# Patient Record
Sex: MALE
[De-identification: names, ages, dates, MRNs, and addresses within clinical notes are randomized per-mention and may not be internally consistent; named-entity substitution may affect disease eponyms.]

---

## 2022-05-02 ENCOUNTER — HOSPITAL ENCOUNTER (INPATIENT)
Dept: HOSPITAL 5 - ED | Age: 37
LOS: 32 days | Discharge: HOME HEALTH | DRG: 981 | End: 2022-06-03
Attending: STUDENT IN AN ORGANIZED HEALTH CARE EDUCATION/TRAINING PROGRAM | Admitting: HOSPITALIST
Payer: SELF-PAY

## 2022-05-02 DIAGNOSIS — Z88.0: ICD-10-CM
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
## 2022-05-02 DIAGNOSIS — E88.09: ICD-10-CM

## 2022-05-02 DIAGNOSIS — T79.A22A: Primary | ICD-10-CM

## 2022-05-02 DIAGNOSIS — N17.0: ICD-10-CM

## 2022-05-02 DIAGNOSIS — Z88.1: ICD-10-CM

## 2022-05-02 DIAGNOSIS — G62.9: ICD-10-CM

## 2022-05-02 DIAGNOSIS — Z71.3: ICD-10-CM

## 2022-05-02 DIAGNOSIS — L03.116: ICD-10-CM

## 2022-05-02 DIAGNOSIS — I10: ICD-10-CM

## 2022-05-02 DIAGNOSIS — E43: ICD-10-CM

## 2022-05-02 DIAGNOSIS — Z79.899: ICD-10-CM
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
## 2022-05-02 DIAGNOSIS — K72.00: ICD-10-CM

## 2022-05-02 DIAGNOSIS — E66.9: ICD-10-CM

## 2022-05-02 DIAGNOSIS — E83.51: ICD-10-CM

## 2022-05-02 DIAGNOSIS — Z20.822: ICD-10-CM

## 2022-05-02 DIAGNOSIS — M62.82: ICD-10-CM
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
## 2022-05-02 DIAGNOSIS — R74.01: ICD-10-CM

## 2022-05-02 PROCEDURE — 85018 HEMOGLOBIN: CPT

## 2022-05-02 PROCEDURE — 93922 UPR/L XTREMITY ART 2 LEVELS: CPT

## 2022-05-02 PROCEDURE — 82140 ASSAY OF AMMONIA: CPT

## 2022-05-02 PROCEDURE — 83690 ASSAY OF LIPASE: CPT

## 2022-05-02 PROCEDURE — 93925 LOWER EXTREMITY STUDY: CPT

## 2022-05-02 PROCEDURE — 85027 COMPLETE CBC AUTOMATED: CPT

## 2022-05-02 PROCEDURE — 80074 ACUTE HEPATITIS PANEL: CPT

## 2022-05-02 PROCEDURE — 87806 HIV AG W/HIV1&2 ANTB W/OPTIC: CPT

## 2022-05-02 PROCEDURE — 87086 URINE CULTURE/COLONY COUNT: CPT

## 2022-05-02 PROCEDURE — 84100 ASSAY OF PHOSPHORUS: CPT

## 2022-05-02 PROCEDURE — 85025 COMPLETE CBC W/AUTO DIFF WBC: CPT

## 2022-05-02 PROCEDURE — 96375 TX/PRO/DX INJ NEW DRUG ADDON: CPT

## 2022-05-02 PROCEDURE — 76700 US EXAM ABDOM COMPLETE: CPT

## 2022-05-02 PROCEDURE — 82248 BILIRUBIN DIRECT: CPT

## 2022-05-02 PROCEDURE — 82962 GLUCOSE BLOOD TEST: CPT

## 2022-05-02 PROCEDURE — 36415 COLL VENOUS BLD VENIPUNCTURE: CPT

## 2022-05-02 PROCEDURE — 94640 AIRWAY INHALATION TREATMENT: CPT

## 2022-05-02 PROCEDURE — 96376 TX/PRO/DX INJ SAME DRUG ADON: CPT

## 2022-05-02 PROCEDURE — 82550 ASSAY OF CK (CPK): CPT

## 2022-05-02 PROCEDURE — 85610 PROTHROMBIN TIME: CPT

## 2022-05-02 PROCEDURE — 96365 THER/PROPH/DIAG IV INF INIT: CPT

## 2022-05-02 PROCEDURE — 80048 BASIC METABOLIC PNL TOTAL CA: CPT

## 2022-05-02 PROCEDURE — 85014 HEMATOCRIT: CPT

## 2022-05-02 PROCEDURE — 85007 BL SMEAR W/DIFF WBC COUNT: CPT

## 2022-05-02 PROCEDURE — 80076 HEPATIC FUNCTION PANEL: CPT

## 2022-05-02 PROCEDURE — 96367 TX/PROPH/DG ADDL SEQ IV INF: CPT

## 2022-05-02 PROCEDURE — 87040 BLOOD CULTURE FOR BACTERIA: CPT

## 2022-05-02 PROCEDURE — 99285 EMERGENCY DEPT VISIT HI MDM: CPT

## 2022-05-02 PROCEDURE — 80053 COMPREHEN METABOLIC PANEL: CPT

## 2022-05-02 PROCEDURE — 83735 ASSAY OF MAGNESIUM: CPT

## 2022-05-02 PROCEDURE — 87116 MYCOBACTERIA CULTURE: CPT

## 2022-05-02 PROCEDURE — U0003 INFECTIOUS AGENT DETECTION BY NUCLEIC ACID (DNA OR RNA); SEVERE ACUTE RESPIRATORY SYNDROME CORONAVIRUS 2 (SARS-COV-2) (CORONAVIRUS DISEASE [COVID-19]), AMPLIFIED PROBE TECHNIQUE, MAKING USE OF HIGH THROUGHPUT TECHNOLOGIES AS DESCRIBED BY CMS-2020-01-R: HCPCS

## 2022-05-02 PROCEDURE — 82150 ASSAY OF AMYLASE: CPT

## 2022-05-02 PROCEDURE — 85730 THROMBOPLASTIN TIME PARTIAL: CPT

## 2022-05-02 NOTE — EMERGENCY DEPARTMENT REPORT
ED Allergic Reaction HPI





- General


Chief complaint: Allergic Reaction


Stated complaint: ALTERED


Time Seen by Provider: 05/02/22 23:19


Source: patient


Mode of arrival: Ambulatory


Limitations: Language Barrier





- History of Present Illness


Initial Comments: 





Patient is a 36-year-old male presenting with complaint of adverse drug 

reaction.  He reportedly received an IM penicillin shot in his left thigh 

earlier today for a "sore throat "with the exact time unknown.  He reportedly 

collapsed while checking in to the emergency department.  He complains of severe

10 out of 10 pain in his left thigh and has noticeable mottling of skin in his 

thigh and left lower abdomen.  He denies shortness of breath, oral swelling or 

itching.  Reports history of buttock injections performed in [place] in 2017.


MD Complaint: allergic reaction


Exposure: medication


Symptoms: other (Pain in left thigh/leg)


Treatment Prior to Arrival: none





- Related Data


                                    Allergies











Allergy/AdvReac Type Severity Reaction Status Date / Time


 


No Known Allergies Allergy   Verified 05/02/22 23:32














ED Review of Systems


ROS: 


Stated complaint: ALTERED


Other details as noted in HPI





Constitutional: denies: chills, fever


Respiratory: denies: cough, shortness of breath, wheezing


Cardiovascular: denies: chest pain, palpitations


Gastrointestinal: denies: abdominal pain, nausea, diarrhea





ED Past Medical Hx





- Social History


Smoking Status: Unknown if ever smoked





ED Physical Exam





- General


Limitations: Language Barrier


General appearance: alert, in distress





- Head


Head exam: Present: atraumatic, normocephalic





- Eye


Eye exam: Present: normal appearance





- ENT


ENT exam: Present: normal orophraynx, mucous membranes moist





- Neck


Neck exam: Present: normal inspection





- Respiratory


Respiratory exam: Present: normal lung sounds bilaterally.  Absent: respiratory 

distress, wheezes





- Cardiovascular


Cardiovascular Exam: Present: normal rhythm, tachycardia, normal heart sounds





- GI/Abdominal


GI/Abdominal exam: Present: soft, normal bowel sounds.  Absent: tenderness





- Rectal


Rectal exam: Present: deferred





- Neurological Exam


Neurological exam: Present: alert, oriented X3, CN II-XII intact





- Psychiatric


Psychiatric exam: Present: normal affect, normal mood





- Skin


Skin exam: Present: warm, dry, intact, normal color, rash (Reticular/lacy rash 

to left lower abdomen and left thigh)





ED Course


                                   Vital Signs











  05/02/22 05/03/22 05/03/22





  23:11 00:05 00:08


 


Temperature 98.6 F  


 


Pulse Rate 110 H  92 H


 


Respiratory 24 29 H 36 H





Rate   


 


Blood Pressure 174/94  


 


Blood Pressure   





[Right]   


 


O2 Sat by Pulse 100  100





Oximetry   














  05/03/22 05/03/22 05/03/22





  00:11 00:12 00:16


 


Temperature 99.0 F  


 


Pulse Rate 92 H  90


 


Respiratory 30 H 30 H 26 H





Rate   


 


Blood Pressure   167/102


 


Blood Pressure 160/103  





[Right]   


 


O2 Sat by Pulse 100 100 99





Oximetry   














  05/03/22 05/03/22 05/03/22





  00:30 00:35 00:46


 


Temperature   


 


Pulse Rate 91 H  86


 


Respiratory 21 23 27 H





Rate   


 


Blood Pressure 170/98  162/99


 


Blood Pressure   





[Right]   


 


O2 Sat by Pulse 98  100





Oximetry   














  05/03/22 05/03/22 05/03/22





  01:00 01:16 01:30


 


Temperature   


 


Pulse Rate 92 H 97 H 95 H


 


Respiratory 35 H 19 21





Rate   


 


Blood Pressure 162/99 162/99 162/99


 


Blood Pressure   





[Right]   


 


O2 Sat by Pulse 94 98 99





Oximetry   














  05/03/22 05/03/22 05/03/22





  01:46 02:00 02:16


 


Temperature   


 


Pulse Rate 107 H 106 H 109 H


 


Respiratory 32 H 16 15





Rate   


 


Blood Pressure 162/99 153/95 146/78


 


Blood Pressure   





[Right]   


 


O2 Sat by Pulse 99 98 98





Oximetry   














  05/03/22 05/03/22 05/03/22





  02:30 02:46 03:00


 


Temperature   


 


Pulse Rate 109 H 102 H 107 H


 


Respiratory 22 24 13





Rate   


 


Blood Pressure 134/72 135/72 147/92


 


Blood Pressure   





[Right]   


 


O2 Sat by Pulse 97 98 98





Oximetry   














  05/03/22 05/03/22 05/03/22





  03:13 03:16 03:30


 


Temperature   


 


Pulse Rate  104 H 104 H


 


Respiratory 26 H 25 H 32 H





Rate   


 


Blood Pressure  139/72 138/77


 


Blood Pressure   





[Right]   


 


O2 Sat by Pulse  84 93





Oximetry   














  05/03/22 05/03/22 05/03/22





  03:43 03:46 03:59


 


Temperature   98.9 F


 


Pulse Rate  106 H 


 


Respiratory 32 H 31 H 





Rate   


 


Blood Pressure  147/78 


 


Blood Pressure   





[Right]   


 


O2 Sat by Pulse  94 





Oximetry   














  05/03/22 05/03/22 05/03/22





  04:00 04:16 04:30


 


Temperature 98.9 F  


 


Pulse Rate 105 H 117 H 98 H


 


Respiratory 30 H 23 19





Rate   


 


Blood Pressure 143/81 133/79 139/73


 


Blood Pressure   





[Right]   


 


O2 Sat by Pulse 93 93 94





Oximetry   














  05/03/22 05/03/22 05/03/22





  04:54 05:00 05:16


 


Temperature   


 


Pulse Rate 102 H 104 H 109 H


 


Respiratory 27 H 28 H 21





Rate   


 


Blood Pressure 139/73 139/73 140/78


 


Blood Pressure   





[Right]   


 


O2 Sat by Pulse 96 96 96





Oximetry   














  05/03/22





  05:20


 


Temperature 98.0 F


 


Pulse Rate 


 


Respiratory 





Rate 


 


Blood Pressure 


 


Blood Pressure 





[Right] 


 


O2 Sat by Pulse 





Oximetry 














ED Medical Decision Making





- Lab Data


Result diagrams: 


                                 05/02/22 23:35





                                 05/02/22 23:35





- Medical Decision Making





36-year-old male presenting with complaint of cute onset left thigh pain.  

Initial assessment reveals mottling of skin involving the proximal thigh and 

left lower abdomen.  Patient is in severe pain.  He was given IV morphine 

followed by IV Dilaudid.  Ultrasound obtained and negative for DVT.  WBC count 

14,000.


CT of the left leg performed.  Possibly cellulitis.  No drainable fluid 

collection present.  Will start on empiric antibiotics and admit to hospitalist 

service with surgery consult.


Critical care attestation.: 


If time is entered above; I have spent that time in minutes in the direct care 

of this critically ill patient, excluding procedure time.








ED Disposition


Clinical Impression: 


 Left leg pain, Complication of surgery





Disposition: 09 ADMITTED AS INPATIENT


Is pt being admited?: Yes


Condition: Stable

## 2022-05-03 LAB
ALBUMIN SERPL-MCNC: 4 G/DL (ref 3.9–5)
ALBUMIN SERPL-MCNC: 4.4 G/DL (ref 3.9–5)
ALBUMIN SERPL-MCNC: 4.6 G/DL (ref 3.9–5)
ALT SERPL-CCNC: 303 UNITS/L (ref 7–56)
ALT SERPL-CCNC: 31 UNITS/L (ref 7–56)
ALT SERPL-CCNC: 460 UNITS/L (ref 7–56)
BAND NEUTROPHILS # (MANUAL): 0 K/MM3
BASOPHILS # (AUTO): 0 K/MM3 (ref 0–0.1)
BASOPHILS NFR BLD AUTO: 0.1 % (ref 0–1.8)
BUN SERPL-MCNC: 10 MG/DL (ref 9–20)
BUN SERPL-MCNC: 12 MG/DL (ref 9–20)
BUN SERPL-MCNC: 14 MG/DL (ref 9–20)
BUN/CREAT SERPL: 13 %
BUN/CREAT SERPL: 13 %
BUN/CREAT SERPL: 15 %
CALCIUM SERPL-MCNC: 8 MG/DL (ref 8.4–10.2)
CALCIUM SERPL-MCNC: 8.3 MG/DL (ref 8.4–10.2)
CALCIUM SERPL-MCNC: 9.3 MG/DL (ref 8.4–10.2)
EOSINOPHIL # BLD AUTO: 0 K/MM3 (ref 0–0.4)
EOSINOPHIL NFR BLD AUTO: 0 % (ref 0–4.3)
HCT VFR BLD CALC: 46.9 % (ref 35.5–45.6)
HCT VFR BLD CALC: 51 % (ref 35.5–45.6)
HEMOLYSIS INDEX: 10
HEMOLYSIS INDEX: 13
HEMOLYSIS INDEX: 8
HGB BLD-MCNC: 15.8 GM/DL (ref 11.8–15.2)
HGB BLD-MCNC: 17.7 GM/DL (ref 11.8–15.2)
INR PPP: 0.92 (ref 0.87–1.13)
LYMPHOCYTES # BLD AUTO: 0.7 K/MM3 (ref 1.2–5.4)
LYMPHOCYTES NFR BLD AUTO: 5.4 % (ref 13.4–35)
MCHC RBC AUTO-ENTMCNC: 34 % (ref 32–34)
MCHC RBC AUTO-ENTMCNC: 35 % (ref 32–34)
MCV RBC AUTO: 97 FL (ref 84–94)
MCV RBC AUTO: 98 FL (ref 84–94)
MONOCYTES # (AUTO): 1.4 K/MM3 (ref 0–0.8)
MONOCYTES % (AUTO): 10 % (ref 0–7.3)
MYELOCYTES # (MANUAL): 0 K/MM3
PLATELET # BLD: 176 K/MM3 (ref 140–440)
PLATELET # BLD: 217 K/MM3 (ref 140–440)
PROMYELOCYTES # (MANUAL): 0 K/MM3
RBC # BLD AUTO: 4.77 M/MM3 (ref 3.65–5.03)
RBC # BLD AUTO: 5.27 M/MM3 (ref 3.65–5.03)
TOTAL CELLS COUNTED BLD: 100

## 2022-05-03 PROCEDURE — 0KNR0ZZ RELEASE LEFT UPPER LEG MUSCLE, OPEN APPROACH: ICD-10-PCS | Performed by: SURGERY

## 2022-05-03 RX ADMIN — IPRATROPIUM BROMIDE AND ALBUTEROL SULFATE SCH AMPUL: .5; 3 SOLUTION RESPIRATORY (INHALATION) at 20:52

## 2022-05-03 RX ADMIN — METHYLPREDNISOLONE SODIUM SUCCINATE SCH MG: 125 INJECTION, POWDER, FOR SOLUTION INTRAMUSCULAR; INTRAVENOUS at 22:07

## 2022-05-03 RX ADMIN — MORPHINE SULFATE PRN MG: 2 INJECTION, SOLUTION INTRAMUSCULAR; INTRAVENOUS at 06:48

## 2022-05-03 RX ADMIN — HYDROMORPHONE HYDROCHLORIDE PRN MG: 1 INJECTION, SOLUTION INTRAMUSCULAR; INTRAVENOUS; SUBCUTANEOUS at 22:11

## 2022-05-03 RX ADMIN — Medication SCH: at 11:42

## 2022-05-03 RX ADMIN — HYDROMORPHONE HYDROCHLORIDE PRN MG: 1 INJECTION, SOLUTION INTRAMUSCULAR; INTRAVENOUS; SUBCUTANEOUS at 18:40

## 2022-05-03 RX ADMIN — HEPARIN SODIUM SCH UNIT: 5000 INJECTION, SOLUTION INTRAVENOUS; SUBCUTANEOUS at 22:07

## 2022-05-03 RX ADMIN — IPRATROPIUM BROMIDE AND ALBUTEROL SULFATE SCH: .5; 3 SOLUTION RESPIRATORY (INHALATION) at 20:52

## 2022-05-03 RX ADMIN — HEPARIN SODIUM SCH: 5000 INJECTION, SOLUTION INTRAVENOUS; SUBCUTANEOUS at 11:42

## 2022-05-03 RX ADMIN — Medication SCH ML: at 22:08

## 2022-05-03 RX ADMIN — HYDROMORPHONE HYDROCHLORIDE PRN MG: 1 INJECTION, SOLUTION INTRAMUSCULAR; INTRAVENOUS; SUBCUTANEOUS at 13:10

## 2022-05-03 RX ADMIN — FAMOTIDINE SCH: 10 INJECTION, SOLUTION INTRAVENOUS at 11:41

## 2022-05-03 RX ADMIN — HYDROMORPHONE HYDROCHLORIDE PRN MG: 1 INJECTION, SOLUTION INTRAMUSCULAR; INTRAVENOUS; SUBCUTANEOUS at 18:26

## 2022-05-03 RX ADMIN — FAMOTIDINE SCH MG: 10 INJECTION, SOLUTION INTRAVENOUS at 22:07

## 2022-05-03 RX ADMIN — HYDROMORPHONE HYDROCHLORIDE PRN MG: 1 INJECTION, SOLUTION INTRAMUSCULAR; INTRAVENOUS; SUBCUTANEOUS at 08:07

## 2022-05-03 NOTE — OPERATIVE REPORT
Operative Report


Operative Report: 





Date of Procedure: 5/3/2022





Pre-operative Diagnosis: Left Lower Extremity Compartment Syndrome





Post-operative Diagnosis: Same





Procedure(s):


1.  Left Leg 4 Compartment Fasciotomy





Surgeon: Vikas Laboy M.D. 





Assistant: None





Anesthesia: General Endotracheal Anesthesia





EBL: None





Counts: Correct





Complications: None





Condition: Stable





Findings: The muscle bulged out of all compartments once opened.   The muscle 

and all 4 compartments was pink and appeared viable however there was minimal 

reaction to manipulation with cautery.





Specimen: None





Indication: 





The patient is a 36-year-old transgender female who presented to the emergency 

department with complaints of left thigh pain with numbness and decreased motor 

of the left foot.  She had recently injected  Rocephin into her left 

thigh which resulted in the symptoms approximate 1 hour after the injection.  

Initial evaluation of the patient was benign from a vascular standpoint however 

reevaluation revealed the patient had developed severe pain in the left leg and 

on physical exam had signs and symptoms consistent with compartment syndrome.  

She is in need of a 4 compartment fasciotomy.  The patient was given the risk, 

benefits, and alternative procedures and consented to the procedure.





Description of Procedure:





The patient was brought to the operating room and laid in supine position.  

After timeout was performed general endotracheal anesthesia was achieved and the

left leg was prepped and draped in normal sterile fashion.  A longitudinal 

incision was created on the medial aspect of the leg extending from just 

proximal to the ankle to just distal to the knee.  Cautery was used to carry the

dissection down to the fascia and then the fascia of the posterior compartment 

was opened using long Metzenbaum scissors.  The soleus was then taken down from 

the tibia exposing the flexor digitorum longus as well as the tibialis 

posterior.  The fascia was released to decompress the deep compartment.  

Evaluation of all muscle revealed that the muscle was pink and appeared viable 

however the muscle did not react to manipulation with cautery.  I then made a 

longitudinal incision on the lateral aspect of the leg and carried this down to 

the fascia using cautery.  The the fascia of the anterior compartment was then 

opened with a long Metzenbaums followed by opening the fascia of the lateral 

compartment using a low Metzenbaums.  Evaluation of the muscle revealed that all

muscle appeared pink and viable however the muscle did not react to manipulation

with cautery.  Evaluation of the foot revealed that the cyanosis improved and 

there was a palpable dorsalis pedis pulse.  The posterior tibial artery had a 

multiphasic signal with Doppler.  Hemostasis within both wounds was achieved 

with a combination of manual pressure and cautery.  Once hemostasis was achieved

both wounds were dressed with Xeroform gauze to cover the muscle, Kerlix rolls 

to pack the wounds, ABDs followed by Kerlix roll to secure them in place and 

then a 6 inch Ace bandage.  The patient tolerated the procedure well.  All 

sponge, needle, and instrument counts were correct.  The patient was taken to 

the recovery area in stable condition.

## 2022-05-03 NOTE — ANESTHESIA CONSULTATION
Anesthesia Consult and Med Hx


Date of service: 05/03/22





- Airway


Anesthetic Teeth Evaluation: Good


ROM Head & Neck: Adequate


Mental/Hyoid Distance: Adequate


Mallampati Class: Class I


Intubation Access Assessment: Good





- Pre-Operative Health Status


ASA Pre-Surgery Classification: ASA3, Emergency


Proposed Anesthetic Plan: General





- Pulmonary


Hx Smoking: No


Hx Sleep Apnea: No





- Endocrine


Hx Liver Disease: Yes (Markedly elevated LFTs)

## 2022-05-03 NOTE — VASCULAR LAB REPORT
DUPLEX DOPPLER LOWER EXTREMITY ARTERIAL, BILATERAL



INDICATION / CLINICAL INFORMATION:

mottling leg.



TECHNIQUE:

Arterial duplex examination of both lower extremities performed using B-mode, color flow and spectral
 Doppler assessment, detailed separately. Bilateral ABIs were performed.



FINDINGS: 



Right ALEX: 0.98

Left ALEX: 0.95



IMPRESSION:

Normal ABIs.



Signer Name: Murray Epperson MD 

Signed: 5/3/2022 1:22 PM

Workstation Name: [id number]

## 2022-05-03 NOTE — EVENT NOTE
Date: 05/03/22





Patient re-evaluated and upon entering the room, the patient found with 

worsening pain of the left leg.  The patient's cousin is providing interpr

etation and states the patient began complaining of the worsening pain over the 

last 20-30 minutes.  Her left calf is tense and tender in both the anterior and 

posterior compartments.  He has limited motor of the foot.  He has thready 

dorsalis pedis and posterior tibial pulses.  Patient has compartment syndrome of

the left lower leg.  He will require a left leg four compartment fasciotomy.  

The details of the procedure were discussed the patient through interpretation 

from the cousin.  He expressed understanding and agrees to proceed.

## 2022-05-03 NOTE — VASCULAR LAB REPORT
DUPLEX DOPPLER LOWER EXTREMITY VEINS, LEFT



INDICATION / CLINICAL INFORMATION: Leg pain.



TECHNIQUE: Duplex doppler imaging was performed through the veins of the left lower extremity using v
enous compression and other maneuvers.



COMPARISON: None available.



FINDINGS:



LEFT COMMON FEMORAL VEIN: Negative.

LEFT FEMORAL VEIN: Negative.

LEFT POPLITEAL VEIN: Negative.

LEFT CALF VEINS: Negative.



ADDITIONAL FINDINGS: None.



IMPRESSION:

1. No sonographic evidence for DVT in the left lower extremity.



Signer Name: Brando Baker II, MD 

Signed: 5/3/2022 3:33 AM

Workstation Name: [id number]

## 2022-05-03 NOTE — CONSULTATION
History of Present Illness


Consult date: 22





- History of present illness


History of present illness: 





36 year old transgender female presents to the ER within a few hours after 

injecting  Rocephin into her left thigh to treat a throat infection last 

night.  She said within 2 minutes after the injection she noticed skin changes 

on her left side starting from her lower abdomen down her leg.  She says she 

began to have leg pain.  She rates the pain between 6 and 8 out of 10.  She 

recently since she has been admitted noticed loss of sensation and decreased 

motor ability distal to her left ankle.  She denies any nausea vomiting or 

difficulty breathing.  General surgery was called for consultation.  I asked him

to get a CAT scan of her left leg.  CT scan showed no fluid collections.





Past History


Past Surgical History: Other (Buttock injection performed in [place] in 2017 

(silicone?), breast implants)


Social history: other (Unknown)


Family history: no significant family history





Medications and Allergies


                                    Allergies











Allergy/AdvReac Type Severity Reaction Status Date / Time


 


Penicillins Allergy  Unknown Verified 22 07:27











Active Meds: 


Active Medications





Acetaminophen (Acetaminophen 325 Mg Tab)  650 mg PO Q4H PRN


   PRN Reason: Pain MILD(1-3)/Fever >100.5/HA


Albuterol (Albuterol 2.5 Mg/3 Ml Nebu)  2.5 mg IH Q3HRT PRN


   PRN Reason: Shortness Of Breath


Albuterol/Ipratropium (Ipratropium/Albuterol Sulfate 3 Ml Ampul.Neb)  1 ampul IH

Q6HRT [facility]


Diphenhydramine HCl (Diphenhydramine 50 Mg/Ml Vial)  25 mg IV Q6H PRN


   PRN Reason: Skin Irritation


Famotidine (Famotidine 20 Mg/2 Ml Inj)  20 mg IV BID [facility]


   Last Admin: 22 11:41 Dose:  Not Given


   


Heparin Sodium (Porcine) (Heparin 5,000 Unit/1 Ml Vial)  5,000 unit SUB-Q Q12HR 

[facility]


   Last Admin: 22 11:42 Dose:  Not Given


   


Hydromorphone HCl (Hydromorphone 1 Mg/1 Ml Inj)  0.5 mg IV Q3H PRN


   PRN Reason: Pain , Severe (7-10)


   Last Admin: 22 08:07 Dose:  0.5 mg


   


Sodium Chloride (Nacl 0.45% 1000 Ml)  1,000 mls @ 125 mls/hr IV AS DIRECT CHERYL


Levofloxacin/Dextrose (Levaquin 750mg/150ml)  750 mg in 150 mls @ 100 mls/hr IV 

Q24H [facility]; Protocol


   Last Admin: 22 08:06 Dose:  100 mls/hr


   


Morphine Sulfate (Morphine 2 Mg/1 Ml Inj)  2 mg IV Q4H PRN


   PRN Reason: Pain, Moderate (4-6)


   Last Admin: 22 06:48 Dose:  2 mg


   


Ondansetron HCl (Ondansetron 4 Mg/2 Ml Inj)  4 mg IV Q8H PRN


   PRN Reason: Nausea And Vomiting


Sodium Chloride (Sodium Chloride 0.9% 10 Ml Flush Syringe)  10 ml IV BID [facility]


   Last Admin: 22 11:42 Dose:  Not Given


   


Sodium Chloride (Sodium Chloride 0.9% 10 Ml Flush Syringe)  10 ml IV PRN PRN


   PRN Reason: LINE FLUSH





PreP





Review of Systems


All systems: negative





- Muskuloskeletal


limitation of motion





- Integumentary


color changes





Exam


                                   Vital Signs











Temp Pulse Resp BP Pulse Ox


 


 98.6 F   110 H  24   174/94   100 


 


 22 23:11  22 23:11  22 23:11  22 23:11  22 23:11














- General physical appearance


Positive: well developed, no distress, no pain





- Eyes


Positive: PERRL





- ENT


Positive: no hearing loss





- Respiratory


Positive: normal expansion, normal respiratory effort





- Cardiovascular


Heart Sounds: Present: S1 & S2





- Extremities


Extremities: abnormal (Left leg with patchy mottling from the thigh down to the 

foot.  Easily palpable DP, PT pulses.  Patient has full sensation down to the 

ankle, foot has decreased sensation to both pain and unable to move toes.  

Patient is able to lift her leg from the hip and bend at the knee without 

difficulty. )


Extremity abnormal: other (Slight swelling on left leg compared to right.  All 

compartments are soft to palpation.  Patient able to tolerate deep palpation 

without difficulty.)





Results





- Labs





                                 22 23:35





                                 22 23:35


                              Abnormal lab results











  22 Range/Units





  23:35 23:35 


 


WBC  14.0 H   (4.5-11.0)  K/mm3


 


Hgb  15.8 H   (11.8-15.2)  gm/dl


 


Hct  46.9 H   (35.5-45.6)  %


 


MCV  98 H   (84-94)  fl


 


MCH  33 H   (28-32)  pg


 


Lymphocytes % (Manual)  41.0 H   (13.4-35.0)  %


 


Lymphocytes # (Manual)  5.7 H   (1.2-5.4)  K/mm3


 


Monocytes # (Manual)  1.0 H   (0.0-0.8)  K/mm3


 


Potassium   3.3 L  (3.6-5.0)  mmol/L


 


Carbon Dioxide   18 L  (22-30)  mmol/L


 


Glucose   171 H  ()  mg/dL








                                 Diabetes panel











  22 Range/Units





  23:35 


 


Sodium  139  (137-145)  mmol/L


 


Potassium  3.3 L  (3.6-5.0)  mmol/L


 


Chloride  99.3  ()  mmol/L


 


Carbon Dioxide  18 L  (22-30)  mmol/L


 


BUN  14  (9-20)  mg/dL


 


Creatinine  1.1  (0.8-1.3)  mg/dL


 


Glucose  171 H  ()  mg/dL


 


Calcium  9.3  (8.4-10.2)  mg/dL


 


AST  31  (5-40)  units/L


 


ALT  31  (7-56)  units/L


 


Alkaline Phosphatase  86  ()  units/L


 


Total Protein  7.2  (6.3-8.2)  g/dL


 


Albumin  4.6  (3.9-5)  g/dL








                                  Calcium panel











  22 Range/Units





  23:35 


 


Calcium  9.3  (8.4-10.2)  mg/dL


 


Albumin  4.6  (3.9-5)  g/dL








                                 Pituitary panel











  22 Range/Units





  23:35 


 


Sodium  139  (137-145)  mmol/L


 


Potassium  3.3 L  (3.6-5.0)  mmol/L


 


Chloride  99.3  ()  mmol/L


 


Carbon Dioxide  18 L  (22-30)  mmol/L


 


BUN  14  (9-20)  mg/dL


 


Creatinine  1.1  (0.8-1.3)  mg/dL


 


Glucose  171 H  ()  mg/dL


 


Calcium  9.3  (8.4-10.2)  mg/dL








                                  Adrenal panel











  22 Range/Units





  23:35 


 


Sodium  139  (137-145)  mmol/L


 


Potassium  3.3 L  (3.6-5.0)  mmol/L


 


Chloride  99.3  ()  mmol/L


 


Carbon Dioxide  18 L  (22-30)  mmol/L


 


BUN  14  (9-20)  mg/dL


 


Creatinine  1.1  (0.8-1.3)  mg/dL


 


Glucose  171 H  ()  mg/dL


 


Calcium  9.3  (8.4-10.2)  mg/dL


 


Total Bilirubin  0.40  (0.1-1.2)  mg/dL


 


AST  31  (5-40)  units/L


 


ALT  31  (7-56)  units/L


 


Alkaline Phosphatase  86  ()  units/L


 


Total Protein  7.2  (6.3-8.2)  g/dL


 


Albumin  4.6  (3.9-5)  g/dL














Assessment and Plan





36-year-old with left lower extremity skin mottling and decreased distal 

sensation and motor skills secondary to self injection of  Rocephin.  

Unclear etiology of sensory motor deficit.  Possibly due to allergic reaction.  

Do not believe there is an infectious process, there were no fluid collections 

to drain requiring surgical intervention.  Discussed case with Dr. Cantrell who did 

a vascular evaluation who believes is unlikely the patient has compartment 

syndrome as a likely cause of neurological deficit.  He said he will reevaluate 

the patient.  No acute surgical intervention indicated at this time.  Agree with

his recommendation of supportive care for allergic reaction.

## 2022-05-03 NOTE — PROGRESS NOTE
Assessment and Plan


Assessment and plan: 


--Left lower extremity cellulitis


Elevate the limb, empiric antibiotics cultures, cultures


Supportive care





--Possible allergic reaction ; to Rocephin


Continue Benadryl, high-dose steroids, Pepcid


Supportive care





-- Possible compartment syndrome ;


Elevate the limb, IV antibiotics, vascular/IR consulted, discussed with Dr. Cantrell


IR evaluated, CT lower extremity findings reviewed


Left lower extremity arterial Doppler, venous Doppler reviewed





--Left leg severe pain;


Pain medications, elevate the limb, supportive care





--Transaminitis/acute hepatic injury;


Monitor liver function tests,


Ultrasound abdomen


GI consult, acute hepatitis panel





--DVT prophylaxis


 Subcu Heparin 5000 held pending's vascular procedure





--obesity; BMI 30.7;


Patient needs weight reduction when medically stable





-- Full CODE STATUS;





We will closely monitor the patient and adjust management as needed


Plan of care reviewed with the patient, family member at the bedside and the 

nurse


I discussed extensively with vascular/IR Dr. Cantrell


I also informed neurology Dr. Nishant Harmon





Patient will be closely observed in IMCU overnight postprocedure per vascular 

recommendations.





History


Interval history: 


I have seen and examined the patient at the bedside, this morning


patient's chart and medications reviewed patient was admitted with


Left lower extremity cellulitis/allergic reaction to some intramuscular 

injection.


Patient complains of severe pain in the right lower extremity


Patient is in mild distress vital signs reviewed








Hospitalist Physical





- Constitutional


Vitals: 


                                        











Temp Pulse Resp BP Pulse Ox


 


 98.0 F   111 H  26 H  143/90   95 


 


 05/03/22 05:20  05/03/22 11:30  05/03/22 11:30  05/03/22 11:30  05/03/22 11:30











General appearance: Present: mild distress, well-nourished, obese





- EENT


Eyes: Present: PERRL, EOM intact


ENT: hearing intact, clear oral mucosa





- Neck


Neck: Present: supple, normal ROM





- Respiratory


Respiratory effort: normal


Respiratory: bilateral: diminished, negative: rales, rhonchi, wheezing





- Cardiovascular


Rhythm: regular


Heart Sounds: Present: S1 & S2





- Extremities


Extremities: abnormal (Left lower extremity swelling, erythema and mottling, 

tender to palpation)


Extremity abnormal: other (Unable to move left toes and foot)





- Abdominal


General gastrointestinal: soft, non-tender, non-distended, normal bowel sounds





- Integumentary


Integumentary: Present: clear, warm





- Psychiatric


Psychiatric: appropriate mood/affect, cooperative





- Neurologic


Neurologic: other (Left lower extremity neuropathy, unable to move left foot and

toes)





Results





- Labs


CBC & Chem 7: 


                                 05/03/22 13:30





                                 05/03/22 13:30


Labs: 


                             Laboratory Last Values











WBC  14.0 K/mm3 (4.5-11.0)  H  05/02/22  23:35    


 


RBC  4.77 M/mm3 (3.65-5.03)   05/02/22  23:35    


 


Hgb  15.8 gm/dl (11.8-15.2)  H  05/02/22  23:35    


 


Hct  46.9 % (35.5-45.6)  H  05/02/22  23:35    


 


MCV  98 fl (84-94)  H  05/02/22  23:35    


 


MCH  33 pg (28-32)  H  05/02/22  23:35    


 


MCHC  34 % (32-34)   05/02/22  23:35    


 


RDW  13.2 % (13.2-15.2)   05/02/22  23:35    


 


Plt Count  217 K/mm3 (140-440)   05/02/22  23:35    


 


Lymph # (Auto)  Np   05/02/22  23:35    


 


Add Manual Diff  Complete   05/02/22  23:35    


 


Total Counted  100   05/02/22  23:35    


 


Seg Neuts % (Manual)  52.0 % (40.0-70.0)   05/02/22  23:35    


 


Band Neutrophils %  0 %  05/02/22  23:35    


 


Lymphocytes % (Manual)  41.0 % (13.4-35.0)  H  05/02/22  23:35    


 


Reactive Lymphs % (Man)  0 %  05/02/22  23:35    


 


Monocytes % (Manual)  7.0 % (0.0-7.3)   05/02/22  23:35    


 


Eosinophils % (Manual)  0 % (0.0-4.3)   05/02/22  23:35    


 


Basophils % (Manual)  0 % (0.0-1.8)   05/02/22  23:35    


 


Metamyelocytes %  0 %  05/02/22  23:35    


 


Myelocytes %  0 %  05/02/22  23:35    


 


Promyelocytes %  0 %  05/02/22  23:35    


 


Blast Cells %  0 %  05/02/22  23:35    


 


Nucleated RBC %  Not Reportable   05/02/22  23:35    


 


Seg Neutrophils # Man  7.3 K/mm3 (1.8-7.7)   05/02/22  23:35    


 


Band Neutrophils #  0.0 K/mm3  05/02/22  23:35    


 


Lymphocytes # (Manual)  5.7 K/mm3 (1.2-5.4)  H  05/02/22  23:35    


 


Abs React Lymphs (Man)  0.0 K/mm3  05/02/22  23:35    


 


Monocytes # (Manual)  1.0 K/mm3 (0.0-0.8)  H  05/02/22  23:35    


 


Eosinophils # (Manual)  0.0 K/mm3 (0.0-0.4)   05/02/22  23:35    


 


Basophils # (Manual)  0.0 K/mm3 (0.0-0.1)   05/02/22  23:35    


 


Metamyelocytes #  0.0 K/mm3  05/02/22  23:35    


 


Myelocytes #  0.0 K/mm3  05/02/22  23:35    


 


Promyelocytes #  0.0 K/mm3  05/02/22  23:35    


 


Blast Cells #  0.0 K/mm3  05/02/22  23:35    


 


WBC Morphology  Not Reportable   05/02/22  23:35    


 


Hypersegmented Neuts  Not Reportable   05/02/22  23:35    


 


Hyposegmented Neuts  Not Reportable   05/02/22  23:35    


 


Hypogranular Neuts  Not Reportable   05/02/22  23:35    


 


Smudge Cells  Not Reportable   05/02/22  23:35    


 


Toxic Granulation  Not Reportable   05/02/22  23:35    


 


Toxic Vacuolation  Not Reportable   05/02/22  23:35    


 


Dohle Bodies  Not Reportable   05/02/22  23:35    


 


Pelger-Huet Anomaly  Not Reportable   05/02/22  23:35    


 


Jamie Rods  Not Reportable   05/02/22  23:35    


 


Platelet Estimate  Consistent w auto   05/02/22  23:35    


 


Clumped Platelets  Not Reportable   05/02/22  23:35    


 


Plt Clumps, EDTA  Not Reportable   05/02/22  23:35    


 


Large Platelets  Not Reportable   05/02/22  23:35    


 


Giant Platelets  Not Reportable   05/02/22  23:35    


 


Platelet Satelliting  Not Reportable   05/02/22  23:35    


 


Plt Morphology Comment  Not Reportable   05/02/22  23:35    


 


RBC Morphology  Normal   05/02/22  23:35    


 


Dimorphic RBCs  Not Reportable   05/02/22  23:35    


 


Polychromasia  Not Reportable   05/02/22  23:35    


 


Hypochromasia  Not Reportable   05/02/22  23:35    


 


Poikilocytosis  Not Reportable   05/02/22  23:35    


 


Anisocytosis  Not Reportable   05/02/22  23:35    


 


Microcytosis  Not Reportable   05/02/22  23:35    


 


Macrocytosis  Not Reportable   05/02/22  23:35    


 


Spherocytes  Not Reportable   05/02/22  23:35    


 


Pappenheimer Bodies  Not Reportable   05/02/22  23:35    


 


Sickle Cells  Not Reportable   05/02/22  23:35    


 


Target Cells  Not Reportable   05/02/22  23:35    


 


Tear Drop Cells  Not Reportable   05/02/22  23:35    


 


Ovalocytes  Not Reportable   05/02/22  23:35    


 


Helmet Cells  Not Reportable   05/02/22  23:35    


 


[place] Bodies  Not Reportable   05/02/22  23:35    


 


Cabot Rings  Not Reportable   05/02/22  23:35    


 


Hartly Cells  Not Reportable   05/02/22  23:35    


 


Bite Cells  Not Reportable   05/02/22  23:35    


 


Crenated Cell  Not Reportable   05/02/22  23:35    


 


Elliptocytes  Not Reportable   05/02/22  23:35    


 


Acanthocytes (Spur)  Not Reportable   05/02/22  23:35    


 


Rouleaux  Not Reportable   05/02/22  23:35    


 


Hemoglobin C Crystals  Not Reportable   05/02/22  23:35    


 


Schistocytes  Not Reportable   05/02/22  23:35    


 


Malaria parasites  Not Reportable   05/02/22  23:35    


 


Miguel Bodies  Not Reportable   05/02/22  23:35    


 


Hem Pathologist Commnt  No   05/02/22  23:35    


 


Sodium  139 mmol/L (137-145)   05/02/22  23:35    


 


Potassium  3.3 mmol/L (3.6-5.0)  L  05/02/22  23:35    


 


Chloride  99.3 mmol/L ()   05/02/22  23:35    


 


Carbon Dioxide  18 mmol/L (22-30)  L  05/02/22  23:35    


 


Anion Gap  25 mmol/L  05/02/22  23:35    


 


BUN  14 mg/dL (9-20)   05/02/22  23:35    


 


Creatinine  1.1 mg/dL (0.8-1.3)   05/02/22  23:35    


 


Estimated GFR  > 60 ml/min  05/02/22  23:35    


 


BUN/Creatinine Ratio  13 %  05/02/22  23:35    


 


Glucose  171 mg/dL ()  H  05/02/22  23:35    


 


Calcium  9.3 mg/dL (8.4-10.2)   05/02/22  23:35    


 


Total Bilirubin  0.40 mg/dL (0.1-1.2)   05/02/22  23:35    


 


AST  31 units/L (5-40)   05/02/22  23:35    


 


ALT  31 units/L (7-56)   05/02/22  23:35    


 


Alkaline Phosphatase  86 units/L ()   05/02/22  23:35    


 


Total Protein  7.2 g/dL (6.3-8.2)   05/02/22  23:35    


 


Albumin  4.6 g/dL (3.9-5)   05/02/22  23:35    


 


Albumin/Globulin Ratio  1.8 %  05/02/22  23:35    














Active Medications





- Current Medications


Current Medications: 














Generic Name Dose Route Start Last Admin





  Trade Name Freq  PRN Reason Stop Dose Admin


 


Acetaminophen  650 mg  05/03/22 06:15 





  Acetaminophen 325 Mg Tab  PO  





  Q4H PRN  





  Pain MILD(1-3)/Fever >100.5/HA  


 


Albuterol  2.5 mg  05/03/22 06:15 





  Albuterol 2.5 Mg/3 Ml Nebu  IH  





  Q3HRT PRN  





  Shortness Of Breath  


 


Albuterol/Ipratropium  1 ampul  05/03/22 08:00 





  Ipratropium/Albuterol Sulfate 3 Ml Ampul.Neb  IH  





  Q6HRT CHERYL  


 


Diphenhydramine HCl  25 mg  05/03/22 06:24 





  Diphenhydramine 50 Mg/Ml Vial  IV  





  Q6H PRN  





  Skin Irritation  


 


Famotidine  20 mg  05/03/22 10:00  05/03/22 11:41





  Famotidine 20 Mg/2 Ml Inj  IV   Not Given





  BID [facility]ERYL  


 


Heparin Sodium (Porcine)  5,000 unit  05/03/22 10:00  05/03/22 11:42





  Heparin 5,000 Unit/1 Ml Vial  SUB-Q   Not Given





  Q12HR [facility]  


 


Hydromorphone HCl  0.5 mg  05/03/22 06:15  05/03/22 08:07





  Hydromorphone 1 Mg/1 Ml Inj  IV   0.5 mg





  Q3H PRN   Administration





  Pain , Severe (7-10)  


 


Sodium Chloride  1,000 mls @ 125 mls/hr  05/03/22 07:00 





  Nacl 0.45% 1000 Ml  IV  





  AS DIRECT [facility]ERYL  


 


Levofloxacin/Dextrose  750 mg in 150 mls @ 100 mls/hr  05/03/22 07:00  05/03/22 

08:06





  Levaquin 750mg/150ml  IV   100 mls/hr





  Q24H [facility]ERYL   Administration





  Protocol  


 


Morphine Sulfate  2 mg  05/03/22 06:15  05/03/22 06:48





  Morphine 2 Mg/1 Ml Inj  IV   2 mg





  Q4H PRN   Administration





  Pain, Moderate (4-6)  


 


Ondansetron HCl  4 mg  05/03/22 06:15 





  Ondansetron 4 Mg/2 Ml Inj  IV  





  Q8H PRN  





  Nausea And Vomiting  


 


Sodium Chloride  10 ml  05/03/22 10:00  05/03/22 11:42





  Sodium Chloride 0.9% 10 Ml Flush Syringe  IV   Not Given





  BID CHERYL  


 


Sodium Chloride  10 ml  05/03/22 06:15 





  Sodium Chloride 0.9% 10 Ml Flush Syringe  IV  





  PRN PRN  





  LINE FLUSH

## 2022-05-03 NOTE — CONSULTATION
History of Present Illness





- Reason for Consult


Consult date: 22


LLE mottling


Requesting physician: AMY J KOCHERLA





- History of Present Illness





36-year-old male who appears to be mixed gender/undergoing gender transformation

with previous history of silicone injections in the buttocks who recently had a 

sore throat and decided to inject his left thigh with a  5 mL container 

of Rocephin with lidocaine.  Afterwards he had significant pain with mottling of

this thigh and left abdomen.





The left abdominal mottling has improved.  The left thigh mottling is still 

present and has now extended to the calf.





The patient has no significant pain at this time with pressure applied to the 

left thigh and left calf with no complaints of pain.  There is numbness of part 

of the left calf and foot with foot drop and inability to move the toes.  This 

is worsened since earlier today.





Palpable pedal pulses bilaterally.  Venous ultrasound demonstrates no DVT.  CT 

demonstrates no collections.  No crepitus in the skin.  There is some left thigh

and left calf mottling but temperature is normal.





Past History


Past Surgical History: Other (Buttock injection performed in [place] in 2017)


Social history: other (Unknown)


Family history: no significant family history





Medications and Allergies


                                    Allergies











Allergy/AdvReac Type Severity Reaction Status Date / Time


 


Penicillins Allergy  Unknown Verified 22 07:27











Active Meds: 


Active Medications





Acetaminophen (Acetaminophen 325 Mg Tab)  650 mg PO Q4H PRN


   PRN Reason: Pain MILD(1-3)/Fever >100.5/HA


Albuterol (Albuterol 2.5 Mg/3 Ml Nebu)  2.5 mg IH Q3HRT PRN


   PRN Reason: Shortness Of Breath


Albuterol/Ipratropium (Ipratropium/Albuterol Sulfate 3 Ml Ampul.Neb)  1 ampul IH

Q6HRT [facility]


Diphenhydramine HCl (Diphenhydramine 50 Mg/Ml Vial)  25 mg IV Q6H PRN


   PRN Reason: Skin Irritation


Famotidine (Famotidine 20 Mg/2 Ml Inj)  20 mg IV BID [facility]


   Last Admin: 22 11:41 Dose:  Not Given


   


Heparin Sodium (Porcine) (Heparin 5,000 Unit/1 Ml Vial)  5,000 unit SUB-Q Q12HR 

[facility]


   Last Admin: 22 11:42 Dose:  Not Given


   


Hydromorphone HCl (Hydromorphone 1 Mg/1 Ml Inj)  0.5 mg IV Q3H PRN


   PRN Reason: Pain , Severe (7-10)


   Last Admin: 22 08:07 Dose:  0.5 mg


   


Sodium Chloride (Nacl 0.45% 1000 Ml)  1,000 mls @ 125 mls/hr IV AS DIRECT [facility]


Levofloxacin/Dextrose (Levaquin 750mg/150ml)  750 mg in 150 mls @ 100 mls/hr IV 

Q24H [facility]; Protocol


   Last Admin: 22 08:06 Dose:  100 mls/hr


   


Morphine Sulfate (Morphine 2 Mg/1 Ml Inj)  2 mg IV Q4H PRN


   PRN Reason: Pain, Moderate (4-6)


   Last Admin: 22 06:48 Dose:  2 mg


   


Ondansetron HCl (Ondansetron 4 Mg/2 Ml Inj)  4 mg IV Q8H PRN


   PRN Reason: Nausea And Vomiting


Sodium Chloride (Sodium Chloride 0.9% 10 Ml Flush Syringe)  10 ml IV BID [facility]


   Last Admin: 22 11:42 Dose:  Not Given


   


Sodium Chloride (Sodium Chloride 0.9% 10 Ml Flush Syringe)  10 ml IV PRN PRN


   PRN Reason: LINE FLUSH











Review of Systems


All systems: negative (see HPI)





Exam





- Constitutional


Vitals: 


                                        











Temp Pulse Resp BP Pulse Ox


 


 98.0 F   111 H  26 H  143/90   95 


 


 22 05:20  22 11:30  22 11:30  22 11:30  22 11:30











General appearance: Present: no acute distress





- EENT


Eyes: Present: EOM intact


ENT: hearing intact





- Respiratory


Respiratory effort: normal





- Extremities


Extremities: normal temperature, abnormal (see HPI)





- Abdominal


General gastrointestinal: Present: soft, non-tender, other (no mottling)





- Psychiatric


Psychiatric: appropriate mood/affect, cooperative





- Neurologic


Neurologic: focal deficits (left calf neuropathy)





Results





- Labs


CBC & Chem 7: 


                                 22 23:35





                                 22 23:35


Labs: 


                              Abnormal lab results











  22 Range/Units





  23:35 23:35 


 


WBC  14.0 H   (4.5-11.0)  K/mm3


 


Hgb  15.8 H   (11.8-15.2)  gm/dl


 


Hct  46.9 H   (35.5-45.6)  %


 


MCV  98 H   (84-94)  fl


 


MCH  33 H   (28-32)  pg


 


Lymphocytes % (Manual)  41.0 H   (13.4-35.0)  %


 


Lymphocytes # (Manual)  5.7 H   (1.2-5.4)  K/mm3


 


Monocytes # (Manual)  1.0 H   (0.0-0.8)  K/mm3


 


Potassium   3.3 L  (3.6-5.0)  mmol/L


 


Carbon Dioxide   18 L  (22-30)  mmol/L


 


Glucose   171 H  ()  mg/dL














Assessment and Plan





36-year-old male with allergic reaction to Rocephin injected yesterday who now 

has mottling of the left abdomen which resolved and mottling of the left thigh w

hich has worsened to the left calf.





The left calf is soft and not painful.  No compartment syndrome.





There is a mottling that happen near immediately after the injection per patient

which occurred on the left abdomen and left thigh and has progressed on the left

lower extremity but resolved on the left abdomen.  Suspect allergic reaction as 

it occurred near immediately.  Infection is less likely as this would not occur 

immediately and would have more pain associated with it as the patient does not 

complain of any discomfort with deep palpation and superficial palpation of the 

left leg.  





There is a dense neuropathy of the left lower extremity involving sensory and 

motor function including a foot drop and inability to move the toes. Unclear 

cause of the neuropathy.  Could be allergic reaction as well.  Consider 

neurology consult.





Recommend aggressive treatment for presumed allergic reaction.

## 2022-05-03 NOTE — CONSULTATION
History of Present Illness


Consult date: 22


Reason for Consult: Left Leg Weakness / Cellulitis


Chief complaint: 





Left Leg Weakness / Pain


History of present illness: 





35 yo transgender female who presents where they injected an  rocephin to

the proximal left leg and soon afterwards (within an hour) noted changes in the 

skin's appearance and then started experiencing some swelling and pain at the 

left leg.  At bedside, patient notes that the swelling and pain involves the 

whole left leg and notes inability to move his left foot or toes.  They describe

the pain as being burning in nature.  Notes the swelling/pain stops at the lower

abdomen/hip. They deny any acute backpain.





Past History


Past Surgical History: Other (Buttock injection performed in [place] in 2017)


Social history: other (Unknown)


Family history: no significant family history





Medications and Allergies


                                    Allergies











Allergy/AdvReac Type Severity Reaction Status Date / Time


 


Penicillins Allergy  Unknown Verified 22 07:27











                                Home Medications











 Medication  Instructions  Recorded  Confirmed  Last Taken  Type


 


No Known Home Medications [No  22 Unknown History





Reported Home Medications]     











Active Meds: 


Active Medications





Acetaminophen (Acetaminophen 325 Mg Tab)  650 mg PO Q4H PRN


   PRN Reason: Pain MILD(1-3)/Fever >100.5/HA


Albuterol (Albuterol 2.5 Mg/3 Ml Nebu)  2.5 mg IH Q3HRT PRN


   PRN Reason: Shortness Of Breath


Albuterol/Ipratropium (Ipratropium/Albuterol Sulfate 3 Ml Ampul.Neb)  1 ampul IH

BIDRT [facility]


Diphenhydramine HCl (Diphenhydramine 50 Mg/Ml Vial)  25 mg IV Q6H PRN


   PRN Reason: Skin Irritation


Famotidine (Famotidine 20 Mg/2 Ml Inj)  20 mg IV BID [facility]


   Last Admin: 22 22:07 Dose:  20 mg


   


Heparin Sodium (Porcine) (Heparin 5,000 Unit/1 Ml Vial)  5,000 unit SUB-Q Q12HR 

[facility]


   Last Admin: 22 22:07 Dose:  5,000 unit


   


Hydromorphone HCl (Hydromorphone 1 Mg/1 Ml Inj)  0.5 mg IV Q3H PRN


   PRN Reason: Pain , Severe (7-10)


   Last Admin: 22 22:11 Dose:  0.5 mg


   


Hydromorphone HCl (Hydromorphone 1 Mg/1 Ml Inj)  0.25 mg IV Q10MIN PRN


   PRN Reason: Pain, Moderate (4-6)


Hydromorphone HCl (Hydromorphone 1 Mg/1 Ml Inj)  0.5 mg IV Q10MIN PRN


   PRN Reason: Pain , Severe (7-10)


   Last Admin: 22 18:40 Dose:  0.5 mg


   


Sodium Chloride (Nacl 0.45% 1000 Ml)  1,000 mls @ 125 mls/hr IV AS DIRECT [facility]


Levofloxacin/Dextrose (Levaquin 750mg/150ml)  750 mg in 150 mls @ 100 mls/hr IV 

Q24H [facility]; Protocol


   Last Admin: 22 08:06 Dose:  100 mls/hr


   


Methylprednisolone Sodium Succinate (Methylprednisolone Sod Succinate 125 Mg/2 

Ml Inj)  125 mg IV Q8HR [facility]


   Last Admin: 22 22:07 Dose:  125 mg


   


Morphine Sulfate (Morphine 2 Mg/1 Ml Inj)  2 mg IV Q4H PRN


   PRN Reason: Pain, Moderate (4-6)


   Last Admin: 22 06:48 Dose:  2 mg


   


Ondansetron HCl (Ondansetron 4 Mg/2 Ml Inj)  4 mg IV Q8H PRN


   PRN Reason: Nausea And Vomiting


Ondansetron HCl (Ondansetron 4 Mg/2 Ml Inj)  4 mg IV ONCE PRN


   PRN Reason: Nausea And Vomiting


Sodium Chloride (Sodium Chloride 0.9% 10 Ml Flush Syringe)  10 ml IV BID [facility]


   Last Admin: 22 22:08 Dose:  10 ml


   


Sodium Chloride (Sodium Chloride 0.9% 10 Ml Flush Syringe)  10 ml IV PRN PRN


   PRN Reason: LINE FLUSH











Review of Systems


All systems: negative (as per hpi;)





Physical Examination





- Vital Signs


Vital Signs: 


                                   Vital Signs











Temp Pulse Resp BP Pulse Ox


 


 98.6 F   110 H  24   174/94   100 


 


 22 23:11  22 23:11  22 23:11  22 23:11  22 23:11














- Physical Exam


Narrative exam: 


Gen: nad, well-nourished;


Head: normocephalic;


Eyes: no gaze deviation; no ptosis;


ENT:  normal vocalization;


CVS: warm and well-perfused;


Pulm: no respiratory distress;


GI: appears non-distended;


Ext: no cyanosis appreciated at distal extremities;


Skin: no acute rash at distal extremities;


Heme: no pathologic ecchymosis appreciated at distal extremities;


Neuro: alert, oriented to name, age, month, year, surroundings, no dysarthria, 

no aphasia, CN 2 - PERRL, visual fields grossly intact, CN 3, 4, 6 - EOMI, CN 5 

- facial sensation symmetric to light touch, CN 7 - facial movement symmetric, 

CN 8 - hearing grossly intact, CN 9, 10 - uvula midline, CN 11  symmetric 

shoulder movement, CN 12 - tongue midline; Motor - at least 5-/5 at right 

extremities and left upper extremity;





Motor - noted with swelling and tenderness to palpation of the muscles of the 

left leg (proximal/distal) with erythema of the whole left leg; no movement (0/5

strength) w/ left ADF/APF and TF/TE.  Noted with at least 4-/5 strength w/ 

KF/KE;  


Sensory - light touch symmetric at BUEs with tenderness / dysesthesia at LLE 

from proximal to distal with numbness distal to left ankle;


Cerebellar - fnf iintact bilaterally w/ difficulty to perform bilateral hts, 

Gait - deferred secondary to left leg weakness/dysesthesia;





Results





- Laboratory Findings


CBC and BMP: 


                                 22 13:30





                                 22 13:30


Abnormal Lab Findings: 


                                  Abnormal Labs











  22





  23:35 23:35 13:30


 


WBC  14.0 H   13.7 H


 


RBC    5.27 H


 


Hgb  15.8 H   17.7 H


 


Hct  46.9 H   51.0 H


 


MCV  98 H   97 H


 


MCH  33 H   34 H


 


MCHC    35 H


 


RDW    13.0 L


 


Lymph % (Auto)    5.4 L


 


Mono % (Auto)    10.0 H


 


Lymph # (Auto)    0.7 L


 


Mono # (Auto)    1.4 H


 


Seg Neutrophils %    84.5 H


 


Lymphocytes % (Manual)  41.0 H  


 


Seg Neutrophils #    11.6 H


 


Lymphocytes # (Manual)  5.7 H  


 


Monocytes # (Manual)  1.0 H  


 


Sodium   


 


Potassium   3.3 L 


 


Chloride   


 


Carbon Dioxide   18 L 


 


Glucose   171 H 


 


Calcium   


 


AST   


 


ALT   














  22





  13:30


 


WBC 


 


RBC 


 


Hgb 


 


Hct 


 


MCV 


 


MCH 


 


MCHC 


 


RDW 


 


Lymph % (Auto) 


 


Mono % (Auto) 


 


Lymph # (Auto) 


 


Mono # (Auto) 


 


Seg Neutrophils % 


 


Lymphocytes % (Manual) 


 


Seg Neutrophils # 


 


Lymphocytes # (Manual) 


 


Monocytes # (Manual) 


 


Sodium  134 L


 


Potassium 


 


Chloride  97.3 L


 


Carbon Dioxide 


 


Glucose  147 H


 


Calcium  8.3 L


 


AST  1130 H


 


ALT  303 H














Assessment and Plan


35 yo transgender female who presents where they injected an  rocephin to

the proximal left leg and soon afterwards (within an hour) noted changes in the 

skin's appearance and then started experiencing some swelling and pain at the 

left leg.





At the time of this note, the patient's symptoms had become worse with worsening

swelling and loss of blood flow to the distal left lower extremity.  Patient was

emergently taken to the OR for fasciotomy for acute compartment syndrome.





1.  Compartment Syndrome - s/p emergent fasciotomies; anti-inflammatories, pain 

management; monitor for rhabdomyolysis (and/or possible renal failure).





2.  Left Leg Weakness - secondary to #1 above; pt/ot evaluation/monitoring when 

clinically stable / surgically cleared.





3.  Paresthesias / Dysesthesias - if not improved s/p fasciotomies, recommend 

gabapentin 100 mg po tid x 7 days and then titrate to 100 mg po bid w/ 300 mg po

qhs x 5 days and then, if needed, gabapentin 300 mg po tid.





4.  No further acute neurologic intervention indicated at present.  Neurology 

will signoff.





Faustino Dillard MD


Neurology 


[id number]

## 2022-05-03 NOTE — VASCULAR LAB REPORT
DUPLEX DOPPLER LOWER EXTREMITY ARTERIAL, BILATERAL



INDICATION / CLINICAL INFORMATION:

mottling leg.



TECHNIQUE:

Arterial duplex examination of both lower extremities performed using B-mode, color flow and spectral
 Doppler assessment.



FINDINGS: 



RIGHT:

Common Femoral Artery: PSV 69 cm/sec.  Triphasic waveform.

Proximal SFA: PSV 80 cm/sec.  Triphasic waveform.

Mid SFA: PSV 93 cm/sec.  Triphasic waveform.

Distal SFA: PSV 65 cm/sec.  Triphasic waveform.

Popliteal artery: PSV 80 cm/sec.  Triphasic waveform.

Posterior tibial artery: PSV 49 cm/sec.  Triphasic waveform.

Dorsalis Pedis Artery: PSV 63 cm/sec.  Triphasic waveform.



LEFT:

Common Femoral Artery:  cm/sec.  Triphasic waveform.

Proximal SFA:  cm/sec.  Triphasic waveform.

Mid SFA:  cm/sec.  Triphasic waveform.

Distal SFA: PSV 77 cm/sec.  Triphasic waveform.

Popliteal artery: PSV 78 cm/sec.  Triphasic waveform.

Posterior tibial artery: PSV 60 cm/sec.  Triphasic waveform.

Dorsalis Pedis Artery: PSV 73 cm/sec.  Triphasic waveform.



Right ALEX: Not calculated

Left ALEX: Not calculated



IMPRESSION:

1. No significant lower extremity peripheral artery disease.











======================

Doppler Waveform: 

======================

- Triphasic is normal. 

- Biphasic is abnormal if clear transition from triphasic signal along vascular tree.

- Monophasic is abnormal.



Signer Name: Murray Epperson MD 

Signed: 5/3/2022 1:21 PM

Workstation Name: [id number]

## 2022-05-03 NOTE — CAT SCAN REPORT
CT lower extremity LT w con



INDICATION / CLINICAL INFORMATION: Pt complains of LEFT lower extremity pain, discoloration since hav
ing a Penicillin Shot yesterday.



COMPARISON: None available.



TECHNIQUE: Following administration of 100 cc Omnipaque 350 intravenous contrast, CT imaging of the l
eft hip and femur was performed. In addition to axial source images, coronal and sagittal MPR series 
were provided. All CT scans at this location are performed using CT dose reduction for ALARA by means
 of automated exposure control.





 

FINDINGS:



Particular attenuation subcutaneous fat is demonstrated posteriorly and posterolaterally around the g
luteus medius muscle. Somewhat more nodular foci of attenuation are present as is minimal dermal thic
kening. Otherwise, no significant abnormality of the included musculature, osseous structures, or int
ra-abdominal/intrapelvic structures.



IMPRESSION:

1. The appearance of the posterior soft tissues could reflect evidence of cellulitis with small subcu
taneous lymph nodes. No drainable fluid collections are present. Contusion could have a similar appea
vince. Correlation recommended.



Signer Name: Brando Baker II, MD 

Signed: 5/3/2022 5:19 AM

Workstation Name: [id number]

## 2022-05-03 NOTE — HISTORY AND PHYSICAL REPORT
History of Present Illness


Date of examination: 05/03/22


Date of admission: 


05/03/22


Chief complaint: 





Allergic reaction


Left leg pain


Complication of surgery


History of present illness: 





36-year-old male with no significant past medical history was brought to the 

emergency room because of adverse drug reaction.  He reportedly received an IM 

penicillin shot in his left thigh earlier today for a "sore throat "with the 

exact time unknown.  He reportedly collapsed while checking in to the emergency 

department.  He complains of severe 10 out of 10 pain in his left thigh and has 

noticeable mottling of skin in his thigh and left lower abdomen.  He denies 

shortness of breath, oral swelling or itching.  Reports history of buttock 

injections performed in [place] in 2017.





CT of the left leg performed.  Possibly cellulitis.  No drainable fluid 

collection present.  Will start on empiric antibiotics and admit to hospitalist 

service with surgery consult.





Past History


Past Surgical History: Other (Buttock injection performed in [place] in 2017)


Social history: other (Unknown)


Family history: no significant family history





Medications and Allergies


                                    Allergies











Allergy/AdvReac Type Severity Reaction Status Date / Time


 


No Known Allergies Allergy   Verified 05/02/22 23:32











Active Meds: 


Active Medications





Piperacillin Sod/Tazobactam Sod (Zosyn/Ns 3.375gm/50ml)  3.375 gm in 50 mls @ 

100 mls/hr IV ONCE ONE; Protocol


   Stop: 05/03/22 06:24











Review of Systems


All systems: negative


Constitutional: other (Mottling of the skin in the thigh and left lower abdomen)





Exam





- Constitutional


Vitals: 


                                        











Temp Pulse Resp BP Pulse Ox


 


 98.0 F   109 H  21   140/78   96 


 


 05/03/22 05:20  05/03/22 05:16  05/03/22 05:16  05/03/22 05:16  05/03/22 05:16











General appearance: Present: no acute distress, well-nourished





- EENT


Eyes: Present: PERRL


ENT: hearing intact, clear oral mucosa





- Neck


Neck: Present: supple, normal ROM





- Respiratory


Respiratory effort: normal


Respiratory: bilateral: CTA





- Cardiovascular


Heart Sounds: Present: S1 & S2.  Absent: rub, click





- Extremities


Extremities: pulses symmetrical, No edema


Peripheral Pulses: within normal limits





- Abdominal


General gastrointestinal: Present: soft, non-tender, non-distended, normal bowel

sounds


Male genitourinary: Present: normal





- Integumentary


Integumentary: Present: clear, warm, dry





- Musculoskeletal


Musculoskeletal: gait normal, strength equal bilaterally





- Psychiatric


Psychiatric: appropriate mood/affect, intact judgment & insight





- Neurologic


Neurologic: CNII-XII intact, moves all extremities





Results





- Labs


CBC & Chem 7: 


                                 05/02/22 23:35





                                 05/02/22 23:35


Labs: 


                             Laboratory Last Values











WBC  14.0 K/mm3 (4.5-11.0)  H  05/02/22  23:35    


 


RBC  4.77 M/mm3 (3.65-5.03)   05/02/22  23:35    


 


Hgb  15.8 gm/dl (11.8-15.2)  H  05/02/22  23:35    


 


Hct  46.9 % (35.5-45.6)  H  05/02/22  23:35    


 


MCV  98 fl (84-94)  H  05/02/22  23:35    


 


MCH  33 pg (28-32)  H  05/02/22  23:35    


 


MCHC  34 % (32-34)   05/02/22  23:35    


 


RDW  13.2 % (13.2-15.2)   05/02/22  23:35    


 


Plt Count  217 K/mm3 (140-440)   05/02/22  23:35    


 


Lymph # (Auto)  Np   05/02/22  23:35    


 


Add Manual Diff  Complete   05/02/22  23:35    


 


Total Counted  100   05/02/22  23:35    


 


Seg Neuts % (Manual)  52.0 % (40.0-70.0)   05/02/22  23:35    


 


Band Neutrophils %  0 %  05/02/22  23:35    


 


Lymphocytes % (Manual)  41.0 % (13.4-35.0)  H  05/02/22  23:35    


 


Reactive Lymphs % (Man)  0 %  05/02/22  23:35    


 


Monocytes % (Manual)  7.0 % (0.0-7.3)   05/02/22  23:35    


 


Eosinophils % (Manual)  0 % (0.0-4.3)   05/02/22  23:35    


 


Basophils % (Manual)  0 % (0.0-1.8)   05/02/22  23:35    


 


Metamyelocytes %  0 %  05/02/22  23:35    


 


Myelocytes %  0 %  05/02/22  23:35    


 


Promyelocytes %  0 %  05/02/22  23:35    


 


Blast Cells %  0 %  05/02/22  23:35    


 


Nucleated RBC %  Not Reportable   05/02/22  23:35    


 


Seg Neutrophils # Man  7.3 K/mm3 (1.8-7.7)   05/02/22  23:35    


 


Band Neutrophils #  0.0 K/mm3  05/02/22  23:35    


 


Lymphocytes # (Manual)  5.7 K/mm3 (1.2-5.4)  H  05/02/22  23:35    


 


Abs React Lymphs (Man)  0.0 K/mm3  05/02/22  23:35    


 


Monocytes # (Manual)  1.0 K/mm3 (0.0-0.8)  H  05/02/22  23:35    


 


Eosinophils # (Manual)  0.0 K/mm3 (0.0-0.4)   05/02/22  23:35    


 


Basophils # (Manual)  0.0 K/mm3 (0.0-0.1)   05/02/22  23:35    


 


Metamyelocytes #  0.0 K/mm3  05/02/22  23:35    


 


Myelocytes #  0.0 K/mm3  05/02/22  23:35    


 


Promyelocytes #  0.0 K/mm3  05/02/22  23:35    


 


Blast Cells #  0.0 K/mm3  05/02/22  23:35    


 


WBC Morphology  Not Reportable   05/02/22  23:35    


 


Hypersegmented Neuts  Not Reportable   05/02/22  23:35    


 


Hyposegmented Neuts  Not Reportable   05/02/22  23:35    


 


Hypogranular Neuts  Not Reportable   05/02/22  23:35    


 


Smudge Cells  Not Reportable   05/02/22  23:35    


 


Toxic Granulation  Not Reportable   05/02/22  23:35    


 


Toxic Vacuolation  Not Reportable   05/02/22  23:35    


 


Dohle Bodies  Not Reportable   05/02/22  23:35    


 


Pelger-Huet Anomaly  Not Reportable   05/02/22  23:35    


 


Jamie Rods  Not Reportable   05/02/22  23:35    


 


Platelet Estimate  Consistent w auto   05/02/22  23:35    


 


Clumped Platelets  Not Reportable   05/02/22  23:35    


 


Plt Clumps, EDTA  Not Reportable   05/02/22  23:35    


 


Large Platelets  Not Reportable   05/02/22  23:35    


 


Giant Platelets  Not Reportable   05/02/22  23:35    


 


Platelet Satelliting  Not Reportable   05/02/22  23:35    


 


Plt Morphology Comment  Not Reportable   05/02/22  23:35    


 


RBC Morphology  Normal   05/02/22  23:35    


 


Dimorphic RBCs  Not Reportable   05/02/22  23:35    


 


Polychromasia  Not Reportable   05/02/22  23:35    


 


Hypochromasia  Not Reportable   05/02/22  23:35    


 


Poikilocytosis  Not Reportable   05/02/22  23:35    


 


Anisocytosis  Not Reportable   05/02/22  23:35    


 


Microcytosis  Not Reportable   05/02/22  23:35    


 


Macrocytosis  Not Reportable   05/02/22  23:35    


 


Spherocytes  Not Reportable   05/02/22  23:35    


 


Pappenheimer Bodies  Not Reportable   05/02/22  23:35    


 


Sickle Cells  Not Reportable   05/02/22  23:35    


 


Target Cells  Not Reportable   05/02/22  23:35    


 


Tear Drop Cells  Not Reportable   05/02/22  23:35    


 


Ovalocytes  Not Reportable   05/02/22  23:35    


 


Helmet Cells  Not Reportable   05/02/22  23:35    


 


M[place] Bodies  Not Reportable   05/02/22  23:35    


 


Cabot Rings  Not Reportable   05/02/22  23:35    


 


Vinson Cells  Not Reportable   05/02/22  23:35    


 


Bite Cells  Not Reportable   05/02/22  23:35    


 


Crenated Cell  Not Reportable   05/02/22  23:35    


 


Elliptocytes  Not Reportable   05/02/22  23:35    


 


Acanthocytes (Spur)  Not Reportable   05/02/22  23:35    


 


Rouleaux  Not Reportable   05/02/22  23:35    


 


Hemoglobin C Crystals  Not Reportable   05/02/22  23:35    


 


Schistocytes  Not Reportable   05/02/22  23:35    


 


Malaria parasites  Not Reportable   05/02/22  23:35    


 


Miguel Bodies  Not Reportable   05/02/22  23:35    


 


Hem Pathologist Commnt  No   05/02/22  23:35    


 


Sodium  139 mmol/L (137-145)   05/02/22  23:35    


 


Potassium  3.3 mmol/L (3.6-5.0)  L  05/02/22  23:35    


 


Chloride  99.3 mmol/L ()   05/02/22  23:35    


 


Carbon Dioxide  18 mmol/L (22-30)  L  05/02/22  23:35    


 


Anion Gap  25 mmol/L  05/02/22  23:35    


 


BUN  14 mg/dL (9-20)   05/02/22  23:35    


 


Creatinine  1.1 mg/dL (0.8-1.3)   05/02/22  23:35    


 


Estimated GFR  > 60 ml/min  05/02/22  23:35    


 


BUN/Creatinine Ratio  13 %  05/02/22  23:35    


 


Glucose  171 mg/dL ()  H  05/02/22  23:35    


 


Calcium  9.3 mg/dL (8.4-10.2)   05/02/22  23:35    


 


Total Bilirubin  0.40 mg/dL (0.1-1.2)   05/02/22  23:35    


 


AST  31 units/L (5-40)   05/02/22  23:35    


 


ALT  31 units/L (7-56)   05/02/22  23:35    


 


Alkaline Phosphatase  86 units/L ()   05/02/22  23:35    


 


Total Protein  7.2 g/dL (6.3-8.2)   05/02/22  23:35    


 


Albumin  4.6 g/dL (3.9-5)   05/02/22  23:35    


 


Albumin/Globulin Ratio  1.8 %  05/02/22  23:35    














Assessment and Plan


VTE prophylaxis?: Chemical


Plan of care discussed with patient/family: Yes





- Patient Problems


(1) Left leg cellulitis


Current Visit: Yes   Status: Acute   


Plan to address problem: 


Admit the patient to the medical floor.  Levaquin 750 mg IV daily.  Half-normal 

saline at the rate of 125 cc/h.  Surgery evaluation.  Recheck CBC in the morning








(2) Allergic reaction


Current Visit: Yes   Status: Acute   


Plan to address problem: 


Pepcid 20 mg IV every 12 hours.  Benadryl 25 mg IV every 6 hours as needed.  If 

needed will start on Solu-Medrol.  Consult dermatology if needed








(3) Complication of surgery


Current Visit: Yes   Status: Acute   


Plan to address problem: 


 Levaquin 750 mg IV daily.  Half-normal saline at the rate of 125 cc/h.  Surgery

evaluation.  Recheck CBC in the morning








(4) Left leg pain


Current Visit: Yes   Status: Acute   


Plan to address problem: 


Tylenol 650 mg p.o. every 6 hours as needed.  Morphine 2 mg IV every 4 hours as 

needed








(5) DVT prophylaxis


Current Visit: Yes   Status: Acute   


Plan to address problem: 


Heparin 5000 units subcu every 12 hours for DVT prophylaxis.  Pepcid 20 mg IV 

every 12 hours for GI prophylaxis.  Patient is a full code

## 2022-05-04 ENCOUNTER — OUT OF OFFICE VISIT (OUTPATIENT)
Dept: URBAN - METROPOLITAN AREA MEDICAL CENTER 41 | Facility: MEDICAL CENTER | Age: 37
End: 2022-05-04
Payer: SELF-PAY

## 2022-05-04 DIAGNOSIS — K72.00 ACUTE AND SUBACUTE HEPATIC FAILURE WITHOUT COMA: ICD-10-CM

## 2022-05-04 DIAGNOSIS — R74.01 ABNORMAL/ELEVATED TRANSAMINASE (SGOT, AMINOTRANSFERASE): ICD-10-CM

## 2022-05-04 LAB
BASOPHILS # (AUTO): 0 K/MM3 (ref 0–0.1)
BASOPHILS NFR BLD AUTO: 0.1 % (ref 0–1.8)
BILIRUB DIRECT SERPL-MCNC: < 0.2 MG/DL (ref 0–0.2)
BUN SERPL-MCNC: 12 MG/DL (ref 9–20)
BUN/CREAT SERPL: 17 %
CALCIUM SERPL-MCNC: 8.6 MG/DL (ref 8.4–10.2)
EOSINOPHIL # BLD AUTO: 0 K/MM3 (ref 0–0.4)
EOSINOPHIL NFR BLD AUTO: 0 % (ref 0–4.3)
HCT VFR BLD CALC: 48.5 % (ref 35.5–45.6)
HEMOLYSIS INDEX: 48
HGB BLD-MCNC: 16.9 GM/DL (ref 11.8–15.2)
LYMPHOCYTES # BLD AUTO: 0.8 K/MM3 (ref 1.2–5.4)
LYMPHOCYTES NFR BLD AUTO: 6.3 % (ref 13.4–35)
MCHC RBC AUTO-ENTMCNC: 35 % (ref 32–34)
MCV RBC AUTO: 97 FL (ref 84–94)
MONOCYTES # (AUTO): 1.1 K/MM3 (ref 0–0.8)
MONOCYTES % (AUTO): 8.5 % (ref 0–7.3)
PLATELET # BLD: 185 K/MM3 (ref 140–440)
RBC # BLD AUTO: 5.02 M/MM3 (ref 3.65–5.03)

## 2022-05-04 PROCEDURE — 99254 IP/OBS CNSLTJ NEW/EST MOD 60: CPT | Performed by: INTERNAL MEDICINE

## 2022-05-04 PROCEDURE — 99232 SBSQ HOSP IP/OBS MODERATE 35: CPT | Performed by: INTERNAL MEDICINE

## 2022-05-04 RX ADMIN — METHYLPREDNISOLONE SODIUM SUCCINATE SCH MG: 125 INJECTION, POWDER, FOR SOLUTION INTRAMUSCULAR; INTRAVENOUS at 06:07

## 2022-05-04 RX ADMIN — HEPARIN SODIUM SCH UNIT: 5000 INJECTION, SOLUTION INTRAVENOUS; SUBCUTANEOUS at 10:46

## 2022-05-04 RX ADMIN — IPRATROPIUM BROMIDE AND ALBUTEROL SULFATE SCH: .5; 3 SOLUTION RESPIRATORY (INHALATION) at 20:23

## 2022-05-04 RX ADMIN — ACETAMINOPHEN PRN MG: 325 TABLET ORAL at 22:37

## 2022-05-04 RX ADMIN — HYDROMORPHONE HYDROCHLORIDE PRN MG: 1 INJECTION, SOLUTION INTRAMUSCULAR; INTRAVENOUS; SUBCUTANEOUS at 13:01

## 2022-05-04 RX ADMIN — Medication SCH ML: at 22:26

## 2022-05-04 RX ADMIN — SODIUM CHLORIDE SCH MLS/HR: 0.45 INJECTION, SOLUTION INTRAVENOUS at 15:19

## 2022-05-04 RX ADMIN — ACETAMINOPHEN PRN MG: 325 TABLET ORAL at 06:02

## 2022-05-04 RX ADMIN — FAMOTIDINE SCH MG: 10 INJECTION, SOLUTION INTRAVENOUS at 22:24

## 2022-05-04 RX ADMIN — HYDROMORPHONE HYDROCHLORIDE PRN MG: 1 INJECTION, SOLUTION INTRAMUSCULAR; INTRAVENOUS; SUBCUTANEOUS at 08:20

## 2022-05-04 RX ADMIN — HYDROMORPHONE HYDROCHLORIDE PRN MG: 1 INJECTION, SOLUTION INTRAMUSCULAR; INTRAVENOUS; SUBCUTANEOUS at 10:47

## 2022-05-04 RX ADMIN — HEPARIN SODIUM SCH UNIT: 5000 INJECTION, SOLUTION INTRAVENOUS; SUBCUTANEOUS at 22:23

## 2022-05-04 RX ADMIN — Medication SCH ML: at 10:48

## 2022-05-04 RX ADMIN — SODIUM CHLORIDE SCH MLS/HR: 0.9 INJECTION, SOLUTION INTRAVENOUS at 20:00

## 2022-05-04 RX ADMIN — HYDROMORPHONE HYDROCHLORIDE PRN MG: 1 INJECTION, SOLUTION INTRAMUSCULAR; INTRAVENOUS; SUBCUTANEOUS at 01:41

## 2022-05-04 RX ADMIN — IPRATROPIUM BROMIDE AND ALBUTEROL SULFATE SCH AMPUL: .5; 3 SOLUTION RESPIRATORY (INHALATION) at 08:36

## 2022-05-04 RX ADMIN — HYDROMORPHONE HYDROCHLORIDE PRN MG: 1 INJECTION, SOLUTION INTRAMUSCULAR; INTRAVENOUS; SUBCUTANEOUS at 16:01

## 2022-05-04 RX ADMIN — VALSARTAN SCH MG: 160 TABLET, FILM COATED ORAL at 22:28

## 2022-05-04 RX ADMIN — FAMOTIDINE SCH MG: 10 INJECTION, SOLUTION INTRAVENOUS at 10:46

## 2022-05-04 RX ADMIN — HYDROMORPHONE HYDROCHLORIDE PRN MG: 1 INJECTION, SOLUTION INTRAMUSCULAR; INTRAVENOUS; SUBCUTANEOUS at 23:25

## 2022-05-04 RX ADMIN — SODIUM CHLORIDE SCH MLS/HR: 0.45 INJECTION, SOLUTION INTRAVENOUS at 04:03

## 2022-05-04 RX ADMIN — METHYLPREDNISOLONE SODIUM SUCCINATE SCH MG: 125 INJECTION, POWDER, FOR SOLUTION INTRAMUSCULAR; INTRAVENOUS at 22:24

## 2022-05-04 RX ADMIN — METHYLPREDNISOLONE SODIUM SUCCINATE SCH MG: 125 INJECTION, POWDER, FOR SOLUTION INTRAMUSCULAR; INTRAVENOUS at 14:14

## 2022-05-04 RX ADMIN — MORPHINE SULFATE PRN MG: 2 INJECTION, SOLUTION INTRAMUSCULAR; INTRAVENOUS at 05:59

## 2022-05-04 RX ADMIN — SODIUM BICARBONATE SCH MG: 650 TABLET ORAL at 22:24

## 2022-05-04 RX ADMIN — HYDROMORPHONE HYDROCHLORIDE PRN MG: 1 INJECTION, SOLUTION INTRAMUSCULAR; INTRAVENOUS; SUBCUTANEOUS at 20:18

## 2022-05-04 RX ADMIN — IPRATROPIUM BROMIDE AND ALBUTEROL SULFATE SCH AMPUL: .5; 3 SOLUTION RESPIRATORY (INHALATION) at 20:18

## 2022-05-04 NOTE — ULTRASOUND REPORT
ULTRASOUND ABDOMEN, COMPLETE



INDICATION:

Acute liver failure.



COMPARISON:

No relevant prior imaging study available. 



FINDINGS:

Pancreas: No significant abnormality.

Abdominal Aorta: No significant abnormality. IVC: No significant abnormality.

Liver: The liver measures 15.7 cm in length.  No significant abnormality. Normal hepatopedal blood fl
ow in the main portal vein.

Gallbladder: No significant abnormality. 

Bile ducts: There is mild biliary dilatation. Common bile duct measures 8.3 mm. 

Kidneys: Right:  10.9 cm in length.  No significant abnormality. Left:  11.0 cm in length.  No signif
icant abnormality.

Spleen: No significant abnormality.



Free fluid: None.

Additional Findings: None.



IMPRESSION:

There is mild diffuse biliary dilatation. Obstructing lesion in the distal common bile duct cannot be
 excluded. Consider MRCP.

There is no evidence for gallstones within the gallbladder.

Otherwise unremarkable exam.. 



Signer Name: Julius Lamb Jr, MD 

Signed: 5/4/2022 11:00 AM

Workstation Name: [id number]

## 2022-05-04 NOTE — EVENT NOTE
Date: 05/04/22


Patient has severe rhabdomyolysis with CK 93,000, preserved renal function .


patient received IV fluid bolus with significant improvement


Increase IV fluids to 200 to 300 mL/h, with IV Lasix 40 every 12hr, oral sodium 

bicarb.


Check CK every 8 hours x4, monitor input output, Mcwilliams catheter


Nephrology consulted.  Discussed with nephrologist Dr. Shin


We will closely monitor the patient and adjust management as needed

## 2022-05-04 NOTE — PROGRESS NOTE
Assessment and Plan





The patient is postoperative day #1 status post left lower extremity 4 

compartment fasciotomy.  The patient still has no motor within the left foot 

however she does have sensation on both the dorsal and plantar surface of the 

foot.  The dressing was changed secondary to saturation with serosanguineous 

drainage however the Xeroform and Kerlix on the muscle was left intact.  

Evaluation revealed no active bleeding.  At this time the patient has improved 

with with sensation but no motor.  We will continue to monitor.  We will change 

the dressing on Friday and at that time evaluate the potential for closure of 

the skin incisions.  We will also recheck CK levels given the elevated CK and 

risk of rhabdomyolysis.  The patient requires IV fluids to prevent renal 

complications.





Subjective


Date of service: 05/04/22


Interval history: 





The patient states her pain has improved.  The pain is mostly associated with 

the incision and not the leg or foot itself.  She has no additional complaints 

at this time.





Objective





- Constitutional


Vitals: 


                               Vital Signs - 12hr











  05/04/22 05/04/22 05/04/22





  05:31 05:59 06:02


 


Temperature 99.2 F  


 


Pulse Rate 85  


 


Pulse Rate [   





Anterior   





Bilateral   





Throughout]   


 


Respiratory 18 18 20





Rate   


 


Respiratory   





Rate [Anterior   





Bilateral   





Throughout]   


 


Blood Pressure 152/104  


 


O2 Sat by Pulse 93  





Oximetry   














  05/04/22 05/04/22 05/04/22





  06:29 07:02 08:36


 


Temperature   


 


Pulse Rate   


 


Pulse Rate [   90





Anterior   





Bilateral   





Throughout]   


 


Respiratory 20 18 





Rate   


 


Respiratory   17





Rate [Anterior   





Bilateral   





Throughout]   


 


Blood Pressure   


 


O2 Sat by Pulse   





Oximetry   














  05/04/22 05/04/22





  08:37 12:09


 


Temperature  98.9 F


 


Pulse Rate  97 H


 


Pulse Rate [  





Anterior  





Bilateral  





Throughout]  


 


Respiratory  18





Rate  


 


Respiratory  





Rate [Anterior  





Bilateral  





Throughout]  


 


Blood Pressure  145/96


 


O2 Sat by Pulse 95 92





Oximetry  











General appearance: Present: no acute distress





- Respiratory


Respiratory effort: normal


Extremities: pulses intact (Palpable left dorsalis pedis and posterior tibial 

pulses), abnormal (Some serosanguineous drainage of the dressing on the left 

leg)


Extremity abnormal: black (Cyanotic changes on the plantar surface of the left 

foot however the foot is warm)





- Labs


CBC & Chem 7: 


                                 05/04/22 05:20





                                 05/04/22 05:20


Labs: 


                              Abnormal lab results











  05/03/22 05/04/22 05/04/22 Range/Units





  22:47 05:20 05:20 


 


WBC   12.7 H   (4.5-11.0)  K/mm3


 


Hgb   16.9 H   (11.8-15.2)  gm/dl


 


Hct   48.5 H   (35.5-45.6)  %


 


MCV   97 H   (84-94)  fl


 


MCH   34 H   (28-32)  pg


 


MCHC   35 H   (32-34)  %


 


Lymph % (Auto)   6.3 L   (13.4-35.0)  %


 


Mono % (Auto)   8.5 H   (0.0-7.3)  %


 


Lymph # (Auto)   0.8 L   (1.2-5.4)  K/mm3


 


Mono # (Auto)   1.1 H   (0.0-0.8)  K/mm3


 


Seg Neutrophils %   85.1 H   (40.0-70.0)  %


 


Seg Neutrophils #   10.8 H   (1.8-7.7)  K/mm3


 


Sodium  135 L   134 L  (137-145)  mmol/L


 


Potassium  5.3 H    (3.6-5.0)  mmol/L


 


Carbon Dioxide  20 L    (22-30)  mmol/L


 


Creatinine    0.7 L  (0.8-1.3)  mg/dL


 


Glucose  165 H   167 H  ()  mg/dL


 


Calcium  8.0 L    (8.4-10.2)  mg/dL


 


AST  1548 H    (5-40)  units/L


 


ALT  460 H    (7-56)  units/L


 


Total Creatine Kinase  18261 H    ()  units/L














Medications & Allergies





- Medications


Allergies/Adverse Reactions: 


                                    Allergies





ceftriaxone [From Rocephin] Allergy (Verified 05/04/22 08:15)


   Unknown


Penicillins Allergy (Verified 05/03/22 07:27)


   Unknown








Home Medications: 


                                Home Medications











 Medication  Instructions  Recorded  Confirmed  Last Taken  Type


 


No Known Home Medications [No  05/03/22 05/03/22 Unknown History





Reported Home Medications]     











Active Medications: 














Generic Name Dose Route Start Last Admin





  Trade Name Freq  PRN Reason Stop Dose Admin


 


Acetaminophen  650 mg  05/03/22 06:15  05/04/22 06:02





  Acetaminophen 325 Mg Tab  PO   650 mg





  Q4H PRN   Administration





  Pain MILD(1-3)/Fever >100.5/HA  


 


Albuterol  2.5 mg  05/03/22 06:15 





  Albuterol 2.5 Mg/3 Ml Nebu  IH  





  Q3HRT PRN  





  Shortness Of Breath  


 


Albuterol/Ipratropium  1 ampul  05/04/22 08:00  05/04/22 08:36





  Ipratropium/Albuterol Sulfate 3 Ml Ampul.Neb  IH   1 ampul





  BIDRT CHERYL   Administration


 


Diphenhydramine HCl  25 mg  05/03/22 06:24 





  Diphenhydramine 50 Mg/Ml Vial  IV  





  Q6H PRN  





  Skin Irritation  


 


Famotidine  20 mg  05/03/22 10:00  05/04/22 10:46





  Famotidine 20 Mg/2 Ml Inj  IV   20 mg





  BID CHERYL   Administration


 


Heparin Sodium (Porcine)  5,000 unit  05/03/22 10:00  05/04/22 10:46





  Heparin 5,000 Unit/1 Ml Vial  SUB-Q   5,000 unit





  Q12HR CHERYL   Administration


 


Hydromorphone HCl  0.5 mg  05/03/22 06:15  05/04/22 16:01





  Hydromorphone 1 Mg/1 Ml Inj  IV   0.5 mg





  Q3H PRN   Administration





  Pain , Severe (7-10)  


 


Hydromorphone HCl  0.25 mg  05/03/22 16:54 





  Hydromorphone 1 Mg/1 Ml Inj  IV  05/04/22 16:53 





  Q10MIN PRN  





  Pain, Moderate (4-6)  


 


Hydromorphone HCl  0.5 mg  05/03/22 16:54  05/03/22 18:40





  Hydromorphone 1 Mg/1 Ml Inj  IV  05/04/22 16:53  0.5 mg





  Q10MIN PRN   Administration





  Pain , Severe (7-10)  


 


Hydromorphone HCl  0.5 mg  05/04/22 13:00  05/04/22 13:00





  Hydromorphone 1 Mg/1 Ml Inj  IV  05/04/22 17:00  0.5 mg





  ONCE@1300 CHERYL   Administration


 


Sodium Chloride  1,000 mls @ 125 mls/hr  05/03/22 07:00  05/04/22 15:19





  Nacl 0.45% 1000 Ml  IV   125 mls/hr





  AS DIRECT CHERYL   Administration


 


Levofloxacin/Dextrose  750 mg in 150 mls @ 100 mls/hr  05/03/22 07:00  05/04/22 

08:21





  Levaquin 750mg/150ml  IV   100 mls/hr





  Q24H CHERYL   Administration





  Protocol  


 


Sodium Chloride  1,000 mls @ 999 mls/hr  05/04/22 16:30 





  Nacl 0.9% 1000 Ml  IV  05/04/22 17:30 





  BOLUS ONE  


 


Methylprednisolone Sodium Succinate  125 mg  05/03/22 22:00  05/04/22 14:14





  Methylprednisolone Sod Succinate 125 Mg/2 Ml Inj  IV   125 mg





  Q8HR CHERYL   Administration


 


Morphine Sulfate  2 mg  05/03/22 06:15  05/04/22 05:59





  Morphine 2 Mg/1 Ml Inj  IV   2 mg





  Q4H PRN   Administration





  Pain, Moderate (4-6)  


 


Ondansetron HCl  4 mg  05/03/22 06:15 





  Ondansetron 4 Mg/2 Ml Inj  IV  





  Q8H PRN  





  Nausea And Vomiting  


 


Ondansetron HCl  4 mg  05/03/22 16:54 





  Ondansetron 4 Mg/2 Ml Inj  IV  05/04/22 16:53 





  ONCE PRN  





  Nausea And Vomiting  


 


Sodium Chloride  10 ml  05/03/22 10:00  05/04/22 10:48





  Sodium Chloride 0.9% 10 Ml Flush Syringe  IV   10 ml





  BID CHERYL   Administration


 


Sodium Chloride  10 ml  05/03/22 06:15 





  Sodium Chloride 0.9% 10 Ml Flush Syringe  IV  





  PRN PRN  





  LINE FLUSH

## 2022-05-04 NOTE — PROGRESS NOTE
Assessment and Plan


Assessment and plan: 


--Left leg compartment syndrome ;


Evaluated by vascular /IR Dr. Cantrell and Dr Vikas Laboy


Emergent left leg 4 compartment fasciotomies done 5/3/2022 


elevate the limb, IV antibiotics, supportive care


CT lower extremity findings reviewed


Left lower extremity, arterial Doppler,venous Doppler findings reviewed


Patient still complains of severe pain in the left lower extremity


Unable to move the foot and toes





--Transaminitis/acute liver failure;


Monitor liver function tests,


Check ultrasound abdomen


GI consulted, follow acute hepatitis panel





--Left lower extremity cellulitis


Elevate the limb, empiric antibiotics cultures, 


Supportive care





--Possible allergic reaction ; to Rocephin


Continue Benadryl, high-dose steroids, Pepcid


Antihistamines





--Left leg severe pain;


Pain medications, elevate the limb, supportive care





--DVT prophylaxis


Per vascular





--obesity; BMI 30.7;


Patient needs weight reduction when medically stable





-- Full CODE STATUS;





--Advance care planning





We will closely monitor the patient and adjust management as needed


Follow consultants evaluation and recommendations


Vascular recommendations noted and appreciated


Planning to transfer the patient to ICU for close observation














History


Interval history: 


I have seen and examined the patient at the bedside, this morning


patient's chart and medications reviewed patient was admitted with


Patient underwent fasciotomies yesterday


This morning patient has bleeding mixed with serosanguineous fluid


Unable to move the toes and the foot


Complains of severe pain


Vital signs noted





Hospitalist Physical





- Constitutional


Vitals: 


                                        











Temp Pulse Resp BP Pulse Ox


 


 98.9 F   78   18   153/89   100 


 


 05/03/22 18:45  05/03/22 20:58  05/04/22 07:02  05/03/22 19:00  05/03/22 23:02











General appearance: Present: mild distress, well-nourished, obese





- EENT


Eyes: Present: PERRL, EOM intact





- Neck


Neck: Present: supple, normal ROM





- Respiratory


Respiratory effort: normal


Respiratory: bilateral: diminished, negative: rales, rhonchi, wheezing





- Cardiovascular


Rhythm: regular


Heart Sounds: Present: S1 & S2





- Extremities


Extremities: abnormal (Left lower extremity dressing in place, soaked with blood

and serosanguineous fluid)


Extremity abnormal: other (Unable to move the left foot and toes)





- Abdominal


General gastrointestinal: soft, non-tender, non-distended, normal bowel sounds





- Integumentary


Integumentary: Present: clear, warm





- Psychiatric


Psychiatric: appropriate mood/affect, cooperative





- Neurologic


Neurologic: moves all extremities





Results





- Labs


CBC & Chem 7: 


                                 05/04/22 05:20





                                 05/04/22 05:20


Labs: 


                             Laboratory Last Values











WBC  12.7 K/mm3 (4.5-11.0)  H  05/04/22  05:20    


 


RBC  5.02 M/mm3 (3.65-5.03)   05/04/22  05:20    


 


Hgb  16.9 gm/dl (11.8-15.2)  H  05/04/22  05:20    


 


Hct  48.5 % (35.5-45.6)  H  05/04/22  05:20    


 


MCV  97 fl (84-94)  H  05/04/22  05:20    


 


MCH  34 pg (28-32)  H  05/04/22  05:20    


 


MCHC  35 % (32-34)  H  05/04/22  05:20    


 


RDW  13.4 % (13.2-15.2)   05/04/22  05:20    


 


Plt Count  185 K/mm3 (140-440)   05/04/22  05:20    


 


Lymph % (Auto)  6.3 % (13.4-35.0)  L  05/04/22  05:20    


 


Mono % (Auto)  8.5 % (0.0-7.3)  H  05/04/22  05:20    


 


Eos % (Auto)  0.0 % (0.0-4.3)   05/04/22  05:20    


 


Baso % (Auto)  0.1 % (0.0-1.8)   05/04/22  05:20    


 


Lymph # (Auto)  0.8 K/mm3 (1.2-5.4)  L  05/04/22  05:20    


 


Mono # (Auto)  1.1 K/mm3 (0.0-0.8)  H  05/04/22  05:20    


 


Eos # (Auto)  0.0 K/mm3 (0.0-0.4)   05/04/22  05:20    


 


Baso # (Auto)  0.0 K/mm3 (0.0-0.1)   05/04/22  05:20    


 


Add Manual Diff  Complete   05/02/22  23:35    


 


Total Counted  100   05/02/22  23:35    


 


Seg Neutrophils %  85.1 % (40.0-70.0)  H  05/04/22  05:20    


 


Seg Neuts % (Manual)  52.0 % (40.0-70.0)   05/02/22  23:35    


 


Band Neutrophils %  0 %  05/02/22  23:35    


 


Lymphocytes % (Manual)  41.0 % (13.4-35.0)  H  05/02/22  23:35    


 


Reactive Lymphs % (Man)  0 %  05/02/22  23:35    


 


Monocytes % (Manual)  7.0 % (0.0-7.3)   05/02/22  23:35    


 


Eosinophils % (Manual)  0 % (0.0-4.3)   05/02/22  23:35    


 


Basophils % (Manual)  0 % (0.0-1.8)   05/02/22  23:35    


 


Metamyelocytes %  0 %  05/02/22  23:35    


 


Myelocytes %  0 %  05/02/22  23:35    


 


Promyelocytes %  0 %  05/02/22  23:35    


 


Blast Cells %  0 %  05/02/22  23:35    


 


Nucleated RBC %  Not Reportable   05/02/22  23:35    


 


Seg Neutrophils #  10.8 K/mm3 (1.8-7.7)  H  05/04/22  05:20    


 


Seg Neutrophils # Man  7.3 K/mm3 (1.8-7.7)   05/02/22  23:35    


 


Band Neutrophils #  0.0 K/mm3  05/02/22  23:35    


 


Lymphocytes # (Manual)  5.7 K/mm3 (1.2-5.4)  H  05/02/22  23:35    


 


Abs React Lymphs (Man)  0.0 K/mm3  05/02/22  23:35    


 


Monocytes # (Manual)  1.0 K/mm3 (0.0-0.8)  H  05/02/22  23:35    


 


Eosinophils # (Manual)  0.0 K/mm3 (0.0-0.4)   05/02/22  23:35    


 


Basophils # (Manual)  0.0 K/mm3 (0.0-0.1)   05/02/22  23:35    


 


Metamyelocytes #  0.0 K/mm3  05/02/22  23:35    


 


Myelocytes #  0.0 K/mm3  05/02/22  23:35    


 


Promyelocytes #  0.0 K/mm3  05/02/22  23:35    


 


Blast Cells #  0.0 K/mm3  05/02/22  23:35    


 


WBC Morphology  Not Reportable   05/02/22  23:35    


 


Hypersegmented Neuts  Not Reportable   05/02/22  23:35    


 


Hyposegmented Neuts  Not Reportable   05/02/22  23:35    


 


Hypogranular Neuts  Not Reportable   05/02/22  23:35    


 


Smudge Cells  Not Reportable   05/02/22  23:35    


 


Toxic Granulation  Not Reportable   05/02/22  23:35    


 


Toxic Vacuolation  Not Reportable   05/02/22  23:35    


 


Dohle Bodies  Not Reportable   05/02/22  23:35    


 


Pelger-Huet Anomaly  Not Reportable   05/02/22  23:35    


 


Jamie Rods  Not Reportable   05/02/22  23:35    


 


Platelet Estimate  Consistent w auto   05/02/22  23:35    


 


Clumped Platelets  Not Reportable   05/02/22  23:35    


 


Plt Clumps, EDTA  Not Reportable   05/02/22  23:35    


 


Large Platelets  Not Reportable   05/02/22  23:35    


 


Giant Platelets  Not Reportable   05/02/22  23:35    


 


Platelet Satelliting  Not Reportable   05/02/22  23:35    


 


Plt Morphology Comment  Not Reportable   05/02/22  23:35    


 


RBC Morphology  Normal   05/02/22  23:35    


 


Dimorphic RBCs  Not Reportable   05/02/22  23:35    


 


Polychromasia  Not Reportable   05/02/22  23:35    


 


Hypochromasia  Not Reportable   05/02/22  23:35    


 


Poikilocytosis  Not Reportable   05/02/22  23:35    


 


Anisocytosis  Not Reportable   05/02/22  23:35    


 


Microcytosis  Not Reportable   05/02/22  23:35    


 


Macrocytosis  Not Reportable   05/02/22  23:35    


 


Spherocytes  Not Reportable   05/02/22  23:35    


 


Pappenheimer Bodies  Not Reportable   05/02/22  23:35    


 


Sickle Cells  Not Reportable   05/02/22  23:35    


 


Target Cells  Not Reportable   05/02/22  23:35    


 


Tear Drop Cells  Not Reportable   05/02/22  23:35    


 


Ovalocytes  Not Reportable   05/02/22  23:35    


 


Helmet Cells  Not Reportable   05/02/22  23:35    


 


[place] Bodies  Not Reportable   05/02/22  23:35    


 


Cabot Rings  Not Reportable   05/02/22  23:35    


 


Cornel Cells  Not Reportable   05/02/22  23:35    


 


Bite Cells  Not Reportable   05/02/22  23:35    


 


Crenated Cell  Not Reportable   05/02/22  23:35    


 


Elliptocytes  Not Reportable   05/02/22  23:35    


 


Acanthocytes (Spur)  Not Reportable   05/02/22  23:35    


 


Rouleaux  Not Reportable   05/02/22  23:35    


 


Hemoglobin C Crystals  Not Reportable   05/02/22  23:35    


 


Schistocytes  Not Reportable   05/02/22  23:35    


 


Malaria parasites  Not Reportable   05/02/22  23:35    


 


Miguel Bodies  Not Reportable   05/02/22  23:35    


 


Hem Pathologist Commnt  No   05/02/22  23:35    


 


PT  13.4 Sec. (12.2-14.9)   05/03/22  13:30    


 


INR  0.92  (0.87-1.13)   05/03/22  13:30    


 


APTT  25.8 Sec. (24.2-36.6)   05/03/22  13:30    


 


Sodium  134 mmol/L (137-145)  L  05/04/22  05:20    


 


Potassium  5.0 mmol/L (3.6-5.0)   05/04/22  05:20    


 


Chloride  98.9 mmol/L ()   05/04/22  05:20    


 


Carbon Dioxide  22 mmol/L (22-30)   05/04/22  05:20    


 


Anion Gap  18 mmol/L  05/04/22  05:20    


 


BUN  12 mg/dL (9-20)   05/04/22  05:20    


 


Creatinine  0.7 mg/dL (0.8-1.3)  L  05/04/22  05:20    


 


Estimated GFR  > 60 ml/min  05/04/22  05:20    


 


BUN/Creatinine Ratio  17 %  05/04/22  05:20    


 


Glucose  167 mg/dL ()  H  05/04/22  05:20    


 


Lactic Acid  1.40 mmol/L (0.7-2.0)   05/03/22  13:30    


 


Calcium  8.6 mg/dL (8.4-10.2)   05/04/22  05:20    


 


Total Bilirubin  0.50 mg/dL (0.1-1.2)   05/03/22  22:47    


 


Direct Bilirubin  < 0.2 mg/dL (0-0.2)   05/03/22  22:47    


 


AST  1548 units/L (5-40)  H  05/03/22  22:47    


 


ALT  460 units/L (7-56)  H  05/03/22  22:47    


 


Alkaline Phosphatase  80 units/L ()   05/03/22  22:47    


 


Total Creatine Kinase  70127 units/L ()  H  05/03/22  22:47    


 


Total Protein  6.6 g/dL (6.3-8.2)   05/03/22  22:47    


 


Albumin  4.0 g/dL (3.9-5)   05/03/22  22:47    


 


Albumin/Globulin Ratio  1.5 %  05/03/22  22:47    


 


Hep Bs Antigen  Non-reactive  (Negative)   05/03/22  13:30    


 


Hep B Core IgM Ab  Non-reactive  (NonReactive)   05/03/22  13:30    


 


Hepatitis C Antibody  Non-reactive  (NonReactive)   05/03/22  13:30    











Microbiology: 


Microbiology





05/03/22 22:47   Peripheral/Venous   Blood Culture - Preliminary


                            Culture in Progress


05/03/22 22:47   Peripheral/Venous   Blood Culture - Preliminary


                            Culture in Progress








Mcwilliams/IV: 


                                        





Voiding Method                   Urinal











Active Medications





- Current Medications


Current Medications: 














Generic Name Dose Route Start Last Admin





  Trade Name Freq  PRN Reason Stop Dose Admin


 


Acetaminophen  650 mg  05/03/22 06:15  05/04/22 06:02





  Acetaminophen 325 Mg Tab  PO   650 mg





  Q4H PRN   Administration





  Pain MILD(1-3)/Fever >100.5/HA  


 


Albuterol  2.5 mg  05/03/22 06:15 





  Albuterol 2.5 Mg/3 Ml Nebu  IH  





  Q3HRT PRN  





  Shortness Of Breath  


 


Albuterol/Ipratropium  1 ampul  05/04/22 08:00 





  Ipratropium/Albuterol Sulfate 3 Ml Ampul.Neb  IH  





  BIDRT CHERYL  


 


Diphenhydramine HCl  25 mg  05/03/22 06:24 





  Diphenhydramine 50 Mg/Ml Vial  IV  





  Q6H PRN  





  Skin Irritation  


 


Famotidine  20 mg  05/03/22 10:00  05/03/22 22:07





  Famotidine 20 Mg/2 Ml Inj  IV   20 mg





  BID CHERYL   Administration


 


Heparin Sodium (Porcine)  5,000 unit  05/03/22 10:00  05/03/22 22:07





  Heparin 5,000 Unit/1 Ml Vial  SUB-Q   5,000 unit





  Q12HR CHERYL   Administration


 


Hydromorphone HCl  0.5 mg  05/03/22 06:15  05/04/22 08:20





  Hydromorphone 1 Mg/1 Ml Inj  IV   0.5 mg





  Q3H PRN   Administration





  Pain , Severe (7-10)  


 


Hydromorphone HCl  0.25 mg  05/03/22 16:54 





  Hydromorphone 1 Mg/1 Ml Inj  IV  05/04/22 16:53 





  Q10MIN PRN  





  Pain, Moderate (4-6)  


 


Hydromorphone HCl  0.5 mg  05/03/22 16:54  05/03/22 18:40





  Hydromorphone 1 Mg/1 Ml Inj  IV  05/04/22 16:53  0.5 mg





  Q10MIN PRN   Administration





  Pain , Severe (7-10)  


 


Sodium Chloride  1,000 mls @ 125 mls/hr  05/03/22 07:00  05/04/22 04:03





  Nacl 0.45% 1000 Ml  IV   125 mls/hr





  AS DIRECT CHERYL   Administration


 


Levofloxacin/Dextrose  750 mg in 150 mls @ 100 mls/hr  05/03/22 07:00  05/04/22 

08:21





  Levaquin 750mg/150ml  IV   100 mls/hr





  Q24H CHERYL   Administration





  Protocol  


 


Methylprednisolone Sodium Succinate  125 mg  05/03/22 22:00  05/04/22 06:07





  Methylprednisolone Sod Succinate 125 Mg/2 Ml Inj  IV   125 mg





  Q8HR CHERYL   Administration


 


Morphine Sulfate  2 mg  05/03/22 06:15  05/04/22 05:59





  Morphine 2 Mg/1 Ml Inj  IV   2 mg





  Q4H PRN   Administration





  Pain, Moderate (4-6)  


 


Ondansetron HCl  4 mg  05/03/22 06:15 





  Ondansetron 4 Mg/2 Ml Inj  IV  





  Q8H PRN  





  Nausea And Vomiting  


 


Ondansetron HCl  4 mg  05/03/22 16:54 





  Ondansetron 4 Mg/2 Ml Inj  IV  05/04/22 16:53 





  ONCE PRN  





  Nausea And Vomiting  


 


Sodium Chloride  10 ml  05/03/22 10:00  05/03/22 22:08





  Sodium Chloride 0.9% 10 Ml Flush Syringe  IV   10 ml





  BID CHERYL   Administration


 


Sodium Chloride  10 ml  05/03/22 06:15 





  Sodium Chloride 0.9% 10 Ml Flush Syringe  IV  





  PRN PRN  





  LINE FLUSH

## 2022-05-04 NOTE — GASTROENTEROLOGY CONSULTATION
History of Present Illness





- Reason for Consult


Consult date: 05/04/22


abnormal liver enzymes


Requesting physician: AMY J KOCHERLA





- History of Present Illness





The patient is a 37 yo transgender female who presented after adverse reaction 

to IM rocephin injection of left thigh, ultimately developed compartment 

syndrome of left lower extremity s/p compartment fasciotomy.  Liver enzymes were

normal on admission, increased last 2 days.  also with high CK noted today.  

denies abd pain or jaundice.  drinks heavily on saturday nights (bottle of 

vodka) but denies alcohol other days of the week.  on ? prep for hiv ppx per pt.

 denies recent tylenol use.





Past History


Past Surgical History: Other (Buttock injection performed in [place] in 2017)


Social history: other (Unknown)


Family history: no significant family history





Medications and Allergies


                                    Allergies











Allergy/AdvReac Type Severity Reaction Status Date / Time


 


ceftriaxone [From Rocephin] Allergy  Unknown Verified 05/04/22 08:15


 


Penicillins Allergy  Unknown Verified 05/03/22 07:27











                                Home Medications











 Medication  Instructions  Recorded  Confirmed  Last Taken  Type


 


No Known Home Medications [No  05/03/22 05/03/22 Unknown History





Reported Home Medications]     











Active Meds: 


Active Medications





Acetaminophen (Acetaminophen 325 Mg Tab)  650 mg PO Q4H PRN


   PRN Reason: Pain MILD(1-3)/Fever >100.5/HA


   Last Admin: 05/04/22 06:02 Dose:  650 mg


   


Albuterol (Albuterol 2.5 Mg/3 Ml Nebu)  2.5 mg IH Q3HRT PRN


   PRN Reason: Shortness Of Breath


Albuterol/Ipratropium (Ipratropium/Albuterol Sulfate 3 Ml Ampul.Neb)  1 ampul IH

BIDRT [facility]


   Last Admin: 05/04/22 08:36 Dose:  1 ampul


   


Diphenhydramine HCl (Diphenhydramine 50 Mg/Ml Vial)  25 mg IV Q6H PRN


   PRN Reason: Skin Irritation


Famotidine (Famotidine 20 Mg/2 Ml Inj)  20 mg IV BID [facility]


   Last Admin: 05/04/22 10:46 Dose:  20 mg


   


Heparin Sodium (Porcine) (Heparin 5,000 Unit/1 Ml Vial)  5,000 unit SUB-Q Q12HR 

[facility]


   Last Admin: 05/04/22 10:46 Dose:  5,000 unit


   


Hydromorphone HCl (Hydromorphone 1 Mg/1 Ml Inj)  0.5 mg IV Q3H PRN


   PRN Reason: Pain , Severe (7-10)


   Last Admin: 05/04/22 10:47 Dose:  0.5 mg


   


Hydromorphone HCl (Hydromorphone 1 Mg/1 Ml Inj)  0.25 mg IV Q10MIN PRN


   PRN Reason: Pain, Moderate (4-6)


   Stop: 05/04/22 16:53


Hydromorphone HCl (Hydromorphone 1 Mg/1 Ml Inj)  0.5 mg IV Q10MIN PRN


   PRN Reason: Pain , Severe (7-10)


   Stop: 05/04/22 16:53


   Last Admin: 05/03/22 18:40 Dose:  0.5 mg


   


Sodium Chloride (Nacl 0.45% 1000 Ml)  1,000 mls @ 125 mls/hr IV AS DIRECT CHERYL


   Last Admin: 05/04/22 04:03 Dose:  125 mls/hr


   


Levofloxacin/Dextrose (Levaquin 750mg/150ml)  750 mg in 150 mls @ 100 mls/hr IV 

Q24H CHERYL; Protocol


   Last Admin: 05/04/22 08:21 Dose:  100 mls/hr


   


Methylprednisolone Sodium Succinate (Methylprednisolone Sod Succinate 125 Mg/2 

Ml Inj)  125 mg IV Q8HR CHERYL


   Last Admin: 05/04/22 06:07 Dose:  125 mg


   


Morphine Sulfate (Morphine 2 Mg/1 Ml Inj)  2 mg IV Q4H PRN


   PRN Reason: Pain, Moderate (4-6)


   Last Admin: 05/04/22 05:59 Dose:  2 mg


   


Ondansetron HCl (Ondansetron 4 Mg/2 Ml Inj)  4 mg IV Q8H PRN


   PRN Reason: Nausea And Vomiting


Ondansetron HCl (Ondansetron 4 Mg/2 Ml Inj)  4 mg IV ONCE PRN


   PRN Reason: Nausea And Vomiting


   Stop: 05/04/22 16:53


Sodium Chloride (Sodium Chloride 0.9% 10 Ml Flush Syringe)  10 ml IV BID CHERYL


   Last Admin: 05/04/22 10:48 Dose:  10 ml


   


Sodium Chloride (Sodium Chloride 0.9% 10 Ml Flush Syringe)  10 ml IV PRN PRN


   PRN Reason: LINE FLUSH





Reviewed/updated patient's home and current medications





Review of Systems





- Review of Systems


All systems: negative (per HPI)





Exam





- Constitutional


Vital Signs: 


                                        











Temp Pulse Resp BP Pulse Ox


 


 98.9 F   90   17   153/89   95 


 


 05/03/22 18:45  05/04/22 08:36  05/04/22 08:36  05/03/22 19:00  05/04/22 08:37











General appearance: no acute distress





- Respiratory


Respiratory effort: normal


Respiratory: bilateral: CTA





- Cardiovascular


Rhythm: regular


Heart Sounds: Present: S1 & S2





- Gastrointestinal


General gastrointestinal: Present: soft, non-tender, non-distended





- Neurologic


Neurological: alert and oriented x3





- Psychiatric


Psychiatric: appropriate mood/affect





- Labs


CBC & Chem 7: 


                                 05/04/22 05:20





                                 05/04/22 05:20


Lab Results: 


                         Laboratory Results - last 24 hr











  05/03/22 05/03/22 05/03/22





  13:30 13:30 13:30


 


WBC   13.7 H 


 


RBC   5.27 H 


 


Hgb   17.7 H 


 


Hct   51.0 H 


 


MCV   97 H 


 


MCH   34 H 


 


MCHC   35 H 


 


RDW   13.0 L 


 


Plt Count   176 


 


Lymph % (Auto)   5.4 L 


 


Mono % (Auto)   10.0 H 


 


Eos % (Auto)   0.0 


 


Baso % (Auto)   0.1 


 


Lymph # (Auto)   0.7 L 


 


Mono # (Auto)   1.4 H 


 


Eos # (Auto)   0.0 


 


Baso # (Auto)   0.0 


 


Seg Neutrophils %   84.5 H 


 


Seg Neutrophils #   11.6 H 


 


PT  13.4  


 


INR  0.92  


 


APTT    25.8


 


Sodium   


 


Potassium   


 


Chloride   


 


Carbon Dioxide   


 


Anion Gap   


 


BUN   


 


Creatinine   


 


Estimated GFR   


 


BUN/Creatinine Ratio   


 


Glucose   


 


Lactic Acid   


 


Calcium   


 


Total Bilirubin   


 


Direct Bilirubin   


 


AST   


 


ALT   


 


Alkaline Phosphatase   


 


Total Creatine Kinase   


 


Total Protein   


 


Albumin   


 


Albumin/Globulin Ratio   


 


Hep Bs Antigen   


 


Hep B Core IgM Ab   


 


Hepatitis C Antibody   














  05/03/22 05/03/22 05/03/22





  13:30 13:30 13:30


 


WBC   


 


RBC   


 


Hgb   


 


Hct   


 


MCV   


 


MCH   


 


MCHC   


 


RDW   


 


Plt Count   


 


Lymph % (Auto)   


 


Mono % (Auto)   


 


Eos % (Auto)   


 


Baso % (Auto)   


 


Lymph # (Auto)   


 


Mono # (Auto)   


 


Eos # (Auto)   


 


Baso # (Auto)   


 


Seg Neutrophils %   


 


Seg Neutrophils #   


 


PT   


 


INR   


 


APTT   


 


Sodium  134 L  


 


Potassium  4.7  D  


 


Chloride  97.3 L  


 


Carbon Dioxide  23  


 


Anion Gap  18  


 


BUN  10  


 


Creatinine  0.8  


 


Estimated GFR  > 60  


 


BUN/Creatinine Ratio  13  


 


Glucose  147 H  


 


Lactic Acid   1.40 


 


Calcium  8.3 L  


 


Total Bilirubin  0.50  


 


Direct Bilirubin   


 


AST  1130 H  


 


ALT  303 H  


 


Alkaline Phosphatase  87  


 


Total Creatine Kinase   


 


Total Protein  7.2  


 


Albumin  4.4  


 


Albumin/Globulin Ratio  1.6  


 


Hep Bs Antigen    Non-reactive


 


Hep B Core IgM Ab    Non-reactive


 


Hepatitis C Antibody    Non-reactive














  05/03/22 05/04/22 05/04/22





  22:47 05:20 05:20


 


WBC   12.7 H 


 


RBC   5.02 


 


Hgb   16.9 H 


 


Hct   48.5 H 


 


MCV   97 H 


 


MCH   34 H 


 


MCHC   35 H 


 


RDW   13.4 


 


Plt Count   185 


 


Lymph % (Auto)   6.3 L 


 


Mono % (Auto)   8.5 H 


 


Eos % (Auto)   0.0 


 


Baso % (Auto)   0.1 


 


Lymph # (Auto)   0.8 L 


 


Mono # (Auto)   1.1 H 


 


Eos # (Auto)   0.0 


 


Baso # (Auto)   0.0 


 


Seg Neutrophils %   85.1 H 


 


Seg Neutrophils #   10.8 H 


 


PT   


 


INR   


 


APTT   


 


Sodium  135 L   134 L


 


Potassium  5.3 H   5.0


 


Chloride  99.5   98.9


 


Carbon Dioxide  20 L   22


 


Anion Gap  21   18


 


BUN  12   12


 


Creatinine  0.8   0.7 L


 


Estimated GFR  > 60   > 60


 


BUN/Creatinine Ratio  15   17


 


Glucose  165 H   167 H


 


Lactic Acid   


 


Calcium  8.0 L   8.6


 


Total Bilirubin  0.50  


 


Direct Bilirubin  < 0.2  


 


AST  1548 H  


 


ALT  460 H  


 


Alkaline Phosphatase  80  


 


Total Creatine Kinase  72310 H  


 


Total Protein  6.6  


 


Albumin  4.0  


 


Albumin/Globulin Ratio  1.5  


 


Hep Bs Antigen   


 


Hep B Core IgM Ab   


 


Hepatitis C Antibody   














Assessment and Plan





1. Acute liver injury - suspect due to rhabdo given CK levels.  US with signs of

biliary dilatation so can not definitely rule out obstructing lesion.  trend 

liver enzymes daily, and if ALT continues to rise, recommend MRCP.  denies abd 

pain/symptoms otherwise.

## 2022-05-05 LAB
ALBUMIN SERPL-MCNC: 3.3 G/DL (ref 3.9–5)
ALT SERPL-CCNC: 441 UNITS/L (ref 7–56)
BASOPHILS # (AUTO): 0 K/MM3 (ref 0–0.1)
BASOPHILS NFR BLD AUTO: 0 % (ref 0–1.8)
BUN SERPL-MCNC: 14 MG/DL (ref 9–20)
BUN/CREAT SERPL: 18 %
CALCIUM SERPL-MCNC: 7.8 MG/DL (ref 8.4–10.2)
EOSINOPHIL # BLD AUTO: 0 K/MM3 (ref 0–0.4)
EOSINOPHIL NFR BLD AUTO: 0 % (ref 0–4.3)
HCT VFR BLD CALC: 42.4 % (ref 35.5–45.6)
HEMOLYSIS INDEX: 8
HGB BLD-MCNC: 14.1 GM/DL (ref 11.8–15.2)
LYMPHOCYTES # BLD AUTO: 1.1 K/MM3 (ref 1.2–5.4)
LYMPHOCYTES NFR BLD AUTO: 7.8 % (ref 13.4–35)
MCHC RBC AUTO-ENTMCNC: 33 % (ref 32–34)
MCV RBC AUTO: 98 FL (ref 84–94)
MONOCYTES # (AUTO): 1.5 K/MM3 (ref 0–0.8)
MONOCYTES % (AUTO): 10.6 % (ref 0–7.3)
PLATELET # BLD: 187 K/MM3 (ref 140–440)
RBC # BLD AUTO: 4.32 M/MM3 (ref 3.65–5.03)

## 2022-05-05 RX ADMIN — HYDROMORPHONE HYDROCHLORIDE PRN MG: 1 INJECTION, SOLUTION INTRAMUSCULAR; INTRAVENOUS; SUBCUTANEOUS at 03:13

## 2022-05-05 RX ADMIN — MORPHINE SULFATE PRN MG: 2 INJECTION, SOLUTION INTRAMUSCULAR; INTRAVENOUS at 08:05

## 2022-05-05 RX ADMIN — METHYLPREDNISOLONE SODIUM SUCCINATE SCH MG: 125 INJECTION, POWDER, FOR SOLUTION INTRAMUSCULAR; INTRAVENOUS at 06:31

## 2022-05-05 RX ADMIN — SODIUM CHLORIDE SCH MLS/HR: 0.9 INJECTION, SOLUTION INTRAVENOUS at 00:15

## 2022-05-05 RX ADMIN — SODIUM BICARBONATE SCH MG: 650 TABLET ORAL at 08:25

## 2022-05-05 RX ADMIN — Medication SCH ML: at 22:45

## 2022-05-05 RX ADMIN — METHYLPREDNISOLONE SODIUM SUCCINATE SCH MG: 125 INJECTION, POWDER, FOR SOLUTION INTRAMUSCULAR; INTRAVENOUS at 14:45

## 2022-05-05 RX ADMIN — Medication SCH ML: at 10:04

## 2022-05-05 RX ADMIN — IPRATROPIUM BROMIDE AND ALBUTEROL SULFATE SCH AMPUL: .5; 3 SOLUTION RESPIRATORY (INHALATION) at 08:51

## 2022-05-05 RX ADMIN — FAMOTIDINE SCH MG: 10 INJECTION, SOLUTION INTRAVENOUS at 22:45

## 2022-05-05 RX ADMIN — FAMOTIDINE SCH MG: 10 INJECTION, SOLUTION INTRAVENOUS at 10:03

## 2022-05-05 RX ADMIN — HEPARIN SODIUM SCH UNIT: 5000 INJECTION, SOLUTION INTRAVENOUS; SUBCUTANEOUS at 10:03

## 2022-05-05 RX ADMIN — METHYLPREDNISOLONE SODIUM SUCCINATE SCH MG: 125 INJECTION, POWDER, FOR SOLUTION INTRAMUSCULAR; INTRAVENOUS at 22:45

## 2022-05-05 RX ADMIN — MORPHINE SULFATE PRN MG: 2 INJECTION, SOLUTION INTRAMUSCULAR; INTRAVENOUS at 11:59

## 2022-05-05 RX ADMIN — SODIUM CHLORIDE SCH MLS/HR: 0.9 INJECTION, SOLUTION INTRAVENOUS at 10:02

## 2022-05-05 RX ADMIN — DOCUSATE SODIUM SCH MG: 100 CAPSULE, LIQUID FILLED ORAL at 22:45

## 2022-05-05 RX ADMIN — SODIUM CHLORIDE SCH MLS/HR: 0.9 INJECTION, SOLUTION INTRAVENOUS at 18:21

## 2022-05-05 RX ADMIN — MORPHINE SULFATE PRN MG: 2 INJECTION, SOLUTION INTRAMUSCULAR; INTRAVENOUS at 20:18

## 2022-05-05 RX ADMIN — SODIUM BICARBONATE SCH MG: 650 TABLET ORAL at 14:45

## 2022-05-05 RX ADMIN — VALSARTAN SCH MG: 160 TABLET, FILM COATED ORAL at 10:02

## 2022-05-05 RX ADMIN — SODIUM BICARBONATE SCH MG: 650 TABLET ORAL at 20:18

## 2022-05-05 RX ADMIN — IPRATROPIUM BROMIDE AND ALBUTEROL SULFATE SCH AMPUL: .5; 3 SOLUTION RESPIRATORY (INHALATION) at 20:50

## 2022-05-05 RX ADMIN — HEPARIN SODIUM SCH UNIT: 5000 INJECTION, SOLUTION INTRAVENOUS; SUBCUTANEOUS at 22:44

## 2022-05-05 RX ADMIN — SODIUM CHLORIDE SCH MLS/HR: 0.9 INJECTION, SOLUTION INTRAVENOUS at 23:04

## 2022-05-05 NOTE — GASTROENTEROLOGY PROGRESS NOTE
Assessment and Plan





1. Acute liver injury - suspect primarily from rhabdo and sepsis.  stable levels

today.  denies abd pain. cont supportive care and trend liver enzymes for now.  

hopefully will improve with management of acute medical issues. 





Subjective


Date of service: 05/05/22


Principal diagnosis: abnormal liver enzymes


Interval history: 





pt transferred to ICU, no new gi complaints/symptoms





Objective





- Constitutional


Vitals: 


                                        











Temp Pulse Resp BP Pulse Ox


 


 100.2 F H  102 H  18   135/90   98 


 


 05/05/22 07:17  05/05/22 08:54  05/05/22 08:54  05/05/22 06:30  05/05/22 06:30











General appearance: no acute distress





- Respiratory


Respiratory effort: normal


Respiratory: bilateral: CTA





- Cardiovascular


Rhythm: regular


Heart Sounds: Present: S1 & S2





- Gastrointestinal


General gastrointestinal: Present: soft, non-tender





- Labs


CBC & Chem 7: 


                                 05/05/22 04:49





                                 05/05/22 04:49


Labs: 


                         Laboratory Results - last 24 hr











  05/04/22 05/04/22 05/04/22





  11:40 16:42 16:42


 


WBC   


 


RBC   


 


Hgb   


 


Hct   


 


MCV   


 


MCH   


 


MCHC   


 


RDW   


 


Plt Count   


 


Lymph % (Auto)   


 


Mono % (Auto)   


 


Eos % (Auto)   


 


Baso % (Auto)   


 


Lymph # (Auto)   


 


Mono # (Auto)   


 


Eos # (Auto)   


 


Baso # (Auto)   


 


Seg Neutrophils %   


 


Seg Neutrophils #   


 


Sodium   


 


Potassium   


 


Chloride   


 


Carbon Dioxide   


 


Anion Gap   


 


BUN   


 


Creatinine   


 


Estimated GFR   


 


BUN/Creatinine Ratio   


 


Glucose   


 


Calcium   


 


Magnesium   


 


Total Bilirubin   


 


AST   


 


ALT   


 


Alkaline Phosphatase   


 


Ammonia   


 


Total Creatine Kinase   18257 H 


 


Total Protein   


 


Albumin   


 


Albumin/Globulin Ratio   


 


Amylase   


 


Lipase   


 


Coronavirus (PCR)  Negative  


 


HIV 1&2 Antibody Rapid    Non react


 


HIV P24 Antigen    Non react














  05/04/22 05/05/22 05/05/22





  22:17 04:49 04:49


 


WBC   14.2 H 


 


RBC   4.32 


 


Hgb   14.1 


 


Hct   42.4  D 


 


MCV   98 H 


 


MCH   33 H 


 


MCHC   33 


 


RDW   12.7 L 


 


Plt Count   187 


 


Lymph % (Auto)   7.8 L 


 


Mono % (Auto)   10.6 H 


 


Eos % (Auto)   0.0 


 


Baso % (Auto)   0.0 


 


Lymph # (Auto)   1.1 L 


 


Mono # (Auto)   1.5 H 


 


Eos # (Auto)   0.0 


 


Baso # (Auto)   0.0 


 


Seg Neutrophils %   81.6 H 


 


Seg Neutrophils #   11.6 H 


 


Sodium    134 L


 


Potassium    4.6


 


Chloride    96.9 L


 


Carbon Dioxide    25


 


Anion Gap    17


 


BUN    14


 


Creatinine    0.8


 


Estimated GFR    > 60


 


BUN/Creatinine Ratio    18


 


Glucose    137 H


 


Calcium    7.8 L


 


Magnesium    2.10


 


Total Bilirubin    0.40


 


AST    1024 H


 


ALT    441 H


 


Alkaline Phosphatase    54


 


Ammonia   


 


Total Creatine Kinase  32001 H  


 


Total Protein    5.5 L


 


Albumin    3.3 L


 


Albumin/Globulin Ratio    1.5


 


Amylase    25 L


 


Lipase    10 L


 


Coronavirus (PCR)   


 


HIV 1&2 Antibody Rapid   


 


HIV P24 Antigen   














  05/05/22 05/05/22





  04:49 04:49


 


WBC  


 


RBC  


 


Hgb  


 


Hct  


 


MCV  


 


MCH  


 


MCHC  


 


RDW  


 


Plt Count  


 


Lymph % (Auto)  


 


Mono % (Auto)  


 


Eos % (Auto)  


 


Baso % (Auto)  


 


Lymph # (Auto)  


 


Mono # (Auto)  


 


Eos # (Auto)  


 


Baso # (Auto)  


 


Seg Neutrophils %  


 


Seg Neutrophils #  


 


Sodium  


 


Potassium  


 


Chloride  


 


Carbon Dioxide  


 


Anion Gap  


 


BUN  


 


Creatinine  


 


Estimated GFR  


 


BUN/Creatinine Ratio  


 


Glucose  


 


Calcium  


 


Magnesium  


 


Total Bilirubin  


 


AST  


 


ALT  


 


Alkaline Phosphatase  


 


Ammonia  37.0 


 


Total Creatine Kinase   56561 H


 


Total Protein  


 


Albumin  


 


Albumin/Globulin Ratio  


 


Amylase  


 


Lipase  


 


Coronavirus (PCR)  


 


HIV 1&2 Antibody Rapid  


 


HIV P24 Antigen

## 2022-05-05 NOTE — CONSULTATION
History of Present Illness





- Reason for Consult


Consult date: 05/05/22


other (rhabdomyolysis)





- History of Present Illness





36-year-old with no significant past medical history was brought to the 

emergency room because of adverse drug reaction.  He reportedly received an IM 

penicillin shot in his left thigh earlier.  Surgery consulted, concern for 

compartment syndrome noted, s/p fasciotomy.  Noted to have elevated CK >90,000 

and nephrology consulted.  Kidney function WNL.  Also noted to have elevated 

AST/ALT.





At time of consult, patient denies any acute issues, no pain.  Deneis dyspnea, 

edema.  Notes normal urine output.





Past History


Past Medical History: No medical history


Past Surgical History: Other (Buttock injection performed in [place] in 2017)


Social history: other (Unknown)


Family history: no significant family history





Medications and Allergies


                                    Allergies











Allergy/AdvReac Type Severity Reaction Status Date / Time


 


ceftriaxone [From Rocephin] Allergy  Unknown Verified 05/04/22 08:15


 


Penicillins Allergy  Unknown Verified 05/03/22 07:27











                                Home Medications











 Medication  Instructions  Recorded  Confirmed  Last Taken  Type


 


No Known Home Medications [No  05/03/22 05/03/22 Unknown History





Reported Home Medications]     











Active Meds: 


Active Medications





Acetaminophen (Acetaminophen 325 Mg Tab)  650 mg PO Q4H PRN


   PRN Reason: Pain MILD(1-3)/Fever >100.5/HA


   Last Admin: 05/04/22 22:37 Dose:  650 mg


   


Albuterol (Albuterol 2.5 Mg/3 Ml Nebu)  2.5 mg IH Q3HRT PRN


   PRN Reason: Shortness Of Breath


Albuterol/Ipratropium (Ipratropium/Albuterol Sulfate 3 Ml Ampul.Neb)  1 ampul IH

BIDRT [facility]


   Last Admin: 05/05/22 08:51 Dose:  1 ampul


   


Diphenhydramine HCl (Diphenhydramine 50 Mg/Ml Vial)  25 mg IV Q6H PRN


   PRN Reason: Skin Irritation


Famotidine (Famotidine 20 Mg/2 Ml Inj)  20 mg IV BID [facility]


   Last Admin: 05/04/22 22:24 Dose:  20 mg


   


Heparin Sodium (Porcine) (Heparin 5,000 Unit/1 Ml Vial)  5,000 unit SUB-Q Q12HR 

[facility]


   Last Admin: 05/04/22 22:23 Dose:  5,000 unit


   


Hydralazine HCl (Hydralazine 20 Mg/1 Ml Inj)  10 mg IV Q3HR PRN


   PRN Reason: Hypertension


   Last Admin: 05/04/22 18:50 Dose:  10 mg


   


Hydromorphone HCl (Hydromorphone 1 Mg/1 Ml Inj)  0.5 mg IV Q3H PRN


   PRN Reason: Pain , Severe (7-10)


   Last Admin: 05/05/22 03:13 Dose:  0.5 mg


   


Sodium Chloride (Nacl 0.45% 1000 Ml)  1,000 mls @ 125 mls/hr IV AS DIRECT [facility]ERYL


   Last Admin: 05/04/22 15:19 Dose:  125 mls/hr


   


Levofloxacin/Dextrose (Levaquin 750mg/150ml)  750 mg in 150 mls @ 100 mls/hr IV 

Q24H [facility]ERYL; Protocol


   Last Admin: 05/05/22 08:26 Dose:  100 mls/hr


   


Sodium Chloride (Nacl 0.9% 1000 Ml)  1,000 mls @ 200 mls/hr IV AS DIRECT CHERYL


   Last Admin: 05/05/22 00:15 Dose:  200 mls/hr


   


Methylprednisolone Sodium Succinate (Methylprednisolone Sod Succinate 125 Mg/2 

Ml Inj)  80 mg IV Q8HR CHERYL


   Last Admin: 05/05/22 06:31 Dose:  80 mg


   


Morphine Sulfate (Morphine 2 Mg/1 Ml Inj)  2 mg IV Q4H PRN


   PRN Reason: Pain, Moderate (4-6)


   Last Admin: 05/05/22 08:05 Dose:  2 mg


   


Ondansetron HCl (Ondansetron 4 Mg/2 Ml Inj)  4 mg IV Q8H PRN


   PRN Reason: Nausea And Vomiting


Sodium Bicarbonate (Sodium Bicarbonate 650 Mg Tab)  650 mg PO TID [facility]


   Last Admin: 05/05/22 08:25 Dose:  650 mg


   


Sodium Chloride (Sodium Chloride 0.9% 10 Ml Flush Syringe)  10 ml IV BID [facility]


   Last Admin: 05/04/22 22:26 Dose:  10 ml


   


Sodium Chloride (Sodium Chloride 0.9% 10 Ml Flush Syringe)  10 ml IV PRN PRN


   PRN Reason: LINE FLUSH


Valsartan (Valsartan 160mg Tab)  160 mg PO DAILY [facility]


   Last Admin: 05/04/22 22:28 Dose:  160 mg


   











Review of Systems


All systems: negative (as per HPI)





Exam





- Vital Signs


Vital signs: 


                                   Vital Signs











Temp Pulse Resp BP Pulse Ox


 


 98.6 F   110 H  24   174/94   100 


 


 05/02/22 23:11  05/02/22 23:11  05/02/22 23:11  05/02/22 23:11  05/02/22 23:11














- General Appearance


General appearance: well-developed, well-nourished


EENT: ATNC


Neck: Present: neck supple, trachea midline


Respiratory: Clear to Ascultation


Heart: regular, S1S2


Gastrointestinal: Present: normoactive bowel sounds


Integumentary: no rash, warm and dry


Neurologic: no focal deficit, no asterixis





Results





- Lab Results





                                 05/05/22 04:49





                                 05/05/22 04:49


                             Most recent lab results











Calcium  7.8 mg/dL (8.4-10.2)  L  05/05/22  04:49    


 


Magnesium  2.10 mg/dL (1.7-2.3)   05/05/22  04:49    














Assessment and Plan





# Acute Rhabdomyolysis in setting of left leg compartment syndrome:


- continue aggressive IVF (NS at 200ml/min reasonable), CK downtrending


- s/p fasciotomy per Dr. Cantrell/Benoit, appreciate


- supportive measures per primary


- can d/c lasix, use prn if respiratory status worsening/volume overload 

developing, but note appropriate urine output


- follow renal function, electrolytes- currently stable





# Transaminitis: in setting of rhabdomyolysis, note GI input

## 2022-05-05 NOTE — PROGRESS NOTE
<ANNELREBELRoel - Last Filed: 22 16:52>





Assessment and Plan


Assessment and plan: 


This is a transgendered female admitted with acute liver failure, 

rhabdomyolysis, s/p 4 compartment fasciotomy to left lower extremity





Neuro: Neuropathy


-Avoid delirium


-Reorientation as needed


-Maintain sleep-wake cycle


-As needed analgesia


-Neurology consulted, appreciate recommendations


-Gabapentin 





Cardiac: ST, undiagnosed HTN


-Blood pressure monitoring per protocol


-Valsartan


-PRN hydralazine





Respiratory: NAD


-Pulmanory hygiene


-Supplemental oxygen as needed


-SPO2 monitoring





GI: Acute liver failure


-GI consulted, appreciate recommendations


-Per GI: Suspect due to rhabdomyolysis given elevated creatinine kinase levels


   -If ALT continues to rise recommend MRCP


-Abdominal ultrasound shows mild diffuse biliary dilation, obstruction to lesion

of the distal common bile duct cannot be excluded consider MRCP, no evidence of 

gallstones within the gallbladder


-24 hours -250 mL


-PPI


-Regular diet


-BR: colace


-Trend LFTs





: Rhabdomyolysis


-Nephrology consulted, appreciate recommendations


-Strict intake and output


-Renally dose medications


-Avoid nephrotoxic medications


-MIVF


-Sodium bicarb


-Trend BMP, CK





ID: NAD


-s/p abx therapy with levaquin (5/3-)


-f/u blood culture


-Monitor WBC and temperature curve





Endo: NAD


-Avoid hypoglycemia


-SSI


-Accu-Cheks q. xxx


-Long-acting insulin, titrate as needed





Heme: NAD


-Trend CBC


-Transfuse hemoglobin less than 7


-Monitor for signs of bleeding


-Heparin subq





MS: Compartment syndrome to LLE


-Vascular surgery consulted, appreciate recommendations


-s/p self admin of IM abx to midthigh


-c/o pain, decreased sensation, pulses were dopplarable, paralysis, mottled skin


-s/p 4 compartment tracheotomy on 5/3 with vascular surgery


-Bilateral lower extremity Doppler ultrasound showed no DVT


-Left lower extremity CT with contrast showed appearance of posterior soft 

tissue complex cellulitis with small subcutaneous lymph nodes, no drainable 

fluid collections present, contusion could have similar appearance


-Bilateral lower extremity arterial duplex showed no significant lower extremity

peripheral artery disease


-Normal ABIs








The high probability of a clinically significant, sudden or life threatening 

deterioration of the [] system(s) required my full and direct attention, 

intervention and personal management. The aggregate critical care time was [] 

minutes. This time is in addition to time spent performing reported procedures 

but includes the following: 





[x] Data Review and interpretation 





[x] Patient assessment and monitoring of vital signs 





[x] Documentation 





[x] Medication orders and management


Disposition Plan: transfer to floor 


Total Time Spent with Patient (Minutes): 60





History


Interval history: 


This is a 36 year old  transgendered female undergoing gender transformation 

with silicone injection to buttocks (2017) and breast implants, heavy 

drinker on the weekends (bottle of vodka) who presented to [facility] on 5/3 because o

f adverse drug reaction after reportedly self administration of  rocephin

 and PCN for sore throat. Patient reportedly collapsed while checking in to the 

emergency department, has complains of severe 10 out of 10 pain in left thigh 

and has noticeable mottling of skin in thigh and left lower abdomen.  Patient 

underwent a CT of the left leg which showed possible cellulitis with no 

drainable fluid collection and was started on empiric antibiotics.  Patient 

admitted to the hospital service with consult to surgery with left leg 

cellulitis and possible allergic reaction.





Hospital course to date:





5/3: Venous ultrasound shows no DVT, Vascular surgery consulted who performed a 

fasciotomy and plan to transfer patient to IMCU post intervention.  Neurology 

consulted





: Transfer to ICU, GI and neurology consulted.





: Patient will be transferred back to the floor from ICU.  Her liver enzymes 

and creatinine are trending down.  Will be started on gabapentin per neurology 

recommendations and antibiotics discontinued.











Hospitalist Physical





- Constitutional


Vitals: 


                                        











Temp Pulse Resp BP Pulse Ox


 


 98.0 F   91 H  23   151/97   97 


 


 22 12:00  22 16:00  22 16:00  22 16:00  22 16:00











General appearance: Present: no acute distress





- EENT


Eyes: Present: PERRL, EOM intact


ENT: hearing intact, clear oral mucosa, dentition normal





- Neck


Neck: Present: normal ROM





- Respiratory


Respiratory effort: normal


Respiratory: bilateral: CTA





- Cardiovascular


Rhythm: regular


Heart Sounds: Present: S1 & S2.  Absent: systolic murmur, diastolic murmur





- Extremities


Extremity abnormal: edema, cyanosis, pulses diminished, tenderness


Peripheral Pulses: abnormal





- Peripheral pulses


  ** dorsalis pedis


Pulse Strength: 2+





  ** posterial tibial


Pulse Strength: 2+





- Abdominal


General gastrointestinal: soft, non-tender, non-distended, normal bowel sounds





- Integumentary


Integumentary: Present: warm, dry





- Psychiatric


Psychiatric: appropriate mood/affect, cooperative





- Neurologic


Neurologic: CNII-XII intact, no focal deficits, moves all extremities





- Allied Health


Allied health notes reviewed: nursing, social work





Results





- Labs


CBC & Chem 7: 


                                 22 04:49





                                 22 04:49


Labs: 


                             Laboratory Last Values











WBC  14.2 K/mm3 (4.5-11.0)  H  22  04:49    


 


RBC  4.32 M/mm3 (3.65-5.03)   22  04:49    


 


Hgb  14.1 gm/dl (11.8-15.2)   22  04:49    


 


Hct  42.4 % (35.5-45.6)  D 22  04:49    


 


MCV  98 fl (84-94)  H  22  04:49    


 


MCH  33 pg (28-32)  H  22  04:49    


 


MCHC  33 % (32-34)   22  04:49    


 


RDW  12.7 % (13.2-15.2)  L  22  04:49    


 


Plt Count  187 K/mm3 (140-440)   22  04:49    


 


Lymph % (Auto)  7.8 % (13.4-35.0)  L  22  04:49    


 


Mono % (Auto)  10.6 % (0.0-7.3)  H  22  04:49    


 


Eos % (Auto)  0.0 % (0.0-4.3)   22  04:49    


 


Baso % (Auto)  0.0 % (0.0-1.8)   22  04:49    


 


Lymph # (Auto)  1.1 K/mm3 (1.2-5.4)  L  22  04:49    


 


Mono # (Auto)  1.5 K/mm3 (0.0-0.8)  H  22  04:49    


 


Eos # (Auto)  0.0 K/mm3 (0.0-0.4)   22  04:49    


 


Baso # (Auto)  0.0 K/mm3 (0.0-0.1)   22  04:49    


 


Add Manual Diff  Complete   22  23:35    


 


Total Counted  100   22  23:35    


 


Seg Neutrophils %  81.6 % (40.0-70.0)  H  22  04:49    


 


Seg Neuts % (Manual)  52.0 % (40.0-70.0)   22  23:35    


 


Band Neutrophils %  0 %  22  23:35    


 


Lymphocytes % (Manual)  41.0 % (13.4-35.0)  H  22  23:35    


 


Reactive Lymphs % (Man)  0 %  22  23:35    


 


Monocytes % (Manual)  7.0 % (0.0-7.3)   22  23:35    


 


Eosinophils % (Manual)  0 % (0.0-4.3)   22  23:35    


 


Basophils % (Manual)  0 % (0.0-1.8)   22  23:35    


 


Metamyelocytes %  0 %  22  23:35    


 


Myelocytes %  0 %  22  23:35    


 


Promyelocytes %  0 %  22  23:35    


 


Blast Cells %  0 %  22  23:35    


 


Nucleated RBC %  Not Reportable   22  23:35    


 


Seg Neutrophils #  11.6 K/mm3 (1.8-7.7)  H  22  04:49    


 


Seg Neutrophils # Man  7.3 K/mm3 (1.8-7.7)   22  23:35    


 


Band Neutrophils #  0.0 K/mm3  22  23:35    


 


Lymphocytes # (Manual)  5.7 K/mm3 (1.2-5.4)  H  22  23:35    


 


Abs React Lymphs (Man)  0.0 K/mm3  22  23:35    


 


Monocytes # (Manual)  1.0 K/mm3 (0.0-0.8)  H  22  23:35    


 


Eosinophils # (Manual)  0.0 K/mm3 (0.0-0.4)   22  23:35    


 


Basophils # (Manual)  0.0 K/mm3 (0.0-0.1)   22  23:35    


 


Metamyelocytes #  0.0 K/mm3  22  23:35    


 


Myelocytes #  0.0 K/mm3  22  23:35    


 


Promyelocytes #  0.0 K/mm3  22  23:35    


 


Blast Cells #  0.0 K/mm3  22  23:35    


 


WBC Morphology  Not Reportable   22  23:35    


 


Hypersegmented Neuts  Not Reportable   22  23:35    


 


Hyposegmented Neuts  Not Reportable   22  23:35    


 


Hypogranular Neuts  Not Reportable   22  23:35    


 


Smudge Cells  Not Reportable   22  23:35    


 


Toxic Granulation  Not Reportable   22  23:35    


 


Toxic Vacuolation  Not Reportable   22  23:35    


 


Dohle Bodies  Not Reportable   22  23:35    


 


Pelger-Huet Anomaly  Not Reportable   22  23:35    


 


Jamie Rods  Not Reportable   22  23:35    


 


Platelet Estimate  Consistent w auto   22  23:35    


 


Clumped Platelets  Not Reportable   22  23:35    


 


Plt Clumps, EDTA  Not Reportable   22  23:35    


 


Large Platelets  Not Reportable   22  23:35    


 


Giant Platelets  Not Reportable   22  23:35    


 


Platelet Satelliting  Not Reportable   22  23:35    


 


Plt Morphology Comment  Not Reportable   22  23:35    


 


RBC Morphology  Normal   22  23:35    


 


Dimorphic RBCs  Not Reportable   22  23:35    


 


Polychromasia  Not Reportable   22  23:35    


 


Hypochromasia  Not Reportable   22  23:35    


 


Poikilocytosis  Not Reportable   22  23:35    


 


Anisocytosis  Not Reportable   22  23:35    


 


Microcytosis  Not Reportable   22  23:35    


 


Macrocytosis  Not Reportable   22  23:35    


 


Spherocytes  Not Reportable   22  23:35    


 


Pappenheimer Bodies  Not Reportable   22  23:35    


 


Sickle Cells  Not Reportable   22  23:35    


 


Target Cells  Not Reportable   22  23:35    


 


Tear Drop Cells  Not Reportable   22  23:35    


 


Ovalocytes  Not Reportable   22  23:35    


 


Helmet Cells  Not Reportable   22  23:35    


 


[place] Bodies  Not Reportable   22  23:35    


 


Cabot Rings  Not Reportable   22  23:35    


 


[place] Cells  Not Reportable   22  23:35    


 


Bite Cells  Not Reportable   22  23:35    


 


Crenated Cell  Not Reportable   22  23:35    


 


Elliptocytes  Not Reportable   22  23:35    


 


Acanthocytes (Spur)  Not Reportable   22  23:35    


 


Rouleaux  Not Reportable   22  23:35    


 


Hemoglobin C Crystals  Not Reportable   22  23:35    


 


Schistocytes  Not Reportable   22  23:35    


 


Malaria parasites  Not Reportable   22  23:35    


 


Miguel Bodies  Not Reportable   22  23:35    


 


Hem Pathologist Commnt  No   22  23:35    


 


PT  13.4 Sec. (12.2-14.9)   22  13:30    


 


INR  0.92  (0.87-1.13)   22  13:30    


 


APTT  25.8 Sec. (24.2-36.6)   22  13:30    


 


Sodium  134 mmol/L (137-145)  L  22  04:49    


 


Potassium  4.6 mmol/L (3.6-5.0)   22  04:49    


 


Chloride  96.9 mmol/L ()  L  22  04:49    


 


Carbon Dioxide  25 mmol/L (22-30)   22  04:49    


 


Anion Gap  17 mmol/L  22  04:49    


 


BUN  14 mg/dL (9-20)   22  04:49    


 


Creatinine  0.8 mg/dL (0.8-1.3)   22  04:49    


 


Estimated GFR  > 60 ml/min  22  04:49    


 


BUN/Creatinine Ratio  18 %  22  04:49    


 


Glucose  137 mg/dL ()  H  22  04:49    


 


Lactic Acid  1.40 mmol/L (0.7-2.0)   22  13:30    


 


Calcium  7.8 mg/dL (8.4-10.2)  L  22  04:49    


 


Magnesium  2.10 mg/dL (1.7-2.3)   22  04:49    


 


Total Bilirubin  0.40 mg/dL (0.1-1.2)   22  04:49    


 


Direct Bilirubin  < 0.2 mg/dL (0-0.2)   22  22:47    


 


AST  1024 units/L (5-40)  H  22  04:49    


 


ALT  441 units/L (7-56)  H  22  04:49    


 


Alkaline Phosphatase  54 units/L ()   22  04:49    


 


Ammonia  37.0 umol/L (25-60)   22  04:49    


 


Total Creatine Kinase  16311 units/L ()  H  22  14:30    


 


Total Protein  5.5 g/dL (6.3-8.2)  L  22  04:49    


 


Albumin  3.3 g/dL (3.9-5)  L  22  04:49    


 


Albumin/Globulin Ratio  1.5 %  22  04:49    


 


Amylase  25 units/L ()  L  22  04:49    


 


Lipase  10 units/L (13-60)  L  22  04:49    


 


Coronavirus (PCR)  Negative  (Negative)   22  11:40    


 


Hep Bs Antigen  Non-reactive  (Negative)   22  13:30    


 


Hep B Core IgM Ab  Non-reactive  (NonReactive)   22  13:30    


 


Hepatitis C Antibody  Non-reactive  (NonReactive)   22  13:30    


 


HIV 1&2 Antibody Rapid  Non react  (Non React)   22  16:42    


 


HIV P24 Antigen  Non react  (Non React)   22  16:42    











Microbiology: 


Microbiology





22 22:47   Peripheral/Venous   Blood Culture - Preliminary


                            NO GROWTH AFTER 24 HOURS


22 22:47   Peripheral/Venous   Blood Culture - Preliminary


                            NO GROWTH AFTER 24 HOURS








Mcwilliams/IV: 


                                        





Voiding Method                   Urinal











Active Medications





- Current Medications


Current Medications: 














Generic Name Dose Route Start Last Admin





  Trade Name Freq  PRN Reason Stop Dose Admin


 


Acetaminophen  650 mg  22 06:15  22 22:37





  Acetaminophen 325 Mg Tab  PO   650 mg





  Q4H PRN   Administration





  Pain MILD(1-3)/Fever >100.5/HA  


 


Albuterol  2.5 mg  22 06:15 





  Albuterol 2.5 Mg/3 Ml Nebu  IH  





  Q3HRT PRN  





  Shortness Of Breath  


 


Albuterol/Ipratropium  1 ampul  22 08:00  22 08:51





  Ipratropium/Albuterol Sulfate 3 Ml Ampul.Neb  IH   1 ampul





  BIDRT CHERYL   Administration


 


Diphenhydramine HCl  25 mg  22 06:24 





  Diphenhydramine 50 Mg/Ml Vial  IV  





  Q6H PRN  





  Skin Irritation  


 


Docusate Sodium  100 mg  22 22:00 





  Docusate Sodium 100 Mg Cap  PO  





  BID CHERYL  


 


Famotidine  20 mg  22 10:00  22 10:03





  Famotidine 20 Mg/2 Ml Inj  IV   20 mg





  BID CHERYL   Administration


 


Heparin Sodium (Porcine)  5,000 unit  22 10:00  22 10:03





  Heparin 5,000 Unit/1 Ml Vial  SUB-Q   5,000 unit





  Q12HR CHERYL   Administration


 


Hydralazine HCl  10 mg  22 18:31  22 18:50





  Hydralazine 20 Mg/1 Ml Inj  IV   10 mg





  Q3HR PRN   Administration





  Hypertension  


 


Hydromorphone HCl  0.5 mg  22 06:15  22 03:13





  Hydromorphone 1 Mg/1 Ml Inj  IV   0.5 mg





  Q3H PRN   Administration





  Pain , Severe (7-10)  


 


Sodium Chloride  1,000 mls @ 200 mls/hr  22 18:30  22 10:02





  Nacl 0.9% 1000 Ml  IV   200 mls/hr





  AS DIRECT CHERYL   Administration


 


Methylprednisolone Sodium Succinate  80 mg  22 18:59  22 14:45





  Methylprednisolone Sod Succinate 125 Mg/2 Ml Inj  IV   80 mg





  Q8HR CHERYL   Administration


 


Morphine Sulfate  2 mg  22 06:15  22 11:59





  Morphine 2 Mg/1 Ml Inj  IV   2 mg





  Q4H PRN   Administration





  Pain, Moderate (4-6)  


 


Ondansetron HCl  4 mg  22 06:15 





  Ondansetron 4 Mg/2 Ml Inj  IV  





  Q8H PRN  





  Nausea And Vomiting  


 


Sodium Bicarbonate  650 mg  22 20:00  22 14:45





  Sodium Bicarbonate 650 Mg Tab  PO   650 mg





  TID CHERYL   Administration


 


Sodium Chloride  10 ml  22 10:00  22 10:04





  Sodium Chloride 0.9% 10 Ml Flush Syringe  IV   10 ml





  BID CHERYL   Administration


 


Sodium Chloride  10 ml  22 06:15 





  Sodium Chloride 0.9% 10 Ml Flush Syringe  IV  





  PRN PRN  





  LINE FLUSH  


 


Valsartan  160 mg  22 22:00  22 10:02





  Valsartan 160mg Tab  PO   160 mg





  DAILY CHERYL   Administration














<JUAN DOWD - Last Filed: 22 08:57>





Assessment and Plan


Assessment and plan: 





I saw and evaluated the patient. Discussed with the nurse practitioner and agree

 with their findings and plan as documented in this note.





Hospitalist Physical





- Constitutional


Vitals: 


                                        











Temp Pulse Resp BP Pulse Ox


 


 99.5 F   101 H  20   123/72   96 


 


 22 23:33  22 19:50  22 23:33  22 23:33  22 23:33














Results





- Labs


CBC & Chem 7: 


                                 22 04:49





                                 22 04:49


Labs: 


                             Laboratory Last Values











WBC  20.6 K/mm3 (4.5-11.0)  H  22  04:49    


 


RBC  3.62 M/mm3 (3.65-5.03)  L  22  04:49    


 


Hgb  11.7 gm/dl (11.8-15.2)  L  22  04:49    


 


Hct  34.7 % (35.5-45.6)  L  22  04:49    


 


MCV  96 fl (84-94)  H  22  04:49    


 


MCH  32 pg (28-32)   22  04:49    


 


MCHC  34 % (32-34)   22  04:49    


 


RDW  13.0 % (13.2-15.2)  L  22  04:49    


 


Plt Count  368 K/mm3 (140-440)   22  04:49    


 


Lymph % (Auto)  7.8 % (13.4-35.0)  L  22  04:49    


 


Mono % (Auto)  10.6 % (0.0-7.3)  H  22  04:49    


 


Eos % (Auto)  0.0 % (0.0-4.3)   22  04:49    


 


Baso % (Auto)  0.0 % (0.0-1.8)   22  04:49    


 


Lymph # (Auto)  1.1 K/mm3 (1.2-5.4)  L  22  04:49    


 


Mono # (Auto)  1.5 K/mm3 (0.0-0.8)  H  22  04:49    


 


Eos # (Auto)  0.0 K/mm3 (0.0-0.4)   22  04:49    


 


Baso # (Auto)  0.0 K/mm3 (0.0-0.1)   22  04:49    


 


Add Manual Diff  Complete   22  04:49    


 


Total Counted  100   22  04:49    


 


Seg Neutrophils %  81.6 % (40.0-70.0)  H  22  04:49    


 


Seg Neuts % (Manual)  83.0 % (40.0-70.0)  H  22  04:49    


 


Band Neutrophils %  2.0 %  22  04:49    


 


Lymphocytes % (Manual)  5.0 % (13.4-35.0)  L  22  04:49    


 


Reactive Lymphs % (Man)  0 %  22  04:49    


 


Monocytes % (Manual)  8.0 % (0.0-7.3)  H  22  04:49    


 


Eosinophils % (Manual)  0 % (0.0-4.3)   22  04:49    


 


Basophils % (Manual)  0 % (0.0-1.8)   22  04:49    


 


Metamyelocytes %  2.0 %  22  04:49    


 


Myelocytes %  0 %  22  04:49    


 


Promyelocytes %  0 %  22  04:49    


 


Blast Cells %  0 %  22  04:49    


 


Nucleated RBC %  Not Reportable   22  04:49    


 


Seg Neutrophils #  11.6 K/mm3 (1.8-7.7)  H  22  04:49    


 


Seg Neutrophils # Man  17.1 K/mm3 (1.8-7.7)  H  22  04:49    


 


Band Neutrophils #  0.4 K/mm3  22  04:49    


 


Lymphocytes # (Manual)  1.0 K/mm3 (1.2-5.4)  L  22  04:49    


 


Abs React Lymphs (Man)  0.0 K/mm3  22  04:49    


 


Monocytes # (Manual)  1.6 K/mm3 (0.0-0.8)  H  22  04:49    


 


Eosinophils # (Manual)  0.0 K/mm3 (0.0-0.4)   22  04:49    


 


Basophils # (Manual)  0.0 K/mm3 (0.0-0.1)   22  04:49    


 


Metamyelocytes #  0.4 K/mm3  22  04:49    


 


Myelocytes #  0.0 K/mm3  22  04:49    


 


Promyelocytes #  0.0 K/mm3  22  04:49    


 


Blast Cells #  0.0 K/mm3  22  04:49    


 


WBC Morphology  Not Reportable   22  04:49    


 


Hypersegmented Neuts  Not Reportable   22  04:49    


 


Hyposegmented Neuts  Rare   22  04:49    


 


Hypogranular Neuts  Not Reportable   22  04:49    


 


Smudge Cells  Not Reportable   22  04:49    


 


Toxic Granulation  Not Reportable   22  04:49    


 


Toxic Vacuolation  Not Reportable   22  04:49    


 


Dohle Bodies  Not Reportable   22  04:49    


 


Pelger-Huet Anomaly  Not Reportable   22  04:49    


 


Jamie Rods  Not Reportable   22  04:49    


 


Platelet Estimate  Consistent w auto   22  04:49    


 


Clumped Platelets  Not Reportable   22  04:49    


 


Plt Clumps, EDTA  Not Reportable   22  04:49    


 


Large Platelets  Rare   22  04:49    


 


Giant Platelets  Not Reportable   22  04:49    


 


Platelet Satelliting  Not Reportable   22  04:49    


 


Plt Morphology Comment  Not Reportable   22  04:49    


 


RBC Morphology  Not Reportable   22  04:49    


 


Dimorphic RBCs  Not Reportable   22  04:49    


 


Polychromasia  Not Reportable   22  04:49    


 


Hypochromasia  Not Reportable   22  04:49    


 


Poikilocytosis  Not Reportable   22  04:49    


 


Anisocytosis  Rare   22  04:49    


 


Microcytosis  Not Reportable   22  04:49    


 


Macrocytosis  Rare   22  04:49    


 


Spherocytes  Not Reportable   22  04:49    


 


Pappenheimer Bodies  Not Reportable   22  04:49    


 


Sickle Cells  Not Reportable   22  04:49    


 


Target Cells  Not Reportable   22  04:49    


 


Tear Drop Cells  Not Reportable   22  04:49    


 


Ovalocytes  Not Reportable   22  04:49    


 


Helmet Cells  Not Reportable   22  04:49    


 


Mccormick[place] Bodies  Not Reportable   22  04:49    


 


Cabot Rings  Not Reportable   22  04:49    


 


[place] Cells  Not Reportable   22  04:49    


 


Bite Cells  Not Reportable   22  04:49    


 


Crenated Cell  Not Reportable   22  04:49    


 


Elliptocytes  Not Reportable   22  04:49    


 


Acanthocytes (Spur)  Not Reportable   22  04:49    


 


Rouleaux  Not Reportable   22  04:49    


 


Hemoglobin C Crystals  Not Reportable   22  04:49    


 


Schistocytes  Not Reportable   22  04:49    


 


Malaria parasites  Not Reportable   22  04:49    


 


Miguel Bodies  Not Reportable   22  04:49    


 


Hem Pathologist Commnt  No   22  04:49    


 


PT  13.4 Sec. (12.2-14.9)   22  13:30    


 


INR  0.92  (0.87-1.13)   22  13:30    


 


APTT  25.8 Sec. (24.2-36.6)   22  13:30    


 


Sodium  136 mmol/L (137-145)  L  22  04:49    


 


Potassium  3.4 mmol/L (3.6-5.0)  L  22  04:49    


 


Chloride  100.1 mmol/L ()   22  04:49    


 


Carbon Dioxide  26 mmol/L (22-30)   22  04:49    


 


Anion Gap  13 mmol/L  22  04:49    


 


BUN  7 mg/dL (9-20)  L  22  04:49    


 


Creatinine  0.5 mg/dL (0.8-1.3)  L  22  04:49    


 


Estimated GFR  > 60 ml/min  22  04:49    


 


BUN/Creatinine Ratio  14 %  22  04:49    


 


Glucose  119 mg/dL ()  H  22  04:49    


 


Lactic Acid  1.40 mmol/L (0.7-2.0)   22  13:30    


 


Calcium  7.6 mg/dL (8.4-10.2)  L  22  04:49    


 


Phosphorus  2.80 mg/dL (2.5-4.5)   22  05:17    


 


Magnesium  2.20 mg/dL (1.7-2.3)   22  05:17    


 


Total Bilirubin  0.40 mg/dL (0.1-1.2)   05/10/22  09:56    


 


Direct Bilirubin  < 0.2 mg/dL (0-0.2)   22  22:47    


 


AST  144 units/L (5-40)  H  05/10/22  09:56    


 


ALT  247 units/L (7-56)  H  05/10/22  09:56    


 


Alkaline Phosphatase  70 units/L ()   05/10/22  09:56    


 


Ammonia  37.0 umol/L (25-60)   22  04:49    


 


Total Creatine Kinase  4125 units/L ()  H  22  04:49    


 


Total Protein  4.8 g/dL (6.3-8.2)  L  05/10/22  09:56    


 


Albumin  2.9 g/dL (3.9-5)  L  05/10/22  09:56    


 


Albumin/Globulin Ratio  1.5 %  05/10/22  09:56    


 


Amylase  25 units/L ()  L  22  04:49    


 


Lipase  10 units/L (13-60)  L  22  04:49    


 


Coronavirus (PCR)  Negative  (Negative)   22  11:40    


 


Hepatitis A IgM Ab  Nonreactive  (NonReactive)   22  13:30    


 


Hep Bs Antigen  Non-reactive  (Negative)   22  13:30    


 


Hep B Core IgM Ab  Non-reactive  (NonReactive)   22  13:30    


 


Hepatitis C Antibody  Non-reactive  (NonReactive)   22  13:30    


 


HIV 1&2 Antibody Rapid  Non react  (Non React)   22  16:42    


 


HIV P24 Antigen  Non react  (Non React)   22  16:42    











Microbiology: 


Microbiology





05/10/22 Unknown   Leg - Left   Surgical Biopsy Culture - Preliminary








Mcwilliams/IV: 


                                        





Voiding Method                   Toilet











Active Medications





- Current Medications


Current Medications: 














Generic Name Dose Route Start Last Admin





  Trade Name Freq  PRN Reason Stop Dose Admin


 


Albuterol  2.5 mg  22 06:15 





  Albuterol 2.5 Mg/3 Ml Nebu  IH  





  Q3HRT PRN  





  Shortness Of Breath  


 


Albuterol/Ipratropium  1 ampul  22 08:00  22 08:39





  Ipratropium/Albuterol Sulfate 3 Ml Ampul.Neb  IH   1 ampul





  BIDRT CHERYL   Administration


 


Diphenhydramine HCl  25 mg  22 06:24  22 18:49





  Diphenhydramine 50 Mg/Ml Vial  IV   25 mg





  Q6H PRN   Administration





  Skin Irritation  


 


Docusate Sodium  100 mg  22 22:00  22 21:05





  Docusate Sodium 100 Mg Cap  PO   100 mg





  BID CHERYL   Administration


 


Famotidine  20 mg  22 10:00  22 21:05





  Famotidine 20 Mg Tab  PO   20 mg





  BID CHERYL   Administration


 


Gabapentin  100 mg  22 22:30  22 05:46





  Gabapentin 100 Mg Cap  PO  22 23:59  100 mg





  Q8HR CHERYL   Administration


 


Gabapentin  100 mg  22 06:00 





  Gabapentin 100 Mg Cap  PO  22 14:01 





  0600,1400 CHERYL  


 


Gabapentin  300 mg  22 22:00 





  Gabapentin 300 Mg Cap  PO  22 22:01 





  QHS CHERYL  


 


Gabapentin  300 mg  22 08:00 





  Gabapentin 300 Mg Cap  PO  





  TID@0800,1600,2200 CHERYL  


 


Heparin Sodium (Porcine)  5,000 unit  22 10:00  22 21:04





  Heparin 5,000 Unit/1 Ml Vial  SUB-Q   5,000 unit





  Q12HR CHERYL   Administration


 


Hydralazine HCl  10 mg  22 18:31  22 18:50





  Hydralazine 20 Mg/1 Ml Inj  IV   10 mg





  Q3HR PRN   Administration





  Hypertension  


 


Hydromorphone HCl  0.5 mg  22 06:15  22 01:08





  Hydromorphone 1 Mg/1 Ml Inj  IV   0.5 mg





  Q3H PRN   Administration





  Pain , Severe (7-10)  


 


Sodium Chloride  1,000 mls @ 200 mls/hr  22 18:30  22 05:46





  Nacl 0.9% 1000 Ml  IV   200 mls/hr





  AS DIRECT CHERYL   Administration


 


Morphine Sulfate  2 mg  22 06:15  22 21:18





  Morphine 2 Mg/1 Ml Inj  IV   2 mg





  Q4H PRN   Administration





  Pain, Moderate (4-6)  


 


Ondansetron HCl  4 mg  22 06:15 





  Ondansetron 4 Mg/2 Ml Inj  IV  





  Q8H PRN  





  Nausea And Vomiting  


 


Sodium Chloride  10 ml  22 10:00  22 21:05





  Sodium Chloride 0.9% 10 Ml Flush Syringe  IV   10 ml





  BID CHERYL   Administration


 


Sodium Chloride  10 ml  22 06:15  22 01:09





  Sodium Chloride 0.9% 10 Ml Flush Syringe  IV   10 ml





  PRN PRN   Administration





  LINE FLUSH  


 


Valsartan  160 mg  22 22:00  22 09:35





  Valsartan 160mg Tab  PO   160 mg





  DAILY CHERYL   Administration














Nutrition/Malnutrition Assess





- Dietary Evaluation


Nutrition/Malnutrition Findings: 


                                        





Nutrition Notes                                            Start:  05/10/22 

16:45


Freq:                                                      Status: Active       

 


Protocol:                                                                       

 


 Document     05/10/22 16:45  HATTIE  (Rec: 05/10/22 17:00  HATTIE  [id number])


 Nutrition Notes


     Need for Assessment generated from:         LOS


     Initial or Follow up                        Assessment


     Current Diagnosis                           Hypertension


     Other Pertinent Diagnosis                   Rabdomyolysis, Liver Failure,


                                                 s/p L-LE 4 Compartment


                                                 Fasciotomy.


     Current Diet                                NPO (since 05/10 00:01).


     Labs/Tests                                  05/10: Cl 97.3, Crea 0.7, Ca 8


                                                 .0.


     Pertinent Medications                       05/10: Nutritionally


                                                 unremarkable.


     Height                                      5 ft 6 in


     Weight                                      86.183 kg


     Ideal Body Weight (kg)                      64.54


     BMI                                         30.7


     Intake Prior to Admission                   Good


     Weight change and time frame                Pt denies having loss body


                                                 weight PTA.


     Weight Status                               Obese


     Subjective/Other Information                RD consult for LOS assessment.


                                                 PT's PO intake of meals has


                                                 been Fair (>50%), according to


                                                 ADL notes.


                                                 Pt has been on NPO in


                                                 preparation for procedure on


                                                 05/10.


                                                 Pt is on Room Air, O2


                                                 saturation @ 97%, according to


                                                 Physical Assessment History


                                                 notes.


                                                 Pt presents L-LE Pitting Edema


                                                 3+, according to Physical


                                                 Assessment History notes.


                                                 Pt presents L-LE Vascular


                                                 Wound, according to Physical


                                                 Assessment History notes.


     Percent of energy/protein needs met:        Pt currently on NPO.


                                                 Prescribed Regular Diet


                                                 provides for energy/protein


                                                 needs (2,289 Kcal/89 g) during


                                                 LOS; additionally, Dietary


                                                 Supplements will support wound


                                                 healing processes with 190


                                                 Kcal and 5 g of protein.


     Burn                                        Absent


     Trauma                                      Absent


     GI Symptoms                                 None


     Food Allergy                                No


     Skin Integrity/Comment                      L-LE Vascular Wound


     Current % PO                                Fair (50-74%)


     Minimum of two criteria                     No


     #1


      Nutrition Diagnosis                        Increased nutrient needs   (


                                                 specify in comment below)


      Comments:                                  Protein to support wound


                                                 healing processes.


      Etiology                                   L-LE 4 Compartment Fascitis.


      As Evidenced by Signs and Symptoms         L-LE Vascular Wound


     Is patient on ventilator?                   No


     Is Patient Ambulatory and/or Out of Bed     No


     REE-([facility]-confined to bed)      [phone]


     Kcal/Kg value to use for calculation        19


     Approximate Energy Requirements Using       1637


      kcal/Kg                                    


     Calculation Used for Recommendations        Kcal/kg


     Additional Notes                            Protein: 1.25-1.5 g/Kg AdjBW;


                                                  g/day.


                                                 Fluids: 1 ml/Kcal, or as per


                                                 MD.


 Nutrition Intervention


     Change Diet Order:                          When pertinent, advance to


                                                 Regular Diet.


     Add Supplement/Snack (indicate name/kcal    When pertinent, start 28.8 pkt


      /protein )                                 Aidan; BID.


     Provides kCal:                              190


     Provides Protein (gm)                       5


     Goal #1                                     Support, through dietary


                                                 supplementation, wound healing


                                                 processes during LOS.


     Goal #2                                     Adjust the dietary


                                                 intervention to better serve


                                                 Pt's needs and clinical


                                                 conditions during LOS.


     Goal #3                                     Maintain body weight within +/


                                                 -3% of admission body weight


                                                 during LOS.


     Follow-Up By:                               22


     Additional Comments                         When pertinent, continue


                                                 monitoring food tolerance, %PO


                                                 intake of meals, and BM.

## 2022-05-05 NOTE — PROGRESS NOTE
Assessment and Plan





Patient status post fasciotomy for left leg compartment syndrome.  

Serosanguineous drainage noted.  Plan to change dressings tomorrow with an eye 

towards possible closure.  Patient with motor deficits to left foot with 

preserved sensation.  Evolving ischemic changes to the bottom of the foot.





Subjective


Date of service: 05/05/22


Principal diagnosis: abnormal liver enzymes


Interval history: 





Patient with a left leg compartment syndrome status post fasciotomy and 

rhabdomyolysis with preserved renal function.  Patient with intact sensation and

palpable dorsalis pedis pulse.  No motor to the left foot.  Patient developing 

ischemic changes to the plantar surface of the foot with discoloration of the 

heel and forefoot.





Objective





- Constitutional


Vitals: 


                               Vital Signs - 12hr











  05/04/22 05/04/22 05/04/22





  22:51 23:00 23:01


 


Temperature   


 


Pulse Rate 111 H 97 H 100 H


 


Pulse Rate [   





Anterior   





Bilateral   





Throughout]   


 


Pulse Rate [   





From Monitor]   


 


Respiratory 24 20 20





Rate   


 


Respiratory   





Rate [Anterior   





Bilateral   





Throughout]   


 


Blood Pressure 139/91 134/90 134/90


 


O2 Sat by Pulse 97 97 97





Oximetry   














  05/04/22 05/04/22 05/04/22





  23:11 23:15 23:21


 


Temperature   


 


Pulse Rate 118 H  105 H


 


Pulse Rate [   





Anterior   





Bilateral   





Throughout]   


 


Pulse Rate [   





From Monitor]   


 


Respiratory 22  22





Rate   


 


Respiratory   





Rate [Anterior   





Bilateral   





Throughout]   


 


Blood Pressure 134/90 138/88 138/88


 


O2 Sat by Pulse 98  96





Oximetry   














  05/04/22 05/04/22 05/04/22





  23:30 23:41 23:51


 


Temperature   


 


Pulse Rate 97 H 108 H 102 H


 


Pulse Rate [   





Anterior   





Bilateral   





Throughout]   


 


Pulse Rate [   





From Monitor]   


 


Respiratory 22 24 25 H





Rate   


 


Respiratory   





Rate [Anterior   





Bilateral   





Throughout]   


 


Blood Pressure 115/70  


 


O2 Sat by Pulse 95 96 96





Oximetry   














  05/05/22 05/05/22 05/05/22





  00:00 00:01 00:11


 


Temperature 98 F  


 


Pulse Rate 94 H 100 H 101 H


 


Pulse Rate [   





Anterior   





Bilateral   





Throughout]   


 


Pulse Rate [ 101 H  





From Monitor]   


 


Respiratory 16 25 H 24





Rate   


 


Respiratory   





Rate [Anterior   





Bilateral   





Throughout]   


 


Blood Pressure   


 


O2 Sat by Pulse 98 96 96





Oximetry   














  05/05/22 05/05/22 05/05/22





  00:21 00:31 00:41


 


Temperature   


 


Pulse Rate 101 H 98 H 93 H


 


Pulse Rate [   





Anterior   





Bilateral   





Throughout]   


 


Pulse Rate [   





From Monitor]   


 


Respiratory 20 19 21





Rate   


 


Respiratory   





Rate [Anterior   





Bilateral   





Throughout]   


 


Blood Pressure 106/59 106/59 106/59


 


O2 Sat by Pulse 96 97 97





Oximetry   














  05/05/22 05/05/22 05/05/22





  00:51 01:00 01:11


 


Temperature   


 


Pulse Rate 97 H 94 H 92 H


 


Pulse Rate [   





Anterior   





Bilateral   





Throughout]   


 


Pulse Rate [   





From Monitor]   


 


Respiratory 22 20 20





Rate   


 


Respiratory   





Rate [Anterior   





Bilateral   





Throughout]   


 


Blood Pressure 106/59 104/63 104/63


 


O2 Sat by Pulse 96 97 97





Oximetry   














  05/05/22 05/05/22 05/05/22





  01:21 01:31 01:41


 


Temperature   


 


Pulse Rate 94 H 93 H 90


 


Pulse Rate [   





Anterior   





Bilateral   





Throughout]   


 


Pulse Rate [   





From Monitor]   


 


Respiratory 20 20 18





Rate   


 


Respiratory   





Rate [Anterior   





Bilateral   





Throughout]   


 


Blood Pressure 104/63 104/63 104/63


 


O2 Sat by Pulse 96 95 96





Oximetry   














  05/05/22 05/05/22 05/05/22





  01:51 02:00 02:11


 


Temperature   


 


Pulse Rate 101 H 92 H 96 H


 


Pulse Rate [   





Anterior   





Bilateral   





Throughout]   


 


Pulse Rate [   





From Monitor]   


 


Respiratory 22 22 23





Rate   


 


Respiratory   





Rate [Anterior   





Bilateral   





Throughout]   


 


Blood Pressure 104/63 122/80 122/80


 


O2 Sat by Pulse 96 97 96





Oximetry   














  05/05/22 05/05/22 05/05/22





  02:21 02:31 02:40


 


Temperature   


 


Pulse Rate 100 H 90 90


 


Pulse Rate [   





Anterior   





Bilateral   





Throughout]   


 


Pulse Rate [   





From Monitor]   


 


Respiratory 22 20 22





Rate   


 


Respiratory   





Rate [Anterior   





Bilateral   





Throughout]   


 


Blood Pressure 122/80 122/80 122/80


 


O2 Sat by Pulse 96 96 96





Oximetry   














  05/05/22 05/05/22 05/05/22





  02:50 03:00 03:10


 


Temperature   


 


Pulse Rate 87 89 107 H


 


Pulse Rate [   





Anterior   





Bilateral   





Throughout]   


 


Pulse Rate [   





From Monitor]   


 


Respiratory 19 22 23





Rate   


 


Respiratory   





Rate [Anterior   





Bilateral   





Throughout]   


 


Blood Pressure  113/71 113/71


 


O2 Sat by Pulse 97 96 97





Oximetry   














  05/05/22 05/05/22 05/05/22





  03:20 03:30 03:40


 


Temperature   


 


Pulse Rate 86 92 H 87


 


Pulse Rate [   





Anterior   





Bilateral   





Throughout]   


 


Pulse Rate [   





From Monitor]   


 


Respiratory 20 20 19





Rate   


 


Respiratory   





Rate [Anterior   





Bilateral   





Throughout]   


 


Blood Pressure 113/71 113/71 113/71


 


O2 Sat by Pulse 97 97 96





Oximetry   














  05/05/22 05/05/22 05/05/22





  03:50 04:00 04:10


 


Temperature  98.3 F 


 


Pulse Rate 100 H 89 84


 


Pulse Rate [   





Anterior   





Bilateral   





Throughout]   


 


Pulse Rate [  86 





From Monitor]   


 


Respiratory 24 18 21





Rate   


 


Respiratory   





Rate [Anterior   





Bilateral   





Throughout]   


 


Blood Pressure 113/71 124/86 124/86


 


O2 Sat by Pulse 95 98 96





Oximetry   














  05/05/22 05/05/22 05/05/22





  04:20 04:30 04:40


 


Temperature   


 


Pulse Rate 82 85 90


 


Pulse Rate [   





Anterior   





Bilateral   





Throughout]   


 


Pulse Rate [   





From Monitor]   


 


Respiratory 21 20 18





Rate   


 


Respiratory   





Rate [Anterior   





Bilateral   





Throughout]   


 


Blood Pressure 124/86 124/86 124/86


 


O2 Sat by Pulse 97 97 97





Oximetry   














  05/05/22 05/05/22 05/05/22





  04:50 05:00 05:10


 


Temperature   


 


Pulse Rate 86 82 84


 


Pulse Rate [   





Anterior   





Bilateral   





Throughout]   


 


Pulse Rate [   





From Monitor]   


 


Respiratory 19 20 21





Rate   


 


Respiratory   





Rate [Anterior   





Bilateral   





Throughout]   


 


Blood Pressure 124/86 140/94 140/94


 


O2 Sat by Pulse 98 98 97





Oximetry   














  05/05/22 05/05/22 05/05/22





  05:20 05:30 05:40


 


Temperature   


 


Pulse Rate 88 83 91 H


 


Pulse Rate [   





Anterior   





Bilateral   





Throughout]   


 


Pulse Rate [   





From Monitor]   


 


Respiratory 21 20 12





Rate   


 


Respiratory   





Rate [Anterior   





Bilateral   





Throughout]   


 


Blood Pressure 140/94 140/94 140/94


 


O2 Sat by Pulse 97 97 99





Oximetry   














  05/05/22 05/05/22 05/05/22





  05:50 06:00 06:10


 


Temperature   


 


Pulse Rate 88 87 85


 


Pulse Rate [   





Anterior   





Bilateral   





Throughout]   


 


Pulse Rate [   





From Monitor]   


 


Respiratory 22 12 19





Rate   


 


Respiratory   





Rate [Anterior   





Bilateral   





Throughout]   


 


Blood Pressure 140/94 135/90 135/90


 


O2 Sat by Pulse 97 96 97





Oximetry   














  05/05/22 05/05/22 05/05/22





  06:20 06:30 07:17


 


Temperature   100.2 F H


 


Pulse Rate 99 H 102 H 


 


Pulse Rate [   





Anterior   





Bilateral   





Throughout]   


 


Pulse Rate [   





From Monitor]   


 


Respiratory 21 15 





Rate   


 


Respiratory   





Rate [Anterior   





Bilateral   





Throughout]   


 


Blood Pressure 135/90 135/90 


 


O2 Sat by Pulse 97 98 





Oximetry   














  05/05/22





  08:54


 


Temperature 


 


Pulse Rate 


 


Pulse Rate [ 102 H





Anterior 





Bilateral 





Throughout] 


 


Pulse Rate [ 





From Monitor] 


 


Respiratory 





Rate 


 


Respiratory 18





Rate [Anterior 





Bilateral 





Throughout] 


 


Blood Pressure 


 


O2 Sat by Pulse 





Oximetry 











General appearance: Present: no acute distress





- EENT


Eyes: EOM intact


ENT: hearing intact





- Neck


Neck: supple, normal ROM





- Respiratory


Respiratory effort: normal





- Breasts


Breasts: deferred


Extremities: abnormal





- Gastrointestinal


General gastrointestinal: Present: deferred


Rectal Exam: deferred





- Psychiatric


Psychiatric: cooperative





- Labs


CBC & Chem 7: 


                                 05/05/22 04:49





                                 05/05/22 04:49


Labs: 


                              Abnormal lab results











  05/04/22 05/04/22 05/05/22 Range/Units





  16:42 22:17 04:49 


 


WBC    14.2 H  (4.5-11.0)  K/mm3


 


MCV    98 H  (84-94)  fl


 


MCH    33 H  (28-32)  pg


 


RDW    12.7 L  (13.2-15.2)  %


 


Lymph % (Auto)    7.8 L  (13.4-35.0)  %


 


Mono % (Auto)    10.6 H  (0.0-7.3)  %


 


Lymph # (Auto)    1.1 L  (1.2-5.4)  K/mm3


 


Mono # (Auto)    1.5 H  (0.0-0.8)  K/mm3


 


Seg Neutrophils %    81.6 H  (40.0-70.0)  %


 


Seg Neutrophils #    11.6 H  (1.8-7.7)  K/mm3


 


Sodium     (137-145)  mmol/L


 


Chloride     ()  mmol/L


 


Glucose     ()  mg/dL


 


Calcium     (8.4-10.2)  mg/dL


 


AST     (5-40)  units/L


 


ALT     (7-56)  units/L


 


Total Creatine Kinase  34378 H  76501 H   ()  units/L


 


Total Protein     (6.3-8.2)  g/dL


 


Albumin     (3.9-5)  g/dL


 


Amylase     ()  units/L


 


Lipase     (13-60)  units/L














  05/05/22 05/05/22 Range/Units





  04:49 04:49 


 


WBC    (4.5-11.0)  K/mm3


 


MCV    (84-94)  fl


 


MCH    (28-32)  pg


 


RDW    (13.2-15.2)  %


 


Lymph % (Auto)    (13.4-35.0)  %


 


Mono % (Auto)    (0.0-7.3)  %


 


Lymph # (Auto)    (1.2-5.4)  K/mm3


 


Mono # (Auto)    (0.0-0.8)  K/mm3


 


Seg Neutrophils %    (40.0-70.0)  %


 


Seg Neutrophils #    (1.8-7.7)  K/mm3


 


Sodium  134 L   (137-145)  mmol/L


 


Chloride  96.9 L   ()  mmol/L


 


Glucose  137 H   ()  mg/dL


 


Calcium  7.8 L   (8.4-10.2)  mg/dL


 


AST  1024 H   (5-40)  units/L


 


ALT  441 H   (7-56)  units/L


 


Total Creatine Kinase   72057 H  ()  units/L


 


Total Protein  5.5 L   (6.3-8.2)  g/dL


 


Albumin  3.3 L   (3.9-5)  g/dL


 


Amylase  25 L   ()  units/L


 


Lipase  10 L   (13-60)  units/L














Medications & Allergies





- Medications


Allergies/Adverse Reactions: 


                                    Allergies





ceftriaxone [From Rocephin] Allergy (Severe, Verified 05/05/22 10:23)


   Unknown


   SEVERE RASH MOTTLING OF THE SKIN POST ADMINISTRATION OF IM ROCEPHIN PTA 


Penicillins Allergy (Severe, Verified 05/05/22 10:23)


   Unknown


   SEVERE RASH MOTTLING OF THE SKIN POST ADMINISTRATION OF IM ROCEPHIN PTA 








Home Medications: 


                                Home Medications











 Medication  Instructions  Recorded  Confirmed  Last Taken  Type


 


No Known Home Medications [No  05/03/22 05/03/22 Unknown History





Reported Home Medications]     











Active Medications: 














Generic Name Dose Route Start Last Admin





  Trade Name Freq  PRN Reason Stop Dose Admin


 


Acetaminophen  650 mg  05/03/22 06:15  05/04/22 22:37





  Acetaminophen 325 Mg Tab  PO   650 mg





  Q4H PRN   Administration





  Pain MILD(1-3)/Fever >100.5/HA  


 


Albuterol  2.5 mg  05/03/22 06:15 





  Albuterol 2.5 Mg/3 Ml Nebu  IH  





  Q3HRT PRN  





  Shortness Of Breath  


 


Albuterol/Ipratropium  1 ampul  05/04/22 08:00  05/05/22 08:51





  Ipratropium/Albuterol Sulfate 3 Ml Ampul.Neb  IH   1 ampul





  BIDRT CHERYL   Administration


 


Diphenhydramine HCl  25 mg  05/03/22 06:24 





  Diphenhydramine 50 Mg/Ml Vial  IV  





  Q6H PRN  





  Skin Irritation  


 


Famotidine  20 mg  05/03/22 10:00  05/05/22 10:03





  Famotidine 20 Mg/2 Ml Inj  IV   20 mg





  BID CHERYL   Administration


 


Heparin Sodium (Porcine)  5,000 unit  05/03/22 10:00  05/05/22 10:03





  Heparin 5,000 Unit/1 Ml Vial  SUB-Q   5,000 unit





  Q12HR CHERYL   Administration


 


Hydralazine HCl  10 mg  05/04/22 18:31  05/04/22 18:50





  Hydralazine 20 Mg/1 Ml Inj  IV   10 mg





  Q3HR PRN   Administration





  Hypertension  


 


Hydromorphone HCl  0.5 mg  05/03/22 06:15  05/05/22 03:13





  Hydromorphone 1 Mg/1 Ml Inj  IV   0.5 mg





  Q3H PRN   Administration





  Pain , Severe (7-10)  


 


Levofloxacin/Dextrose  750 mg in 150 mls @ 100 mls/hr  05/03/22 07:00  05/05/22 

08:26





  Levaquin 750mg/150ml  IV   100 mls/hr





  Q24H CHERYL   Administration





  Protocol  


 


Sodium Chloride  1,000 mls @ 200 mls/hr  05/04/22 18:30  05/05/22 10:02





  Nacl 0.9% 1000 Ml  IV   200 mls/hr





  AS DIRECT CHERYL   Administration


 


Methylprednisolone Sodium Succinate  80 mg  05/04/22 18:59  05/05/22 06:31





  Methylprednisolone Sod Succinate 125 Mg/2 Ml Inj  IV   80 mg





  Q8HR CHERYL   Administration


 


Morphine Sulfate  2 mg  05/03/22 06:15  05/05/22 08:05





  Morphine 2 Mg/1 Ml Inj  IV   2 mg





  Q4H PRN   Administration





  Pain, Moderate (4-6)  


 


Ondansetron HCl  4 mg  05/03/22 06:15 





  Ondansetron 4 Mg/2 Ml Inj  IV  





  Q8H PRN  





  Nausea And Vomiting  


 


Sodium Bicarbonate  650 mg  05/04/22 20:00  05/05/22 08:25





  Sodium Bicarbonate 650 Mg Tab  PO   650 mg





  TID CHERYL   Administration


 


Sodium Chloride  10 ml  05/03/22 10:00  05/05/22 10:04





  Sodium Chloride 0.9% 10 Ml Flush Syringe  IV   10 ml





  BID CHERYL   Administration


 


Sodium Chloride  10 ml  05/03/22 06:15 





  Sodium Chloride 0.9% 10 Ml Flush Syringe  IV  





  PRN PRN  





  LINE FLUSH  


 


Valsartan  160 mg  05/04/22 22:00  05/05/22 10:02





  Valsartan 160mg Tab  PO   160 mg





  DAILY CHERYL   Administration

## 2022-05-06 LAB
ALBUMIN SERPL-MCNC: 3.2 G/DL (ref 3.9–5)
ALT SERPL-CCNC: 416 UNITS/L (ref 7–56)
BUN SERPL-MCNC: 12 MG/DL (ref 9–20)
BUN/CREAT SERPL: 24 %
CALCIUM SERPL-MCNC: 8.3 MG/DL (ref 8.4–10.2)
HCT VFR BLD CALC: 37.5 % (ref 35.5–45.6)
HEMOLYSIS INDEX: 5
HGB BLD-MCNC: 12.6 GM/DL (ref 11.8–15.2)
MCHC RBC AUTO-ENTMCNC: 34 % (ref 32–34)
MCV RBC AUTO: 98 FL (ref 84–94)
PLATELET # BLD: 186 K/MM3 (ref 140–440)
RBC # BLD AUTO: 3.82 M/MM3 (ref 3.65–5.03)

## 2022-05-06 RX ADMIN — GABAPENTIN SCH MG: 100 CAPSULE ORAL at 01:55

## 2022-05-06 RX ADMIN — GABAPENTIN SCH MG: 100 CAPSULE ORAL at 13:46

## 2022-05-06 RX ADMIN — HEPARIN SODIUM SCH UNIT: 5000 INJECTION, SOLUTION INTRAVENOUS; SUBCUTANEOUS at 10:18

## 2022-05-06 RX ADMIN — IPRATROPIUM BROMIDE AND ALBUTEROL SULFATE SCH AMPUL: .5; 3 SOLUTION RESPIRATORY (INHALATION) at 08:56

## 2022-05-06 RX ADMIN — Medication SCH ML: at 21:50

## 2022-05-06 RX ADMIN — SODIUM CHLORIDE SCH MLS/HR: 0.9 INJECTION, SOLUTION INTRAVENOUS at 18:48

## 2022-05-06 RX ADMIN — Medication SCH ML: at 10:23

## 2022-05-06 RX ADMIN — DOCUSATE SODIUM SCH: 100 CAPSULE, LIQUID FILLED ORAL at 21:50

## 2022-05-06 RX ADMIN — DOCUSATE SODIUM SCH MG: 100 CAPSULE, LIQUID FILLED ORAL at 10:18

## 2022-05-06 RX ADMIN — IPRATROPIUM BROMIDE AND ALBUTEROL SULFATE SCH AMPUL: .5; 3 SOLUTION RESPIRATORY (INHALATION) at 20:50

## 2022-05-06 RX ADMIN — FAMOTIDINE SCH MG: 10 INJECTION, SOLUTION INTRAVENOUS at 21:49

## 2022-05-06 RX ADMIN — MORPHINE SULFATE PRN MG: 2 INJECTION, SOLUTION INTRAMUSCULAR; INTRAVENOUS at 10:33

## 2022-05-06 RX ADMIN — METHYLPREDNISOLONE SODIUM SUCCINATE SCH MG: 125 INJECTION, POWDER, FOR SOLUTION INTRAMUSCULAR; INTRAVENOUS at 13:46

## 2022-05-06 RX ADMIN — SODIUM CHLORIDE SCH MLS/HR: 0.9 INJECTION, SOLUTION INTRAVENOUS at 23:32

## 2022-05-06 RX ADMIN — METHYLPREDNISOLONE SODIUM SUCCINATE SCH MG: 125 INJECTION, POWDER, FOR SOLUTION INTRAMUSCULAR; INTRAVENOUS at 21:49

## 2022-05-06 RX ADMIN — HEPARIN SODIUM SCH UNIT: 5000 INJECTION, SOLUTION INTRAVENOUS; SUBCUTANEOUS at 21:50

## 2022-05-06 RX ADMIN — VALSARTAN SCH MG: 160 TABLET, FILM COATED ORAL at 10:22

## 2022-05-06 RX ADMIN — METHYLPREDNISOLONE SODIUM SUCCINATE SCH MG: 125 INJECTION, POWDER, FOR SOLUTION INTRAMUSCULAR; INTRAVENOUS at 05:24

## 2022-05-06 RX ADMIN — SODIUM BICARBONATE SCH: 650 TABLET ORAL at 10:18

## 2022-05-06 RX ADMIN — GABAPENTIN SCH: 100 CAPSULE ORAL at 05:21

## 2022-05-06 RX ADMIN — SODIUM CHLORIDE SCH MLS/HR: 0.9 INJECTION, SOLUTION INTRAVENOUS at 10:38

## 2022-05-06 RX ADMIN — MORPHINE SULFATE PRN MG: 2 INJECTION, SOLUTION INTRAMUSCULAR; INTRAVENOUS at 01:55

## 2022-05-06 RX ADMIN — SODIUM CHLORIDE SCH MLS/HR: 0.9 INJECTION, SOLUTION INTRAVENOUS at 04:05

## 2022-05-06 RX ADMIN — GABAPENTIN SCH MG: 100 CAPSULE ORAL at 21:50

## 2022-05-06 RX ADMIN — FAMOTIDINE SCH MG: 10 INJECTION, SOLUTION INTRAVENOUS at 10:18

## 2022-05-06 NOTE — GASTROENTEROLOGY PROGRESS NOTE
Assessment and Plan





acute liver injury - suspect from rhabdo +/- sepsis, stable/improving.  cont to 

monitor.  if worsening levels, then obtain mrcp.  otherwise, conservative 

management from gi stand point





will sign off, please call as needed or worsening liver enzymes.





Subjective


Date of service: 05/06/22


Principal diagnosis: abnormal liver enzymes


Interval history: 





no new gi complaints/symptoms.  





Objective





- Constitutional


Vitals: 


                                        











Temp Pulse Resp BP Pulse Ox


 


 97.3 F L  94 H  18   140/87   98 


 


 05/06/22 04:44  05/06/22 08:56  05/06/22 08:56  05/06/22 10:22  05/06/22 12:40











General appearance: no acute distress





- Respiratory


Respiratory effort: normal


Respiratory: bilateral: CTA





- Cardiovascular


Rhythm: regular





- Gastrointestinal


General gastrointestinal: Present: soft, non-tender





- Labs


CBC & Chem 7: 


                                 05/06/22 05:17





                                 05/06/22 05:17


Labs: 


                         Laboratory Results - last 24 hr











  05/03/22 05/05/22 05/05/22





  13:30 14:30 23:33


 


WBC   


 


RBC   


 


Hgb   


 


Hct   


 


MCV   


 


MCH   


 


MCHC   


 


RDW   


 


Plt Count   


 


Sodium   


 


Potassium   


 


Chloride   


 


Carbon Dioxide   


 


Anion Gap   


 


BUN   


 


Creatinine   


 


Estimated GFR   


 


BUN/Creatinine Ratio   


 


Glucose   


 


Calcium   


 


Phosphorus   


 


Magnesium   


 


Total Bilirubin   


 


AST   


 


ALT   


 


Alkaline Phosphatase   


 


Total Creatine Kinase   74306 H  16016 H


 


Total Protein   


 


Albumin   


 


Albumin/Globulin Ratio   


 


Hep Bs Antigen  Non-reactive  


 


Hep B Core IgM Ab  Non-reactive  


 


Hepatitis C Antibody  Non-reactive  














  05/06/22 05/06/22





  05:17 05:17


 


WBC  16.3 H 


 


RBC  3.82 


 


Hgb  12.6 


 


Hct  37.5 


 


MCV  98 H 


 


MCH  33 H 


 


MCHC  34 


 


RDW  12.8 L 


 


Plt Count  186 


 


Sodium   135 L


 


Potassium   4.4


 


Chloride   99.3


 


Carbon Dioxide   28


 


Anion Gap   12


 


BUN   12


 


Creatinine   0.5 L


 


Estimated GFR   > 60


 


BUN/Creatinine Ratio   24


 


Glucose   131 H


 


Calcium   8.3 L


 


Phosphorus   2.80


 


Magnesium   2.20


 


Total Bilirubin   0.30


 


AST   701 H


 


ALT   416 H


 


Alkaline Phosphatase   55


 


Total Creatine Kinase   18735 H


 


Total Protein   5.1 L


 


Albumin   3.2 L


 


Albumin/Globulin Ratio   1.7


 


Hep Bs Antigen  


 


Hep B Core IgM Ab  


 


Hepatitis C Antibody

## 2022-05-06 NOTE — PROGRESS NOTE
Assessment and Plan





# Acute Rhabdomyolysis in setting of left leg compartment syndrome:


- continue aggressive IVF (NS at 200ml/min reasonable), CK downtrending 

appropriately


- s/p fasciotomy per Dr. Cantrell/Benoit, shelby


- supportive measures per primary


- can hold lasix, use prn if respiratory status worsening/volume overload 

developing, but note appropriate urine output (1.8L)


- can hold NaHCO3 as acid/base stable


- follow renal function, electrolytes- currently stable





# Transaminitis: in setting of rhabdomyolysis, note GI input








Subjective


Date of service: 05/06/22


Principal diagnosis: abnormal liver enzymes


Interval history: 





No renal related clinical changes noted.  Resting in bed.





Objective





- Exam


Narrative Exam: 





General appearance: well-developed, well-nourished


EENT: ATNC


Neck: Present: neck supple, trachea midline


Respiratory: Clear to Ascultation


Heart: regular, S1S2


Gastrointestinal: Present: normoactive bowel sounds


Integumentary: no rash, warm and dry


Neurologic: no focal deficit, no asterixis





- Vital Signs


Vital signs: 


                               Vital Signs - 12hr











  05/05/22 05/06/22





  22:30 04:44


 


Temperature  97.3 F L


 


Pulse Rate  76


 


Respiratory  18





Rate  


 


Blood Pressure  143/92


 


O2 Sat by Pulse 98 100





Oximetry  














- Lab





                                 05/06/22 05:17





                                 05/06/22 05:17


                             Most recent lab results











Calcium  8.3 mg/dL (8.4-10.2)  L  05/06/22  05:17    


 


Phosphorus  2.80 mg/dL (2.5-4.5)   05/06/22  05:17    


 


Magnesium  2.20 mg/dL (1.7-2.3)   05/06/22  05:17    














Medications & Allergies





- Medications


Allergies/Adverse Reactions: 


                                    Allergies





ceftriaxone [From Rocephin] Allergy (Severe, Verified 05/05/22 10:23)


   Unknown


   SEVERE RASH MOTTLING OF THE SKIN POST ADMINISTRATION OF IM ROCEPHIN PTA 


Penicillins Allergy (Severe, Verified 05/05/22 10:23)


   Unknown


   SEVERE RASH MOTTLING OF THE SKIN POST ADMINISTRATION OF IM ROCEPHIN PTA 








Home Medications: 


                                Home Medications











 Medication  Instructions  Recorded  Confirmed  Last Taken  Type


 


No Known Home Medications [No  05/03/22 05/03/22 Unknown History





Reported Home Medications]     











Active Medications: 














Generic Name Dose Route Start Last Admin





  Trade Name Freq  PRN Reason Stop Dose Admin


 


Albuterol  2.5 mg  05/03/22 06:15 





  Albuterol 2.5 Mg/3 Ml Nebu  IH  





  Q3HRT PRN  





  Shortness Of Breath  


 


Albuterol/Ipratropium  1 ampul  05/04/22 08:00  05/06/22 08:56





  Ipratropium/Albuterol Sulfate 3 Ml Ampul.Neb  IH   1 ampul





  BIDRT CHERYL   Administration


 


Diphenhydramine HCl  25 mg  05/03/22 06:24 





  Diphenhydramine 50 Mg/Ml Vial  IV  





  Q6H PRN  





  Skin Irritation  


 


Docusate Sodium  100 mg  05/05/22 22:00  05/05/22 22:45





  Docusate Sodium 100 Mg Cap  PO   100 mg





  BID CHERYL   Administration


 


Famotidine  20 mg  05/03/22 10:00  05/05/22 22:45





  Famotidine 20 Mg/2 Ml Inj  IV   20 mg





  BID CHERYL   Administration


 


Gabapentin  100 mg  05/05/22 22:30  05/06/22 05:21





  Gabapentin 100 Mg Cap  PO  05/12/22 22:29  Not Given





  Q8HR CHERYL  


 


Gabapentin  100 mg  05/13/22 10:00 





  Gabapentin 100 Mg Cap  PO  05/18/22 09:59 





  BID CHERYL  


 


Gabapentin  300 mg  05/13/22 18:00 





  Gabapentin 300 Mg Cap  PO  05/18/22 17:59 





  QPM CHERYL  


 


Heparin Sodium (Porcine)  5,000 unit  05/03/22 10:00  05/05/22 22:44





  Heparin 5,000 Unit/1 Ml Vial  SUB-Q   5,000 unit





  Q12HR CHERYL   Administration


 


Hydralazine HCl  10 mg  05/04/22 18:31  05/04/22 18:50





  Hydralazine 20 Mg/1 Ml Inj  IV   10 mg





  Q3HR PRN   Administration





  Hypertension  


 


Hydromorphone HCl  0.5 mg  05/03/22 06:15  05/05/22 03:13





  Hydromorphone 1 Mg/1 Ml Inj  IV   0.5 mg





  Q3H PRN   Administration





  Pain , Severe (7-10)  


 


Sodium Chloride  1,000 mls @ 200 mls/hr  05/04/22 18:30  05/06/22 04:05





  Nacl 0.9% 1000 Ml  IV   200 mls/hr





  AS DIRECT CHERYL   Administration


 


Methylprednisolone Sodium Succinate  80 mg  05/04/22 18:59  05/06/22 05:24





  Methylprednisolone Sod Succinate 125 Mg/2 Ml Inj  IV   80 mg





  Q8HR CHERYL   Administration


 


Morphine Sulfate  2 mg  05/03/22 06:15  05/06/22 01:55





  Morphine 2 Mg/1 Ml Inj  IV   2 mg





  Q4H PRN   Administration





  Pain, Moderate (4-6)  


 


Ondansetron HCl  4 mg  05/03/22 06:15 





  Ondansetron 4 Mg/2 Ml Inj  IV  





  Q8H PRN  





  Nausea And Vomiting  


 


Sodium Bicarbonate  650 mg  05/04/22 20:00  05/05/22 20:18





  Sodium Bicarbonate 650 Mg Tab  PO   650 mg





  TID CHERYL   Administration


 


Sodium Chloride  10 ml  05/03/22 10:00  05/05/22 22:45





  Sodium Chloride 0.9% 10 Ml Flush Syringe  IV   10 ml





  BID CHERYL   Administration


 


Sodium Chloride  10 ml  05/03/22 06:15 





  Sodium Chloride 0.9% 10 Ml Flush Syringe  IV  





  PRN PRN  





  LINE FLUSH  


 


Valsartan  160 mg  05/04/22 22:00  05/05/22 10:02





  Valsartan 160mg Tab  PO   160 mg





  DAILY CHERYL   Administration

## 2022-05-06 NOTE — PROGRESS NOTE
Assessment and Plan


Assessment and plan: 


This is a transgendered female admitted with acute liver failure, 

rhabdomyolysis, s/p 4 compartment fasciotomy to left lower extremity





Neuro: Neuropathy


-Avoid delirium


-Reorientation as needed


-Maintain sleep-wake cycle


-As needed analgesia


-Neurology consulted, appreciate recommendations


-Gabapentin 





Cardiac: ST, undiagnosed HTN


-Blood pressure monitoring per protocol


-Valsartan


-PRN hydralazine





Respiratory: NAD


-Pulmanory hygiene


-Supplemental oxygen as needed


-SPO2 monitoring





GI: Acute liver failure


-GI consulted, appreciate recommendations


-Per GI: Suspect due to rhabdomyolysis given elevated creatinine kinase levels


   -If ALT continues to rise recommend MRCP


-Abdominal ultrasound shows mild diffuse biliary dilation, obstruction to lesion

of the distal common bile duct cannot be excluded consider MRCP, no evidence of 

gallstones within the gallbladder


-24 hours -250 mL


-PPI


-Regular diet


-BR: colace


-Trend LFTs





: Rhabdomyolysis


-Nephrology consulted, appreciate recommendations


-Strict intake and output


-Renally dose medications


-Avoid nephrotoxic medications


-MIVF


-Sodium bicarb


-Trend BMP, CK





ID: NAD


-s/p abx therapy with levaquin (5/3-)


-f/u blood culture


-Monitor WBC and temperature curve





Endo: NAD


-Avoid hypoglycemia


-SSI


-Accu-Cheks q. xxx


-Long-acting insulin, titrate as needed





Heme: NAD


-Trend CBC


-Transfuse hemoglobin less than 7


-Monitor for signs of bleeding


-Heparin subq





MS: Compartment syndrome to LLE


-Vascular surgery consulted, appreciate recommendations


-s/p self admin of IM abx to midthigh


-c/o pain, decreased sensation, pulses were dopplarable, paralysis, mottled skin


-s/p 4 compartment tracheotomy on 5/3 with vascular surgery


-Bilateral lower extremity Doppler ultrasound showed no DVT


-Left lower extremity CT with contrast showed appearance of posterior soft 

tissue complex cellulitis with small subcutaneous lymph nodes, no drainable 

fluid collections present, contusion could have similar appearance


-Bilateral lower extremity arterial duplex showed no significant lower extremity

peripheral artery disease


-Normal ABIs








History


Interval history: 


This is a 36 year old  transgendered female undergoing gender transformation 

with silicone injection to buttocks ([place] ) and breast implants, heavy 

drinker on the weekends (bottle of vodka) who presented to [facility] on 5/3 because 

of adverse drug reaction after reportedly self administration of  

rocephin and PCN for sore throat. Patient reportedly collapsed while checking in

to the emergency department, has complains of severe 10 out of 10 pain in left 

thigh and has noticeable mottling of skin in thigh and left lower abdomen.  

Patient underwent a CT of the left leg which showed possible cellulitis with no 

drainable fluid collection and was started on empiric antibiotics.  Patient 

admitted to the hospital service with consult to surgery with left leg 

cellulitis and possible allergic reaction.





Hospital course to date:





5/3: Venous ultrasound shows no DVT, Vascular surgery consulted who performed a 

fasciotomy and plan to transfer patient to [facility] post intervention.  Neurology co

nsulted





: Transfer to ICU, GI and neurology consulted.





: Patient will be transferred back to the floor from ICU.  Her liver enzymes 

and creatinine are trending down.  Will be started on gabapentin per neurology 

recommendations and antibiotics discontinued.








: Continue supportive care.  CPK slowly improving.  Vascular plans for 

closure of fasciotomy early next week.  PT OT evaluation and treatment closure 

done.  Continue wound management.  No evidence of withdrawal noted at this time.

 Continue to monitor LFTs which are also slightly improving.  Plan discussed in 

detail with the patient who verbalized understanding





History


Interval history: 


Patient seen and examined this morning resting comfortably.  No new complaints 

at this time.  Dressing still in place left lower extremity awaiting closure 

which could happen next week.








Hospitalist Physical





- Physical exam


Narrative exam: 





General appearance: Present: no acute distress





- EENT


Eyes: Present: PERRL, EOM intact


ENT: hearing intact, clear oral mucosa, dentition normal





- Neck


Neck: Present: normal ROM





- Respiratory


Respiratory effort: normal


Respiratory: bilateral: CTA





- Cardiovascular


Rhythm: regular


Heart Sounds: Present: S1 & S2.  Absent: systolic murmur, diastolic murmur





- Extremities


Extremity abnormal: edema, cyanosis, pulses diminished, tenderness


Peripheral Pulses: abnormal





- Peripheral pulses


  ** dorsalis pedis


Pulse Strength: 2+





  ** posterial tibial


Pulse Strength: 2+





- Abdominal


General gastrointestinal: soft, non-tender, non-distended, normal bowel sounds





- Integumentary


Integumentary: Present: warm, dry, evidence of surgically implanted breast and 

gluteal region noted.  Left lower extremity with dressing significant edema 

remains in place open wound.  No amish necrosis noted





- Psychiatric


Psychiatric: appropriate mood/affect, cooperative





- Neurologic


Neurologic: CNII-XII intact, no focal deficits, moves all extremities





- Allied Health


Allied health notes reviewed: nursing, social work











- Constitutional


Vitals: 


                                        











Temp Pulse Resp BP Pulse Ox


 


 97.3 F L  94 H  18   140/87   98 


 


 22 04:44  22 08:56  22 08:56  22 10:22  22 12:40











General appearance: Present: no acute distress





Results





- Labs


CBC & Chem 7: 


                                 22 05:17





                                 22 05:17


Labs: 


                             Laboratory Last Values











WBC  16.3 K/mm3 (4.5-11.0)  H  22  05:17    


 


RBC  3.82 M/mm3 (3.65-5.03)   22  05:17    


 


Hgb  12.6 gm/dl (11.8-15.2)   22  05:17    


 


Hct  37.5 % (35.5-45.6)   22  05:17    


 


MCV  98 fl (84-94)  H  22  05:17    


 


MCH  33 pg (28-32)  H  22  05:17    


 


MCHC  34 % (32-34)   22  05:17    


 


RDW  12.8 % (13.2-15.2)  L  22  05:17    


 


Plt Count  186 K/mm3 (140-440)   22  05:17    


 


Lymph % (Auto)  7.8 % (13.4-35.0)  L  22  04:49    


 


Mono % (Auto)  10.6 % (0.0-7.3)  H  22  04:49    


 


Eos % (Auto)  0.0 % (0.0-4.3)   22  04:49    


 


Baso % (Auto)  0.0 % (0.0-1.8)   22  04:49    


 


Lymph # (Auto)  1.1 K/mm3 (1.2-5.4)  L  22  04:49    


 


Mono # (Auto)  1.5 K/mm3 (0.0-0.8)  H  22  04:49    


 


Eos # (Auto)  0.0 K/mm3 (0.0-0.4)   22  04:49    


 


Baso # (Auto)  0.0 K/mm3 (0.0-0.1)   22  04:49    


 


Add Manual Diff  Complete   22  23:35    


 


Total Counted  100   22  23:35    


 


Seg Neutrophils %  81.6 % (40.0-70.0)  H  22  04:49    


 


Seg Neuts % (Manual)  52.0 % (40.0-70.0)   22  23:35    


 


Band Neutrophils %  0 %  22  23:35    


 


Lymphocytes % (Manual)  41.0 % (13.4-35.0)  H  22  23:35    


 


Reactive Lymphs % (Man)  0 %  22  23:35    


 


Monocytes % (Manual)  7.0 % (0.0-7.3)   22  23:35    


 


Eosinophils % (Manual)  0 % (0.0-4.3)   22  23:35    


 


Basophils % (Manual)  0 % (0.0-1.8)   22  23:35    


 


Metamyelocytes %  0 %  22  23:35    


 


Myelocytes %  0 %  22  23:35    


 


Promyelocytes %  0 %  22  23:35    


 


Blast Cells %  0 %  22  23:35    


 


Nucleated RBC %  Not Reportable   22  23:35    


 


Seg Neutrophils #  11.6 K/mm3 (1.8-7.7)  H  22  04:49    


 


Seg Neutrophils # Man  7.3 K/mm3 (1.8-7.7)   22  23:35    


 


Band Neutrophils #  0.0 K/mm3  22  23:35    


 


Lymphocytes # (Manual)  5.7 K/mm3 (1.2-5.4)  H  22  23:35    


 


Abs React Lymphs (Man)  0.0 K/mm3  22  23:35    


 


Monocytes # (Manual)  1.0 K/mm3 (0.0-0.8)  H  22  23:35    


 


Eosinophils # (Manual)  0.0 K/mm3 (0.0-0.4)   22  23:35    


 


Basophils # (Manual)  0.0 K/mm3 (0.0-0.1)   22  23:35    


 


Metamyelocytes #  0.0 K/mm3  22  23:35    


 


Myelocytes #  0.0 K/mm3  22  23:35    


 


Promyelocytes #  0.0 K/mm3  22  23:35    


 


Blast Cells #  0.0 K/mm3  22  23:35    


 


WBC Morphology  Not Reportable   22  23:35    


 


Hypersegmented Neuts  Not Reportable   22  23:35    


 


Hyposegmented Neuts  Not Reportable   22  23:35    


 


Hypogranular Neuts  Not Reportable   22  23:35    


 


Smudge Cells  Not Reportable   22  23:35    


 


Toxic Granulation  Not Reportable   22  23:35    


 


Toxic Vacuolation  Not Reportable   22  23:35    


 


Dohle Bodies  Not Reportable   22  23:35    


 


Pelger-Huet Anomaly  Not Reportable   22  23:35    


 


Jamie Rods  Not Reportable   22  23:35    


 


Platelet Estimate  Consistent w auto   22  23:35    


 


Clumped Platelets  Not Reportable   22  23:35    


 


Plt Clumps, EDTA  Not Reportable   22  23:35    


 


Large Platelets  Not Reportable   22  23:35    


 


Giant Platelets  Not Reportable   22  23:35    


 


Platelet Satelliting  Not Reportable   22  23:35    


 


Plt Morphology Comment  Not Reportable   22  23:35    


 


RBC Morphology  Normal   22  23:35    


 


Dimorphic RBCs  Not Reportable   22  23:35    


 


Polychromasia  Not Reportable   22  23:35    


 


Hypochromasia  Not Reportable   22  23:35    


 


Poikilocytosis  Not Reportable   22  23:35    


 


Anisocytosis  Not Reportable   22  23:35    


 


Microcytosis  Not Reportable   22  23:35    


 


Macrocytosis  Not Reportable   22  23:35    


 


Spherocytes  Not Reportable   22  23:35    


 


Pappenheimer Bodies  Not Reportable   22  23:35    


 


Sickle Cells  Not Reportable   22  23:35    


 


Target Cells  Not Reportable   22  23:35    


 


Tear Drop Cells  Not Reportable   22  23:35    


 


Ovalocytes  Not Reportable   22  23:35    


 


Helmet Cells  Not Reportable   22  23:35    


 


Mccormick-[place] Bodies  Not Reportable   22  23:35    


 


Cabot Rings  Not Reportable   22  23:35    


 


Cornel Cells  Not Reportable   22  23:35    


 


Bite Cells  Not Reportable   22  23:35    


 


Crenated Cell  Not Reportable   22  23:35    


 


Elliptocytes  Not Reportable   22  23:35    


 


Acanthocytes (Spur)  Not Reportable   22  23:35    


 


Rouleaux  Not Reportable   22  23:35    


 


Hemoglobin C Crystals  Not Reportable   22  23:35    


 


Schistocytes  Not Reportable   22  23:35    


 


Malaria parasites  Not Reportable   22  23:35    


 


Miguel Bodies  Not Reportable   22  23:35    


 


Hem Pathologist Commnt  No   22  23:35    


 


PT  13.4 Sec. (12.2-14.9)   22  13:30    


 


INR  0.92  (0.87-1.13)   22  13:30    


 


APTT  25.8 Sec. (24.2-36.6)   22  13:30    


 


Sodium  135 mmol/L (137-145)  L  22  05:17    


 


Potassium  4.4 mmol/L (3.6-5.0)   22  05:17    


 


Chloride  99.3 mmol/L ()   22  05:17    


 


Carbon Dioxide  28 mmol/L (22-30)   22  05:17    


 


Anion Gap  12 mmol/L  22  05:17    


 


BUN  12 mg/dL (9-20)   22  05:17    


 


Creatinine  0.5 mg/dL (0.8-1.3)  L  22  05:17    


 


Estimated GFR  > 60 ml/min  22  05:17    


 


BUN/Creatinine Ratio  24 %  22  05:17    


 


Glucose  131 mg/dL ()  H  22  05:17    


 


Lactic Acid  1.40 mmol/L (0.7-2.0)   22  13:30    


 


Calcium  8.3 mg/dL (8.4-10.2)  L  22  05:17    


 


Phosphorus  2.80 mg/dL (2.5-4.5)   22  05:17    


 


Magnesium  2.20 mg/dL (1.7-2.3)   22  05:17    


 


Total Bilirubin  0.30 mg/dL (0.1-1.2)   22  05:17    


 


Direct Bilirubin  < 0.2 mg/dL (0-0.2)   22  22:47    


 


AST  701 units/L (5-40)  H  22  05:17    


 


ALT  416 units/L (7-56)  H  22  05:17    


 


Alkaline Phosphatase  55 units/L ()   22  05:17    


 


Ammonia  37.0 umol/L (25-60)   22  04:49    


 


Total Creatine Kinase  65603 units/L ()  H  22  05:17    


 


Total Protein  5.1 g/dL (6.3-8.2)  L  22  05:17    


 


Albumin  3.2 g/dL (3.9-5)  L  22  05:17    


 


Albumin/Globulin Ratio  1.7 %  22  05:17    


 


Amylase  25 units/L ()  L  22  04:49    


 


Lipase  10 units/L (13-60)  L  22  04:49    


 


Coronavirus (PCR)  Negative  (Negative)   22  11:40    


 


Hep Bs Antigen  Non-reactive  (Negative)   22  13:30    


 


Hep B Core IgM Ab  Non-reactive  (NonReactive)   22  13:30    


 


Hepatitis C Antibody  Non-reactive  (NonReactive)   22  13:30    


 


HIV 1&2 Antibody Rapid  Non react  (Non React)   22  16:42    


 


HIV P24 Antigen  Non react  (Non React)   22  16:42    











Microbiology: 


Microbiology





22 22:47   Peripheral/Venous   Blood Culture - Preliminary


                            NO GROWTH AFTER 48 HOURS


22 22:47   Peripheral/Venous   Blood Culture - Preliminary


                            NO GROWTH AFTER 48 HOURS


22 17:51   Urine,Clean Catch   Urine Culture - Preliminary


                            NO GROWTH AFTER 24 HOURS








Mcwilliams/IV: 


                                        





Voiding Method                   Urinal











Active Medications





- Current Medications


Current Medications: 














Generic Name Dose Route Start Last Admin





  Trade Name Freq  PRN Reason Stop Dose Admin


 


Albuterol  2.5 mg  22 06:15 





  Albuterol 2.5 Mg/3 Ml Nebu  IH  





  Q3HRT PRN  





  Shortness Of Breath  


 


Albuterol/Ipratropium  1 ampul  22 08:00  22 08:56





  Ipratropium/Albuterol Sulfate 3 Ml Ampul.Neb  IH   1 ampul





  BIDRT CHERYL   Administration


 


Diphenhydramine HCl  25 mg  22 06:24 





  Diphenhydramine 50 Mg/Ml Vial  IV  





  Q6H PRN  





  Skin Irritation  


 


Docusate Sodium  100 mg  22 22:00  22 10:18





  Docusate Sodium 100 Mg Cap  PO   100 mg





  BID CHERYL   Administration


 


Famotidine  20 mg  22 10:00  22 10:18





  Famotidine 20 Mg/2 Ml Inj  IV   20 mg





  BID CHERYL   Administration


 


Gabapentin  100 mg  22 22:30  22 05:21





  Gabapentin 100 Mg Cap  PO  22 22:29  Not Given





  Q8HR CHERYL  


 


Gabapentin  100 mg  22 10:00 





  Gabapentin 100 Mg Cap  PO  22 09:59 





  BID CHERYL  


 


Gabapentin  300 mg  22 18:00 





  Gabapentin 300 Mg Cap  PO  22 17:59 





  QPM CHERYL  


 


Heparin Sodium (Porcine)  5,000 unit  22 10:00  22 10:18





  Heparin 5,000 Unit/1 Ml Vial  SUB-Q   5,000 unit





  Q12HR CHERYL   Administration


 


Hydralazine HCl  10 mg  22 18:31  22 18:50





  Hydralazine 20 Mg/1 Ml Inj  IV   10 mg





  Q3HR PRN   Administration





  Hypertension  


 


Hydromorphone HCl  0.5 mg  22 06:15  22 03:13





  Hydromorphone 1 Mg/1 Ml Inj  IV   0.5 mg





  Q3H PRN   Administration





  Pain , Severe (7-10)  


 


Sodium Chloride  1,000 mls @ 200 mls/hr  22 18:30  22 10:38





  Nacl 0.9% 1000 Ml  IV   200 mls/hr





  AS DIRECT CHERYL   Administration


 


Methylprednisolone Sodium Succinate  80 mg  22 18:59  22 05:24





  Methylprednisolone Sod Succinate 125 Mg/2 Ml Inj  IV   80 mg





  Q8HR CHERYL   Administration


 


Morphine Sulfate  2 mg  22 06:15  22 10:33





  Morphine 2 Mg/1 Ml Inj  IV   2 mg





  Q4H PRN   Administration





  Pain, Moderate (4-6)  


 


Ondansetron HCl  4 mg  22 06:15 





  Ondansetron 4 Mg/2 Ml Inj  IV  





  Q8H PRN  





  Nausea And Vomiting  


 


Sodium Chloride  10 ml  22 10:00  22 10:23





  Sodium Chloride 0.9% 10 Ml Flush Syringe  IV   10 ml





  BID CHERYL   Administration


 


Sodium Chloride  10 ml  22 06:15 





  Sodium Chloride 0.9% 10 Ml Flush Syringe  IV  





  PRN PRN  





  LINE FLUSH  


 


Valsartan  160 mg  22 22:00  22 10:22





  Valsartan 160mg Tab  PO   160 mg





  DAILY CHERYL   Administration

## 2022-05-06 NOTE — PROGRESS NOTE
Assessment and Plan





The patient has some dusky muscle within the left posterior compartment however 

there is no amish necrosis.  There is too much edema to close the incisions at 

this time.  Patient will likely return to the operating room on Monday or Tuesda

y for possible closure or wound VAC placement.  I was able to communicate with 

the plan with the patient through translation from his family member.  The 

patient expressed understanding and agrees.





Subjective


Date of service: 05/06/22


Principal diagnosis: abnormal liver enzymes


Interval history: 





Patient complains of some incisional pain however she has no additional 

complaints.  No significant events overnight.





Objective





- Constitutional


Vitals: 


                               Vital Signs - 12hr











  05/06/22 05/06/22 05/06/22





  04:44 08:56 10:22


 


Temperature 97.3 F L  


 


Pulse Rate 76  


 


Pulse Rate [  94 H 





Anterior   





Bilateral   





Throughout]   


 


Respiratory 18  





Rate   


 


Respiratory  18 





Rate [Anterior   





Bilateral   





Throughout]   


 


Blood Pressure 143/92  140/87


 


O2 Sat by Pulse 100 95 





Oximetry   














- Respiratory


Respiratory effort: normal





- Cardiovascular


Rhythm: regular


Extremities: pulses intact (Palpable left dorsalis pedis and posterior tibial 

pulses), abnormal (Left leg incision dressings were changed.  No active 

bleeding.  Muscle within the posterior compartments with some duskiness however 

there was no frankly necrotic muscle.)


Extremity abnormal: edema (Edema of the left foot.  There is also continued 

edema of the lower leg muscles as well as some edema within the thigh however 

there is no evidence of thigh compartment syndrome), other (Patient has 

sensation of the left foot but diminished motor)





- Labs


CBC & Chem 7: 


                                 05/06/22 05:17





                                 05/06/22 05:17


Labs: 


                              Abnormal lab results











  05/05/22 05/05/22 05/06/22 Range/Units





  14:30 23:33 05:17 


 


WBC    16.3 H  (4.5-11.0)  K/mm3


 


MCV    98 H  (84-94)  fl


 


MCH    33 H  (28-32)  pg


 


RDW    12.8 L  (13.2-15.2)  %


 


Sodium     (137-145)  mmol/L


 


Creatinine     (0.8-1.3)  mg/dL


 


Glucose     ()  mg/dL


 


Calcium     (8.4-10.2)  mg/dL


 


AST     (5-40)  units/L


 


ALT     (7-56)  units/L


 


Total Creatine Kinase  90808 H  46046 H   ()  units/L


 


Total Protein     (6.3-8.2)  g/dL


 


Albumin     (3.9-5)  g/dL














  05/06/22 Range/Units





  05:17 


 


WBC   (4.5-11.0)  K/mm3


 


MCV   (84-94)  fl


 


MCH   (28-32)  pg


 


RDW   (13.2-15.2)  %


 


Sodium  135 L  (137-145)  mmol/L


 


Creatinine  0.5 L  (0.8-1.3)  mg/dL


 


Glucose  131 H  ()  mg/dL


 


Calcium  8.3 L  (8.4-10.2)  mg/dL


 


AST  701 H  (5-40)  units/L


 


ALT  416 H  (7-56)  units/L


 


Total Creatine Kinase  07070 H  ()  units/L


 


Total Protein  5.1 L  (6.3-8.2)  g/dL


 


Albumin  3.2 L  (3.9-5)  g/dL














Medications & Allergies





- Medications


Allergies/Adverse Reactions: 


                                    Allergies





ceftriaxone [From Rocephin] Allergy (Severe, Verified 05/05/22 10:23)


   Unknown


   SEVERE RASH MOTTLING OF THE SKIN POST ADMINISTRATION OF IM ROCEPHIN PTA 


Penicillins Allergy (Severe, Verified 05/05/22 10:23)


   Unknown


   SEVERE RASH MOTTLING OF THE SKIN POST ADMINISTRATION OF IM ROCEPHIN PTA 








Home Medications: 


                                Home Medications











 Medication  Instructions  Recorded  Confirmed  Last Taken  Type


 


No Known Home Medications [No  05/03/22 05/03/22 Unknown History





Reported Home Medications]     











Active Medications: 














Generic Name Dose Route Start Last Admin





  Trade Name Freq  PRN Reason Stop Dose Admin


 


Albuterol  2.5 mg  05/03/22 06:15 





  Albuterol 2.5 Mg/3 Ml Nebu  IH  





  Q3HRT PRN  





  Shortness Of Breath  


 


Albuterol/Ipratropium  1 ampul  05/04/22 08:00  05/06/22 08:56





  Ipratropium/Albuterol Sulfate 3 Ml Ampul.Neb  IH   1 ampul





  BIDRT CHERYL   Administration


 


Diphenhydramine HCl  25 mg  05/03/22 06:24 





  Diphenhydramine 50 Mg/Ml Vial  IV  





  Q6H PRN  





  Skin Irritation  


 


Docusate Sodium  100 mg  05/05/22 22:00  05/06/22 10:18





  Docusate Sodium 100 Mg Cap  PO   100 mg





  BID CHERYL   Administration


 


Famotidine  20 mg  05/03/22 10:00  05/06/22 10:18





  Famotidine 20 Mg/2 Ml Inj  IV   20 mg





  BID CHERYL   Administration


 


Gabapentin  100 mg  05/05/22 22:30  05/06/22 05:21





  Gabapentin 100 Mg Cap  PO  05/12/22 22:29  Not Given





  Q8HR CHERYL  


 


Gabapentin  100 mg  05/13/22 10:00 





  Gabapentin 100 Mg Cap  PO  05/18/22 09:59 





  BID CHERYL  


 


Gabapentin  300 mg  05/13/22 18:00 





  Gabapentin 300 Mg Cap  PO  05/18/22 17:59 





  QPM CHERYL  


 


Heparin Sodium (Porcine)  5,000 unit  05/03/22 10:00  05/06/22 10:18





  Heparin 5,000 Unit/1 Ml Vial  SUB-Q   5,000 unit





  Q12HR CHERYL   Administration


 


Hydralazine HCl  10 mg  05/04/22 18:31  05/04/22 18:50





  Hydralazine 20 Mg/1 Ml Inj  IV   10 mg





  Q3HR PRN   Administration





  Hypertension  


 


Hydromorphone HCl  0.5 mg  05/03/22 06:15  05/05/22 03:13





  Hydromorphone 1 Mg/1 Ml Inj  IV   0.5 mg





  Q3H PRN   Administration





  Pain , Severe (7-10)  


 


Sodium Chloride  1,000 mls @ 200 mls/hr  05/04/22 18:30  05/06/22 10:38





  Nacl 0.9% 1000 Ml  IV   200 mls/hr





  AS DIRECT CEHRYL   Administration


 


Methylprednisolone Sodium Succinate  80 mg  05/04/22 18:59  05/06/22 05:24





  Methylprednisolone Sod Succinate 125 Mg/2 Ml Inj  IV   80 mg





  Q8HR CHERYL   Administration


 


Morphine Sulfate  2 mg  05/03/22 06:15  05/06/22 10:33





  Morphine 2 Mg/1 Ml Inj  IV   2 mg





  Q4H PRN   Administration





  Pain, Moderate (4-6)  


 


Ondansetron HCl  4 mg  05/03/22 06:15 





  Ondansetron 4 Mg/2 Ml Inj  IV  





  Q8H PRN  





  Nausea And Vomiting  


 


Sodium Chloride  10 ml  05/03/22 10:00  05/06/22 10:23





  Sodium Chloride 0.9% 10 Ml Flush Syringe  IV   10 ml





  BID CHERYL   Administration


 


Sodium Chloride  10 ml  05/03/22 06:15 





  Sodium Chloride 0.9% 10 Ml Flush Syringe  IV  





  PRN PRN  





  LINE FLUSH  


 


Valsartan  160 mg  05/04/22 22:00  05/06/22 10:22





  Valsartan 160mg Tab  PO   160 mg





  DAILY CHERYL   Administration

## 2022-05-07 LAB
ALBUMIN SERPL-MCNC: 3.2 G/DL (ref 3.9–5)
ALT SERPL-CCNC: 397 UNITS/L (ref 7–56)
BUN SERPL-MCNC: 13 MG/DL (ref 9–20)
BUN/CREAT SERPL: 26 %
CALCIUM SERPL-MCNC: 7.9 MG/DL (ref 8.4–10.2)
HCT VFR BLD CALC: 36.6 % (ref 35.5–45.6)
HEMOLYSIS INDEX: 3
HGB BLD-MCNC: 12.2 GM/DL (ref 11.8–15.2)
MCHC RBC AUTO-ENTMCNC: 33 % (ref 32–34)
MCV RBC AUTO: 98 FL (ref 84–94)
PLATELET # BLD: 205 K/MM3 (ref 140–440)
RBC # BLD AUTO: 3.72 M/MM3 (ref 3.65–5.03)

## 2022-05-07 RX ADMIN — GABAPENTIN SCH MG: 100 CAPSULE ORAL at 15:01

## 2022-05-07 RX ADMIN — IPRATROPIUM BROMIDE AND ALBUTEROL SULFATE SCH AMPUL: .5; 3 SOLUTION RESPIRATORY (INHALATION) at 07:50

## 2022-05-07 RX ADMIN — DOCUSATE SODIUM SCH MG: 100 CAPSULE, LIQUID FILLED ORAL at 22:09

## 2022-05-07 RX ADMIN — Medication SCH ML: at 22:10

## 2022-05-07 RX ADMIN — METHYLPREDNISOLONE SODIUM SUCCINATE SCH MG: 125 INJECTION, POWDER, FOR SOLUTION INTRAMUSCULAR; INTRAVENOUS at 15:01

## 2022-05-07 RX ADMIN — GABAPENTIN SCH MG: 100 CAPSULE ORAL at 22:09

## 2022-05-07 RX ADMIN — GABAPENTIN SCH MG: 100 CAPSULE ORAL at 05:33

## 2022-05-07 RX ADMIN — DOCUSATE SODIUM SCH MG: 100 CAPSULE, LIQUID FILLED ORAL at 09:35

## 2022-05-07 RX ADMIN — Medication SCH ML: at 09:36

## 2022-05-07 RX ADMIN — FAMOTIDINE SCH MG: 10 INJECTION, SOLUTION INTRAVENOUS at 09:35

## 2022-05-07 RX ADMIN — VALSARTAN SCH MG: 160 TABLET, FILM COATED ORAL at 09:35

## 2022-05-07 RX ADMIN — IPRATROPIUM BROMIDE AND ALBUTEROL SULFATE SCH AMPUL: .5; 3 SOLUTION RESPIRATORY (INHALATION) at 21:11

## 2022-05-07 RX ADMIN — SODIUM CHLORIDE SCH MLS/HR: 0.9 INJECTION, SOLUTION INTRAVENOUS at 04:39

## 2022-05-07 RX ADMIN — HEPARIN SODIUM SCH UNIT: 5000 INJECTION, SOLUTION INTRAVENOUS; SUBCUTANEOUS at 09:35

## 2022-05-07 RX ADMIN — HEPARIN SODIUM SCH UNIT: 5000 INJECTION, SOLUTION INTRAVENOUS; SUBCUTANEOUS at 22:09

## 2022-05-07 RX ADMIN — FAMOTIDINE SCH MG: 10 INJECTION, SOLUTION INTRAVENOUS at 22:09

## 2022-05-07 RX ADMIN — METHYLPREDNISOLONE SODIUM SUCCINATE SCH MG: 125 INJECTION, POWDER, FOR SOLUTION INTRAMUSCULAR; INTRAVENOUS at 22:10

## 2022-05-07 RX ADMIN — SODIUM CHLORIDE SCH MLS/HR: 0.9 INJECTION, SOLUTION INTRAVENOUS at 15:30

## 2022-05-07 RX ADMIN — METHYLPREDNISOLONE SODIUM SUCCINATE SCH MG: 125 INJECTION, POWDER, FOR SOLUTION INTRAMUSCULAR; INTRAVENOUS at 05:33

## 2022-05-07 RX ADMIN — SODIUM CHLORIDE SCH MLS/HR: 0.9 INJECTION, SOLUTION INTRAVENOUS at 09:36

## 2022-05-07 NOTE — PROGRESS NOTE
Assessment and Plan





# Acute Rhabdomyolysis in setting of left leg compartment syndrome:


- continue aggressive IVF (NS at 200ml/min reasonable), CK downtrending has been

appropriately, no CK today


- s/p fasciotomy per Dr. Cantrell/Benoit, [place]


- supportive measures per primary


- can hold lasix, use prn if respiratory status worsening/volume overload 

developing, but note appropriate urine output (3.3L)


- can hold NaHCO3 as acid/base stable


- follow renal function, electrolytes- currently stable





# Transaminitis: in setting of rhabdomyolysis, note GI input








Subjective


Date of service: 05/07/22


Principal diagnosis: abnormal liver enzymes


Interval history: 





No renal related clinical changes noted.  Resting in bed. Denies pain in foot





Objective





- Exam


Narrative Exam: 





General appearance: well-developed, well-nourished


EENT: ATNC


Neck: Present: neck supple, trachea midline


Respiratory: Clear to Ascultation


Heart: regular, S1S2


Gastrointestinal: Present: normoactive bowel sounds


Integumentary: no rash, warm and dry


Neurologic: no focal deficit, no asterixis





- Vital Signs


Vital signs: 


                               Vital Signs - 12hr











  05/07/22 05/07/22





  05:05 07:50


 


Temperature 97.7 F 


 


Pulse Rate 72 


 


Pulse Rate [  77





Anterior  





Bilateral  





Throughout]  


 


Respiratory 18 





Rate  


 


Respiratory  18





Rate [Anterior  





Bilateral  





Throughout]  


 


Blood Pressure 139/94 


 


O2 Sat by Pulse 94 97





Oximetry  














- Lab





                                 05/07/22 04:57





                                 05/07/22 04:57


                             Most recent lab results











Calcium  7.9 mg/dL (8.4-10.2)  L  05/07/22  04:57    


 


Phosphorus  2.80 mg/dL (2.5-4.5)   05/06/22  05:17    


 


Magnesium  2.20 mg/dL (1.7-2.3)   05/06/22  05:17    














Medications & Allergies





- Medications


Allergies/Adverse Reactions: 


                                    Allergies





ceftriaxone [From Rocephin] Allergy (Severe, Verified 05/05/22 10:23)


   Unknown


   SEVERE RASH MOTTLING OF THE SKIN POST ADMINISTRATION OF IM ROCEPHIN PTA 


Penicillins Allergy (Severe, Verified 05/05/22 10:23)


   Unknown


   SEVERE RASH MOTTLING OF THE SKIN POST ADMINISTRATION OF IM ROCEPHIN PTA 








Home Medications: 


                                Home Medications











 Medication  Instructions  Recorded  Confirmed  Last Taken  Type


 


No Known Home Medications [No  05/03/22 05/03/22 Unknown History





Reported Home Medications]     











Active Medications: 














Generic Name Dose Route Start Last Admin





  Trade Name Freq  PRN Reason Stop Dose Admin


 


Albuterol  2.5 mg  05/03/22 06:15 





  Albuterol 2.5 Mg/3 Ml Nebu  IH  





  Q3HRT PRN  





  Shortness Of Breath  


 


Albuterol/Ipratropium  1 ampul  05/04/22 08:00  05/07/22 07:50





  Ipratropium/Albuterol Sulfate 3 Ml Ampul.Neb  IH   1 ampul





  BIDRT CHERYL   Administration


 


Diphenhydramine HCl  25 mg  05/03/22 06:24 





  Diphenhydramine 50 Mg/Ml Vial  IV  





  Q6H PRN  





  Skin Irritation  


 


Docusate Sodium  100 mg  05/05/22 22:00  05/07/22 09:35





  Docusate Sodium 100 Mg Cap  PO   100 mg





  BID CHERYL   Administration


 


Famotidine  20 mg  05/03/22 10:00  05/07/22 09:35





  Famotidine 20 Mg/2 Ml Inj  IV   20 mg





  BID CHERYL   Administration


 


Gabapentin  100 mg  05/05/22 22:30  05/07/22 05:33





  Gabapentin 100 Mg Cap  PO  05/12/22 22:29  100 mg





  Q8HR CHERYL   Administration


 


Gabapentin  100 mg  05/13/22 10:00 





  Gabapentin 100 Mg Cap  PO  05/18/22 09:59 





  BID CHERYL  


 


Gabapentin  300 mg  05/13/22 18:00 





  Gabapentin 300 Mg Cap  PO  05/18/22 17:59 





  QPM CHERYL  


 


Heparin Sodium (Porcine)  5,000 unit  05/03/22 10:00  05/07/22 09:35





  Heparin 5,000 Unit/1 Ml Vial  SUB-Q   5,000 unit





  Q12HR CHERYL   Administration


 


Hydralazine HCl  10 mg  05/04/22 18:31  05/04/22 18:50





  Hydralazine 20 Mg/1 Ml Inj  IV   10 mg





  Q3HR PRN   Administration





  Hypertension  


 


Hydromorphone HCl  0.5 mg  05/03/22 06:15  05/05/22 03:13





  Hydromorphone 1 Mg/1 Ml Inj  IV   0.5 mg





  Q3H PRN   Administration





  Pain , Severe (7-10)  


 


Sodium Chloride  1,000 mls @ 200 mls/hr  05/04/22 18:30  05/07/22 09:36





  Nacl 0.9% 1000 Ml  IV   200 mls/hr





  AS DIRECT CHERYL   Administration


 


Methylprednisolone Sodium Succinate  80 mg  05/04/22 18:59  05/07/22 05:33





  Methylprednisolone Sod Succinate 125 Mg/2 Ml Inj  IV   80 mg





  Q8HR CHERYL   Administration


 


Morphine Sulfate  2 mg  05/03/22 06:15  05/06/22 10:33





  Morphine 2 Mg/1 Ml Inj  IV   2 mg





  Q4H PRN   Administration





  Pain, Moderate (4-6)  


 


Ondansetron HCl  4 mg  05/03/22 06:15 





  Ondansetron 4 Mg/2 Ml Inj  IV  





  Q8H PRN  





  Nausea And Vomiting  


 


Sodium Chloride  10 ml  05/03/22 10:00  05/07/22 09:36





  Sodium Chloride 0.9% 10 Ml Flush Syringe  IV   10 ml





  BID CHERYL   Administration


 


Sodium Chloride  10 ml  05/03/22 06:15 





  Sodium Chloride 0.9% 10 Ml Flush Syringe  IV  





  PRN PRN  





  LINE FLUSH  


 


Valsartan  160 mg  05/04/22 22:00  05/07/22 09:35





  Valsartan 160mg Tab  PO   160 mg





  DAILY CHERYL   Administration

## 2022-05-07 NOTE — PROGRESS NOTE
Assessment and Plan


Left lower extremity compartment syndrome status post fasciotomies.  Cause is 

still unclear and may represent some allergic reaction.





Motor function is unchanged from original exam.  Sensory function has improved 

with full sensation of the foot.





We will see about placing wound vacs on the leg on Monday, Tuesday, or Wednesday

based on OR availability and muscular swelling.





Discussed with patient and friends at bedside that this may take 3 to 6 months 

to completely heal.  They understand.











Subjective


Date of service: 05/07/22


Principal diagnosis: abnormal liver enzymes


Interval history: 





Minimal discomfort of the left leg.  Left foot has some areas of duskiness which

are improving.  Strongly palpable left dorsalis pedis and posterior tibial 

pulse.  The patient has sensation of the foot.  No motor function of the foot.  

This is unchanged from the original exam.  Bandage over fasciotomies.





Objective





- Constitutional


Vitals: 


                               Vital Signs - 12hr











  05/07/22 05/07/22 05/07/22





  05:05 07:50 10:58


 


Temperature 97.7 F  97.8 F


 


Pulse Rate 72  80


 


Pulse Rate [  77 





Anterior   





Bilateral   





Throughout]   


 


Respiratory 18  20





Rate   


 


Respiratory  18 





Rate [Anterior   





Bilateral   





Throughout]   


 


Blood Pressure 139/94  153/83


 


O2 Sat by Pulse 94 97 96





Oximetry   














  05/07/22





  11:42


 


Temperature 


 


Pulse Rate 


 


Pulse Rate [ 





Anterior 





Bilateral 





Throughout] 


 


Respiratory 





Rate 


 


Respiratory 





Rate [Anterior 





Bilateral 





Throughout] 


 


Blood Pressure 


 


O2 Sat by Pulse 98





Oximetry 











General appearance: Present: no acute distress





- EENT


Eyes: EOM intact


ENT: hearing intact





- Respiratory


Respiratory effort: normal


Extremities: pulses intact, abnormal (see Subjective)





- Gastrointestinal


General gastrointestinal: Present: soft, non-tender





- Psychiatric


Psychiatric: appropriate mood/affect, cooperative





- Labs


CBC & Chem 7: 


                                 05/07/22 04:57





                                 05/07/22 04:57


Labs: 


                              Abnormal lab results











  05/07/22 05/07/22 05/07/22 Range/Units





  04:57 04:57 11:36 


 


WBC  17.2 H    (4.5-11.0)  K/mm3


 


MCV  98 H    (84-94)  fl


 


MCH  33 H    (28-32)  pg


 


RDW  12.6 L    (13.2-15.2)  %


 


Creatinine   0.5 L   (0.8-1.3)  mg/dL


 


Glucose   133 H   ()  mg/dL


 


Calcium   7.9 L   (8.4-10.2)  mg/dL


 


AST   501 H   (5-40)  units/L


 


ALT   397 H   (7-56)  units/L


 


Total Creatine Kinase    96488 H  ()  units/L


 


Total Protein   5.0 L   (6.3-8.2)  g/dL


 


Albumin   3.2 L   (3.9-5)  g/dL














Medications & Allergies





- Medications


Allergies/Adverse Reactions: 


                                    Allergies





ceftriaxone [From Rocephin] Allergy (Severe, Verified 05/05/22 10:23)


   Unknown


   SEVERE RASH MOTTLING OF THE SKIN POST ADMINISTRATION OF IM ROCEPHIN PTA 


Penicillins Allergy (Severe, Verified 05/05/22 10:23)


   Unknown


   SEVERE RASH MOTTLING OF THE SKIN POST ADMINISTRATION OF IM ROCEPHIN PTA 








Home Medications: 


                                Home Medications











 Medication  Instructions  Recorded  Confirmed  Last Taken  Type


 


No Known Home Medications [No  05/03/22 05/03/22 Unknown History





Reported Home Medications]     











Active Medications: 














Generic Name Dose Route Start Last Admin





  Trade Name Freq  PRN Reason Stop Dose Admin


 


Albuterol  2.5 mg  05/03/22 06:15 





  Albuterol 2.5 Mg/3 Ml Nebu  IH  





  Q3HRT PRN  





  Shortness Of Breath  


 


Albuterol/Ipratropium  1 ampul  05/04/22 08:00  05/07/22 07:50





  Ipratropium/Albuterol Sulfate 3 Ml Ampul.Neb  IH   1 ampul





  BIDRT CHERYL   Administration


 


Diphenhydramine HCl  25 mg  05/03/22 06:24 





  Diphenhydramine 50 Mg/Ml Vial  IV  





  Q6H PRN  





  Skin Irritation  


 


Docusate Sodium  100 mg  05/05/22 22:00  05/07/22 09:35





  Docusate Sodium 100 Mg Cap  PO   100 mg





  BID CHERYL   Administration


 


Famotidine  20 mg  05/03/22 10:00  05/07/22 09:35





  Famotidine 20 Mg/2 Ml Inj  IV   20 mg





  BID CHERYL   Administration


 


Gabapentin  100 mg  05/05/22 22:30  05/07/22 05:33





  Gabapentin 100 Mg Cap  PO  05/12/22 22:29  100 mg





  Q8HR CHERYL   Administration


 


Gabapentin  100 mg  05/13/22 10:00 





  Gabapentin 100 Mg Cap  PO  05/18/22 09:59 





  BID CHERYL  


 


Gabapentin  300 mg  05/13/22 18:00 





  Gabapentin 300 Mg Cap  PO  05/18/22 17:59 





  QPM CHERYL  


 


Heparin Sodium (Porcine)  5,000 unit  05/03/22 10:00  05/07/22 09:35





  Heparin 5,000 Unit/1 Ml Vial  SUB-Q   5,000 unit





  Q12HR CHERYL   Administration


 


Hydralazine HCl  10 mg  05/04/22 18:31  05/04/22 18:50





  Hydralazine 20 Mg/1 Ml Inj  IV   10 mg





  Q3HR PRN   Administration





  Hypertension  


 


Hydromorphone HCl  0.5 mg  05/03/22 06:15  05/05/22 03:13





  Hydromorphone 1 Mg/1 Ml Inj  IV   0.5 mg





  Q3H PRN   Administration





  Pain , Severe (7-10)  


 


Sodium Chloride  1,000 mls @ 200 mls/hr  05/04/22 18:30  05/07/22 09:36





  Nacl 0.9% 1000 Ml  IV   200 mls/hr





  AS DIRECT CHERYL   Administration


 


Methylprednisolone Sodium Succinate  80 mg  05/04/22 18:59  05/07/22 05:33





  Methylprednisolone Sod Succinate 125 Mg/2 Ml Inj  IV   80 mg





  Q8HR CHERYL   Administration


 


Morphine Sulfate  2 mg  05/03/22 06:15  05/06/22 10:33





  Morphine 2 Mg/1 Ml Inj  IV   2 mg





  Q4H PRN   Administration





  Pain, Moderate (4-6)  


 


Ondansetron HCl  4 mg  05/03/22 06:15 





  Ondansetron 4 Mg/2 Ml Inj  IV  





  Q8H PRN  





  Nausea And Vomiting  


 


Sodium Chloride  10 ml  05/03/22 10:00  05/07/22 09:36





  Sodium Chloride 0.9% 10 Ml Flush Syringe  IV   10 ml





  BID CHERYL   Administration


 


Sodium Chloride  10 ml  05/03/22 06:15 





  Sodium Chloride 0.9% 10 Ml Flush Syringe  IV  





  PRN PRN  





  LINE FLUSH  


 


Valsartan  160 mg  05/04/22 22:00  05/07/22 09:35





  Valsartan 160mg Tab  PO   160 mg





  DAILY CHERYL   Administration

## 2022-05-07 NOTE — PROGRESS NOTE
Assessment and Plan


Assessment and plan: 


This is a transgendered female admitted with acute liver failure, 

rhabdomyolysis, s/p 4 compartment fasciotomy to left lower extremity





Neuro: Neuropathy


-Avoid delirium


-Reorientation as needed


-Maintain sleep-wake cycle


-As needed analgesia


-Neurology consulted, appreciate recommendations


-Gabapentin 





Cardiac: ST, undiagnosed HTN


-Blood pressure monitoring per protocol


-Valsartan


-PRN hydralazine





Respiratory: NAD


-Pulmanory hygiene


-Supplemental oxygen as needed


-SPO2 monitoring





GI: Acute liver failure


-GI consulted, appreciate recommendations


-Per GI: Suspect due to rhabdomyolysis given elevated creatinine kinase levels


   -If ALT continues to rise recommend MRCP


-Abdominal ultrasound shows mild diffuse biliary dilation, obstruction to lesion

of the distal common bile duct cannot be excluded consider MRCP, no evidence of 

gallstones within the gallbladder


-24 hours -250 mL


-PPI


-Regular diet


-BR: colace


-Trend LFTs





: Rhabdomyolysis, acute kidney injury with vasomotor nephropathy now resolved


-Nephrology consulted, appreciate recommendations


-Strict intake and output


-Renally dose medications


-Avoid nephrotoxic medications


-MIVF


-Sodium bicarb


-Trend BMP, CK





ID: NAD


-s/p abx therapy with levaquin (5/3-)


-f/u blood culture


-Monitor WBC and temperature curve





Endo: NAD


-Avoid hypoglycemia


-SSI


-Accu-Cheks q. xxx


-Long-acting insulin, titrate as needed





Heme: NAD


-Trend CBC


-Transfuse hemoglobin less than 7


-Monitor for signs of bleeding


-Heparin subq





MS: Compartment syndrome to LLE


-Vascular surgery consulted, appreciate recommendations


-s/p self admin of IM abx to midthigh


-c/o pain, decreased sensation, pulses were dopplarable, paralysis, mottled skin


-s/p 4 compartment tracheotomy on 5/3 with vascular surgery


-Bilateral lower extremity Doppler ultrasound showed no DVT


-Left lower extremity CT with contrast showed appearance of posterior soft 

tissue complex cellulitis with small subcutaneous lymph nodes, no drainable 

fluid collections present, contusion could have similar appearance


-Bilateral lower extremity arterial duplex showed no significant lower extremity

peripheral artery disease


-Normal ABIs








History


Interval history: 


This is a 36 year old  transgendered female undergoing gender transformation 

with silicone injection to buttocks ([place] ) and breast implants, heavy 

drinker on the weekends (bottle of vodka) who presented to [facility] on 5/3 because 

of adverse drug reaction after reportedly self administration of  

rocephin and PCN for sore throat. Patient reportedly collapsed while checking in

to the emergency department, has complains of severe 10 out of 10 pain in left 

thigh and has noticeable mottling of skin in thigh and left lower abdomen.  

Patient underwent a CT of the left leg which showed possible cellulitis with no 

drainable fluid collection and was started on empiric antibiotics.  Patient 

admitted to the hospital service with consult to surgery with left leg 

cellulitis and possible allergic reaction.





Hospital course to date:





5/3: Venous ultrasound shows no DVT, Vascular surgery consulted who performed a 

fasciotomy and plan to transfer patient to [facility] post intervention.  Neurology 

consulted





: Transfer to ICU, GI and neurology consulted.





: Patient will be transferred back to the floor from ICU.  Her liver enzymes 

and creatinine are trending down.  Will be started on gabapentin per neurology 

recommendations and antibiotics discontinued.








: Continue supportive care.  CPK slowly improving.  Vascular plans for 

closure of fasciotomy early next week.  PT OT evaluation and treatment closure 

done.  Continue wound management.  No evidence of withdrawal noted at this time.

 Continue to monitor LFTs which are also slightly improving.  Plan discussed in 

detail with the patient who verbalized understanding





: Patient seen and examined clinically stable continues to show some 

improvement.  Closure of fasciotomy planned for tomorrow.  Renal function is 

improving.  CK ordered and pending





History


Interval history: 


Patient seen and examined this morning resting comfortably.  No new complaints 

at this time.  Dressing still in place left lower extremity awaiting closure 

which could happen next week.








Hospitalist Physical





- Physical exam


Narrative exam: 





General appearance: Present: no acute distress





- EENT


Eyes: Present: PERRL, EOM intact


ENT: hearing intact, clear oral mucosa, dentition normal





- Neck


Neck: Present: normal ROM





- Respiratory


Respiratory effort: normal


Respiratory: bilateral: CTA





- Cardiovascular


Rhythm: regular


Heart Sounds: Present: S1 & S2.  Absent: systolic murmur, diastolic murmur





- Extremities


Extremity abnormal: edema, cyanosis, pulses diminished, tenderness


Peripheral Pulses: abnormal





- Peripheral pulses


  ** dorsalis pedis


Pulse Strength: 2+





  ** posterial tibial


Pulse Strength: 2+





- Abdominal


General gastrointestinal: soft, non-tender, non-distended, normal bowel sounds





- Integumentary


Integumentary: Present: warm, dry, evidence of surgically implanted breast and 

gluteal region noted.  Left lower extremity with dressing significant edema 

remains in place open wound.  No amish necrosis noted





- Psychiatric


Psychiatric: appropriate mood/affect, cooperative





- Neurologic


Neurologic: CNII-XII intact, no focal deficits, moves all extremities





- Allied Health


Allied health notes reviewed: nursing, social work











- Constitutional


Vitals: 


                                        











Temp Pulse Resp BP Pulse Ox


 


 97.7 F   77   18   139/94   98 


 


 22 05:05  22 07:50  22 07:50  22 05:05  22 11:42











General appearance: Present: no acute distress





Results





- Labs


CBC & Chem 7: 


                                 22 04:57





                                 22 04:57


Labs: 


                             Laboratory Last Values











WBC  17.2 K/mm3 (4.5-11.0)  H  22  04:57    


 


RBC  3.72 M/mm3 (3.65-5.03)   22  04:57    


 


Hgb  12.2 gm/dl (11.8-15.2)   22  04:57    


 


Hct  36.6 % (35.5-45.6)   22  04:57    


 


MCV  98 fl (84-94)  H  22  04:57    


 


MCH  33 pg (28-32)  H  22  04:57    


 


MCHC  33 % (32-34)   22  04:57    


 


RDW  12.6 % (13.2-15.2)  L  22  04:57    


 


Plt Count  205 K/mm3 (140-440)   22  04:57    


 


Lymph % (Auto)  7.8 % (13.4-35.0)  L  22  04:49    


 


Mono % (Auto)  10.6 % (0.0-7.3)  H  22  04:49    


 


Eos % (Auto)  0.0 % (0.0-4.3)   22  04:49    


 


Baso % (Auto)  0.0 % (0.0-1.8)   22  04:49    


 


Lymph # (Auto)  1.1 K/mm3 (1.2-5.4)  L  22  04:49    


 


Mono # (Auto)  1.5 K/mm3 (0.0-0.8)  H  22  04:49    


 


Eos # (Auto)  0.0 K/mm3 (0.0-0.4)   22  04:49    


 


Baso # (Auto)  0.0 K/mm3 (0.0-0.1)   22  04:49    


 


Add Manual Diff  Complete   22  23:35    


 


Total Counted  100   22  23:35    


 


Seg Neutrophils %  81.6 % (40.0-70.0)  H  22  04:49    


 


Seg Neuts % (Manual)  52.0 % (40.0-70.0)   22  23:35    


 


Band Neutrophils %  0 %  22  23:35    


 


Lymphocytes % (Manual)  41.0 % (13.4-35.0)  H  22  23:35    


 


Reactive Lymphs % (Man)  0 %  22  23:35    


 


Monocytes % (Manual)  7.0 % (0.0-7.3)   22  23:35    


 


Eosinophils % (Manual)  0 % (0.0-4.3)   22  23:35    


 


Basophils % (Manual)  0 % (0.0-1.8)   22  23:35    


 


Metamyelocytes %  0 %  22  23:35    


 


Myelocytes %  0 %  22  23:35    


 


Promyelocytes %  0 %  22  23:35    


 


Blast Cells %  0 %  22  23:35    


 


Nucleated RBC %  Not Reportable   22  23:35    


 


Seg Neutrophils #  11.6 K/mm3 (1.8-7.7)  H  22  04:49    


 


Seg Neutrophils # Man  7.3 K/mm3 (1.8-7.7)   22  23:35    


 


Band Neutrophils #  0.0 K/mm3  22  23:35    


 


Lymphocytes # (Manual)  5.7 K/mm3 (1.2-5.4)  H  22  23:35    


 


Abs React Lymphs (Man)  0.0 K/mm3  22  23:35    


 


Monocytes # (Manual)  1.0 K/mm3 (0.0-0.8)  H  22  23:35    


 


Eosinophils # (Manual)  0.0 K/mm3 (0.0-0.4)   22  23:35    


 


Basophils # (Manual)  0.0 K/mm3 (0.0-0.1)   22  23:35    


 


Metamyelocytes #  0.0 K/mm3  22  23:35    


 


Myelocytes #  0.0 K/mm3  22  23:35    


 


Promyelocytes #  0.0 K/mm3  22  23:35    


 


Blast Cells #  0.0 K/mm3  22  23:35    


 


WBC Morphology  Not Reportable   22  23:35    


 


Hypersegmented Neuts  Not Reportable   22  23:35    


 


Hyposegmented Neuts  Not Reportable   22  23:35    


 


Hypogranular Neuts  Not Reportable   22  23:35    


 


Smudge Cells  Not Reportable   22  23:35    


 


Toxic Granulation  Not Reportable   22  23:35    


 


Toxic Vacuolation  Not Reportable   22  23:35    


 


Dohle Bodies  Not Reportable   22  23:35    


 


Pelger-Huet Anomaly  Not Reportable   22  23:35    


 


Jamie Rods  Not Reportable   22  23:35    


 


Platelet Estimate  Consistent w auto   22  23:35    


 


Clumped Platelets  Not Reportable   22  23:35    


 


Plt Clumps, EDTA  Not Reportable   22  23:35    


 


Large Platelets  Not Reportable   22  23:35    


 


Giant Platelets  Not Reportable   22  23:35    


 


Platelet Satelliting  Not Reportable   22  23:35    


 


Plt Morphology Comment  Not Reportable   22  23:35    


 


RBC Morphology  Normal   22  23:35    


 


Dimorphic RBCs  Not Reportable   22  23:35    


 


Polychromasia  Not Reportable   22  23:35    


 


Hypochromasia  Not Reportable   22  23:35    


 


Poikilocytosis  Not Reportable   22  23:35    


 


Anisocytosis  Not Reportable   22  23:35    


 


Microcytosis  Not Reportable   22  23:35    


 


Macrocytosis  Not Reportable   22  23:35    


 


Spherocytes  Not Reportable   22  23:35    


 


Pappenheimer Bodies  Not Reportable   22  23:35    


 


Sickle Cells  Not Reportable   22  23:35    


 


Target Cells  Not Reportable   22  23:35    


 


Tear Drop Cells  Not Reportable   22  23:35    


 


Ovalocytes  Not Reportable   22  23:35    


 


Helmet Cells  Not Reportable   22  23:35    


 


M[place] Bodies  Not Reportable   22  23:35    


 


Cabot Rings  Not Reportable   22  23:35    


 


[place] Cells  Not Reportable   22  23:35    


 


Bite Cells  Not Reportable   22  23:35    


 


Crenated Cell  Not Reportable   22  23:35    


 


Elliptocytes  Not Reportable   22  23:35    


 


Acanthocytes (Spur)  Not Reportable   22  23:35    


 


Rouleaux  Not Reportable   22  23:35    


 


Hemoglobin C Crystals  Not Reportable   22  23:35    


 


Schistocytes  Not Reportable   22  23:35    


 


Malaria parasites  Not Reportable   22  23:35    


 


Miguel Bodies  Not Reportable   22  23:35    


 


Hem Pathologist Commnt  No   22  23:35    


 


PT  13.4 Sec. (12.2-14.9)   22  13:30    


 


INR  0.92  (0.87-1.13)   22  13:30    


 


APTT  25.8 Sec. (24.2-36.6)   22  13:30    


 


Sodium  138 mmol/L (137-145)   22  04:57    


 


Potassium  4.1 mmol/L (3.6-5.0)   22  04:57    


 


Chloride  101.2 mmol/L ()   22  04:57    


 


Carbon Dioxide  26 mmol/L (22-30)   22  04:57    


 


Anion Gap  15 mmol/L  22  04:57    


 


BUN  13 mg/dL (9-20)   22  04:57    


 


Creatinine  0.5 mg/dL (0.8-1.3)  L  22  04:57    


 


Estimated GFR  > 60 ml/min  22  04:57    


 


BUN/Creatinine Ratio  26 %  22  04:57    


 


Glucose  133 mg/dL ()  H  22  04:57    


 


Lactic Acid  1.40 mmol/L (0.7-2.0)   22  13:30    


 


Calcium  7.9 mg/dL (8.4-10.2)  L  22  04:57    


 


Phosphorus  2.80 mg/dL (2.5-4.5)   22  05:17    


 


Magnesium  2.20 mg/dL (1.7-2.3)   22  05:17    


 


Total Bilirubin  0.30 mg/dL (0.1-1.2)   22  04:57    


 


Direct Bilirubin  < 0.2 mg/dL (0-0.2)   22  22:47    


 


AST  501 units/L (5-40)  H  22  04:57    


 


ALT  397 units/L (7-56)  H  22  04:57    


 


Alkaline Phosphatase  63 units/L ()   22  04:57    


 


Ammonia  37.0 umol/L (25-60)   22  04:49    


 


Total Creatine Kinase  54840 units/L ()  H  22  05:17    


 


Total Protein  5.0 g/dL (6.3-8.2)  L  22  04:57    


 


Albumin  3.2 g/dL (3.9-5)  L  22  04:57    


 


Albumin/Globulin Ratio  1.8 %  22  04:57    


 


Amylase  25 units/L ()  L  22  04:49    


 


Lipase  10 units/L (13-60)  L  22  04:49    


 


Coronavirus (PCR)  Negative  (Negative)   22  11:40    


 


Hep Bs Antigen  Non-reactive  (Negative)   22  13:30    


 


Hep B Core IgM Ab  Non-reactive  (NonReactive)   22  13:30    


 


Hepatitis C Antibody  Non-reactive  (NonReactive)   22  13:30    


 


HIV 1&2 Antibody Rapid  Non react  (Non React)   22  16:42    


 


HIV P24 Antigen  Non react  (Non React)   22  16:42    











Microbiology: 


Microbiology





22 22:47   Peripheral/Venous   Blood Culture - Preliminary


                            NO GROWTH AFTER 72 HOURS


22 22:47   Peripheral/Venous   Blood Culture - Preliminary


                            NO GROWTH AFTER 72 HOURS


22 17:51   Urine,Clean Catch   Urine Culture - Final


                            NO GROWTH AFTER 48 HOURS








Mcwilliams/IV: 


                                        





Voiding Method                   Urinal











Active Medications





- Current Medications


Current Medications: 














Generic Name Dose Route Start Last Admin





  Trade Name Freq  PRN Reason Stop Dose Admin


 


Albuterol  2.5 mg  22 06:15 





  Albuterol 2.5 Mg/3 Ml Nebu  IH  





  Q3HRT PRN  





  Shortness Of Breath  


 


Albuterol/Ipratropium  1 ampul  22 08:00  22 07:50





  Ipratropium/Albuterol Sulfate 3 Ml Ampul.Neb  IH   1 ampul





  BIDRT CHERYL   Administration


 


Diphenhydramine HCl  25 mg  22 06:24 





  Diphenhydramine 50 Mg/Ml Vial  IV  





  Q6H PRN  





  Skin Irritation  


 


Docusate Sodium  100 mg  22 22:00  22 09:35





  Docusate Sodium 100 Mg Cap  PO   100 mg





  BID CHERYL   Administration


 


Famotidine  20 mg  22 10:00  22 09:35





  Famotidine 20 Mg/2 Ml Inj  IV   20 mg





  BID CHERYL   Administration


 


Gabapentin  100 mg  22 22:30  22 05:33





  Gabapentin 100 Mg Cap  PO  22 22:29  100 mg





  Q8HR CHERYL   Administration


 


Gabapentin  100 mg  22 10:00 





  Gabapentin 100 Mg Cap  PO  22 09:59 





  BID CHERYL  


 


Gabapentin  300 mg  22 18:00 





  Gabapentin 300 Mg Cap  PO  22 17:59 





  QPM CHERYL  


 


Heparin Sodium (Porcine)  5,000 unit  22 10:00  22 09:35





  Heparin 5,000 Unit/1 Ml Vial  SUB-Q   5,000 unit





  Q12HR CHERYL   Administration


 


Hydralazine HCl  10 mg  22 18:31  22 18:50





  Hydralazine 20 Mg/1 Ml Inj  IV   10 mg





  Q3HR PRN   Administration





  Hypertension  


 


Hydromorphone HCl  0.5 mg  22 06:15  22 03:13





  Hydromorphone 1 Mg/1 Ml Inj  IV   0.5 mg





  Q3H PRN   Administration





  Pain , Severe (7-10)  


 


Sodium Chloride  1,000 mls @ 200 mls/hr  22 18:30  22 09:36





  Nacl 0.9% 1000 Ml  IV   200 mls/hr





  AS DIRECT CHERYL   Administration


 


Methylprednisolone Sodium Succinate  80 mg  22 18:59  22 05:33





  Methylprednisolone Sod Succinate 125 Mg/2 Ml Inj  IV   80 mg





  Q8HR CHERYL   Administration


 


Morphine Sulfate  2 mg  22 06:15  22 10:33





  Morphine 2 Mg/1 Ml Inj  IV   2 mg





  Q4H PRN   Administration





  Pain, Moderate (4-6)  


 


Ondansetron HCl  4 mg  22 06:15 





  Ondansetron 4 Mg/2 Ml Inj  IV  





  Q8H PRN  





  Nausea And Vomiting  


 


Sodium Chloride  10 ml  22 10:00  22 09:36





  Sodium Chloride 0.9% 10 Ml Flush Syringe  IV   10 ml





  BID CHERYL   Administration


 


Sodium Chloride  10 ml  22 06:15 





  Sodium Chloride 0.9% 10 Ml Flush Syringe  IV  





  PRN PRN  





  LINE FLUSH  


 


Valsartan  160 mg  22 22:00  22 09:35





  Valsartan 160mg Tab  PO   160 mg





  DAILY CHERYL   Administration

## 2022-05-08 LAB
ALBUMIN SERPL-MCNC: 3.2 G/DL (ref 3.9–5)
ALT SERPL-CCNC: 364 UNITS/L (ref 7–56)
BUN SERPL-MCNC: 11 MG/DL (ref 9–20)
BUN/CREAT SERPL: 18 %
CALCIUM SERPL-MCNC: 8 MG/DL (ref 8.4–10.2)
HCT VFR BLD CALC: 37.1 % (ref 35.5–45.6)
HEMOLYSIS INDEX: 8
HGB BLD-MCNC: 12.4 GM/DL (ref 11.8–15.2)
MCHC RBC AUTO-ENTMCNC: 34 % (ref 32–34)
MCV RBC AUTO: 98 FL (ref 84–94)
PLATELET # BLD: 254 K/MM3 (ref 140–440)
RBC # BLD AUTO: 3.77 M/MM3 (ref 3.65–5.03)

## 2022-05-08 RX ADMIN — SODIUM CHLORIDE SCH MLS/HR: 0.9 INJECTION, SOLUTION INTRAVENOUS at 21:26

## 2022-05-08 RX ADMIN — SODIUM CHLORIDE SCH MLS/HR: 0.9 INJECTION, SOLUTION INTRAVENOUS at 04:18

## 2022-05-08 RX ADMIN — GABAPENTIN SCH MG: 100 CAPSULE ORAL at 14:30

## 2022-05-08 RX ADMIN — FAMOTIDINE SCH MG: 20 TABLET ORAL at 11:16

## 2022-05-08 RX ADMIN — Medication SCH ML: at 21:26

## 2022-05-08 RX ADMIN — HEPARIN SODIUM SCH UNIT: 5000 INJECTION, SOLUTION INTRAVENOUS; SUBCUTANEOUS at 11:16

## 2022-05-08 RX ADMIN — METHYLPREDNISOLONE SODIUM SUCCINATE SCH MG: 125 INJECTION, POWDER, FOR SOLUTION INTRAMUSCULAR; INTRAVENOUS at 06:03

## 2022-05-08 RX ADMIN — VALSARTAN SCH MG: 160 TABLET, FILM COATED ORAL at 11:16

## 2022-05-08 RX ADMIN — Medication SCH ML: at 11:16

## 2022-05-08 RX ADMIN — FAMOTIDINE SCH MG: 20 TABLET ORAL at 21:24

## 2022-05-08 RX ADMIN — GABAPENTIN SCH MG: 100 CAPSULE ORAL at 21:24

## 2022-05-08 RX ADMIN — SODIUM CHLORIDE SCH MLS/HR: 0.9 INJECTION, SOLUTION INTRAVENOUS at 14:30

## 2022-05-08 RX ADMIN — DOCUSATE SODIUM SCH MG: 100 CAPSULE, LIQUID FILLED ORAL at 21:24

## 2022-05-08 RX ADMIN — IPRATROPIUM BROMIDE AND ALBUTEROL SULFATE SCH AMPUL: .5; 3 SOLUTION RESPIRATORY (INHALATION) at 20:54

## 2022-05-08 RX ADMIN — GABAPENTIN SCH MG: 100 CAPSULE ORAL at 06:03

## 2022-05-08 RX ADMIN — DOCUSATE SODIUM SCH MG: 100 CAPSULE, LIQUID FILLED ORAL at 11:16

## 2022-05-08 RX ADMIN — IPRATROPIUM BROMIDE AND ALBUTEROL SULFATE SCH AMPUL: .5; 3 SOLUTION RESPIRATORY (INHALATION) at 08:14

## 2022-05-08 RX ADMIN — HEPARIN SODIUM SCH UNIT: 5000 INJECTION, SOLUTION INTRAVENOUS; SUBCUTANEOUS at 21:25

## 2022-05-08 NOTE — PROGRESS NOTE
Assessment and Plan


Assessment and plan: 


This is a transgendered female admitted with acute liver failure, 

rhabdomyolysis, s/p 4 compartment fasciotomy to left lower extremity





Neuro: Neuropathy


-Avoid delirium


-Reorientation as needed


-Maintain sleep-wake cycle


-As needed analgesia


-Neurology consulted, appreciate recommendations


-Gabapentin 





Cardiac: ST, undiagnosed HTN


-Blood pressure monitoring per protocol


-Valsartan


-PRN hydralazine





Respiratory: NAD


-Pulmanory hygiene


-Supplemental oxygen as needed


-SPO2 monitoring





GI: Acute liver failure


-GI consulted, appreciate recommendations


-Per GI: Suspect due to rhabdomyolysis given elevated creatinine kinase levels


   -If ALT continues to rise recommend MRCP


-Abdominal ultrasound shows mild diffuse biliary dilation, obstruction to lesion

of the distal common bile duct cannot be excluded consider MRCP, no evidence of 

gallstones within the gallbladder


-24 hours -250 mL


-PPI


-Regular diet


-BR: colace


-Trend LFTs





: Rhabdomyolysis, acute kidney injury with vasomotor nephropathy now resolved


-Nephrology consulted, appreciate recommendations


-Strict intake and output


-Renally dose medications


-Avoid nephrotoxic medications


-MIVF


-Sodium bicarb


-Trend BMP, CK





ID: NAD


-s/p abx therapy with levaquin (5/3-)


-f/u blood culture


-Monitor WBC and temperature curve





Endo: NAD


-Avoid hypoglycemia


-SSI


-Accu-Cheks q. xxx


-Long-acting insulin, titrate as needed





Heme: NAD


-Trend CBC


-Transfuse hemoglobin less than 7


-Monitor for signs of bleeding


-Heparin subq





MS: Compartment syndrome to LLE


-Vascular surgery consulted, appreciate recommendations


-s/p self admin of IM abx to midthigh


-c/o pain, decreased sensation, pulses were dopplarable, paralysis, mottled skin


-s/p 4 compartment tracheotomy on 5/3 with vascular surgery


-Bilateral lower extremity Doppler ultrasound showed no DVT


-Left lower extremity CT with contrast showed appearance of posterior soft 

tissue complex cellulitis with small subcutaneous lymph nodes, no drainable 

fluid collections present, contusion could have similar appearance


-Bilateral lower extremity arterial duplex showed no significant lower extremity

peripheral artery disease


-Normal ABIs








History


Interval history: 


This is a 36 year old  transgendered female undergoing gender transformation 

with silicone injection to buttocks ([place] ) and breast implants, heavy 

drinker on the weekends (bottle of vodka) who presented to [facility] on 5/3 because 

of adverse drug reaction after reportedly self administration of  

rocephin and PCN for sore throat. Patient reportedly collapsed while checking in

to the emergency department, has complains of severe 10 out of 10 pain in left 

thigh and has noticeable mottling of skin in thigh and left lower abdomen.  

Patient underwent a CT of the left leg which showed possible cellulitis with no 

drainable fluid collection and was started on empiric antibiotics.  Patient 

admitted to the hospital service with consult to surgery with left leg 

cellulitis and possible allergic reaction.





Hospital course to date:





5/3: Venous ultrasound shows no DVT, Vascular surgery consulted who performed a 

fasciotomy and plan to transfer patient to [facility] post intervention.  Neurology 

consulted





: Transfer to ICU, GI and neurology consulted.





: Patient will be transferred back to the floor from ICU.  Her liver enzymes 

and creatinine are trending down.  Will be started on gabapentin per neurology 

recommendations and antibiotics discontinued.





: Continue supportive care.  CPK slowly improving.  Vascular plans for 

closure of fasciotomy early next week.  PT OT evaluation and treatment closure 

done.  Continue wound management.  No evidence of withdrawal noted at this time.

 Continue to monitor LFTs which are also slightly improving.  Plan discussed in 

detail with the patient who verbalized understanding





: Patient seen and examined clinically stable continues to show some 

improvement.  Closure of fasciotomy planned for tomorrow.  Renal function is 

improving.  CK ordered and pending





: Patient seen and examined, wbc mildly elevated likely secondary to 

steroids, will change to po for two additional days and stop, benefit probably 

already achieved, continue wound dressing, sensory function improved, awaiting 

wound vac and closure. Patient advised on clinically progress. 








History


Interval history: 


Patient seen and examined this morning resting comfortably.  No new complaints 

at this time.  Dressing still in place left lower extremity awaiting closure wh

ich could happen next week.








Hospitalist Physical





- Physical exam


Narrative exam: 





General appearance: Present: no acute distress





- EENT


Eyes: Present: PERRL, EOM intact


ENT: hearing intact, clear oral mucosa, dentition normal





- Neck


Neck: Present: normal ROM





- Respiratory


Respiratory effort: normal


Respiratory: bilateral: CTA





- Cardiovascular


Rhythm: regular


Heart Sounds: Present: S1 & S2.  Absent: systolic murmur, diastolic murmur





- Extremities


Extremity abnormal: edema, cyanosis, pulses diminished, tenderness


Peripheral Pulses: abnormal





- Peripheral pulses


  ** dorsalis pedis


Pulse Strength: 2+





  ** posterial tibial


Pulse Strength: 2+





- Abdominal


General gastrointestinal: soft, non-tender, non-distended, normal bowel sounds





- Integumentary


Integumentary: Present: warm, dry, evidence of surgically implanted breast and 

gluteal region noted.  Left lower extremity with dressing significant edema 

remains in place open wound. initial dusky dorasal noted improving.  No amish 

necrosis noted





- Psychiatric


Psychiatric: appropriate mood/affect, cooperative





- Neurologic


Neurologic: CNII-XII intact, no focal deficits, moves all extremities





- Allied Health


Allied health notes reviewed: nursing, social work











- Constitutional


Vitals: 


                                        











Temp Pulse Resp BP Pulse Ox


 


 98.6 F   76   18   138/92   96 


 


 22 04:25  22 21:13  22 04:58  22 04:25  22 04:58











General appearance: Present: no acute distress





Results





- Labs


CBC & Chem 7: 


                                 22 04:53





                                 22 04:53


Labs: 


                             Laboratory Last Values











WBC  18.1 K/mm3 (4.5-11.0)  H  22  04:53    


 


RBC  3.77 M/mm3 (3.65-5.03)   22  04:53    


 


Hgb  12.4 gm/dl (11.8-15.2)   22  04:53    


 


Hct  37.1 % (35.5-45.6)   22  04:53    


 


MCV  98 fl (84-94)  H  22  04:53    


 


MCH  33 pg (28-32)  H  22  04:53    


 


MCHC  34 % (32-34)   22  04:53    


 


RDW  13.1 % (13.2-15.2)  L  22  04:53    


 


Plt Count  254 K/mm3 (140-440)   22  04:53    


 


Lymph % (Auto)  7.8 % (13.4-35.0)  L  22  04:49    


 


Mono % (Auto)  10.6 % (0.0-7.3)  H  22  04:49    


 


Eos % (Auto)  0.0 % (0.0-4.3)   22  04:49    


 


Baso % (Auto)  0.0 % (0.0-1.8)   22  04:49    


 


Lymph # (Auto)  1.1 K/mm3 (1.2-5.4)  L  22  04:49    


 


Mono # (Auto)  1.5 K/mm3 (0.0-0.8)  H  22  04:49    


 


Eos # (Auto)  0.0 K/mm3 (0.0-0.4)   22  04:49    


 


Baso # (Auto)  0.0 K/mm3 (0.0-0.1)   22  04:49    


 


Add Manual Diff  Complete   22  23:35    


 


Total Counted  100   22  23:35    


 


Seg Neutrophils %  81.6 % (40.0-70.0)  H  22  04:49    


 


Seg Neuts % (Manual)  52.0 % (40.0-70.0)   22  23:35    


 


Band Neutrophils %  0 %  22  23:35    


 


Lymphocytes % (Manual)  41.0 % (13.4-35.0)  H  22  23:35    


 


Reactive Lymphs % (Man)  0 %  22  23:35    


 


Monocytes % (Manual)  7.0 % (0.0-7.3)   22  23:35    


 


Eosinophils % (Manual)  0 % (0.0-4.3)   22  23:35    


 


Basophils % (Manual)  0 % (0.0-1.8)   22  23:35    


 


Metamyelocytes %  0 %  22  23:35    


 


Myelocytes %  0 %  22  23:35    


 


Promyelocytes %  0 %  22  23:35    


 


Blast Cells %  0 %  22  23:35    


 


Nucleated RBC %  Not Reportable   22  23:35    


 


Seg Neutrophils #  11.6 K/mm3 (1.8-7.7)  H  22  04:49    


 


Seg Neutrophils # Man  7.3 K/mm3 (1.8-7.7)   22  23:35    


 


Band Neutrophils #  0.0 K/mm3  22  23:35    


 


Lymphocytes # (Manual)  5.7 K/mm3 (1.2-5.4)  H  22  23:35    


 


Abs React Lymphs (Man)  0.0 K/mm3  22  23:35    


 


Monocytes # (Manual)  1.0 K/mm3 (0.0-0.8)  H  22  23:35    


 


Eosinophils # (Manual)  0.0 K/mm3 (0.0-0.4)   22  23:35    


 


Basophils # (Manual)  0.0 K/mm3 (0.0-0.1)   22  23:35    


 


Metamyelocytes #  0.0 K/mm3  22  23:35    


 


Myelocytes #  0.0 K/mm3  22  23:35    


 


Promyelocytes #  0.0 K/mm3  22  23:35    


 


Blast Cells #  0.0 K/mm3  22  23:35    


 


WBC Morphology  Not Reportable   22  23:35    


 


Hypersegmented Neuts  Not Reportable   22  23:35    


 


Hyposegmented Neuts  Not Reportable   22  23:35    


 


Hypogranular Neuts  Not Reportable   22  23:35    


 


Smudge Cells  Not Reportable   22  23:35    


 


Toxic Granulation  Not Reportable   22  23:35    


 


Toxic Vacuolation  Not Reportable   22  23:35    


 


Dohle Bodies  Not Reportable   22  23:35    


 


Pelger-Huet Anomaly  Not Reportable   22  23:35    


 


Jamie Rods  Not Reportable   22  23:35    


 


Platelet Estimate  Consistent w auto   22  23:35    


 


Clumped Platelets  Not Reportable   22  23:35    


 


Plt Clumps, EDTA  Not Reportable   22  23:35    


 


Large Platelets  Not Reportable   22  23:35    


 


Giant Platelets  Not Reportable   22  23:35    


 


Platelet Satelliting  Not Reportable   22  23:35    


 


Plt Morphology Comment  Not Reportable   22  23:35    


 


RBC Morphology  Normal   22  23:35    


 


Dimorphic RBCs  Not Reportable   22  23:35    


 


Polychromasia  Not Reportable   22  23:35    


 


Hypochromasia  Not Reportable   22  23:35    


 


Poikilocytosis  Not Reportable   22  23:35    


 


Anisocytosis  Not Reportable   22  23:35    


 


Microcytosis  Not Reportable   22  23:35    


 


Macrocytosis  Not Reportable   22  23:35    


 


Spherocytes  Not Reportable   22  23:35    


 


Pappenheimer Bodies  Not Reportable   22  23:35    


 


Sickle Cells  Not Reportable   22  23:35    


 


Target Cells  Not Reportable   22  23:35    


 


Tear Drop Cells  Not Reportable   22  23:35    


 


Ovalocytes  Not Reportable   22  23:35    


 


Helmet Cells  Not Reportable   22  23:35    


 


[place] Bodies  Not Reportable   22  23:35    


 


Cabot Rings  Not Reportable   22  23:35    


 


[place] Cells  Not Reportable   22  23:35    


 


Bite Cells  Not Reportable   22  23:35    


 


Crenated Cell  Not Reportable   22  23:35    


 


Elliptocytes  Not Reportable   22  23:35    


 


Acanthocytes (Spur)  Not Reportable   22  23:35    


 


Rouleaux  Not Reportable   22  23:35    


 


Hemoglobin C Crystals  Not Reportable   22  23:35    


 


Schistocytes  Not Reportable   22  23:35    


 


Malaria parasites  Not Reportable   22  23:35    


 


Miguel Bodies  Not Reportable   22  23:35    


 


Hem Pathologist Commnt  No   22  23:35    


 


PT  13.4 Sec. (12.2-14.9)   22  13:30    


 


INR  0.92  (0.87-1.13)   22  13:30    


 


APTT  25.8 Sec. (24.2-36.6)   22  13:30    


 


Sodium  137 mmol/L (137-145)   22  04:53    


 


Potassium  3.9 mmol/L (3.6-5.0)   22  04:53    


 


Chloride  99.6 mmol/L ()   22  04:53    


 


Carbon Dioxide  24 mmol/L (22-30)   22  04:53    


 


Anion Gap  17 mmol/L  22  04:53    


 


BUN  11 mg/dL (9-20)   22  04:53    


 


Creatinine  0.6 mg/dL (0.8-1.3)  L  22  04:53    


 


Estimated GFR  > 60 ml/min  22  04:53    


 


BUN/Creatinine Ratio  18 %  22  04:53    


 


Glucose  198 mg/dL ()  H  22  04:53    


 


Lactic Acid  1.40 mmol/L (0.7-2.0)   22  13:30    


 


Calcium  8.0 mg/dL (8.4-10.2)  L  22  04:53    


 


Phosphorus  2.80 mg/dL (2.5-4.5)   22  05:17    


 


Magnesium  2.20 mg/dL (1.7-2.3)   22  05:17    


 


Total Bilirubin  0.30 mg/dL (0.1-1.2)   22  04:53    


 


Direct Bilirubin  < 0.2 mg/dL (0-0.2)   22  22:47    


 


AST  292 units/L (5-40)  H  22  04:53    


 


ALT  364 units/L (7-56)  H  22  04:53    


 


Alkaline Phosphatase  72 units/L ()   22  04:53    


 


Ammonia  37.0 umol/L (25-60)   22  04:49    


 


Total Creatine Kinase  27043 units/L ()  H  22  04:53    


 


Total Protein  5.0 g/dL (6.3-8.2)  L  22  04:53    


 


Albumin  3.2 g/dL (3.9-5)  L  22  04:53    


 


Albumin/Globulin Ratio  1.8 %  22  04:53    


 


Amylase  25 units/L ()  L  22  04:49    


 


Lipase  10 units/L (13-60)  L  22  04:49    


 


Coronavirus (PCR)  Negative  (Negative)   22  11:40    


 


Hep Bs Antigen  Non-reactive  (Negative)   22  13:30    


 


Hep B Core IgM Ab  Non-reactive  (NonReactive)   22  13:30    


 


Hepatitis C Antibody  Non-reactive  (NonReactive)   22  13:30    


 


HIV 1&2 Antibody Rapid  Non react  (Non React)   22  16:42    


 


HIV P24 Antigen  Non react  (Non React)   22  16:42    











Microbiology: 


Microbiology





22 22:47   Peripheral/Venous   Blood Culture - Preliminary


                            NO GROWTH AFTER 4 DAYS


22 22:47   Peripheral/Venous   Blood Culture - Preliminary


                            NO GROWTH AFTER 4 DAYS








Mcwilliams/IV: 


                                        





Voiding Method                   Urinal











Active Medications





- Current Medications


Current Medications: 














Generic Name Dose Route Start Last Admin





  Trade Name Freq  PRN Reason Stop Dose Admin


 


Albuterol  2.5 mg  22 06:15 





  Albuterol 2.5 Mg/3 Ml Nebu  IH  





  Q3HRT PRN  





  Shortness Of Breath  


 


Albuterol/Ipratropium  1 ampul  22 08:00  22 08:14





  Ipratropium/Albuterol Sulfate 3 Ml Ampul.Neb  IH   1 ampul





  BIDRT CHERYL   Administration


 


Diphenhydramine HCl  25 mg  22 06:24 





  Diphenhydramine 50 Mg/Ml Vial  IV  





  Q6H PRN  





  Skin Irritation  


 


Docusate Sodium  100 mg  22 22:00  22 11:16





  Docusate Sodium 100 Mg Cap  PO   100 mg





  BID CHERYL   Administration


 


Famotidine  20 mg  22 10:00  22 11:16





  Famotidine 20 Mg Tab  PO   20 mg





  BID CHERYL   Administration


 


Gabapentin  100 mg  22 22:30  22 06:03





  Gabapentin 100 Mg Cap  PO  22 22:29  100 mg





  Q8HR CHERYL   Administration


 


Gabapentin  100 mg  22 10:00 





  Gabapentin 100 Mg Cap  PO  22 09:59 





  BID CHERYL  


 


Gabapentin  300 mg  22 18:00 





  Gabapentin 300 Mg Cap  PO  22 17:59 





  QPM CHERYL  


 


Heparin Sodium (Porcine)  5,000 unit  22 10:00  22 11:16





  Heparin 5,000 Unit/1 Ml Vial  SUB-Q   5,000 unit





  Q12HR CHERYL   Administration


 


Hydralazine HCl  10 mg  22 18:31  22 18:50





  Hydralazine 20 Mg/1 Ml Inj  IV   10 mg





  Q3HR PRN   Administration





  Hypertension  


 


Hydromorphone HCl  0.5 mg  22 06:15  22 03:13





  Hydromorphone 1 Mg/1 Ml Inj  IV   0.5 mg





  Q3H PRN   Administration





  Pain , Severe (7-10)  


 


Sodium Chloride  1,000 mls @ 200 mls/hr  22 18:30  22 04:18





  Nacl 0.9% 1000 Ml  IV   200 mls/hr





  AS DIRECT CHERYL   Administration


 


Morphine Sulfate  2 mg  22 06:15  22 10:33





  Morphine 2 Mg/1 Ml Inj  IV   2 mg





  Q4H PRN   Administration





  Pain, Moderate (4-6)  


 


Ondansetron HCl  4 mg  22 06:15 





  Ondansetron 4 Mg/2 Ml Inj  IV  





  Q8H PRN  





  Nausea And Vomiting  


 


Sodium Chloride  10 ml  22 10:00  22 11:16





  Sodium Chloride 0.9% 10 Ml Flush Syringe  IV   10 ml





  BID CHERYL   Administration


 


Sodium Chloride  10 ml  22 06:15 





  Sodium Chloride 0.9% 10 Ml Flush Syringe  IV  





  PRN PRN  





  LINE FLUSH  


 


Valsartan  160 mg  22 22:00  22 11:16





  Valsartan 160mg Tab  PO   160 mg





  DAILY CHERYL   Administration

## 2022-05-08 NOTE — PROGRESS NOTE
Assessment and Plan





# Acute Rhabdomyolysis in setting of left leg compartment syndrome:


- continue aggressive IVF (NS at 200ml/min reasonable), CK downtrending has been

appropriately, CK down to 14K (peak >90k)


- s/p fasciotomy per Dr. Cantrell/Benoit, shelby


- supportive measures per primary


- can hold lasix, use prn if respiratory status worsening/volume overload 

developing, but note appropriate urine output (5.3L)


- can hold NaHCO3 as acid/base stable


- follow renal function, electrolytes- currently stable





# Transaminitis: in setting of rhabdomyolysis, note GI input








Subjective


Date of service: 05/08/22


Principal diagnosis: abnormal liver enzymes


Interval history: 





No renal related clinical changes noted.  Chart, vitals, labs reviewed.  





Objective





- Exam


Narrative Exam: 





General appearance: well-developed, well-nourished


EENT: ATNC


Neck: Present: neck supple, trachea midline


Respiratory: Clear to Ascultation


Heart: regular, S1S2


Gastrointestinal: Present: normoactive bowel sounds


Integumentary: no rash, warm and dry


Neurologic: no focal deficit, no asterixis





- Vital Signs


Vital signs: 


                               Vital Signs - 12hr











  05/08/22 05/08/22





  04:25 04:58


 


Temperature 98.6 F 


 


Respiratory 18 18





Rate  


 


Blood Pressure 138/92 


 


O2 Sat by Pulse  96





Oximetry  














- Lab





                                 05/08/22 04:53





                                 05/08/22 04:53


                             Most recent lab results











Calcium  8.0 mg/dL (8.4-10.2)  L  05/08/22  04:53    


 


Phosphorus  2.80 mg/dL (2.5-4.5)   05/06/22  05:17    


 


Magnesium  2.20 mg/dL (1.7-2.3)   05/06/22  05:17    














Medications & Allergies





- Medications


Allergies/Adverse Reactions: 


                                    Allergies





ceftriaxone [From Rocephin] Allergy (Severe, Verified 05/05/22 10:23)


   Unknown


   SEVERE RASH MOTTLING OF THE SKIN POST ADMINISTRATION OF IM ROCEPHIN PTA 


Penicillins Allergy (Severe, Verified 05/05/22 10:23)


   Unknown


   SEVERE RASH MOTTLING OF THE SKIN POST ADMINISTRATION OF IM ROCEPHIN PTA 








Home Medications: 


                                Home Medications











 Medication  Instructions  Recorded  Confirmed  Last Taken  Type


 


No Known Home Medications [No  05/03/22 05/03/22 Unknown History





Reported Home Medications]     











Active Medications: 














Generic Name Dose Route Start Last Admin





  Trade Name Freq  PRN Reason Stop Dose Admin


 


Albuterol  2.5 mg  05/03/22 06:15 





  Albuterol 2.5 Mg/3 Ml Nebu  IH  





  Q3HRT PRN  





  Shortness Of Breath  


 


Albuterol/Ipratropium  1 ampul  05/04/22 08:00  05/08/22 08:14





  Ipratropium/Albuterol Sulfate 3 Ml Ampul.Neb  IH   1 ampul





  BIDRT CHERYL   Administration


 


Diphenhydramine HCl  25 mg  05/03/22 06:24 





  Diphenhydramine 50 Mg/Ml Vial  IV  





  Q6H PRN  





  Skin Irritation  


 


Docusate Sodium  100 mg  05/05/22 22:00  05/08/22 11:16





  Docusate Sodium 100 Mg Cap  PO   100 mg





  BID CHERYL   Administration


 


Famotidine  20 mg  05/08/22 10:00  05/08/22 11:16





  Famotidine 20 Mg Tab  PO   20 mg





  BID CHERYL   Administration


 


Gabapentin  100 mg  05/05/22 22:30  05/08/22 06:03





  Gabapentin 100 Mg Cap  PO  05/12/22 22:29  100 mg





  Q8HR CHERYL   Administration


 


Gabapentin  100 mg  05/13/22 10:00 





  Gabapentin 100 Mg Cap  PO  05/18/22 09:59 





  BID CHERYL  


 


Gabapentin  300 mg  05/13/22 18:00 





  Gabapentin 300 Mg Cap  PO  05/18/22 17:59 





  QPM CHERYL  


 


Heparin Sodium (Porcine)  5,000 unit  05/03/22 10:00  05/08/22 11:16





  Heparin 5,000 Unit/1 Ml Vial  SUB-Q   5,000 unit





  Q12HR CHERYL   Administration


 


Hydralazine HCl  10 mg  05/04/22 18:31  05/04/22 18:50





  Hydralazine 20 Mg/1 Ml Inj  IV   10 mg





  Q3HR PRN   Administration





  Hypertension  


 


Hydromorphone HCl  0.5 mg  05/03/22 06:15  05/05/22 03:13





  Hydromorphone 1 Mg/1 Ml Inj  IV   0.5 mg





  Q3H PRN   Administration





  Pain , Severe (7-10)  


 


Sodium Chloride  1,000 mls @ 200 mls/hr  05/04/22 18:30  05/08/22 04:18





  Nacl 0.9% 1000 Ml  IV   200 mls/hr





  AS DIRECT CHERYL   Administration


 


Morphine Sulfate  2 mg  05/03/22 06:15  05/06/22 10:33





  Morphine 2 Mg/1 Ml Inj  IV   2 mg





  Q4H PRN   Administration





  Pain, Moderate (4-6)  


 


Ondansetron HCl  4 mg  05/03/22 06:15 





  Ondansetron 4 Mg/2 Ml Inj  IV  





  Q8H PRN  





  Nausea And Vomiting  


 


Prednisone  40 mg  05/08/22 12:00 





  Prednisone 20 Mg Tab  PO  05/09/22 10:01 





  QDAY CHERYL  


 


Sodium Chloride  10 ml  05/03/22 10:00  05/08/22 11:16





  Sodium Chloride 0.9% 10 Ml Flush Syringe  IV   10 ml





  BID CHERYL   Administration


 


Sodium Chloride  10 ml  05/03/22 06:15 





  Sodium Chloride 0.9% 10 Ml Flush Syringe  IV  





  PRN PRN  





  LINE FLUSH  


 


Valsartan  160 mg  05/04/22 22:00  05/08/22 11:16





  Valsartan 160mg Tab  PO   160 mg





  DAILY CHERYL   Administration

## 2022-05-09 LAB
ALBUMIN SERPL-MCNC: 2.9 G/DL (ref 3.9–5)
ALT SERPL-CCNC: 321 UNITS/L (ref 7–56)
BUN SERPL-MCNC: 12 MG/DL (ref 9–20)
BUN/CREAT SERPL: 24 %
CALCIUM SERPL-MCNC: 8 MG/DL (ref 8.4–10.2)
HCT VFR BLD CALC: 37.5 % (ref 35.5–45.6)
HEMOLYSIS INDEX: 5
HGB BLD-MCNC: 12.4 GM/DL (ref 11.8–15.2)
MCHC RBC AUTO-ENTMCNC: 33 % (ref 32–34)
MCV RBC AUTO: 99 FL (ref 84–94)
PLATELET # BLD: 284 K/MM3 (ref 140–440)
RBC # BLD AUTO: 3.78 M/MM3 (ref 3.65–5.03)

## 2022-05-09 RX ADMIN — IPRATROPIUM BROMIDE AND ALBUTEROL SULFATE SCH AMPUL: .5; 3 SOLUTION RESPIRATORY (INHALATION) at 21:41

## 2022-05-09 RX ADMIN — GABAPENTIN SCH MG: 100 CAPSULE ORAL at 14:05

## 2022-05-09 RX ADMIN — FAMOTIDINE SCH MG: 20 TABLET ORAL at 09:27

## 2022-05-09 RX ADMIN — HEPARIN SODIUM SCH UNIT: 5000 INJECTION, SOLUTION INTRAVENOUS; SUBCUTANEOUS at 09:27

## 2022-05-09 RX ADMIN — GABAPENTIN SCH MG: 100 CAPSULE ORAL at 05:44

## 2022-05-09 RX ADMIN — HEPARIN SODIUM SCH UNIT: 5000 INJECTION, SOLUTION INTRAVENOUS; SUBCUTANEOUS at 22:23

## 2022-05-09 RX ADMIN — Medication SCH ML: at 22:23

## 2022-05-09 RX ADMIN — GABAPENTIN SCH MG: 100 CAPSULE ORAL at 22:23

## 2022-05-09 RX ADMIN — SODIUM CHLORIDE SCH MLS/HR: 0.9 INJECTION, SOLUTION INTRAVENOUS at 14:41

## 2022-05-09 RX ADMIN — DOCUSATE SODIUM SCH MG: 100 CAPSULE, LIQUID FILLED ORAL at 09:27

## 2022-05-09 RX ADMIN — Medication SCH ML: at 09:28

## 2022-05-09 RX ADMIN — SODIUM CHLORIDE SCH MLS/HR: 0.9 INJECTION, SOLUTION INTRAVENOUS at 03:37

## 2022-05-09 RX ADMIN — VALSARTAN SCH MG: 160 TABLET, FILM COATED ORAL at 09:27

## 2022-05-09 RX ADMIN — FAMOTIDINE SCH MG: 20 TABLET ORAL at 22:23

## 2022-05-09 RX ADMIN — MORPHINE SULFATE PRN MG: 2 INJECTION, SOLUTION INTRAMUSCULAR; INTRAVENOUS at 14:46

## 2022-05-09 RX ADMIN — SODIUM CHLORIDE SCH MLS/HR: 0.9 INJECTION, SOLUTION INTRAVENOUS at 22:45

## 2022-05-09 RX ADMIN — IPRATROPIUM BROMIDE AND ALBUTEROL SULFATE SCH AMPUL: .5; 3 SOLUTION RESPIRATORY (INHALATION) at 09:57

## 2022-05-09 RX ADMIN — MORPHINE SULFATE PRN MG: 2 INJECTION, SOLUTION INTRAMUSCULAR; INTRAVENOUS at 22:27

## 2022-05-09 RX ADMIN — DOCUSATE SODIUM SCH MG: 100 CAPSULE, LIQUID FILLED ORAL at 22:23

## 2022-05-09 NOTE — ANESTHESIA CONSULTATION
<ILANA BRUNER - Last Filed: 05/09/22 15:05>





Anesthesia Consult and Med Hx


Date of service: 05/09/22





- Airway


Anesthetic Teeth Evaluation: Good


ROM Head & Neck: Adequate


Mental/Hyoid Distance: Adequate


Mallampati Class: Class I


Intubation Access Assessment: Probably Good





- Pulmonary Exam


CTA: Yes





- Cardiac Exam


Cardiac Exam: RRR





- Pre-Operative Health Status


ASA Pre-Surgery Classification: ASA2


Proposed Anesthetic Plan: General





- Pulmonary


Hx Smoking: No


Hx Asthma: No


Hx Respiratory Symptoms: No


SOB: No


COPD: No


Home Oxygen Therapy: No


Hx Pneumonia: No


Hx Sleep Apnea: No





- Cardiovascular System


Hx Hypertension: No


Hx Coronary Artery Disease: No


Hx Heart Attack/AMI: No


Hx Angina: No


Hx Percutaneous Transluminal Coronary Angioplasty (PTCA): No


Hx Cardia Arrhythmia: No


Hx Pacemaker: No


Hx Internal Defibrillator: No


Hx Valvular Heart Disease: No


Hx Heart Murmur: No


Hx Peripheral Vascular Disease: No





- Central Nervous System


Hx Neuromuscular Disorder: No


Hx Seizures: No


CVA: No


Hx Back Pain: No


Hx Psychiatric Problems: No





- Gastrointestinal


Hx Ulcer: No


Hx Gastroesophageal Reflux Disease: No





- Endocrine


Hx Renal Disease: No


Hx End Stage Renal Disease: No


Hx Cirrhosis: No


Hx Liver Disease: Yes (Markedly elevated LFTs)


Hx Insulin Dependent Diabetes: No


Hx Non-Insulin Dependent Diabetes: No


Hx Thyroid Disease: No


Hx Hypothyroidism: No


Hx Hyperthyroidism: No





- Hematic


Hx Anemia: No


Hx Sickle Cell Disease: No





- Other Systems


Hx Alcohol Use: No


Hx Substance Use: No


Hx Cancer: No


Hx Obesity: No





<SULAIMAN STEIN - Last Filed: 05/10/22 18:17>





Anesthesia Consult and Med Hx





- Airway


Anesthetic Teeth Evaluation: Good


ROM Head & Neck: Adequate


Mental/Hyoid Distance: Adequate


Mallampati Class: Class I


Intubation Access Assessment: Good





- Pre-Operative Health Status


ASA Pre-Surgery Classification: ASA3


Proposed Anesthetic Plan: MAC (conversion to GA if necessary)





- Pulmonary


Hx Respiratory Symptoms: No





- Cardiovascular System


Hx Hypertension: No





- Endocrine


Hx Renal Disease: No (JHONNY 2/2 rhabdomyolysis this admission now resolved)


Hx Liver Disease: Yes (severe transaminitis on admission now improved)


Hx Insulin Dependent Diabetes: No





- Additional Comments


Anesthesia Medical History Comments: No hx anesthetic complications.

## 2022-05-09 NOTE — PROGRESS NOTE
Assessment and Plan





# Acute Rhabdomyolysis in setting of left leg compartment syndrome:


- continue aggressive IVF (NS at 200ml/min reasonable), CK downtrending has been

appropriately, CK down to 8K (peak >90k)


- can begin to taper IVF in next 1-2 days


- s/p fasciotomy per Dr. Cantrell/Benoit, appreciate, note plans for wound vac


- supportive measures per primary


- can hold lasix, use prn if respiratory status worsening/volume overload 

developing, but note appropriate urine output 


- can hold NaHCO3 as acid/base stable


- follow renal function, electrolytes- currently stable





# Transaminitis: in setting of rhabdomyolysis, note GI input








Subjective


Date of service: 05/09/22


Principal diagnosis: abnormal liver enzymes


Interval history: 





No renal related clinical changes noted.  Chart, vitals, labs reviewed.  





Objective





- Exam


Narrative Exam: 





General appearance: well-developed, well-nourished


EENT: ATNC


Neck: Present: neck supple, trachea midline


Respiratory: Clear to Ascultation


Heart: regular, S1S2


Gastrointestinal: Present: normoactive bowel sounds


Integumentary: no rash, warm and dry


Neurologic: no focal deficit, no asterixis





- Vital Signs


Vital signs: 


                               Vital Signs - 12hr











  05/09/22 05/09/22 05/09/22





  04:35 09:27 09:57


 


Temperature 98.3 F  


 


Pulse Rate 76 76 


 


Pulse Rate [   89





Anterior   





Bilateral   





Throughout]   


 


Respiratory 16  





Rate   


 


Respiratory   16





Rate [Anterior   





Bilateral   





Throughout]   


 


Blood Pressure 141/100 140/86 


 


O2 Sat by Pulse 96  





Oximetry   














  05/09/22 05/09/22





  10:00 11:10


 


Temperature  98.9 F


 


Pulse Rate  90


 


Pulse Rate [  





Anterior  





Bilateral  





Throughout]  


 


Respiratory  20





Rate  


 


Respiratory  





Rate [Anterior  





Bilateral  





Throughout]  


 


Blood Pressure  134/80


 


O2 Sat by Pulse 96 97





Oximetry  














- Lab





                                 05/09/22 05:08





                                 05/09/22 05:08


                             Most recent lab results











Calcium  8.0 mg/dL (8.4-10.2)  L  05/09/22  05:08    


 


Phosphorus  2.80 mg/dL (2.5-4.5)   05/06/22  05:17    


 


Magnesium  2.20 mg/dL (1.7-2.3)   05/06/22  05:17    














Medications & Allergies





- Medications


Allergies/Adverse Reactions: 


                                    Allergies





ceftriaxone [From Rocephin] Allergy (Severe, Verified 05/05/22 10:23)


   Unknown


   SEVERE RASH MOTTLING OF THE SKIN POST ADMINISTRATION OF IM ROCEPHIN PTA 


Penicillins Allergy (Severe, Verified 05/05/22 10:23)


   Unknown


   SEVERE RASH MOTTLING OF THE SKIN POST ADMINISTRATION OF IM ROCEPHIN PTA 








Home Medications: 


                                Home Medications











 Medication  Instructions  Recorded  Confirmed  Last Taken  Type


 


No Known Home Medications [No  05/03/22 05/03/22 Unknown History





Reported Home Medications]     











Active Medications: 














Generic Name Dose Route Start Last Admin





  Trade Name Freq  PRN Reason Stop Dose Admin


 


Albuterol  2.5 mg  05/03/22 06:15 





  Albuterol 2.5 Mg/3 Ml Nebu  IH  





  Q3HRT PRN  





  Shortness Of Breath  


 


Albuterol/Ipratropium  1 ampul  05/04/22 08:00  05/09/22 09:57





  Ipratropium/Albuterol Sulfate 3 Ml Ampul.Neb  IH   1 ampul





  BIDRT CHERYL   Administration


 


Diphenhydramine HCl  25 mg  05/03/22 06:24  05/08/22 18:49





  Diphenhydramine 50 Mg/Ml Vial  IV   25 mg





  Q6H PRN   Administration





  Skin Irritation  


 


Docusate Sodium  100 mg  05/05/22 22:00  05/09/22 09:27





  Docusate Sodium 100 Mg Cap  PO   100 mg





  BID CHERYL   Administration


 


Famotidine  20 mg  05/08/22 10:00  05/09/22 09:27





  Famotidine 20 Mg Tab  PO   20 mg





  BID CHERYL   Administration


 


Gabapentin  100 mg  05/05/22 22:30  05/09/22 14:05





  Gabapentin 100 Mg Cap  PO  05/12/22 22:29  100 mg





  Q8HR CHERYL   Administration


 


Gabapentin  100 mg  05/13/22 10:00 





  Gabapentin 100 Mg Cap  PO  05/18/22 09:59 





  BID CHERYL  


 


Gabapentin  300 mg  05/13/22 18:00 





  Gabapentin 300 Mg Cap  PO  05/18/22 17:59 





  QPM CHERYL  


 


Heparin Sodium (Porcine)  5,000 unit  05/03/22 10:00  05/09/22 09:27





  Heparin 5,000 Unit/1 Ml Vial  SUB-Q   5,000 unit





  Q12HR CHERYL   Administration


 


Hydralazine HCl  10 mg  05/04/22 18:31  05/04/22 18:50





  Hydralazine 20 Mg/1 Ml Inj  IV   10 mg





  Q3HR PRN   Administration





  Hypertension  


 


Hydromorphone HCl  0.5 mg  05/03/22 06:15  05/05/22 03:13





  Hydromorphone 1 Mg/1 Ml Inj  IV   0.5 mg





  Q3H PRN   Administration





  Pain , Severe (7-10)  


 


Sodium Chloride  1,000 mls @ 200 mls/hr  05/04/22 18:30  05/09/22 14:41





  Nacl 0.9% 1000 Ml  IV   200 mls/hr





  AS DIRECT CHERYL   Administration


 


Morphine Sulfate  2 mg  05/03/22 06:15  05/09/22 14:46





  Morphine 2 Mg/1 Ml Inj  IV   2 mg





  Q4H PRN   Administration





  Pain, Moderate (4-6)  


 


Ondansetron HCl  4 mg  05/03/22 06:15 





  Ondansetron 4 Mg/2 Ml Inj  IV  





  Q8H PRN  





  Nausea And Vomiting  


 


Sodium Chloride  10 ml  05/03/22 10:00  05/09/22 09:28





  Sodium Chloride 0.9% 10 Ml Flush Syringe  IV   10 ml





  BID CHERYL   Administration


 


Sodium Chloride  10 ml  05/03/22 06:15 





  Sodium Chloride 0.9% 10 Ml Flush Syringe  IV  





  PRN PRN  





  LINE FLUSH  


 


Valsartan  160 mg  05/04/22 22:00  05/09/22 09:27





  Valsartan 160mg Tab  PO   160 mg





  DAILY CHERYL   Administration

## 2022-05-09 NOTE — PROGRESS NOTE
Assessment and Plan


Assessment and plan: 


This is a transgendered female admitted with acute liver failure, 

rhabdomyolysis, s/p 4 compartment fasciotomy to left lower extremity





Neuro: Neuropathy


-Avoid delirium


-Reorientation as needed


-Maintain sleep-wake cycle


-As needed analgesia


-Neurology consulted, appreciate recommendations


-Gabapentin 





Cardiac: ST, undiagnosed HTN


-Blood pressure monitoring per protocol


-Valsartan


-PRN hydralazine





Respiratory: NAD


-Pulmanory hygiene


-Supplemental oxygen as needed


-SPO2 monitoring





GI: Acute liver failure 


-GI consulted, appreciate recommendations


-Per GI: Suspect due to rhabdomyolysis given elevated creatinine kinase levels


   -If ALT continues to rise recommend MRCP


-Abdominal ultrasound shows mild diffuse biliary dilation, obstruction to lesion

of the distal common bile duct cannot be excluded consider MRCP, no evidence of 

gallstones within the gallbladder


-24 hours -250 mL


-PPI


-Regular diet


-BR: colace


-Trend LFTs





: Rhabdomyolysis, acute kidney injury with vasomotor nephropathy now resolved


-Nephrology consulted, appreciate recommendations


-Strict intake and output


-Renally dose medications


-Avoid nephrotoxic medications


-MIVF


-Sodium bicarb


-Trend BMP, CK





ID: NAD


-s/p abx therapy with levaquin (5/3-)


-f/u blood culture


-Monitor WBC and temperature curve





Endo: NAD


-Avoid hypoglycemia


-SSI


-Accu-Cheks q. xxx


-Long-acting insulin, titrate as needed





Heme: NAD


-Trend CBC


-Transfuse hemoglobin less than 7


-Monitor for signs of bleeding


-Heparin subq





MS: Compartment syndrome to LLE


-Vascular surgery consulted, appreciate recommendations


-s/p self admin of IM abx to midthigh


-c/o pain, decreased sensation, pulses were dopplarable, paralysis, mottled skin


-s/p 4 compartment tracheotomy on 5/3 with vascular surgery


-Bilateral lower extremity Doppler ultrasound showed no DVT


-Left lower extremity CT with contrast showed appearance of posterior soft 

tissue complex cellulitis with small subcutaneous lymph nodes, no drainable 

fluid collections present, contusion could have similar appearance


-Bilateral lower extremity arterial duplex showed no significant lower extremity

peripheral artery disease


-Normal ABIs








History


Interval history: 


This is a 36 year old  transgendered female undergoing gender transformation 

with silicone injection to buttocks ([place] ) and breast implants, heavy 

drinker on the weekends (bottle of vodka) who presented to [facility] on 5/3 because 

of adverse drug reaction after reportedly self administration of  

rocephin and PCN for sore throat. Patient reportedly collapsed while checking in

to the emergency department, has complains of severe 10 out of 10 pain in left 

thigh and has noticeable mottling of skin in thigh and left lower abdomen.  

Patient underwent a CT of the left leg which showed possible cellulitis with no 

drainable fluid collection and was started on empiric antibiotics.  Patient 

admitted to the hospital service with consult to surgery with left leg 

cellulitis and possible allergic reaction.





Hospital course to date:





5/3: Venous ultrasound shows no DVT, Vascular surgery consulted who performed a 

fasciotomy and plan to transfer patient to [facility] post intervention.  Neurology 

consulted





: Transfer to ICU, GI and neurology consulted.





: Patient will be transferred back to the floor from ICU.  Her liver enzymes 

and creatinine are trending down.  Will be started on gabapentin per neurology 

recommendations and antibiotics discontinued.





: Continue supportive care.  CPK slowly improving.  Vascular plans for 

closure of fasciotomy early next week.  PT OT evaluation and treatment closure 

done.  Continue wound management.  No evidence of withdrawal noted at this time.

 Continue to monitor LFTs which are also slightly improving.  Plan discussed in 

detail with the patient who verbalized understanding





: Patient seen and examined clinically stable continues to show some 

improvement.  Closure of fasciotomy planned for tomorrow.  Renal function is 

improving.  CK ordered and pending





: Patient seen and examined, wbc mildly elevated likely secondary to 

steroids, will change to po for two additional days and stop, benefit probably 

already achieved, continue wound dressing, sensory function improved, awaiting 

wound vac and closure. Patient advised on clinically progress. 





: Patient seen and examined awaiting her vascular surgery for closure of 

fasciotomy.  CK is decreasing less than 9000 today.  We will continue aggressive

IV fluids until we have the resolution below 5000.  LFTs on the downward trend 

also.  Leukocytosis appears to have peaked steroids was changed to oral for 2 

additional days today is 1 of 2..  Plan of care discussed in detail with the 

patient who verbalized understanding.





Hospitalist Physical





- Constitutional


Vitals: 


                                        











Temp Pulse Resp BP Pulse Ox


 


 98.3 F   89   16   140/86   96 


 


 22 04:35  22 09:57  22 09:57  22 09:27  22 10:00











General appearance: Present: no acute distress





Results





- Labs


CBC & Chem 7: 


                                 22 05:08





                                 22 05:08


Labs: 


                             Laboratory Last Values











WBC  18.3 K/mm3 (4.5-11.0)  H  22  05:08    


 


RBC  3.78 M/mm3 (3.65-5.03)   22  05:08    


 


Hgb  12.4 gm/dl (11.8-15.2)   22  05:08    


 


Hct  37.5 % (35.5-45.6)   22  05:08    


 


MCV  99 fl (84-94)  H  22  05:08    


 


MCH  33 pg (28-32)  H  22  05:08    


 


MCHC  33 % (32-34)   22  05:08    


 


RDW  13.0 % (13.2-15.2)  L  22  05:08    


 


Plt Count  284 K/mm3 (140-440)   22  05:08    


 


Lymph % (Auto)  7.8 % (13.4-35.0)  L  22  04:49    


 


Mono % (Auto)  10.6 % (0.0-7.3)  H  22  04:49    


 


Eos % (Auto)  0.0 % (0.0-4.3)   22  04:49    


 


Baso % (Auto)  0.0 % (0.0-1.8)   22  04:49    


 


Lymph # (Auto)  1.1 K/mm3 (1.2-5.4)  L  22  04:49    


 


Mono # (Auto)  1.5 K/mm3 (0.0-0.8)  H  22  04:49    


 


Eos # (Auto)  0.0 K/mm3 (0.0-0.4)   22  04:49    


 


Baso # (Auto)  0.0 K/mm3 (0.0-0.1)   22  04:49    


 


Add Manual Diff  Complete   22  23:35    


 


Total Counted  100   22  23:35    


 


Seg Neutrophils %  81.6 % (40.0-70.0)  H  22  04:49    


 


Seg Neuts % (Manual)  52.0 % (40.0-70.0)   22  23:35    


 


Band Neutrophils %  0 %  22  23:35    


 


Lymphocytes % (Manual)  41.0 % (13.4-35.0)  H  22  23:35    


 


Reactive Lymphs % (Man)  0 %  22  23:35    


 


Monocytes % (Manual)  7.0 % (0.0-7.3)   22  23:35    


 


Eosinophils % (Manual)  0 % (0.0-4.3)   22  23:35    


 


Basophils % (Manual)  0 % (0.0-1.8)   22  23:35    


 


Metamyelocytes %  0 %  22  23:35    


 


Myelocytes %  0 %  22  23:35    


 


Promyelocytes %  0 %  22  23:35    


 


Blast Cells %  0 %  22  23:35    


 


Nucleated RBC %  Not Reportable   22  23:35    


 


Seg Neutrophils #  11.6 K/mm3 (1.8-7.7)  H  22  04:49    


 


Seg Neutrophils # Man  7.3 K/mm3 (1.8-7.7)   22  23:35    


 


Band Neutrophils #  0.0 K/mm3  22  23:35    


 


Lymphocytes # (Manual)  5.7 K/mm3 (1.2-5.4)  H  22  23:35    


 


Abs React Lymphs (Man)  0.0 K/mm3  22  23:35    


 


Monocytes # (Manual)  1.0 K/mm3 (0.0-0.8)  H  22  23:35    


 


Eosinophils # (Manual)  0.0 K/mm3 (0.0-0.4)   22  23:35    


 


Basophils # (Manual)  0.0 K/mm3 (0.0-0.1)   22  23:35    


 


Metamyelocytes #  0.0 K/mm3  22  23:35    


 


Myelocytes #  0.0 K/mm3  22  23:35    


 


Promyelocytes #  0.0 K/mm3  22  23:35    


 


Blast Cells #  0.0 K/mm3  22  23:35    


 


WBC Morphology  Not Reportable   22  23:35    


 


Hypersegmented Neuts  Not Reportable   22  23:35    


 


Hyposegmented Neuts  Not Reportable   22  23:35    


 


Hypogranular Neuts  Not Reportable   22  23:35    


 


Smudge Cells  Not Reportable   22  23:35    


 


Toxic Granulation  Not Reportable   22  23:35    


 


Toxic Vacuolation  Not Reportable   22  23:35    


 


Dohle Bodies  Not Reportable   22  23:35    


 


Pelger-Huet Anomaly  Not Reportable   22  23:35    


 


Jamie Rods  Not Reportable   22  23:35    


 


Platelet Estimate  Consistent w auto   22  23:35    


 


Clumped Platelets  Not Reportable   22  23:35    


 


Plt Clumps, EDTA  Not Reportable   22  23:35    


 


Large Platelets  Not Reportable   22  23:35    


 


Giant Platelets  Not Reportable   22  23:35    


 


Platelet Satelliting  Not Reportable   22  23:35    


 


Plt Morphology Comment  Not Reportable   22  23:35    


 


RBC Morphology  Normal   22  23:35    


 


Dimorphic RBCs  Not Reportable   22  23:35    


 


Polychromasia  Not Reportable   22  23:35    


 


Hypochromasia  Not Reportable   22  23:35    


 


Poikilocytosis  Not Reportable   22  23:35    


 


Anisocytosis  Not Reportable   22  23:35    


 


Microcytosis  Not Reportable   22  23:35    


 


Macrocytosis  Not Reportable   22  23:35    


 


Spherocytes  Not Reportable   22  23:35    


 


Pappenheimer Bodies  Not Reportable   22  23:35    


 


Sickle Cells  Not Reportable   22  23:35    


 


Target Cells  Not Reportable   22  23:35    


 


Tear Drop Cells  Not Reportable   22  23:35    


 


Ovalocytes  Not Reportable   22  23:35    


 


Helmet Cells  Not Reportable   22  23:35    


 


M[place]ormick-[place] Bodies  Not Reportable   22  23:35    


 


Cabot Rings  Not Reportable   22  23:35    


 


Worthing Cells  Not Reportable   22  23:35    


 


Bite Cells  Not Reportable   22  23:35    


 


Crenated Cell  Not Reportable   22  23:35    


 


Elliptocytes  Not Reportable   22  23:35    


 


Acanthocytes (Spur)  Not Reportable   22  23:35    


 


Rouleaux  Not Reportable   22  23:35    


 


Hemoglobin C Crystals  Not Reportable   22  23:35    


 


Schistocytes  Not Reportable   22  23:35    


 


Malaria parasites  Not Reportable   22  23:35    


 


Miguel Bodies  Not Reportable   22  23:35    


 


Hem Pathologist Commnt  No   22  23:35    


 


PT  13.4 Sec. (12.2-14.9)   22  13:30    


 


INR  0.92  (0.87-1.13)   22  13:30    


 


APTT  25.8 Sec. (24.2-36.6)   22  13:30    


 


Sodium  138 mmol/L (137-145)   22  05:08    


 


Potassium  3.8 mmol/L (3.6-5.0)   22  05:08    


 


Chloride  101.6 mmol/L ()   22  05:08    


 


Carbon Dioxide  26 mmol/L (22-30)   22  05:08    


 


Anion Gap  14 mmol/L  22  05:08    


 


BUN  12 mg/dL (9-20)   22  05:08    


 


Creatinine  0.5 mg/dL (0.8-1.3)  L  22  05:08    


 


Estimated GFR  > 60 ml/min  22  05:08    


 


BUN/Creatinine Ratio  24 %  22  05:08    


 


Glucose  98 mg/dL ()   22  05:08    


 


Lactic Acid  1.40 mmol/L (0.7-2.0)   22  13:30    


 


Calcium  8.0 mg/dL (8.4-10.2)  L  22  05:08    


 


Phosphorus  2.80 mg/dL (2.5-4.5)   22  05:17    


 


Magnesium  2.20 mg/dL (1.7-2.3)   22  05:17    


 


Total Bilirubin  0.30 mg/dL (0.1-1.2)   22  05:08    


 


Direct Bilirubin  < 0.2 mg/dL (0-0.2)   22  22:47    


 


AST  177 units/L (5-40)  H  22  05:08    


 


ALT  321 units/L (7-56)  H  22  05:08    


 


Alkaline Phosphatase  68 units/L ()   22  05:08    


 


Ammonia  37.0 umol/L (25-60)   22  04:49    


 


Total Creatine Kinase  8066 units/L ()  H  22  05:08    


 


Total Protein  4.8 g/dL (6.3-8.2)  L  22  05:08    


 


Albumin  2.9 g/dL (3.9-5)  L  22  05:08    


 


Albumin/Globulin Ratio  1.5 %  22  05:08    


 


Amylase  25 units/L ()  L  22  04:49    


 


Lipase  10 units/L (13-60)  L  22  04:49    


 


Coronavirus (PCR)  Negative  (Negative)   22  11:40    


 


Hep Bs Antigen  Non-reactive  (Negative)   22  13:30    


 


Hep B Core IgM Ab  Non-reactive  (NonReactive)   22  13:30    


 


Hepatitis C Antibody  Non-reactive  (NonReactive)   22  13:30    


 


HIV 1&2 Antibody Rapid  Non react  (Non React)   22  16:42    


 


HIV P24 Antigen  Non react  (Non React)   22  16:42    











Microbiology: 


Microbiology





22 22:47   Peripheral/Venous   Blood Culture - Final


                            NO GROWTH AFTER 5 DAYS


22 22:47   Peripheral/Venous   Blood Culture - Final


                            NO GROWTH AFTER 5 DAYS








Mcwilliams/IV: 


                                        





Voiding Method                   Urinal











Active Medications





- Current Medications


Current Medications: 














Generic Name Dose Route Start Last Admin





  Trade Name Freq  PRN Reason Stop Dose Admin


 


Albuterol  2.5 mg  22 06:15 





  Albuterol 2.5 Mg/3 Ml Nebu  IH  





  Q3HRT PRN  





  Shortness Of Breath  


 


Albuterol/Ipratropium  1 ampul  22 08:00  22 09:57





  Ipratropium/Albuterol Sulfate 3 Ml Ampul.Neb  IH   1 ampul





  BIDRT CHERYL   Administration


 


Diphenhydramine HCl  25 mg  22 06:24  22 18:49





  Diphenhydramine 50 Mg/Ml Vial  IV   25 mg





  Q6H PRN   Administration





  Skin Irritation  


 


Docusate Sodium  100 mg  22 22:00  22 09:27





  Docusate Sodium 100 Mg Cap  PO   100 mg





  BID CHERYL   Administration


 


Famotidine  20 mg  22 10:00  22 09:27





  Famotidine 20 Mg Tab  PO   20 mg





  BID CHERYL   Administration


 


Gabapentin  100 mg  22 22:30  22 05:44





  Gabapentin 100 Mg Cap  PO  22 22:29  100 mg





  Q8HR CHERYL   Administration


 


Gabapentin  100 mg  22 10:00 





  Gabapentin 100 Mg Cap  PO  22 09:59 





  BID CHERYL  


 


Gabapentin  300 mg  22 18:00 





  Gabapentin 300 Mg Cap  PO  22 17:59 





  QPM CHERYL  


 


Heparin Sodium (Porcine)  5,000 unit  22 10:00  22 09:27





  Heparin 5,000 Unit/1 Ml Vial  SUB-Q   5,000 unit





  Q12HR CHERYL   Administration


 


Hydralazine HCl  10 mg  22 18:31  22 18:50





  Hydralazine 20 Mg/1 Ml Inj  IV   10 mg





  Q3HR PRN   Administration





  Hypertension  


 


Hydromorphone HCl  0.5 mg  22 06:15  22 03:13





  Hydromorphone 1 Mg/1 Ml Inj  IV   0.5 mg





  Q3H PRN   Administration





  Pain , Severe (7-10)  


 


Sodium Chloride  1,000 mls @ 200 mls/hr  22 18:30  22 03:37





  Nacl 0.9% 1000 Ml  IV   200 mls/hr





  AS DIRECT CHERYL   Administration


 


Morphine Sulfate  2 mg  22 06:15  22 10:33





  Morphine 2 Mg/1 Ml Inj  IV   2 mg





  Q4H PRN   Administration





  Pain, Moderate (4-6)  


 


Ondansetron HCl  4 mg  22 06:15 





  Ondansetron 4 Mg/2 Ml Inj  IV  





  Q8H PRN  





  Nausea And Vomiting  


 


Sodium Chloride  10 ml  22 10:00  22 09:28





  Sodium Chloride 0.9% 10 Ml Flush Syringe  IV   10 ml





  BID CHERYL   Administration


 


Sodium Chloride  10 ml  22 06:15 





  Sodium Chloride 0.9% 10 Ml Flush Syringe  IV  





  PRN PRN  





  LINE FLUSH  


 


Valsartan  160 mg  22 22:00  22 09:27





  Valsartan 160mg Tab  PO   160 mg





  DAILY CHERYL   Administration

## 2022-05-09 NOTE — PROGRESS NOTE
Assessment and Plan


Left lower extremity compartment syndrome status post fasciotomies.  Cause is 

still unclear and may represent some allergic reaction.





Motor function is unchanged from original exam.  Sensory function has improved 

with full sensation of the foot.





We will see about placing wound vacs on the leg tomorrow.  N.p.o. after midnight

except sips of water with meds.





Discussed with patient and friends at bedside that this may take 3 to 6 months 

to completely heal.  They understand.











Subjective


Date of service: 05/09/22


Principal diagnosis: abnormal liver enzymes


Interval history: 


Minimal discomfort of the left leg.  Left foot has some areas of duskiness which

continue to improving.  Strongly palpable left dorsalis pedis and posterior 

tibial pulse.  The patient has sensation of the foot.  No motor function of the 

foot.  Motor is unchanged from the original exam.  Bandage over fasciotomies.





Objective





- Constitutional


Vitals: 


                               Vital Signs - 12hr











  05/09/22 05/09/22 05/09/22





  04:35 09:27 09:57


 


Temperature 98.3 F  


 


Pulse Rate 76 76 


 


Pulse Rate [   89





Anterior   





Bilateral   





Throughout]   


 


Respiratory 16  





Rate   


 


Respiratory   16





Rate [Anterior   





Bilateral   





Throughout]   


 


Blood Pressure 141/100 140/86 


 


O2 Sat by Pulse 96  





Oximetry   














  05/09/22





  10:00


 


Temperature 


 


Pulse Rate 


 


Pulse Rate [ 





Anterior 





Bilateral 





Throughout] 


 


Respiratory 





Rate 


 


Respiratory 





Rate [Anterior 





Bilateral 





Throughout] 


 


Blood Pressure 


 


O2 Sat by Pulse 96





Oximetry 











General appearance: Present: no acute distress





- EENT


Eyes: EOM intact


ENT: hearing intact





- Respiratory


Respiratory effort: normal


Extremities: abnormal (left leg fasciotomies with bandages)





- Gastrointestinal


General gastrointestinal: Present: soft, non-tender





- Psychiatric


Psychiatric: appropriate mood/affect, cooperative





- Labs


CBC & Chem 7: 


                                 05/09/22 05:08





                                 05/09/22 05:08


Labs: 


                              Abnormal lab results











  05/09/22 05/09/22 Range/Units





  05:08 05:08 


 


WBC  18.3 H   (4.5-11.0)  K/mm3


 


MCV  99 H   (84-94)  fl


 


MCH  33 H   (28-32)  pg


 


RDW  13.0 L   (13.2-15.2)  %


 


Creatinine   0.5 L  (0.8-1.3)  mg/dL


 


Calcium   8.0 L  (8.4-10.2)  mg/dL


 


AST   177 H  (5-40)  units/L


 


ALT   321 H  (7-56)  units/L


 


Total Creatine Kinase   8066 H  ()  units/L


 


Total Protein   4.8 L  (6.3-8.2)  g/dL


 


Albumin   2.9 L  (3.9-5)  g/dL














Medications & Allergies





- Medications


Allergies/Adverse Reactions: 


                                    Allergies





ceftriaxone [From Rocephin] Allergy (Severe, Verified 05/05/22 10:23)


   Unknown


   SEVERE RASH MOTTLING OF THE SKIN POST ADMINISTRATION OF IM ROCEPHIN PTA 


Penicillins Allergy (Severe, Verified 05/05/22 10:23)


   Unknown


   SEVERE RASH MOTTLING OF THE SKIN POST ADMINISTRATION OF IM ROCEPHIN PTA 








Home Medications: 


                                Home Medications











 Medication  Instructions  Recorded  Confirmed  Last Taken  Type


 


No Known Home Medications [No  05/03/22 05/03/22 Unknown History





Reported Home Medications]     











Active Medications: 














Generic Name Dose Route Start Last Admin





  Trade Name Freq  PRN Reason Stop Dose Admin


 


Albuterol  2.5 mg  05/03/22 06:15 





  Albuterol 2.5 Mg/3 Ml Nebu  IH  





  Q3HRT PRN  





  Shortness Of Breath  


 


Albuterol/Ipratropium  1 ampul  05/04/22 08:00  05/09/22 09:57





  Ipratropium/Albuterol Sulfate 3 Ml Ampul.Neb  IH   1 ampul





  BIDRT CHERYL   Administration


 


Diphenhydramine HCl  25 mg  05/03/22 06:24  05/08/22 18:49





  Diphenhydramine 50 Mg/Ml Vial  IV   25 mg





  Q6H PRN   Administration





  Skin Irritation  


 


Docusate Sodium  100 mg  05/05/22 22:00  05/09/22 09:27





  Docusate Sodium 100 Mg Cap  PO   100 mg





  BID CHERYL   Administration


 


Famotidine  20 mg  05/08/22 10:00  05/09/22 09:27





  Famotidine 20 Mg Tab  PO   20 mg





  BID CHERYL   Administration


 


Gabapentin  100 mg  05/05/22 22:30  05/09/22 05:44





  Gabapentin 100 Mg Cap  PO  05/12/22 22:29  100 mg





  Q8HR CHERYL   Administration


 


Gabapentin  100 mg  05/13/22 10:00 





  Gabapentin 100 Mg Cap  PO  05/18/22 09:59 





  BID CHERYL  


 


Gabapentin  300 mg  05/13/22 18:00 





  Gabapentin 300 Mg Cap  PO  05/18/22 17:59 





  QPM CHERYL  


 


Heparin Sodium (Porcine)  5,000 unit  05/03/22 10:00  05/09/22 09:27





  Heparin 5,000 Unit/1 Ml Vial  SUB-Q   5,000 unit





  Q12HR CHERYL   Administration


 


Hydralazine HCl  10 mg  05/04/22 18:31  05/04/22 18:50





  Hydralazine 20 Mg/1 Ml Inj  IV   10 mg





  Q3HR PRN   Administration





  Hypertension  


 


Hydromorphone HCl  0.5 mg  05/03/22 06:15  05/05/22 03:13





  Hydromorphone 1 Mg/1 Ml Inj  IV   0.5 mg





  Q3H PRN   Administration





  Pain , Severe (7-10)  


 


Sodium Chloride  1,000 mls @ 200 mls/hr  05/04/22 18:30  05/09/22 03:37





  Nacl 0.9% 1000 Ml  IV   200 mls/hr





  AS DIRECT CHERYL   Administration


 


Morphine Sulfate  2 mg  05/03/22 06:15  05/06/22 10:33





  Morphine 2 Mg/1 Ml Inj  IV   2 mg





  Q4H PRN   Administration





  Pain, Moderate (4-6)  


 


Ondansetron HCl  4 mg  05/03/22 06:15 





  Ondansetron 4 Mg/2 Ml Inj  IV  





  Q8H PRN  





  Nausea And Vomiting  


 


Sodium Chloride  10 ml  05/03/22 10:00  05/09/22 09:28





  Sodium Chloride 0.9% 10 Ml Flush Syringe  IV   10 ml





  BID CHERYL   Administration


 


Sodium Chloride  10 ml  05/03/22 06:15 





  Sodium Chloride 0.9% 10 Ml Flush Syringe  IV  





  PRN PRN  





  LINE FLUSH  


 


Valsartan  160 mg  05/04/22 22:00  05/09/22 09:27





  Valsartan 160mg Tab  PO   160 mg





  DAILY CHERYL   Administration

## 2022-05-10 LAB
ALBUMIN SERPL-MCNC: 2.9 G/DL (ref 3.9–5)
ALT SERPL-CCNC: 247 UNITS/L (ref 7–56)
BUN SERPL-MCNC: 9 MG/DL (ref 9–20)
BUN/CREAT SERPL: 13 %
CALCIUM SERPL-MCNC: 8 MG/DL (ref 8.4–10.2)
HCT VFR BLD CALC: 38.6 % (ref 35.5–45.6)
HEMOLYSIS INDEX: 5
HGB BLD-MCNC: 13.3 GM/DL (ref 11.8–15.2)
MCHC RBC AUTO-ENTMCNC: 34 % (ref 32–34)
MCV RBC AUTO: 97 FL (ref 84–94)
PLATELET # BLD: 342 K/MM3 (ref 140–440)
RBC # BLD AUTO: 3.97 M/MM3 (ref 3.65–5.03)

## 2022-05-10 PROCEDURE — 0KNR0ZZ RELEASE LEFT UPPER LEG MUSCLE, OPEN APPROACH: ICD-10-PCS | Performed by: SURGERY

## 2022-05-10 RX ADMIN — GABAPENTIN SCH MG: 100 CAPSULE ORAL at 05:31

## 2022-05-10 RX ADMIN — HEPARIN SODIUM SCH UNIT: 5000 INJECTION, SOLUTION INTRAVENOUS; SUBCUTANEOUS at 21:44

## 2022-05-10 RX ADMIN — MORPHINE SULFATE PRN MG: 2 INJECTION, SOLUTION INTRAMUSCULAR; INTRAVENOUS at 07:41

## 2022-05-10 RX ADMIN — SODIUM CHLORIDE SCH MLS/HR: 0.9 INJECTION, SOLUTION INTRAVENOUS at 05:32

## 2022-05-10 RX ADMIN — FAMOTIDINE SCH MG: 20 TABLET ORAL at 21:44

## 2022-05-10 RX ADMIN — VALSARTAN SCH MG: 160 TABLET, FILM COATED ORAL at 07:47

## 2022-05-10 RX ADMIN — MORPHINE SULFATE PRN MG: 2 INJECTION, SOLUTION INTRAMUSCULAR; INTRAVENOUS at 13:47

## 2022-05-10 RX ADMIN — SODIUM CHLORIDE SCH MLS/HR: 0.9 INJECTION, SOLUTION INTRAVENOUS at 13:47

## 2022-05-10 RX ADMIN — DOCUSATE SODIUM SCH: 100 CAPSULE, LIQUID FILLED ORAL at 10:24

## 2022-05-10 RX ADMIN — Medication SCH ML: at 21:44

## 2022-05-10 RX ADMIN — HEPARIN SODIUM SCH: 5000 INJECTION, SOLUTION INTRAVENOUS; SUBCUTANEOUS at 10:25

## 2022-05-10 RX ADMIN — FAMOTIDINE SCH MG: 20 TABLET ORAL at 10:31

## 2022-05-10 RX ADMIN — HYDROMORPHONE HYDROCHLORIDE PRN MG: 1 INJECTION, SOLUTION INTRAMUSCULAR; INTRAVENOUS; SUBCUTANEOUS at 03:05

## 2022-05-10 RX ADMIN — SODIUM CHLORIDE SCH MLS/HR: 0.9 INJECTION, SOLUTION INTRAVENOUS at 17:54

## 2022-05-10 RX ADMIN — GABAPENTIN SCH MG: 100 CAPSULE ORAL at 21:44

## 2022-05-10 RX ADMIN — IPRATROPIUM BROMIDE AND ALBUTEROL SULFATE SCH: .5; 3 SOLUTION RESPIRATORY (INHALATION) at 20:02

## 2022-05-10 RX ADMIN — VALSARTAN SCH: 160 TABLET, FILM COATED ORAL at 17:22

## 2022-05-10 RX ADMIN — Medication SCH ML: at 10:32

## 2022-05-10 RX ADMIN — SODIUM CHLORIDE SCH MLS/HR: 0.9 INJECTION, SOLUTION INTRAVENOUS at 23:35

## 2022-05-10 RX ADMIN — DOCUSATE SODIUM SCH MG: 100 CAPSULE, LIQUID FILLED ORAL at 21:44

## 2022-05-10 RX ADMIN — IPRATROPIUM BROMIDE AND ALBUTEROL SULFATE SCH AMPUL: .5; 3 SOLUTION RESPIRATORY (INHALATION) at 08:34

## 2022-05-10 RX ADMIN — MORPHINE SULFATE PRN MG: 2 INJECTION, SOLUTION INTRAMUSCULAR; INTRAVENOUS at 23:31

## 2022-05-10 RX ADMIN — GABAPENTIN SCH MG: 100 CAPSULE ORAL at 13:47

## 2022-05-10 NOTE — OPERATIVE REPORT
Operative Report


Operative Report: 





Date of Procedure: 5/10/2022





Pre-operative Diagnosis: Left Leg 4 Compartment Fasciotomy





Post-operative Diagnosis: Same





Procedure(s):


1.  Excisional Debridement of Muscle and Soft Tissue from Left Leg Wounds


2.  Wound VAC Placement Left Leg Fasciotomy Incisions (Medial Wound Measures 28 

x 8 x 1 cm) (Lateral Wound Measures 30 x 9 x 1 cm)





Surgeon: Vikas Laboy M.D. 





Assistant: None





Anesthesia: MAC





EBL: Minimal





Counts: Correct





Complications: None





Condition: Stable





Findings: Moderate amount of superficial necrotic muscle in the anterior lateral

compartments with minimal amount of superficial necrotic muscle in the posterior

compartment.





Specimen: Left leg muscle and soft tissue was sent to pathology and for 

cultures.





Indication:





The patient is a 36-year-old transgender female who presented after an injection

of a substance in her left thigh that resulted in left leg compartment syndrome.

 She underwent a 4 compartment fasciotomy and has significant edema of the 

muscle preventing closure of the skin.  She is in need of a washout with wound 

VAC placement.  She was given the risk, benefits, and alternative procedures and

consented to the procedure.





Description of Procedure:





The patient was brought to the operating room and laid in supine position.  

After she was adequately sedated her left leg was prepped and draped in normal 

sterile fashion.  Evaluation of the muscle revealed several areas of necrotic 

muscle in the posterior compartment.  This was sharply debrided with curved 

Mayos and found to be very superficial.  Once the muscle was debrided cautery 

was used to achieve hemostasis.  Evaluation of the anterior lateral compartment 

revealed a moderate amount of necrotic muscle which was debrided sharply with 

curved Mayos and found to be fairly superficial.  Cautery was used to achieve 

hemostasis in these compartments as well.  Of note none of the muscle and in the

other compartments reacted to stimulation with cautery.  The wounds were 

copiously irrigated and then a wound VAC was placed with an adequate seal.  A 

Kerlix roll was then placed around the wounds.  The patient tolerated the 

procedure well.  All sponge, needle, and instrument counts were correct.  The 

patient was taken to the recovery area in stable condition.

## 2022-05-10 NOTE — PROGRESS NOTE
Subjective


Principal diagnosis: abnormal liver enzymes


Interval history: 


Assessment and plan


#Severe rhabdomyolysis: from renal standpoint patient creatinine is currently 

0.5 electrolytes stable,





#Mild hypocalcemia consider supplementation, 500 mg once a day,


Check ionized calcium





#Rhabdomyolysis appears to be improving gradually current CPK is 8066 on May 6 

it was 33,219, peaked at more than 90,000


Resulting from left leg compartment syndrome


Continue with hydration





Patient will need to make an appointment follow-up in the office, contact 

information was provided


If there are any further renal questions please feel free to reach us


We'll continue to follow and make recommendation for renal standpoint.


______________________________________________________________________________


Progress note by: 


Montrell Milner MD


[address] [place] [id number] 


Tele 


[facility]








Patient was seen today for follow-up of multiple renal related issues


No complaints of any chest pain pressure or shortness of breath


Interdisciplinary notes that also reviewed


Events of 24 hours vitals labs intake output medications were reviewed








Past medical history: Reviewed


Family history: Reviewed


Social history: Reviewed


Allergies: Reviewed








Physical examination:


Vitals: Reviewed


HEENT: No pallor or icterus oral mucosa moist 


Neck: Supple no JVD no thyromegaly


Chest: Bilateral clear to auscultation anteriorly


Heart: Regular rate and rhythm S1-S2 heard no S3-S4


Abdomen: Soft nontender no voluntary guarding rigidity rebound


Extremity: Dry skin less than 1+ peripheral edema


Psychiatric: No evidence of agitation and aggression noted


Dermatology: No petechial rashes





Labs and x-rays: Reviewed from today























Objective





- Vital Signs


Vital signs: 


                               Vital Signs - 12hr











  05/09/22 05/09/22 05/09/22





  21:41 21:45 22:00


 


Temperature   


 


Pulse Rate   


 


Pulse Rate [ 92 H  





Anterior   





Bilateral   





Throughout]   


 


Respiratory   





Rate   


 


Respiratory 19  





Rate [Anterior   





Bilateral   





Throughout]   


 


Blood Pressure   


 


O2 Sat by Pulse  97 96





Oximetry   














  05/10/22 05/10/22 05/10/22





  00:05 06:37 07:47


 


Temperature 98.6 F 98.0 F 


 


Pulse Rate 93 H 83 


 


Pulse Rate [   





Anterior   





Bilateral   





Throughout]   


 


Respiratory 20 18 





Rate   


 


Respiratory   





Rate [Anterior   





Bilateral   





Throughout]   


 


Blood Pressure 127/86 153/100 150/100


 


O2 Sat by Pulse 95 99 





Oximetry   














  05/10/22





  07:49


 


Temperature 


 


Pulse Rate 


 


Pulse Rate [ 





Anterior 





Bilateral 





Throughout] 


 


Respiratory 





Rate 


 


Respiratory 





Rate [Anterior 





Bilateral 





Throughout] 


 


Blood Pressure 


 


O2 Sat by Pulse 97





Oximetry 














- Lab





                                 05/09/22 05:08





                                 05/09/22 05:08


                             Most recent lab results











Calcium  8.0 mg/dL (8.4-10.2)  L  05/09/22  05:08    


 


Phosphorus  2.80 mg/dL (2.5-4.5)   05/06/22  05:17    


 


Magnesium  2.20 mg/dL (1.7-2.3)   05/06/22  05:17    














Medications & Allergies





- Medications


Allergies/Adverse Reactions: 


                                    Allergies





ceftriaxone [From Rocephin] Allergy (Severe, Verified 05/05/22 10:23)


   Unknown


   SEVERE RASH MOTTLING OF THE SKIN POST ADMINISTRATION OF IM ROCEPHIN PTA 


Penicillins Allergy (Severe, Verified 05/05/22 10:23)


   Unknown


   SEVERE RASH MOTTLING OF THE SKIN POST ADMINISTRATION OF IM ROCEPHIN PTA 








Home Medications: 


                                Home Medications











 Medication  Instructions  Recorded  Confirmed  Last Taken  Type


 


No Known Home Medications [No  05/03/22 05/03/22 Unknown History





Reported Home Medications]     











Active Medications: 














Generic Name Dose Route Start Last Admin





  Trade Name Freq  PRN Reason Stop Dose Admin


 


Albuterol  2.5 mg  05/03/22 06:15 





  Albuterol 2.5 Mg/3 Ml Nebu  IH  





  Q3HRT PRN  





  Shortness Of Breath  


 


Albuterol/Ipratropium  1 ampul  05/04/22 08:00  05/09/22 21:41





  Ipratropium/Albuterol Sulfate 3 Ml Ampul.Neb  IH   1 ampul





  BIDRT CHERYL   Administration


 


Diphenhydramine HCl  25 mg  05/03/22 06:24  05/08/22 18:49





  Diphenhydramine 50 Mg/Ml Vial  IV   25 mg





  Q6H PRN   Administration





  Skin Irritation  


 


Docusate Sodium  100 mg  05/05/22 22:00  05/09/22 22:23





  Docusate Sodium 100 Mg Cap  PO   100 mg





  BID CHERYL   Administration


 


Famotidine  20 mg  05/08/22 10:00  05/09/22 22:23





  Famotidine 20 Mg Tab  PO   20 mg





  BID CHERYL   Administration


 


Gabapentin  100 mg  05/05/22 22:30  05/10/22 05:31





  Gabapentin 100 Mg Cap  PO  05/12/22 22:29  100 mg





  Q8HR CHERYL   Administration


 


Gabapentin  100 mg  05/13/22 10:00 





  Gabapentin 100 Mg Cap  PO  05/18/22 09:59 





  BID CHERYL  


 


Gabapentin  300 mg  05/13/22 18:00 





  Gabapentin 300 Mg Cap  PO  05/18/22 17:59 





  QPM CHERYL  


 


Heparin Sodium (Porcine)  5,000 unit  05/03/22 10:00  05/09/22 22:23





  Heparin 5,000 Unit/1 Ml Vial  SUB-Q   5,000 unit





  Q12HR CHERYL   Administration


 


Hydralazine HCl  10 mg  05/04/22 18:31  05/04/22 18:50





  Hydralazine 20 Mg/1 Ml Inj  IV   10 mg





  Q3HR PRN   Administration





  Hypertension  


 


Hydromorphone HCl  0.5 mg  05/03/22 06:15  05/10/22 03:05





  Hydromorphone 1 Mg/1 Ml Inj  IV   0.5 mg





  Q3H PRN   Administration





  Pain , Severe (7-10)  


 


Sodium Chloride  1,000 mls @ 200 mls/hr  05/04/22 18:30  05/10/22 05:32





  Nacl 0.9% 1000 Ml  IV   200 mls/hr





  AS DIRECT CHERYL   Administration


 


Morphine Sulfate  2 mg  05/03/22 06:15  05/10/22 07:41





  Morphine 2 Mg/1 Ml Inj  IV   2 mg





  Q4H PRN   Administration





  Pain, Moderate (4-6)  


 


Ondansetron HCl  4 mg  05/03/22 06:15 





  Ondansetron 4 Mg/2 Ml Inj  IV  





  Q8H PRN  





  Nausea And Vomiting  


 


Sodium Chloride  10 ml  05/03/22 10:00  05/09/22 22:23





  Sodium Chloride 0.9% 10 Ml Flush Syringe  IV   10 ml





  BID CHERYL   Administration


 


Sodium Chloride  10 ml  05/03/22 06:15 





  Sodium Chloride 0.9% 10 Ml Flush Syringe  IV  





  PRN PRN  





  LINE FLUSH  


 


Valsartan  160 mg  05/04/22 22:00  05/10/22 07:47





  Valsartan 160mg Tab  PO   160 mg





  DAILY CHERYL   Administration

## 2022-05-10 NOTE — ANESTHESIA DAY OF SURGERY
Anesthesia Day of Surgery





- Day of Surgery


Patient Examined: Yes


Patient H&P Reviewed: Yes


Patient is NPO: Yes
Anesthesia Day of Surgery





- Day of Surgery


Patient Examined: Yes


Patient H&P Reviewed: Yes


Patient is NPO: Yes
- - -

## 2022-05-10 NOTE — PROGRESS NOTE
Assessment and Plan


Assessment and plan: 


This is a 36 year old  transgendered female undergoing gender transformation 

with silicone injection to buttocks ([place] ) and breast implants, heavy 

drinker on the weekends (bottle of vodka) who presented to [facility] on 5/3 because 

of adverse drug reaction after reportedly self administration of  

rocephin and PCN for sore throat. Patient reportedly collapsed while checking in

to the emergency department, has complains of severe 10 out of 10 pain in left 

thigh and has noticeable mottling of skin in thigh and left lower abdomen.  

Patient underwent a CT of the left leg which showed possible cellulitis with no 

drainable fluid collection and was started on empiric antibiotics.  Patient 

admitted to the hospital service with diagnosis of acute liver failure, 

rhabdomyolysis, s/p 4 compartment fasciotomy to left lower extremity





Acute liver failure 


-GI consulted, appreciate recommendations


-Per GI: Suspect due to rhabdomyolysis given elevated creatinine kinase levels


   -If ALT continues to rise recommend MRCP


-Abdominal ultrasound showed mild diffuse biliary dilation, obstruction to 

lesion of the distal common bile duct cannot be excluded consider MRCP, no 

evidence of gallstones within the gallbladder


-PPI


-Regular diet


-BR: colace


-Trend LFTs





Compartment syndrome to LLE


-Vascular surgery consulted, appreciate recommendations


-s/p self admin of IM abx to midthigh


-c/o pain, decreased sensation, pulses were dopplarable, paralysis, mottled skin


-s/p 4 compartment tracheotomy on 5/3 with vascular surgery


-Bilateral lower extremity Doppler ultrasound showed no DVT


-Left lower extremity CT with contrast showed appearance of posterior soft 

tissue complex cellulitis with small subcutaneous lymph nodes, no drainable 

fluid collections present, contusion could have similar appearance


-Bilateral lower extremity arterial duplex showed no significant lower extremity

peripheral artery disease


-Normal ABIs





Peripheral neuropathy


-Avoid delirium


-Reorientation as needed


-Maintain sleep-wake cycle


-As needed analgesia


-Neurology consulted, appreciate recommendations


-Gabapentin 





HTN


-Blood pressure monitoring per protocol


-Valsartan


-PRN hydralazine





Rhabdomyolysis





acute kidney injury with vasomotor nephropathy now resolved


-Nephrology consulted, appreciate recommendations


-Strict intake and output


-Renally dose medications


-Avoid nephrotoxic medications


-MIVF


-Sodium bicarb


-Trend BMP, CK








Hospital course to date:





5/3: Venous ultrasound shows no DVT, Vascular surgery consulted who performed a 

fasciotomy and plan to transfer patient to [facility] post intervention.  Neurology 

consulted





: Transfer to ICU, GI and neurology consulted.





: Patient will be transferred back to the floor from ICU.  Her liver enzymes 

and creatinine are trending down.  Will be started on gabapentin per neurology 

recommendations and antibiotics discontinued.





: Continue supportive care.  CPK slowly improving.  Vascular plans for 

closure of fasciotomy early next week.  PT OT evaluation and treatment closure 

done.  Continue wound management.  No evidence of withdrawal noted at this time.

 Continue to monitor LFTs which are also slightly improving.  Plan discussed in 

detail with the patient who verbalized understanding





: Patient seen and examined clinically stable continues to show some 

improvement.  Closure of fasciotomy planned for tomorrow.  Renal function is 

improving.  CK ordered and pending





: Patient seen and examined, wbc mildly elevated likely secondary to 

steroids, will change to po for two additional days and stop, benefit probably 

already achieved, continue wound dressing, sensory function improved, awaiting 

wound vac and closure. Patient advised on clinically progress. 





: Patient seen and examined awaiting her vascular surgery for closure of 

fasciotomy.  CK is decreasing less than 9000 today.  We will continue aggressive

IV fluids until we have the resolution below 5000.  LFTs on the downward trend 

also.  Leukocytosis appears to have peaked steroids was changed to oral for 2 

additional days today is 1 of 2..  Plan of care discussed in detail with the 

patient who verbalized understanding.





5/10:  Left lower extremity compartment syndrome status post fasciotomies.  Cau

se is still unclear and may represent some allergic reaction.  Motor function is

unchanged from original exam.  Sensory function has improved with full sensation

of the foot.vascular surgery to place wound vacs on the leg today.  CK improved 

to the 8000 range today





History


Interval history: 





No new issues overnight





Hospitalist Physical





- Constitutional


Vitals: 


                                        











Temp Pulse Resp BP Pulse Ox


 


 98.0 F   88   16   150/100   100 


 


 05/10/22 06:37  05/10/22 08:38  05/10/22 08:38  05/10/22 07:47  05/10/22 08:36











General appearance: Present: no acute distress





- EENT


Eyes: Present: PERRL, EOM intact


ENT: hearing intact, clear oral mucosa, dentition normal





- Neck


Neck: Present: supple, normal ROM





- Respiratory


Respiratory effort: normal


Respiratory: bilateral: CTA





- Cardiovascular


Rhythm: regular


Heart Sounds: Present: S1 & S2.  Absent: gallop, rub





- Extremities


Extremities: no ischemia, No edema, Full ROM





- Abdominal


General gastrointestinal: soft, non-tender, non-distended, normal bowel sounds





- Integumentary


Integumentary: Present: clear, warm, dry





- Neurologic


Neurologic: CNII-XII intact, moves all extremities





Results





- Labs


CBC & Chem 7: 


                                 22 05:08





                                 22 05:08


Labs: 


                             Laboratory Last Values











WBC  18.3 K/mm3 (4.5-11.0)  H  22  05:08    


 


RBC  3.78 M/mm3 (3.65-5.03)   22  05:08    


 


Hgb  12.4 gm/dl (11.8-15.2)   22  05:08    


 


Hct  37.5 % (35.5-45.6)   22  05:08    


 


MCV  99 fl (84-94)  H  22  05:08    


 


MCH  33 pg (28-32)  H  22  05:08    


 


MCHC  33 % (32-34)   22  05:08    


 


RDW  13.0 % (13.2-15.2)  L  22  05:08    


 


Plt Count  284 K/mm3 (140-440)   22  05:08    


 


Lymph % (Auto)  7.8 % (13.4-35.0)  L  22  04:49    


 


Mono % (Auto)  10.6 % (0.0-7.3)  H  22  04:49    


 


Eos % (Auto)  0.0 % (0.0-4.3)   22  04:49    


 


Baso % (Auto)  0.0 % (0.0-1.8)   22  04:49    


 


Lymph # (Auto)  1.1 K/mm3 (1.2-5.4)  L  22  04:49    


 


Mono # (Auto)  1.5 K/mm3 (0.0-0.8)  H  22  04:49    


 


Eos # (Auto)  0.0 K/mm3 (0.0-0.4)   22  04:49    


 


Baso # (Auto)  0.0 K/mm3 (0.0-0.1)   22  04:49    


 


Add Manual Diff  Complete   22  23:35    


 


Total Counted  100   22  23:35    


 


Seg Neutrophils %  81.6 % (40.0-70.0)  H  22  04:49    


 


Seg Neuts % (Manual)  52.0 % (40.0-70.0)   22  23:35    


 


Band Neutrophils %  0 %  22  23:35    


 


Lymphocytes % (Manual)  41.0 % (13.4-35.0)  H  22  23:35    


 


Reactive Lymphs % (Man)  0 %  22  23:35    


 


Monocytes % (Manual)  7.0 % (0.0-7.3)   22  23:35    


 


Eosinophils % (Manual)  0 % (0.0-4.3)   22  23:35    


 


Basophils % (Manual)  0 % (0.0-1.8)   22  23:35    


 


Metamyelocytes %  0 %  22  23:35    


 


Myelocytes %  0 %  22  23:35    


 


Promyelocytes %  0 %  22  23:35    


 


Blast Cells %  0 %  22  23:35    


 


Nucleated RBC %  Not Reportable   22  23:35    


 


Seg Neutrophils #  11.6 K/mm3 (1.8-7.7)  H  22  04:49    


 


Seg Neutrophils # Man  7.3 K/mm3 (1.8-7.7)   22  23:35    


 


Band Neutrophils #  0.0 K/mm3  22  23:35    


 


Lymphocytes # (Manual)  5.7 K/mm3 (1.2-5.4)  H  22  23:35    


 


Abs React Lymphs (Man)  0.0 K/mm3  22  23:35    


 


Monocytes # (Manual)  1.0 K/mm3 (0.0-0.8)  H  22  23:35    


 


Eosinophils # (Manual)  0.0 K/mm3 (0.0-0.4)   22  23:35    


 


Basophils # (Manual)  0.0 K/mm3 (0.0-0.1)   22  23:35    


 


Metamyelocytes #  0.0 K/mm3  05/02/22  23:35    


 


Myelocytes #  0.0 K/mm3  22  23:35    


 


Promyelocytes #  0.0 K/mm3  22  23:35    


 


Blast Cells #  0.0 K/mm3  22  23:35    


 


WBC Morphology  Not Reportable   22  23:35    


 


Hypersegmented Neuts  Not Reportable   22  23:35    


 


Hyposegmented Neuts  Not Reportable   22  23:35    


 


Hypogranular Neuts  Not Reportable   22  23:35    


 


Smudge Cells  Not Reportable   22  23:35    


 


Toxic Granulation  Not Reportable   22  23:35    


 


Toxic Vacuolation  Not Reportable   22  23:35    


 


Dohle Bodies  Not Reportable   22  23:35    


 


Pelger-Huet Anomaly  Not Reportable   22  23:35    


 


Jamie Rods  Not Reportable   22  23:35    


 


Platelet Estimate  Consistent w auto   22  23:35    


 


Clumped Platelets  Not Reportable   22  23:35    


 


Plt Clumps, EDTA  Not Reportable   22  23:35    


 


Large Platelets  Not Reportable   22  23:35    


 


Giant Platelets  Not Reportable   22  23:35    


 


Platelet Satelliting  Not Reportable   22  23:35    


 


Plt Morphology Comment  Not Reportable   22  23:35    


 


RBC Morphology  Normal   22  23:35    


 


Dimorphic RBCs  Not Reportable   22  23:35    


 


Polychromasia  Not Reportable   22  23:35    


 


Hypochromasia  Not Reportable   22  23:35    


 


Poikilocytosis  Not Reportable   22  23:35    


 


Anisocytosis  Not Reportable   22  23:35    


 


Microcytosis  Not Reportable   22  23:35    


 


Macrocytosis  Not Reportable   22  23:35    


 


Spherocytes  Not Reportable   22  23:35    


 


Pappenheimer Bodies  Not Reportable   22  23:35    


 


Sickle Cells  Not Reportable   22  23:35    


 


Target Cells  Not Reportable   22  23:35    


 


Tear Drop Cells  Not Reportable   22  23:35    


 


Ovalocytes  Not Reportable   22  23:35    


 


Helmet Cells  Not Reportable   22  23:35    


 


Mccormick-Ormsby Bodies  Not Reportable   22  23:35    


 


Cabot Rings  Not Reportable   22  23:35    


 


[place] Cells  Not Reportable   22  23:35    


 


Bite Cells  Not Reportable   22  23:35    


 


Crenated Cell  Not Reportable   22  23:35    


 


Elliptocytes  Not Reportable   22  23:35    


 


Acanthocytes (Spur)  Not Reportable   22  23:35    


 


Rouleaux  Not Reportable   22  23:35    


 


Hemoglobin C Crystals  Not Reportable   22  23:35    


 


Schistocytes  Not Reportable   22  23:35    


 


Malaria parasites  Not Reportable   22  23:35    


 


Miguel Bodies  Not Reportable   22  23:35    


 


Hem Pathologist Commnt  No   22  23:35    


 


PT  13.4 Sec. (12.2-14.9)   22  13:30    


 


INR  0.92  (0.87-1.13)   22  13:30    


 


APTT  25.8 Sec. (24.2-36.6)   22  13:30    


 


Sodium  138 mmol/L (137-145)   22  05:08    


 


Potassium  3.8 mmol/L (3.6-5.0)   22  05:08    


 


Chloride  101.6 mmol/L ()   22  05:08    


 


Carbon Dioxide  26 mmol/L (22-30)   22  05:08    


 


Anion Gap  14 mmol/L  22  05:08    


 


BUN  12 mg/dL (9-20)   22  05:08    


 


Creatinine  0.5 mg/dL (0.8-1.3)  L  22  05:08    


 


Estimated GFR  > 60 ml/min  22  05:08    


 


BUN/Creatinine Ratio  24 %  22  05:08    


 


Glucose  98 mg/dL ()   22  05:08    


 


Lactic Acid  1.40 mmol/L (0.7-2.0)   22  13:30    


 


Calcium  8.0 mg/dL (8.4-10.2)  L  22  05:08    


 


Phosphorus  2.80 mg/dL (2.5-4.5)   22  05:17    


 


Magnesium  2.20 mg/dL (1.7-2.3)   22  05:17    


 


Total Bilirubin  0.30 mg/dL (0.1-1.2)   22  05:08    


 


Direct Bilirubin  < 0.2 mg/dL (0-0.2)   22  22:47    


 


AST  177 units/L (5-40)  H  22  05:08    


 


ALT  321 units/L (7-56)  H  22  05:08    


 


Alkaline Phosphatase  68 units/L ()   22  05:08    


 


Ammonia  37.0 umol/L (25-60)   22  04:49    


 


Total Creatine Kinase  8066 units/L ()  H  22  05:08    


 


Total Protein  4.8 g/dL (6.3-8.2)  L  22  05:08    


 


Albumin  2.9 g/dL (3.9-5)  L  22  05:08    


 


Albumin/Globulin Ratio  1.5 %  22  05:08    


 


Amylase  25 units/L ()  L  22  04:49    


 


Lipase  10 units/L (13-60)  L  22  04:49    


 


Coronavirus (PCR)  Negative  (Negative)   22  11:40    


 


Hep Bs Antigen  Non-reactive  (Negative)   22  13:30    


 


Hep B Core IgM Ab  Non-reactive  (NonReactive)   22  13:30    


 


Hepatitis C Antibody  Non-reactive  (NonReactive)   22  13:30    


 


HIV 1&2 Antibody Rapid  Non react  (Non React)   22  16:42    


 


HIV P24 Antigen  Non react  (Non React)   22  16:42    











Mcwilliams/IV: 


                                        





Voiding Method                   Urinal











Active Medications





- Current Medications


Current Medications: 














Generic Name Dose Route Start Last Admin





  Trade Name Freq  PRN Reason Stop Dose Admin


 


Albuterol  2.5 mg  22 06:15 





  Albuterol 2.5 Mg/3 Ml Nebu  IH  





  Q3HRT PRN  





  Shortness Of Breath  


 


Albuterol/Ipratropium  1 ampul  22 08:00  05/10/22 08:34





  Ipratropium/Albuterol Sulfate 3 Ml Ampul.Neb  IH   1 ampul





  BIDRT CHERYL   Administration


 


Diphenhydramine HCl  25 mg  22 06:24  22 18:49





  Diphenhydramine 50 Mg/Ml Vial  IV   25 mg





  Q6H PRN   Administration





  Skin Irritation  


 


Docusate Sodium  100 mg  22 22:00  22 22:23





  Docusate Sodium 100 Mg Cap  PO   100 mg





  BID CHERYL   Administration


 


Famotidine  20 mg  22 10:00  22 22:23





  Famotidine 20 Mg Tab  PO   20 mg





  BID CHERYL   Administration


 


Gabapentin  100 mg  22 22:30  05/10/22 05:31





  Gabapentin 100 Mg Cap  PO  22 22:29  100 mg





  Q8HR CHERYL   Administration


 


Gabapentin  100 mg  22 10:00 





  Gabapentin 100 Mg Cap  PO  22 09:59 





  BID CHERYL  


 


Gabapentin  300 mg  22 18:00 





  Gabapentin 300 Mg Cap  PO  22 17:59 





  QPM CHERYL  


 


Heparin Sodium (Porcine)  5,000 unit  22 10:00  22 22:23





  Heparin 5,000 Unit/1 Ml Vial  SUB-Q   5,000 unit





  Q12HR CHERYL   Administration


 


Hydralazine HCl  10 mg  22 18:31  22 18:50





  Hydralazine 20 Mg/1 Ml Inj  IV   10 mg





  Q3HR PRN   Administration





  Hypertension  


 


Hydromorphone HCl  0.5 mg  22 06:15  05/10/22 03:05





  Hydromorphone 1 Mg/1 Ml Inj  IV   0.5 mg





  Q3H PRN   Administration





  Pain , Severe (7-10)  


 


Sodium Chloride  1,000 mls @ 200 mls/hr  22 18:30  05/10/22 05:32





  Nacl 0.9% 1000 Ml  IV   200 mls/hr





  AS DIRECT CHERYL   Administration


 


Morphine Sulfate  2 mg  22 06:15  05/10/22 07:41





  Morphine 2 Mg/1 Ml Inj  IV   2 mg





  Q4H PRN   Administration





  Pain, Moderate (4-6)  


 


Ondansetron HCl  4 mg  22 06:15 





  Ondansetron 4 Mg/2 Ml Inj  IV  





  Q8H PRN  





  Nausea And Vomiting  


 


Sodium Chloride  10 ml  22 10:00  22 22:23





  Sodium Chloride 0.9% 10 Ml Flush Syringe  IV   10 ml





  BID CHERYL   Administration


 


Sodium Chloride  10 ml  22 06:15 





  Sodium Chloride 0.9% 10 Ml Flush Syringe  IV  





  PRN PRN  





  LINE FLUSH  


 


Valsartan  160 mg  22 22:00  05/10/22 07:47





  Valsartan 160mg Tab  PO   160 mg





  DAILY CHERYL   Administration

## 2022-05-11 RX ADMIN — HEPARIN SODIUM SCH UNIT: 5000 INJECTION, SOLUTION INTRAVENOUS; SUBCUTANEOUS at 09:36

## 2022-05-11 RX ADMIN — IPRATROPIUM BROMIDE AND ALBUTEROL SULFATE SCH AMPUL: .5; 3 SOLUTION RESPIRATORY (INHALATION) at 08:59

## 2022-05-11 RX ADMIN — MORPHINE SULFATE PRN MG: 2 INJECTION, SOLUTION INTRAMUSCULAR; INTRAVENOUS at 21:18

## 2022-05-11 RX ADMIN — DOCUSATE SODIUM SCH MG: 100 CAPSULE, LIQUID FILLED ORAL at 09:35

## 2022-05-11 RX ADMIN — GABAPENTIN SCH MG: 100 CAPSULE ORAL at 21:05

## 2022-05-11 RX ADMIN — MORPHINE SULFATE PRN MG: 2 INJECTION, SOLUTION INTRAMUSCULAR; INTRAVENOUS at 15:19

## 2022-05-11 RX ADMIN — FAMOTIDINE SCH MG: 20 TABLET ORAL at 09:35

## 2022-05-11 RX ADMIN — HYDROMORPHONE HYDROCHLORIDE PRN MG: 1 INJECTION, SOLUTION INTRAMUSCULAR; INTRAVENOUS; SUBCUTANEOUS at 06:19

## 2022-05-11 RX ADMIN — Medication SCH ML: at 21:05

## 2022-05-11 RX ADMIN — FAMOTIDINE SCH MG: 20 TABLET ORAL at 21:05

## 2022-05-11 RX ADMIN — Medication SCH ML: at 09:36

## 2022-05-11 RX ADMIN — GABAPENTIN SCH MG: 100 CAPSULE ORAL at 05:23

## 2022-05-11 RX ADMIN — IPRATROPIUM BROMIDE AND ALBUTEROL SULFATE SCH AMPUL: .5; 3 SOLUTION RESPIRATORY (INHALATION) at 19:50

## 2022-05-11 RX ADMIN — VALSARTAN SCH MG: 160 TABLET, FILM COATED ORAL at 09:35

## 2022-05-11 RX ADMIN — MORPHINE SULFATE PRN MG: 2 INJECTION, SOLUTION INTRAMUSCULAR; INTRAVENOUS at 05:10

## 2022-05-11 RX ADMIN — SODIUM CHLORIDE SCH MLS/HR: 0.9 INJECTION, SOLUTION INTRAVENOUS at 18:14

## 2022-05-11 RX ADMIN — DOCUSATE SODIUM SCH MG: 100 CAPSULE, LIQUID FILLED ORAL at 21:05

## 2022-05-11 RX ADMIN — GABAPENTIN SCH MG: 100 CAPSULE ORAL at 15:19

## 2022-05-11 RX ADMIN — SODIUM CHLORIDE SCH MLS/HR: 0.9 INJECTION, SOLUTION INTRAVENOUS at 10:53

## 2022-05-11 RX ADMIN — HEPARIN SODIUM SCH UNIT: 5000 INJECTION, SOLUTION INTRAVENOUS; SUBCUTANEOUS at 21:04

## 2022-05-11 NOTE — PROGRESS NOTE
Assessment and Plan


Assessment and plan: 


This is a 36 year old  transgendered female undergoing gender transformation 

with silicone injection to buttocks ([place] ) and breast implants, heavy 

drinker on the weekends (bottle of vodka) who presented to [facility] on 5/3 because 

of adverse drug reaction after reportedly self administration of  

rocephin and PCN for sore throat. Patient reportedly collapsed while checking in

to the emergency department, has complains of severe 10 out of 10 pain in left 

thigh and has noticeable mottling of skin in thigh and left lower abdomen.  

Patient underwent a CT of the left leg which showed possible cellulitis with no 

drainable fluid collection and was started on empiric antibiotics.  Patient 

admitted to the hospital service with diagnosis of acute liver failure, 

rhabdomyolysis, s/p 4 compartment fasciotomy to left lower extremity





Acute liver failure 


-GI consulted, appreciate recommendations


-Per GI: Suspect due to rhabdomyolysis given elevated creatinine kinase levels


   -If ALT continues to rise recommend MRCP


-Abdominal ultrasound showed mild diffuse biliary dilation, obstruction to 

lesion of the distal common bile duct cannot be excluded consider MRCP, no 

evidence of gallstones within the gallbladder


-PPI


-Regular diet


-BR: colace


-Trend LFTs





Compartment syndrome to LLE


-Vascular surgery consulted, appreciate recommendations


-s/p self admin of IM abx to midthigh


-c/o pain, decreased sensation, pulses were dopplarable, paralysis, mottled skin


-s/p 4 compartment tracheotomy on 5/3 with vascular surgery


-Bilateral lower extremity Doppler ultrasound showed no DVT


-Left lower extremity CT with contrast showed appearance of posterior soft 

tissue complex cellulitis with small subcutaneous lymph nodes, no drainable 

fluid collections present, contusion could have similar appearance


-Bilateral lower extremity arterial duplex showed no significant lower extremity

peripheral artery disease


-Normal ABIs





Peripheral neuropathy


-Avoid delirium


-Reorientation as needed


-Maintain sleep-wake cycle


-As needed analgesia


-Neurology consulted, appreciate recommendations


-Gabapentin 





HTN


-Blood pressure monitoring per protocol


-Valsartan


-PRN hydralazine





Rhabdomyolysis





acute kidney injury with vasomotor nephropathy now resolved


-Nephrology consulted, appreciate recommendations


-Strict intake and output


-Renally dose medications


-Avoid nephrotoxic medications


-MIVF


-Sodium bicarb


-Trend BMP, CK








Hospital course to date:





5/3: Venous ultrasound shows no DVT, Vascular surgery consulted who performed a 

fasciotomy and plan to transfer patient to [facility] post intervention.  Neurology 

consulted





: Transfer to ICU, GI and neurology consulted.





: Patient will be transferred back to the floor from ICU.  Her liver enzymes 

and creatinine are trending down.  Will be started on gabapentin per neurology 

recommendations and antibiotics discontinued.





: Continue supportive care.  CPK slowly improving.  Vascular plans for 

closure of fasciotomy early next week.  PT OT evaluation and treatment closure 

done.  Continue wound management.  No evidence of withdrawal noted at this time.

 Continue to monitor LFTs which are also slightly improving.  Plan discussed in 

detail with the patient who verbalized understanding





: Patient seen and examined clinically stable continues to show some 

improvement.  Closure of fasciotomy planned for tomorrow.  Renal function is 

improving.  CK ordered and pending





: Patient seen and examined, wbc mildly elevated likely secondary to 

steroids, will change to po for two additional days and stop, benefit probably 

already achieved, continue wound dressing, sensory function improved, awaiting 

wound vac and closure. Patient advised on clinically progress. 





: Patient seen and examined awaiting her vascular surgery for closure of 

fasciotomy.  CK is decreasing less than 9000 today.  We will continue aggressive

IV fluids until we have the resolution below 5000.  LFTs on the downward trend 

also.  Leukocytosis appears to have peaked steroids was changed to oral for 2 

additional days today is 1 of 2..  Plan of care discussed in detail with the 

patient who verbalized understanding.





5/10:  Left lower extremity compartment syndrome status post fasciotomies.  Cau

se is still unclear and may represent some allergic reaction.  Motor function is

unchanged from original exam.  Sensory function has improved with full sensation

of the foot.vascular surgery to place wound vacs on the leg today.  CK improved 

to the 8000 range today





: Patient underwent excisional debridement of muscle and soft tissue from 

left leg wound yesterday.  Patient also had wound VAC placement left leg 

fasciotomy incisions.  The patient had moderate amount of superficial necrotic 

muscle in the anterior lateral compartments with minimal amount of superficial 

necrotic muscle in the posterior compartment.  Follow-up culture results.  CK 

levels have improved to 6900 range.  AST/ALT continue to trend downward.








History


Interval history: 





No new issues overnight





Hospitalist Physical





- Constitutional


Vitals: 


                                        











Temp Pulse Resp BP Pulse Ox


 


 98.1 F   102 H  18   106/53   96 


 


 22 09:44  22 09:44  22 09:44  22 09:44  22 09:20











General appearance: Present: no acute distress





- EENT


Eyes: Present: PERRL, EOM intact


ENT: hearing intact, clear oral mucosa, dentition normal





- Neck


Neck: Present: supple, normal ROM





- Respiratory


Respiratory effort: normal


Respiratory: bilateral: CTA





- Cardiovascular


Rhythm: regular


Heart Sounds: Present: S1 & S2.  Absent: gallop, rub





- Extremities


Extremities: no ischemia, No edema, Full ROM





- Abdominal


General gastrointestinal: soft, non-tender, non-distended, normal bowel sounds





- Integumentary


Integumentary: Present: clear, warm, dry





- Neurologic


Neurologic: CNII-XII intact, moves all extremities





Results





- Labs


CBC & Chem 7: 


                                 05/10/22 09:56





                                 05/10/22 09:56


Labs: 


                             Laboratory Last Values











WBC  22.8 K/mm3 (4.5-11.0)  H  05/10/22  09:56    


 


RBC  3.97 M/mm3 (3.65-5.03)   05/10/22  09:56    


 


Hgb  13.3 gm/dl (11.8-15.2)   05/10/22  09:56    


 


Hct  38.6 % (35.5-45.6)   05/10/22  09:56    


 


MCV  97 fl (84-94)  H  05/10/22  09:56    


 


MCH  34 pg (28-32)  H  05/10/22  09:56    


 


MCHC  34 % (32-34)   05/10/22  09:56    


 


RDW  13.3 % (13.2-15.2)   05/10/22  09:56    


 


Plt Count  342 K/mm3 (140-440)   05/10/22  09:56    


 


Lymph % (Auto)  7.8 % (13.4-35.0)  L  22  04:49    


 


Mono % (Auto)  10.6 % (0.0-7.3)  H  22  04:49    


 


Eos % (Auto)  0.0 % (0.0-4.3)   22  04:49    


 


Baso % (Auto)  0.0 % (0.0-1.8)   22  04:49    


 


Lymph # (Auto)  1.1 K/mm3 (1.2-5.4)  L  22  04:49    


 


Mono # (Auto)  1.5 K/mm3 (0.0-0.8)  H  22  04:49    


 


Eos # (Auto)  0.0 K/mm3 (0.0-0.4)   22  04:49    


 


Baso # (Auto)  0.0 K/mm3 (0.0-0.1)   22  04:49    


 


Add Manual Diff  Complete   22  23:35    


 


Total Counted  100   22  23:35    


 


Seg Neutrophils %  81.6 % (40.0-70.0)  H  22  04:49    


 


Seg Neuts % (Manual)  52.0 % (40.0-70.0)   22  23:35    


 


Band Neutrophils %  0 %  22  23:35    


 


Lymphocytes % (Manual)  41.0 % (13.4-35.0)  H  22  23:35    


 


Reactive Lymphs % (Man)  0 %  22  23:35    


 


Monocytes % (Manual)  7.0 % (0.0-7.3)   22  23:35    


 


Eosinophils % (Manual)  0 % (0.0-4.3)   22  23:35    


 


Basophils % (Manual)  0 % (0.0-1.8)   22  23:35    


 


Metamyelocytes %  0 %  22  23:35    


 


Myelocytes %  0 %  22  23:35    


 


Promyelocytes %  0 %  22  23:35    


 


Blast Cells %  0 %  22  23:35    


 


Nucleated RBC %  Not Reportable   22  23:35    


 


Seg Neutrophils #  11.6 K/mm3 (1.8-7.7)  H  22  04:49    


 


Seg Neutrophils # Man  7.3 K/mm3 (1.8-7.7)   22  23:35    


 


Band Neutrophils #  0.0 K/mm3  22  23:35    


 


Lymphocytes # (Manual)  5.7 K/mm3 (1.2-5.4)  H  22  23:35    


 


Abs React Lymphs (Man)  0.0 K/mm3  22  23:35    


 


Monocytes # (Manual)  1.0 K/mm3 (0.0-0.8)  H  22  23:35    


 


Eosinophils # (Manual)  0.0 K/mm3 (0.0-0.4)   22  23:35    


 


Basophils # (Manual)  0.0 K/mm3 (0.0-0.1)   22  23:35    


 


Metamyelocytes #  0.0 K/mm3  22  23:35    


 


Myelocytes #  0.0 K/mm3  22  23:35    


 


Promyelocytes #  0.0 K/mm3  22  23:35    


 


Blast Cells #  0.0 K/mm3  22  23:35    


 


WBC Morphology  Not Reportable   22  23:35    


 


Hypersegmented Neuts  Not Reportable   22  23:35    


 


Hyposegmented Neuts  Not Reportable   22  23:35    


 


Hypogranular Neuts  Not Reportable   22  23:35    


 


Smudge Cells  Not Reportable   22  23:35    


 


Toxic Granulation  Not Reportable   22  23:35    


 


Toxic Vacuolation  Not Reportable   22  23:35    


 


Dohle Bodies  Not Reportable   22  23:35    


 


Pelger-Huet Anomaly  Not Reportable   22  23:35    


 


Jamie Rods  Not Reportable   22  23:35    


 


Platelet Estimate  Consistent w auto   22  23:35    


 


Clumped Platelets  Not Reportable   22  23:35    


 


Plt Clumps, EDTA  Not Reportable   22  23:35    


 


Large Platelets  Not Reportable   22  23:35    


 


Giant Platelets  Not Reportable   22  23:35    


 


Platelet Satelliting  Not Reportable   22  23:35    


 


Plt Morphology Comment  Not Reportable   22  23:35    


 


RBC Morphology  Normal   22  23:35    


 


Dimorphic RBCs  Not Reportable   22  23:35    


 


Polychromasia  Not Reportable   22  23:35    


 


Hypochromasia  Not Reportable   22  23:35    


 


Poikilocytosis  Not Reportable   22  23:35    


 


Anisocytosis  Not Reportable   22  23:35    


 


Microcytosis  Not Reportable   22  23:35    


 


Macrocytosis  Not Reportable   22  23:35    


 


Spherocytes  Not Reportable   22  23:35    


 


Pappenheimer Bodies  Not Reportable   22  23:35    


 


Sickle Cells  Not Reportable   22  23:35    


 


Target Cells  Not Reportable   22  23:35    


 


Tear Drop Cells  Not Reportable   22  23:35    


 


Ovalocytes  Not Reportable   22  23:35    


 


Helmet Cells  Not Reportable   22  23:35    


 


Mccormick-Donora Bodies  Not Reportable   22  23:35    


 


Cabot Rings  Not Reportable   22  23:35    


 


[place] Cells  Not Reportable   22  23:35    


 


Bite Cells  Not Reportable   22  23:35    


 


Crenated Cell  Not Reportable   22  23:35    


 


Elliptocytes  Not Reportable   22  23:35    


 


Acanthocytes (Spur)  Not Reportable   22  23:35    


 


Rouleaux  Not Reportable   22  23:35    


 


Hemoglobin C Crystals  Not Reportable   22  23:35    


 


Schistocytes  Not Reportable   22  23:35    


 


Malaria parasites  Not Reportable   22  23:35    


 


Miguel Bodies  Not Reportable   22  23:35    


 


Hem Pathologist Commnt  No   22  23:35    


 


PT  13.4 Sec. (12.2-14.9)   22  13:30    


 


INR  0.92  (0.87-1.13)   22  13:30    


 


APTT  25.8 Sec. (24.2-36.6)   22  13:30    


 


Sodium  138 mmol/L (137-145)   05/10/22  09:56    


 


Potassium  3.7 mmol/L (3.6-5.0)   05/10/22  09:56    


 


Chloride  97.3 mmol/L ()  L  05/10/22  09:56    


 


Carbon Dioxide  30 mmol/L (22-30)   05/10/22  09:56    


 


Anion Gap  14 mmol/L  05/10/22  09:56    


 


BUN  9 mg/dL (9-20)   05/10/22  09:56    


 


Creatinine  0.7 mg/dL (0.8-1.3)  L  05/10/22  09:56    


 


Estimated GFR  > 60 ml/min  05/10/22  09:56    


 


BUN/Creatinine Ratio  13 %  05/10/22  09:56    


 


Glucose  88 mg/dL ()   05/10/22  09:56    


 


Lactic Acid  1.40 mmol/L (0.7-2.0)   22  13:30    


 


Calcium  8.0 mg/dL (8.4-10.2)  L  05/10/22  09:56    


 


Phosphorus  2.80 mg/dL (2.5-4.5)   22  05:17    


 


Magnesium  2.20 mg/dL (1.7-2.3)   22  05:17    


 


Total Bilirubin  0.40 mg/dL (0.1-1.2)   05/10/22  09:56    


 


Direct Bilirubin  < 0.2 mg/dL (0-0.2)   22  22:47    


 


AST  144 units/L (5-40)  H  05/10/22  09:56    


 


ALT  247 units/L (7-56)  H  05/10/22  09:56    


 


Alkaline Phosphatase  70 units/L ()   05/10/22  09:56    


 


Ammonia  37.0 umol/L (25-60)   22  04:49    


 


Total Creatine Kinase  6940 units/L ()  H  05/10/22  09:56    


 


Total Protein  4.8 g/dL (6.3-8.2)  L  05/10/22  09:56    


 


Albumin  2.9 g/dL (3.9-5)  L  05/10/22  09:56    


 


Albumin/Globulin Ratio  1.5 %  05/10/22  09:56    


 


Amylase  25 units/L ()  L  22  04:49    


 


Lipase  10 units/L (13-60)  L  22  04:49    


 


Coronavirus (PCR)  Negative  (Negative)   22  11:40    


 


Hepatitis A IgM Ab  Nonreactive  (NonReactive)   22  13:30    


 


Hep Bs Antigen  Non-reactive  (Negative)   22  13:30    


 


Hep B Core IgM Ab  Non-reactive  (NonReactive)   22  13:30    


 


Hepatitis C Antibody  Non-reactive  (NonReactive)   22  13:30    


 


HIV 1&2 Antibody Rapid  Non react  (Non React)   22  16:42    


 


HIV P24 Antigen  Non react  (Non React)   22  16:42    











Microbiology: 


Microbiology





05/10/22 Unknown   Leg - Left   Surgical Biopsy Culture - Preliminary








Mcwilliams/IV: 


                                        





Voiding Method                   Urinal











Active Medications





- Current Medications


Current Medications: 














Generic Name Dose Route Start Last Admin





  Trade Name Freq  PRN Reason Stop Dose Admin


 


Albuterol  2.5 mg  22 06:15 





  Albuterol 2.5 Mg/3 Ml Nebu  IH  





  Q3HRT PRN  





  Shortness Of Breath  


 


Albuterol/Ipratropium  1 ampul  22 08:00  22 08:59





  Ipratropium/Albuterol Sulfate 3 Ml Ampul.Neb  IH   1 ampul





  BIDRT CHERYL   Administration


 


Diphenhydramine HCl  25 mg  22 06:24  22 18:49





  Diphenhydramine 50 Mg/Ml Vial  IV   25 mg





  Q6H PRN   Administration





  Skin Irritation  


 


Docusate Sodium  100 mg  22 22:00  22 09:35





  Docusate Sodium 100 Mg Cap  PO   100 mg





  BID CHERYL   Administration


 


Famotidine  20 mg  22 10:00  22 09:35





  Famotidine 20 Mg Tab  PO   20 mg





  BID CHERYL   Administration


 


Gabapentin  100 mg  22 22:30  22 05:23





  Gabapentin 100 Mg Cap  PO  22 23:59  100 mg





  Q8HR CHERYL   Administration


 


Gabapentin  100 mg  22 06:00 





  Gabapentin 100 Mg Cap  PO  22 14:01 





  0600,1400 CHERYL  


 


Gabapentin  300 mg  22 22:00 





  Gabapentin 300 Mg Cap  PO  22 22:01 





  QHS CHERYL  


 


Gabapentin  300 mg  22 08:00 





  Gabapentin 300 Mg Cap  PO  





  TID@0800,1600,2200 CHERYL  


 


Heparin Sodium (Porcine)  5,000 unit  22 10:00  22 09:36





  Heparin 5,000 Unit/1 Ml Vial  SUB-Q   5,000 unit





  Q12HR CHERYL   Administration


 


Hydralazine HCl  10 mg  22 18:31  22 18:50





  Hydralazine 20 Mg/1 Ml Inj  IV   10 mg





  Q3HR PRN   Administration





  Hypertension  


 


Hydromorphone HCl  0.5 mg  22 06:15  22 06:19





  Hydromorphone 1 Mg/1 Ml Inj  IV   0.5 mg





  Q3H PRN   Administration





  Pain , Severe (7-10)  


 


Hydromorphone HCl  0.5 mg  05/10/22 18:18 





  Hydromorphone 1 Mg/1 Ml Inj  IV  22 18:17 





  Q10MIN PRN  





  Pain , Severe (7-10)  


 


Sodium Chloride  1,000 mls @ 200 mls/hr  22 18:30  05/10/22 23:35





  Nacl 0.9% 1000 Ml  IV   200 mls/hr





  AS DIRECT CHERYL   Administration


 


Morphine Sulfate  2 mg  22 06:15  22 05:10





  Morphine 2 Mg/1 Ml Inj  IV   2 mg





  Q4H PRN   Administration





  Pain, Moderate (4-6)  


 


Ondansetron HCl  4 mg  22 06:15 





  Ondansetron 4 Mg/2 Ml Inj  IV  





  Q8H PRN  





  Nausea And Vomiting  


 


Sodium Chloride  10 ml  22 10:00  22 09:36





  Sodium Chloride 0.9% 10 Ml Flush Syringe  IV   10 ml





  BID CHERYL   Administration


 


Sodium Chloride  10 ml  22 06:15 





  Sodium Chloride 0.9% 10 Ml Flush Syringe  IV  





  PRN PRN  





  LINE FLUSH  


 


Valsartan  160 mg  22 22:00  22 09:35





  Valsartan 160mg Tab  PO   160 mg





  DAILY CHERYL   Administration














Nutrition/Malnutrition Assess





- Dietary Evaluation


Nutrition/Malnutrition Findings: 


                                        





Nutrition Notes                                            Start:  05/10/22 

16:45


Freq:                                                      Status: Active       




Protocol:                                                                       




 Document     05/10/22 16:45  HATTIE  (Rec: 05/10/22 17:00  HATTIE  [id number])


 Nutrition Notes


     Need for Assessment generated from:         LOS


     Initial or Follow up                        Assessment


     Current Diagnosis                           Hypertension


     Other Pertinent Diagnosis                   Rabdomyolysis, Liver Failure,


                                                 s/p L-LE 4 Compartment


                                                 Fasciotomy.


     Current Diet                                NPO (since 05/10 00:01).


     Labs/Tests                                  05/10: Cl 97.3, Crea 0.7, Ca 8


                                                 .0.


     Pertinent Medications                       05/10: Nutritionally


                                                 unremarkable.


     Height                                      5 ft 6 in


     Weight                                      86.183 kg


     Ideal Body Weight (kg)                      64.54


     BMI                                         30.7


     Intake Prior to Admission                   Good


     Weight change and time frame                Pt denies having loss body


                                                 weight PTA.


     Weight Status                               Obese


     Subjective/Other Information                RD consult for LOS assessment.


                                                 PT's PO intake of meals has


                                                 been Fair (>50%), according to


                                                 ADL notes.


                                                 Pt has been on NPO in


                                                 preparation for procedure on


                                                 05/10.


                                                 Pt is on Room Air, O2


                                                 saturation @ 97%, according to


                                                 Physical Assessment History


                                                 notes.


                                                 Pt presents L-LE Pitting Edema


                                                 3+, according to Physical


                                                 Assessment History notes.


                                                 Pt presents L-LE Vascular


                                                 Wound, according to Physical


                                                 Assessment History notes.


     Percent of energy/protein needs met:        Pt currently on NPO.


                                                 Prescribed Regular Diet


                                                 provides for energy/protein


                                                 needs (2,289 Kcal/89 g) during


                                                 LOS; additionally, Dietary


                                                 Supplements will support wound


                                                 healing processes with 190


                                                 Kcal and 5 g of protein.


     Burn                                        Absent


     Trauma                                      Absent


     GI Symptoms                                 None


     Food Allergy                                No


     Skin Integrity/Comment                      L-LE Vascular Wound


     Current % PO                                Fair (50-74%)


     Minimum of two criteria                     No


     #1


      Nutrition Diagnosis                        Increased nutrient needs   (


                                                 specify in comment below)


      Comments:                                  Protein to support wound


                                                 healing processes.


      Etiology                                   L-LE 4 Compartment Fascitis.


      As Evidenced by Signs and Symptoms         L-LE Vascular Wound


     Is patient on ventilator?                   No


     Is Patient Ambulatory and/or Out of Bed     No


     REE-([facility]-confined to bed)      [phone]


     Kcal/Kg value to use for calculation        19


     Approximate Energy Requirements Using       1637


      kcal/Kg                                    


     Calculation Used for Recommendations        Kcal/kg


     Additional Notes                            Protein: 1.25-1.5 g/Kg AdjBW;


                                                  g/day.


                                                 Fluids: 1 ml/Kcal, or as per


                                                 MD.


 Nutrition Intervention


     Change Diet Order:                          When pertinent, advance to


                                                 Regular Diet.


     Add Supplement/Snack (indicate name/kcal    When pertinent, start 28.8 pkt


      /protein )                                 Aidan; BID.


     Provides kCal:                              190


     Provides Protein (gm)                       5


     Goal #1                                     Support, through dietary


                                                 supplementation, wound healing


                                                 processes during LOS.


     Goal #2                                     Adjust the dietary


                                                 intervention to better serve


                                                 Pt's needs and clinical


                                                 conditions during LOS.


     Goal #3                                     Maintain body weight within +/


                                                 -3% of admission body weight


                                                 during LOS.


     Follow-Up By:                               22


     Additional Comments                         When pertinent, continue


                                                 monitoring food tolerance, %PO


                                                 intake of meals, and BM.

## 2022-05-11 NOTE — PROGRESS NOTE
Subjective


Principal diagnosis: abnormal liver enzymes


Interval history: 


Assessment and plan


Patient renal function remained stable at this point, patiently hydrate and 

monitor renal function and electrolyte


S/p excisional debridement of muscle and soft tissue of the left leg wound s/p 

wound VAC placement,, for compartment syndrome


Continue with supportive care periodic monitoring of labs


We'll continue to follow and make recommendation for renal standpoint.


______________________________________________________________________________


Progress note by: 


Montrell Milner MD


[address] [place] [id number] 


Tele 


[employer]








Patient was seen today for follow-up of multiple renal related issues


S/p surgery and wound VAC placement


Events of 24 hours vitals labs intake output medications were reviewed








Past medical history: Reviewed


Family history: Reviewed


Social history: Reviewed


Allergies: Reviewed








Physical examination:


Vitals: Reviewed


HEENT: No pallor or icterus oral mucosa moist 


Neck: Supple no JVD no thyromegaly


Chest: Bilateral clear to auscultation anteriorly


Heart: Regular rate and rhythm S1-S2 heard no S3-S4


Abdomen: Soft nontender no voluntary guarding rigidity rebound


Extremity: Dry skin less than 1+ peripheral edema


 dressings in place on the leg


Psychiatric: No evidence of agitation and aggression noted


Dermatology: No petechial rashes





Labs and x-rays: Reviewed from today























Objective





- Vital Signs


Vital signs: 


                               Vital Signs - 12hr











  05/10/22 05/10/22 05/11/22





  21:52 22:00 05:17


 


Temperature 98.6 F  98.6 F


 


Pulse Rate 99 H  95 H


 


Respiratory 18  18





Rate   


 


Blood Pressure 116/67  103/60


 


O2 Sat by Pulse 99 97 96





Oximetry   














- Lab





                                 05/10/22 09:56





                                 05/10/22 09:56


                             Most recent lab results











Calcium  8.0 mg/dL (8.4-10.2)  L  05/10/22  09:56    


 


Phosphorus  2.80 mg/dL (2.5-4.5)   05/06/22  05:17    


 


Magnesium  2.20 mg/dL (1.7-2.3)   05/06/22  05:17    














Medications & Allergies





- Medications


Allergies/Adverse Reactions: 


                                    Allergies





ceftriaxone [From Rocephin] Allergy (Severe, Verified 05/05/22 10:23)


   Unknown


   SEVERE RASH MOTTLING OF THE SKIN POST ADMINISTRATION OF IM ROCEPHIN PTA 


Penicillins Allergy (Severe, Verified 05/05/22 10:23)


   Unknown


   SEVERE RASH MOTTLING OF THE SKIN POST ADMINISTRATION OF IM ROCEPHIN PTA 








Home Medications: 


                                Home Medications











 Medication  Instructions  Recorded  Confirmed  Last Taken  Type


 


No Known Home Medications [No  05/03/22 05/03/22 Unknown History





Reported Home Medications]     











Active Medications: 














Generic Name Dose Route Start Last Admin





  Trade Name Freq  PRN Reason Stop Dose Admin


 


Albuterol  2.5 mg  05/03/22 06:15 





  Albuterol 2.5 Mg/3 Ml Nebu  IH  





  Q3HRT PRN  





  Shortness Of Breath  


 


Albuterol/Ipratropium  1 ampul  05/04/22 08:00  05/11/22 08:59





  Ipratropium/Albuterol Sulfate 3 Ml Ampul.Neb  IH   1 ampul





  BIDRT CHERYL   Administration


 


Diphenhydramine HCl  25 mg  05/03/22 06:24  05/08/22 18:49





  Diphenhydramine 50 Mg/Ml Vial  IV   25 mg





  Q6H PRN   Administration





  Skin Irritation  


 


Docusate Sodium  100 mg  05/05/22 22:00  05/10/22 21:44





  Docusate Sodium 100 Mg Cap  PO   100 mg





  BID CHERYL   Administration


 


Famotidine  20 mg  05/08/22 10:00  05/10/22 21:44





  Famotidine 20 Mg Tab  PO   20 mg





  BID CHERYL   Administration


 


Gabapentin  100 mg  05/05/22 22:30  05/11/22 05:23





  Gabapentin 100 Mg Cap  PO  05/12/22 22:29  100 mg





  Q8HR CHERYL   Administration


 


Gabapentin  100 mg  05/13/22 10:00 





  Gabapentin 100 Mg Cap  PO  05/18/22 09:59 





  BID CHERYL  


 


Gabapentin  300 mg  05/13/22 18:00 





  Gabapentin 300 Mg Cap  PO  05/18/22 17:59 





  QPM CHERYL  


 


Heparin Sodium (Porcine)  5,000 unit  05/03/22 10:00  05/10/22 21:44





  Heparin 5,000 Unit/1 Ml Vial  SUB-Q   5,000 unit





  Q12HR CHERYL   Administration


 


Hydralazine HCl  10 mg  05/04/22 18:31  05/04/22 18:50





  Hydralazine 20 Mg/1 Ml Inj  IV   10 mg





  Q3HR PRN   Administration





  Hypertension  


 


Hydromorphone HCl  0.5 mg  05/03/22 06:15  05/11/22 06:19





  Hydromorphone 1 Mg/1 Ml Inj  IV   0.5 mg





  Q3H PRN   Administration





  Pain , Severe (7-10)  


 


Hydromorphone HCl  0.5 mg  05/10/22 18:18 





  Hydromorphone 1 Mg/1 Ml Inj  IV  05/11/22 18:17 





  Q10MIN PRN  





  Pain , Severe (7-10)  


 


Sodium Chloride  1,000 mls @ 200 mls/hr  05/04/22 18:30  05/10/22 23:35





  Nacl 0.9% 1000 Ml  IV   200 mls/hr





  AS DIRECT CHERYL   Administration


 


Morphine Sulfate  2 mg  05/03/22 06:15  05/11/22 05:10





  Morphine 2 Mg/1 Ml Inj  IV   2 mg





  Q4H PRN   Administration





  Pain, Moderate (4-6)  


 


Ondansetron HCl  4 mg  05/03/22 06:15 





  Ondansetron 4 Mg/2 Ml Inj  IV  





  Q8H PRN  





  Nausea And Vomiting  


 


Sodium Chloride  10 ml  05/03/22 10:00  05/10/22 21:44





  Sodium Chloride 0.9% 10 Ml Flush Syringe  IV   10 ml





  BID CHERYL   Administration


 


Sodium Chloride  10 ml  05/03/22 06:15 





  Sodium Chloride 0.9% 10 Ml Flush Syringe  IV  





  PRN PRN  





  LINE FLUSH  


 


Valsartan  160 mg  05/04/22 22:00  05/10/22 17:22





  Valsartan 160mg Tab  PO   Not Given





  DAILY CHERYL

## 2022-05-12 LAB
ANISOCYTOSIS BLD QL SMEAR: (no result)
BAND NEUTROPHILS # (MANUAL): 0.4 K/MM3
BUN SERPL-MCNC: 7 MG/DL (ref 9–20)
BUN/CREAT SERPL: 14 %
CALCIUM SERPL-MCNC: 7.6 MG/DL (ref 8.4–10.2)
HCT VFR BLD CALC: 34.7 % (ref 35.5–45.6)
HEMOLYSIS INDEX: 4
HGB BLD-MCNC: 11.7 GM/DL (ref 11.8–15.2)
MCHC RBC AUTO-ENTMCNC: 34 % (ref 32–34)
MCV RBC AUTO: 96 FL (ref 84–94)
MYELOCYTES # (MANUAL): 0 K/MM3
PLATELET # BLD: 368 K/MM3 (ref 140–440)
PROMYELOCYTES # (MANUAL): 0 K/MM3
RBC # BLD AUTO: 3.62 M/MM3 (ref 3.65–5.03)
TOTAL CELLS COUNTED BLD: 100

## 2022-05-12 RX ADMIN — Medication SCH ML: at 21:13

## 2022-05-12 RX ADMIN — IPRATROPIUM BROMIDE AND ALBUTEROL SULFATE SCH AMPUL: .5; 3 SOLUTION RESPIRATORY (INHALATION) at 21:39

## 2022-05-12 RX ADMIN — HEPARIN SODIUM SCH UNIT: 5000 INJECTION, SOLUTION INTRAVENOUS; SUBCUTANEOUS at 21:12

## 2022-05-12 RX ADMIN — SODIUM CHLORIDE SCH MLS/HR: 0.9 INJECTION, SOLUTION INTRAVENOUS at 16:33

## 2022-05-12 RX ADMIN — FAMOTIDINE SCH MG: 20 TABLET ORAL at 09:36

## 2022-05-12 RX ADMIN — SODIUM CHLORIDE SCH MLS/HR: 0.9 INJECTION, SOLUTION INTRAVENOUS at 11:49

## 2022-05-12 RX ADMIN — HEPARIN SODIUM SCH UNIT: 5000 INJECTION, SOLUTION INTRAVENOUS; SUBCUTANEOUS at 09:36

## 2022-05-12 RX ADMIN — HYDROMORPHONE HYDROCHLORIDE PRN MG: 1 INJECTION, SOLUTION INTRAMUSCULAR; INTRAVENOUS; SUBCUTANEOUS at 01:08

## 2022-05-12 RX ADMIN — SODIUM CHLORIDE SCH MLS/HR: 0.9 INJECTION, SOLUTION INTRAVENOUS at 05:46

## 2022-05-12 RX ADMIN — IPRATROPIUM BROMIDE AND ALBUTEROL SULFATE SCH AMPUL: .5; 3 SOLUTION RESPIRATORY (INHALATION) at 08:39

## 2022-05-12 RX ADMIN — DOCUSATE SODIUM SCH MG: 100 CAPSULE, LIQUID FILLED ORAL at 21:12

## 2022-05-12 RX ADMIN — MORPHINE SULFATE PRN MG: 2 INJECTION, SOLUTION INTRAMUSCULAR; INTRAVENOUS at 21:23

## 2022-05-12 RX ADMIN — GABAPENTIN SCH MG: 100 CAPSULE ORAL at 05:46

## 2022-05-12 RX ADMIN — FAMOTIDINE SCH MG: 20 TABLET ORAL at 21:12

## 2022-05-12 RX ADMIN — VALSARTAN SCH MG: 160 TABLET, FILM COATED ORAL at 09:33

## 2022-05-12 RX ADMIN — SODIUM CHLORIDE SCH MLS/HR: 0.9 INJECTION, SOLUTION INTRAVENOUS at 21:15

## 2022-05-12 RX ADMIN — MORPHINE SULFATE PRN MG: 2 INJECTION, SOLUTION INTRAMUSCULAR; INTRAVENOUS at 16:32

## 2022-05-12 RX ADMIN — GABAPENTIN SCH MG: 100 CAPSULE ORAL at 13:06

## 2022-05-12 RX ADMIN — GABAPENTIN SCH MG: 100 CAPSULE ORAL at 21:12

## 2022-05-12 RX ADMIN — Medication SCH ML: at 09:36

## 2022-05-12 RX ADMIN — Medication PRN ML: at 01:09

## 2022-05-12 RX ADMIN — DOCUSATE SODIUM SCH MG: 100 CAPSULE, LIQUID FILLED ORAL at 09:32

## 2022-05-12 RX ADMIN — SODIUM CHLORIDE SCH MLS/HR: 0.9 INJECTION, SOLUTION INTRAVENOUS at 01:11

## 2022-05-12 NOTE — PROGRESS NOTE
Assessment and Plan





Patient status post debridement of left leg.  Patient will need wound VAC for 

home use as well as home wound care.  Additionally, the patient will need 

physical therapy.  Consulted case management.





Subjective


Date of service: 05/12/22


Principal diagnosis: abnormal liver enzymes


Interval history: 





Patient is postop day 1 following debridement and placement of wound VAC for 

left leg fasciotomy sites.  Patient still with a sensation of her left foot but 

no motor.  Edema of the left foot.  White count is still elevated.  Patient 

resting comfortably in bed.  Wound VAC with only minimal drainage.





Objective





- Constitutional


Vitals: 


                               Vital Signs - 12hr











  05/11/22 05/12/22 05/12/22





  23:33 09:33 09:59


 


Temperature 99.5 F  


 


Pulse Rate  108 H 


 


Respiratory 20  20





Rate   


 


Blood Pressure 123/72 121/53 


 


O2 Sat by Pulse 96  97





Oximetry   











General appearance: Present: no acute distress





- EENT


Eyes: EOM intact


ENT: hearing intact





- Neck


Neck: supple, normal ROM





- Respiratory


Respiratory effort: normal





- Breasts


Breasts: deferred


Extremities: abnormal





- Gastrointestinal


General gastrointestinal: Present: deferred





- Psychiatric


Psychiatric: appropriate mood/affect, cooperative





- Labs


CBC & Chem 7: 


                                 05/12/22 04:49





                                 05/12/22 04:49


Labs: 


                              Abnormal lab results











  05/12/22 05/12/22 Range/Units





  04:49 04:49 


 


WBC  20.6 H   (4.5-11.0)  K/mm3


 


RBC  3.62 L   (3.65-5.03)  M/mm3


 


Hgb  11.7 L   (11.8-15.2)  gm/dl


 


Hct  34.7 L   (35.5-45.6)  %


 


MCV  96 H   (84-94)  fl


 


RDW  13.0 L   (13.2-15.2)  %


 


Seg Neuts % (Manual)  83.0 H   (40.0-70.0)  %


 


Lymphocytes % (Manual)  5.0 L   (13.4-35.0)  %


 


Monocytes % (Manual)  8.0 H   (0.0-7.3)  %


 


Seg Neutrophils # Man  17.1 H   (1.8-7.7)  K/mm3


 


Lymphocytes # (Manual)  1.0 L   (1.2-5.4)  K/mm3


 


Monocytes # (Manual)  1.6 H   (0.0-0.8)  K/mm3


 


Sodium   136 L  (137-145)  mmol/L


 


Potassium   3.4 L  (3.6-5.0)  mmol/L


 


BUN   7 L  (9-20)  mg/dL


 


Creatinine   0.5 L  (0.8-1.3)  mg/dL


 


Glucose   119 H  ()  mg/dL


 


Calcium   7.6 L  (8.4-10.2)  mg/dL


 


Total Creatine Kinase   4125 H  ()  units/L














Medications & Allergies





- Medications


Allergies/Adverse Reactions: 


                                    Allergies





ceftriaxone [From Rocephin] Allergy (Severe, Verified 05/05/22 10:23)


   Unknown


   SEVERE RASH MOTTLING OF THE SKIN POST ADMINISTRATION OF IM ROCEPHIN PTA 


Penicillins Allergy (Severe, Verified 05/05/22 10:23)


   Unknown


   SEVERE RASH MOTTLING OF THE SKIN POST ADMINISTRATION OF IM ROCEPHIN PTA 








Home Medications: 


                                Home Medications











 Medication  Instructions  Recorded  Confirmed  Last Taken  Type


 


No Known Home Medications [No  05/03/22 05/03/22 Unknown History





Reported Home Medications]     











Active Medications: 














Generic Name Dose Route Start Last Admin





  Trade Name Freq  PRN Reason Stop Dose Admin


 


Albuterol  2.5 mg  05/03/22 06:15 





  Albuterol 2.5 Mg/3 Ml Nebu  IH  





  Q3HRT PRN  





  Shortness Of Breath  


 


Albuterol/Ipratropium  1 ampul  05/04/22 08:00  05/12/22 08:39





  Ipratropium/Albuterol Sulfate 3 Ml Ampul.Neb  IH   1 ampul





  BIDRT CHERYL   Administration


 


Diphenhydramine HCl  25 mg  05/03/22 06:24  05/08/22 18:49





  Diphenhydramine 50 Mg/Ml Vial  IV   25 mg





  Q6H PRN   Administration





  Skin Irritation  


 


Docusate Sodium  100 mg  05/05/22 22:00  05/12/22 09:32





  Docusate Sodium 100 Mg Cap  PO   100 mg





  BID CHERYL   Administration


 


Famotidine  20 mg  05/08/22 10:00  05/12/22 09:36





  Famotidine 20 Mg Tab  PO   20 mg





  BID CHERYL   Administration


 


Gabapentin  100 mg  05/05/22 22:30  05/12/22 05:46





  Gabapentin 100 Mg Cap  PO  05/12/22 23:59  100 mg





  Q8HR CHERYL   Administration


 


Gabapentin  100 mg  05/13/22 06:00 





  Gabapentin 100 Mg Cap  PO  05/17/22 14:01 





  0600,1400 CHERYL  


 


Gabapentin  300 mg  05/13/22 22:00 





  Gabapentin 300 Mg Cap  PO  05/17/22 22:01 





  QHS CHERYL  


 


Gabapentin  300 mg  05/18/22 08:00 





  Gabapentin 300 Mg Cap  PO  





  TID@0800,1600,2200 CHERYL  


 


Heparin Sodium (Porcine)  5,000 unit  05/03/22 10:00  05/12/22 09:36





  Heparin 5,000 Unit/1 Ml Vial  SUB-Q   5,000 unit





  Q12HR CHERYL   Administration


 


Hydralazine HCl  10 mg  05/04/22 18:31  05/04/22 18:50





  Hydralazine 20 Mg/1 Ml Inj  IV   10 mg





  Q3HR PRN   Administration





  Hypertension  


 


Hydromorphone HCl  0.5 mg  05/03/22 06:15  05/12/22 01:08





  Hydromorphone 1 Mg/1 Ml Inj  IV   0.5 mg





  Q3H PRN   Administration





  Pain , Severe (7-10)  


 


Sodium Chloride  1,000 mls @ 200 mls/hr  05/04/22 18:30  05/12/22 05:46





  Nacl 0.9% 1000 Ml  IV   200 mls/hr





  AS DIRECT CHERYL   Administration


 


Morphine Sulfate  2 mg  05/03/22 06:15  05/11/22 21:18





  Morphine 2 Mg/1 Ml Inj  IV   2 mg





  Q4H PRN   Administration





  Pain, Moderate (4-6)  


 


Ondansetron HCl  4 mg  05/03/22 06:15 





  Ondansetron 4 Mg/2 Ml Inj  IV  





  Q8H PRN  





  Nausea And Vomiting  


 


Sodium Chloride  10 ml  05/03/22 10:00  05/12/22 09:36





  Sodium Chloride 0.9% 10 Ml Flush Syringe  IV   10 ml





  BID CHERYL   Administration


 


Sodium Chloride  10 ml  05/03/22 06:15  05/12/22 01:09





  Sodium Chloride 0.9% 10 Ml Flush Syringe  IV   10 ml





  PRN PRN   Administration





  LINE FLUSH  


 


Valsartan  160 mg  05/04/22 22:00  05/12/22 09:33





  Valsartan 160mg Tab  PO   160 mg





  DAILY CHERYL   Administration

## 2022-05-12 NOTE — EVENT NOTE
Patient's renal function has been normal, at this time we will sign off the case

please call if needed please consider replacing potassium to keep it around 4.5 

or above consider oral supplementation of calcium, elevated liver enzyme most 

likely due to rhabdomyolysis which is improving already

## 2022-05-12 NOTE — PROGRESS NOTE
Assessment and Plan


Assessment and plan: 


This is a 36 year old  transgendered female undergoing gender transformation 

with silicone injection to buttocks ([place] ) and breast implants, heavy 

drinker on the weekends (bottle of vodka) who presented to [facility] on 5/3 because 

of adverse drug reaction after reportedly self administration of  

rocephin and PCN for sore throat. Patient reportedly collapsed while checking in

to the emergency department, has complains of severe 10 out of 10 pain in left 

thigh and has noticeable mottling of skin in thigh and left lower abdomen.  

Patient underwent a CT of the left leg which showed possible cellulitis with no 

drainable fluid collection and was started on empiric antibiotics.  Patient 

admitted to the hospital service with diagnosis of acute liver failure, 

rhabdomyolysis, s/p 4 compartment fasciotomy to left lower extremity





Acute liver failure 


-GI consulted, appreciate recommendations


-Per GI: Suspect due to rhabdomyolysis given elevated creatinine kinase levels


   -If ALT continues to rise recommend MRCP


-Abdominal ultrasound showed mild diffuse biliary dilation, obstruction to 

lesion of the distal common bile duct cannot be excluded consider MRCP, no 

evidence of gallstones within the gallbladder


-PPI


-Regular diet


-BR: colace


-Trend LFTs





Compartment syndrome to LLE


-Vascular surgery consulted, appreciate recommendations


-s/p self admin of IM abx to midthigh


-c/o pain, decreased sensation, pulses were dopplarable, paralysis, mottled skin


-s/p 4 compartment tracheotomy on 5/3 with vascular surgery


-Bilateral lower extremity Doppler ultrasound showed no DVT


-Left lower extremity CT with contrast showed appearance of posterior soft 

tissue complex cellulitis with small subcutaneous lymph nodes, no drainable 

fluid collections present, contusion could have similar appearance


-Bilateral lower extremity arterial duplex showed no significant lower extremity

peripheral artery disease


-Normal ABIs





Peripheral neuropathy


-Avoid delirium


-Reorientation as needed


-Maintain sleep-wake cycle


-As needed analgesia


-Neurology consulted, appreciate recommendations


-Gabapentin 





HTN


-Blood pressure monitoring per protocol


-Valsartan


-PRN hydralazine





Rhabdomyolysis





acute kidney injury with vasomotor nephropathy now resolved


-Nephrology consulted, appreciate recommendations


-Strict intake and output


-Renally dose medications


-Avoid nephrotoxic medications


-MIVF


-Sodium bicarb


-Trend BMP, CK








Hospital course to date:





5/3: Venous ultrasound shows no DVT, Vascular surgery consulted who performed a 

fasciotomy and plan to transfer patient to [facility] post intervention.  Neurology 

consulted





: Transfer to ICU, GI and neurology consulted.





: Patient will be transferred back to the floor from ICU.  Her liver enzymes 

and creatinine are trending down.  Will be started on gabapentin per neurology 

recommendations and antibiotics discontinued.





: Continue supportive care.  CPK slowly improving.  Vascular plans for 

closure of fasciotomy early next week.  PT OT evaluation and treatment closure 

done.  Continue wound management.  No evidence of withdrawal noted at this time.

 Continue to monitor LFTs which are also slightly improving.  Plan discussed in 

detail with the patient who verbalized understanding





: Patient seen and examined clinically stable continues to show some 

improvement.  Closure of fasciotomy planned for tomorrow.  Renal function is 

improving.  CK ordered and pending





: Patient seen and examined, wbc mildly elevated likely secondary to 

steroids, will change to po for two additional days and stop, benefit probably 

already achieved, continue wound dressing, sensory function improved, awaiting 

wound vac and closure. Patient advised on clinically progress. 





: Patient seen and examined awaiting her vascular surgery for closure of 

fasciotomy.  CK is decreasing less than 9000 today.  We will continue aggressive

IV fluids until we have the resolution below 5000.  LFTs on the downward trend 

also.  Leukocytosis appears to have peaked steroids was changed to oral for 2 

additional days today is 1 of 2..  Plan of care discussed in detail with the 

patient who verbalized understanding.





5/10:  Left lower extremity compartment syndrome status post fasciotomies.  Cau

se is still unclear and may represent some allergic reaction.  Motor function is

unchanged from original exam.  Sensory function has improved with full sensation

of the foot.vascular surgery to place wound vacs on the leg today.  CK improved 

to the 8000 range today





: Patient underwent excisional debridement of muscle and soft tissue from 

left leg wound yesterday.  Patient also had wound VAC placement left leg 

fasciotomy incisions.  The patient had moderate amount of superficial necrotic 

muscle in the anterior lateral compartments with minimal amount of superficial 

necrotic muscle in the posterior compartment.  Follow-up culture results.  CK 

levels have improved to 6900 range.  AST/ALT continue to trend downward.





: I discussed the case with Dr. Mckeon who recommends home wound VAC as 

well as physical therapy.  I also discussed these needs with case management who

was attempting to arrange for discharge planning.  CK continues to improve with 

levels in the 4000 range.





History


Interval history: 





No new issues overnight





Hospitalist Physical





- Constitutional


Vitals: 


                                        











Temp Pulse Resp BP Pulse Ox


 


 99.5 F   108 H  20   121/53   97 


 


 22 23:33  22 09:33  22 09:59  22 09:33  22 09:59











General appearance: Present: no acute distress





- EENT


Eyes: Present: PERRL, EOM intact


ENT: hearing intact, clear oral mucosa, dentition normal





- Neck


Neck: Present: supple, normal ROM





- Respiratory


Respiratory effort: normal


Respiratory: bilateral: CTA





- Cardiovascular


Rhythm: regular


Heart Sounds: Present: S1 & S2.  Absent: gallop, rub





- Extremities


Extremities: no ischemia, No edema, Full ROM





- Abdominal


General gastrointestinal: soft, non-tender, non-distended, normal bowel sounds





- Integumentary


Integumentary: Present: clear, warm, dry





- Neurologic


Neurologic: CNII-XII intact, moves all extremities





Results





- Labs


CBC & Chem 7: 


                                 22 04:49





                                 22 04:49


Labs: 


                             Laboratory Last Values











WBC  20.6 K/mm3 (4.5-11.0)  H  22  04:49    


 


RBC  3.62 M/mm3 (3.65-5.03)  L  22  04:49    


 


Hgb  11.7 gm/dl (11.8-15.2)  L  22  04:49    


 


Hct  34.7 % (35.5-45.6)  L  22  04:49    


 


MCV  96 fl (84-94)  H  22  04:49    


 


MCH  32 pg (28-32)   22  04:49    


 


MCHC  34 % (32-34)   22  04:49    


 


RDW  13.0 % (13.2-15.2)  L  22  04:49    


 


Plt Count  368 K/mm3 (140-440)   22  04:49    


 


Lymph % (Auto)  7.8 % (13.4-35.0)  L  22  04:49    


 


Mono % (Auto)  10.6 % (0.0-7.3)  H  22  04:49    


 


Eos % (Auto)  0.0 % (0.0-4.3)   22  04:49    


 


Baso % (Auto)  0.0 % (0.0-1.8)   22  04:49    


 


Lymph # (Auto)  1.1 K/mm3 (1.2-5.4)  L  22  04:49    


 


Mono # (Auto)  1.5 K/mm3 (0.0-0.8)  H  22  04:49    


 


Eos # (Auto)  0.0 K/mm3 (0.0-0.4)   22  04:49    


 


Baso # (Auto)  0.0 K/mm3 (0.0-0.1)   22  04:49    


 


Add Manual Diff  Complete   22  04:49    


 


Total Counted  100   22  04:49    


 


Seg Neutrophils %  81.6 % (40.0-70.0)  H  22  04:49    


 


Seg Neuts % (Manual)  83.0 % (40.0-70.0)  H  22  04:49    


 


Band Neutrophils %  2.0 %  22  04:49    


 


Lymphocytes % (Manual)  5.0 % (13.4-35.0)  L  22  04:49    


 


Reactive Lymphs % (Man)  0 %  22  04:49    


 


Monocytes % (Manual)  8.0 % (0.0-7.3)  H  22  04:49    


 


Eosinophils % (Manual)  0 % (0.0-4.3)   22  04:49    


 


Basophils % (Manual)  0 % (0.0-1.8)   22  04:49    


 


Metamyelocytes %  2.0 %  22  04:49    


 


Myelocytes %  0 %  22  04:49    


 


Promyelocytes %  0 %  22  04:49    


 


Blast Cells %  0 %  22  04:49    


 


Nucleated RBC %  Not Reportable   22  04:49    


 


Seg Neutrophils #  11.6 K/mm3 (1.8-7.7)  H  22  04:49    


 


Seg Neutrophils # Man  17.1 K/mm3 (1.8-7.7)  H  22  04:49    


 


Band Neutrophils #  0.4 K/mm3  22  04:49    


 


Lymphocytes # (Manual)  1.0 K/mm3 (1.2-5.4)  L  22  04:49    


 


Abs React Lymphs (Man)  0.0 K/mm3  22  04:49    


 


Monocytes # (Manual)  1.6 K/mm3 (0.0-0.8)  H  22  04:49    


 


Eosinophils # (Manual)  0.0 K/mm3 (0.0-0.4)   22  04:49    


 


Basophils # (Manual)  0.0 K/mm3 (0.0-0.1)   22  04:49    


 


Metamyelocytes #  0.4 K/mm3  22  04:49    


 


Myelocytes #  0.0 K/mm3  22  04:49    


 


Promyelocytes #  0.0 K/mm3  22  04:49    


 


Blast Cells #  0.0 K/mm3  22  04:49    


 


WBC Morphology  Not Reportable   22  04:49    


 


Hypersegmented Neuts  Not Reportable   22  04:49    


 


Hyposegmented Neuts  Rare   22  04:49    


 


Hypogranular Neuts  Not Reportable   22  04:49    


 


Smudge Cells  Not Reportable   22  04:49    


 


Toxic Granulation  Not Reportable   22  04:49    


 


Toxic Vacuolation  Not Reportable   22  04:49    


 


Dohle Bodies  Not Reportable   22  04:49    


 


Pelger-Huet Anomaly  Not Reportable   22  04:49    


 


Jamie Rods  Not Reportable   22  04:49    


 


Platelet Estimate  Consistent w auto   22  04:49    


 


Clumped Platelets  Not Reportable   22  04:49    


 


Plt Clumps, EDTA  Not Reportable   22  04:49    


 


Large Platelets  Rare   22  04:49    


 


Giant Platelets  Not Reportable   22  04:49    


 


Platelet Satelliting  Not Reportable   22  04:49    


 


Plt Morphology Comment  Not Reportable   22  04:49    


 


RBC Morphology  Not Reportable   22  04:49    


 


Dimorphic RBCs  Not Reportable   22  04:49    


 


Polychromasia  Not Reportable   22  04:49    


 


Hypochromasia  Not Reportable   22  04:49    


 


Poikilocytosis  Not Reportable   22  04:49    


 


Anisocytosis  Rare   22  04:49    


 


Microcytosis  Not Reportable   22  04:49    


 


Macrocytosis  Rare   22  04:49    


 


Spherocytes  Not Reportable   22  04:49    


 


Pappenheimer Bodies  Not Reportable   22  04:49    


 


Sickle Cells  Not Reportable   22  04:49    


 


Target Cells  Not Reportable   22  04:49    


 


Tear Drop Cells  Not Reportable   22  04:49    


 


Ovalocytes  Not Reportable   22  04:49    


 


Helmet Cells  Not Reportable   22  04:49    


 


Mccormick-Dorchester Bodies  Not Reportable   22  04:49    


 


Cabot Rings  Not Reportable   22  04:49    


 


Hondo Cells  Not Reportable   22  04:49    


 


Bite Cells  Not Reportable   22  04:49    


 


Crenated Cell  Not Reportable   22  04:49    


 


Elliptocytes  Not Reportable   22  04:49    


 


Acanthocytes (Spur)  Not Reportable   22  04:49    


 


Rouleaux  Not Reportable   22  04:49    


 


Hemoglobin C Crystals  Not Reportable   22  04:49    


 


Schistocytes  Not Reportable   22  04:49    


 


Malaria parasites  Not Reportable   22  04:49    


 


Miguel Bodies  Not Reportable   22  04:49    


 


Hem Pathologist Commnt  No   22  04:49    


 


PT  13.4 Sec. (12.2-14.9)   22  13:30    


 


INR  0.92  (0.87-1.13)   22  13:30    


 


APTT  25.8 Sec. (24.2-36.6)   22  13:30    


 


Sodium  136 mmol/L (137-145)  L  22  04:49    


 


Potassium  3.4 mmol/L (3.6-5.0)  L  22  04:49    


 


Chloride  100.1 mmol/L ()   22  04:49    


 


Carbon Dioxide  26 mmol/L (22-30)   22  04:49    


 


Anion Gap  13 mmol/L  22  04:49    


 


BUN  7 mg/dL (9-20)  L  22  04:49    


 


Creatinine  0.5 mg/dL (0.8-1.3)  L  22  04:49    


 


Estimated GFR  > 60 ml/min  22  04:49    


 


BUN/Creatinine Ratio  14 %  22  04:49    


 


Glucose  119 mg/dL ()  H  22  04:49    


 


Lactic Acid  1.40 mmol/L (0.7-2.0)   22  13:30    


 


Calcium  7.6 mg/dL (8.4-10.2)  L  22  04:49    


 


Phosphorus  2.80 mg/dL (2.5-4.5)   22  05:17    


 


Magnesium  2.20 mg/dL (1.7-2.3)   22  05:17    


 


Total Bilirubin  0.40 mg/dL (0.1-1.2)   05/10/22  09:56    


 


Direct Bilirubin  < 0.2 mg/dL (0-0.2)   22  22:47    


 


AST  144 units/L (5-40)  H  05/10/22  09:56    


 


ALT  247 units/L (7-56)  H  05/10/22  09:56    


 


Alkaline Phosphatase  70 units/L ()   05/10/22  09:56    


 


Ammonia  37.0 umol/L (25-60)   22  04:49    


 


Total Creatine Kinase  4125 units/L ()  H  22  04:49    


 


Total Protein  4.8 g/dL (6.3-8.2)  L  05/10/22  09:56    


 


Albumin  2.9 g/dL (3.9-5)  L  05/10/22  09:56    


 


Albumin/Globulin Ratio  1.5 %  05/10/22  09:56    


 


Amylase  25 units/L ()  L  22  04:49    


 


Lipase  10 units/L (13-60)  L  22  04:49    


 


Coronavirus (PCR)  Negative  (Negative)   22  11:40    


 


Hepatitis A IgM Ab  Nonreactive  (NonReactive)   22  13:30    


 


Hep Bs Antigen  Non-reactive  (Negative)   22  13:30    


 


Hep B Core IgM Ab  Non-reactive  (NonReactive)   22  13:30    


 


Hepatitis C Antibody  Non-reactive  (NonReactive)   22  13:30    


 


HIV 1&2 Antibody Rapid  Non react  (Non React)   22  16:42    


 


HIV P24 Antigen  Non react  (Non React)   22  16:42    











Microbiology: 


Microbiology





05/10/22 Unknown   Leg - Left   Surgical Biopsy Culture - Preliminary








Mcwilliams/IV: 


                                        





Voiding Method                   Urinal











Active Medications





- Current Medications


Current Medications: 














Generic Name Dose Route Start Last Admin





  Trade Name Freq  PRN Reason Stop Dose Admin


 


Albuterol  2.5 mg  22 06:15 





  Albuterol 2.5 Mg/3 Ml Nebu  IH  





  Q3HRT PRN  





  Shortness Of Breath  


 


Albuterol/Ipratropium  1 ampul  22 08:00  22 08:39





  Ipratropium/Albuterol Sulfate 3 Ml Ampul.Neb  IH   1 ampul





  BIDRT CHERYL   Administration


 


Diphenhydramine HCl  25 mg  22 06:24  22 18:49





  Diphenhydramine 50 Mg/Ml Vial  IV   25 mg





  Q6H PRN   Administration





  Skin Irritation  


 


Docusate Sodium  100 mg  22 22:00  22 09:32





  Docusate Sodium 100 Mg Cap  PO   100 mg





  BID CHERYL   Administration


 


Famotidine  20 mg  22 10:00  22 09:36





  Famotidine 20 Mg Tab  PO   20 mg





  BID CHERYL   Administration


 


Gabapentin  100 mg  22 22:30  22 05:46





  Gabapentin 100 Mg Cap  PO  22 23:59  100 mg





  Q8HR CHERYL   Administration


 


Gabapentin  100 mg  22 06:00 





  Gabapentin 100 Mg Cap  PO  22 14:01 





  0600,1400 CHERYL  


 


Gabapentin  300 mg  22 22:00 





  Gabapentin 300 Mg Cap  PO  22 22:01 





  QHS CHERYL  


 


Gabapentin  300 mg  22 08:00 





  Gabapentin 300 Mg Cap  PO  





  TID@0800,1600,2200 CHERYL  


 


Heparin Sodium (Porcine)  5,000 unit  22 10:00  22 09:36





  Heparin 5,000 Unit/1 Ml Vial  SUB-Q   5,000 unit





  Q12HR CHERYL   Administration


 


Hydralazine HCl  10 mg  22 18:31  22 18:50





  Hydralazine 20 Mg/1 Ml Inj  IV   10 mg





  Q3HR PRN   Administration





  Hypertension  


 


Hydromorphone HCl  0.5 mg  22 06:15  22 01:08





  Hydromorphone 1 Mg/1 Ml Inj  IV   0.5 mg





  Q3H PRN   Administration





  Pain , Severe (7-10)  


 


Sodium Chloride  1,000 mls @ 200 mls/hr  22 18:30  22 05:46





  Nacl 0.9% 1000 Ml  IV   200 mls/hr





  AS DIRECT CHERYL   Administration


 


Morphine Sulfate  2 mg  22 06:15  22 21:18





  Morphine 2 Mg/1 Ml Inj  IV   2 mg





  Q4H PRN   Administration





  Pain, Moderate (4-6)  


 


Ondansetron HCl  4 mg  22 06:15 





  Ondansetron 4 Mg/2 Ml Inj  IV  





  Q8H PRN  





  Nausea And Vomiting  


 


Sodium Chloride  10 ml  22 10:00  22 09:36





  Sodium Chloride 0.9% 10 Ml Flush Syringe  IV   10 ml





  BID CHERYL   Administration


 


Sodium Chloride  10 ml  22 06:15  22 01:09





  Sodium Chloride 0.9% 10 Ml Flush Syringe  IV   10 ml





  PRN PRN   Administration





  LINE FLUSH  


 


Valsartan  160 mg  22 22:00  22 09:33





  Valsartan 160mg Tab  PO   160 mg





  DAILY CHERYL   Administration














Nutrition/Malnutrition Assess





- Dietary Evaluation


Nutrition/Malnutrition Findings: 


                                        





Nutrition Notes                                            Start:  05/10/22 

16:45


Freq:                                                      Status: Active       




Protocol:                                                                       




 Document     05/10/22 16:45  HATTIE  (Rec: 05/10/22 17:00  HATTIE  [id number])


 Nutrition Notes


     Need for Assessment generated from:         LOS


     Initial or Follow up                        Assessment


     Current Diagnosis                           Hypertension


     Other Pertinent Diagnosis                   Rabdomyolysis, Liver Failure,


                                                 s/p L-LE 4 Compartment


                                                 Fasciotomy.


     Current Diet                                NPO (since 05/10 00:01).


     Labs/Tests                                  05/10: Cl 97.3, Crea 0.7, Ca 8


                                                 .0.


     Pertinent Medications                       05/10: Nutritionally


                                                 unremarkable.


     Height                                      5 ft 6 in


     Weight                                      86.183 kg


     Ideal Body Weight (kg)                      64.54


     BMI                                         30.7


     Intake Prior to Admission                   Good


     Weight change and time frame                Pt denies having loss body


                                                 weight PTA.


     Weight Status                               Obese


     Subjective/Other Information                RD consult for LOS assessment.


                                                 PT's PO intake of meals has


                                                 been Fair (>50%), according to


                                                 ADL notes.


                                                 Pt has been on NPO in


                                                 preparation for procedure on


                                                 05/10.


                                                 Pt is on Room Air, O2


                                                 saturation @ 97%, according to


                                                 Physical Assessment History


                                                 notes.


                                                 Pt presents L-LE Pitting Edema


                                                 3+, according to Physical


                                                 Assessment History notes.


                                                 Pt presents L-LE Vascular


                                                 Wound, according to Physical


                                                 Assessment History notes.


     Percent of energy/protein needs met:        Pt currently on NPO.


                                                 Prescribed Regular Diet


                                                 provides for energy/protein


                                                 needs (2,289 Kcal/89 g) during


                                                 LOS; additionally, Dietary


                                                 Supplements will support wound


                                                 healing processes with 190


                                                 Kcal and 5 g of protein.


     Burn                                        Absent


     Trauma                                      Absent


     GI Symptoms                                 None


     Food Allergy                                No


     Skin Integrity/Comment                      L-LE Vascular Wound


     Current % PO                                Fair (50-74%)


     Minimum of two criteria                     No


     #1


      Nutrition Diagnosis                        Increased nutrient needs   (


                                                 specify in comment below)


      Comments:                                  Protein to support wound


                                                 healing processes.


      Etiology                                   L-LE 4 Compartment Fascitis.


      As Evidenced by Signs and Symptoms         L-LE Vascular Wound


     Is patient on ventilator?                   No


     Is Patient Ambulatory and/or Out of Bed     No


     REE-([facility]-confined to bed)      [phone]


     Kcal/Kg value to use for calculation        19


     Approximate Energy Requirements Using       1637


      kcal/Kg                                    


     Calculation Used for Recommendations        Kcal/kg


     Additional Notes                            Protein: 1.25-1.5 g/Kg AdjBW;


                                                  g/day.


                                                 Fluids: 1 ml/Kcal, or as per


                                                 MD.


 Nutrition Intervention


     Change Diet Order:                          When pertinent, advance to


                                                 Regular Diet.


     Add Supplement/Snack (indicate name/kcal    When pertinent, start 28.8 pkt


      /protein )                                 Aidan; BID.


     Provides kCal:                              190


     Provides Protein (gm)                       5


     Goal #1                                     Support, through dietary


                                                 supplementation, wound healing


                                                 processes during LOS.


     Goal #2                                     Adjust the dietary


                                                 intervention to better serve


                                                 Pt's needs and clinical


                                                 conditions during LOS.


     Goal #3                                     Maintain body weight within +/


                                                 -3% of admission body weight


                                                 during LOS.


     Follow-Up By:                               22


     Additional Comments                         When pertinent, continue


                                                 monitoring food tolerance, %PO


                                                 intake of meals, and BM.

## 2022-05-13 LAB
ALBUMIN SERPL-MCNC: 2.3 G/DL (ref 3.9–5)
ALT SERPL-CCNC: 202 UNITS/L (ref 7–56)
BASOPHILS # (AUTO): 0 K/MM3 (ref 0–0.1)
BASOPHILS NFR BLD AUTO: 0.1 % (ref 0–1.8)
BILIRUB DIRECT SERPL-MCNC: < 0.2 MG/DL (ref 0–0.2)
BUN SERPL-MCNC: 6 MG/DL (ref 9–20)
BUN/CREAT SERPL: 10 %
CALCIUM SERPL-MCNC: 7.7 MG/DL (ref 8.4–10.2)
EOSINOPHIL # BLD AUTO: 0 K/MM3 (ref 0–0.4)
EOSINOPHIL NFR BLD AUTO: 0.4 % (ref 0–4.3)
HCT VFR BLD CALC: 36 % (ref 35.5–45.6)
HEMOLYSIS INDEX: 3
HGB BLD-MCNC: 12 GM/DL (ref 11.8–15.2)
LYMPHOCYTES # BLD AUTO: 1.9 K/MM3 (ref 1.2–5.4)
LYMPHOCYTES NFR BLD AUTO: 13.9 % (ref 13.4–35)
MCHC RBC AUTO-ENTMCNC: 33 % (ref 32–34)
MCV RBC AUTO: 99 FL (ref 84–94)
MONOCYTES # (AUTO): 1.2 K/MM3 (ref 0–0.8)
MONOCYTES % (AUTO): 8.7 % (ref 0–7.3)
PLATELET # BLD: 420 K/MM3 (ref 140–440)
RBC # BLD AUTO: 3.65 M/MM3 (ref 3.65–5.03)

## 2022-05-13 RX ADMIN — DOCUSATE SODIUM SCH MG: 100 CAPSULE, LIQUID FILLED ORAL at 09:32

## 2022-05-13 RX ADMIN — SODIUM CHLORIDE SCH MLS/HR: 0.9 INJECTION, SOLUTION INTRAVENOUS at 17:27

## 2022-05-13 RX ADMIN — DOCUSATE SODIUM SCH: 100 CAPSULE, LIQUID FILLED ORAL at 21:15

## 2022-05-13 RX ADMIN — Medication SCH ML: at 09:33

## 2022-05-13 RX ADMIN — HEPARIN SODIUM SCH UNIT: 5000 INJECTION, SOLUTION INTRAVENOUS; SUBCUTANEOUS at 09:32

## 2022-05-13 RX ADMIN — FAMOTIDINE SCH MG: 20 TABLET ORAL at 21:13

## 2022-05-13 RX ADMIN — FAMOTIDINE SCH MG: 20 TABLET ORAL at 09:32

## 2022-05-13 RX ADMIN — MORPHINE SULFATE PRN MG: 2 INJECTION, SOLUTION INTRAMUSCULAR; INTRAVENOUS at 15:29

## 2022-05-13 RX ADMIN — VALSARTAN SCH MG: 160 TABLET, FILM COATED ORAL at 09:32

## 2022-05-13 RX ADMIN — GABAPENTIN SCH MG: 300 CAPSULE ORAL at 21:14

## 2022-05-13 RX ADMIN — IPRATROPIUM BROMIDE AND ALBUTEROL SULFATE SCH AMPUL: .5; 3 SOLUTION RESPIRATORY (INHALATION) at 19:40

## 2022-05-13 RX ADMIN — IPRATROPIUM BROMIDE AND ALBUTEROL SULFATE SCH AMPUL: .5; 3 SOLUTION RESPIRATORY (INHALATION) at 07:42

## 2022-05-13 RX ADMIN — Medication SCH ML: at 21:14

## 2022-05-13 RX ADMIN — GABAPENTIN SCH MG: 100 CAPSULE ORAL at 05:23

## 2022-05-13 RX ADMIN — SODIUM CHLORIDE SCH MLS/HR: 0.9 INJECTION, SOLUTION INTRAVENOUS at 05:23

## 2022-05-13 RX ADMIN — GABAPENTIN SCH MG: 100 CAPSULE ORAL at 13:57

## 2022-05-13 RX ADMIN — SODIUM CHLORIDE SCH MLS/HR: 0.9 INJECTION, SOLUTION INTRAVENOUS at 11:38

## 2022-05-13 RX ADMIN — HEPARIN SODIUM SCH UNIT: 5000 INJECTION, SOLUTION INTRAVENOUS; SUBCUTANEOUS at 21:15

## 2022-05-13 NOTE — PROGRESS NOTE
Assessment and Plan





Increase activity with physical therapy


Will need foot drop splint


Vac change today, by wound care nurse


Home when wound vac and home health are set up





Subjective


Date of service: 05/13/22


Principal diagnosis: abnormal liver enzymes


Interval history: 





Patient without complaints today.





Objective





- Constitutional


Vitals: 


                               Vital Signs - 12hr











  05/13/22 05/13/22 05/13/22





  05:21 07:24 07:42


 


Temperature 99.1 F  


 


Pulse Rate 101 H  


 


Pulse Rate [   96 H





Anterior   





Bilateral   





Throughout]   


 


Respiratory 18  





Rate   


 


Respiratory   18





Rate [Anterior   





Bilateral   





Throughout]   


 


Blood Pressure 101/79  


 


O2 Sat by Pulse 98 100 





Oximetry   














  05/13/22 05/13/22





  08:32 11:44


 


Temperature 98.4 F 98.7 F


 


Pulse Rate 105 H 109 H


 


Pulse Rate [  





Anterior  





Bilateral  





Throughout]  


 


Respiratory 18 18





Rate  


 


Respiratory  





Rate [Anterior  





Bilateral  





Throughout]  


 


Blood Pressure 137/79 115/73


 


O2 Sat by Pulse 97 98





Oximetry  











Extremities: pulses intact (palpable left DP), abnormal (edema of left foot, 

cyanotic changes have improved)


Extremity abnormal: other (wound vac in place)





- Labs


CBC & Chem 7: 


                                 05/13/22 04:59





                                 05/13/22 04:59


Labs: 


                              Abnormal lab results











  05/13/22 05/13/22 Range/Units





  04:59 04:59 


 


WBC  13.9 H   (4.5-11.0)  K/mm3


 


MCV  99 H   (84-94)  fl


 


MCH  33 H   (28-32)  pg


 


RDW  13.0 L   (13.2-15.2)  %


 


Mono % (Auto)  8.7 H   (0.0-7.3)  %


 


Mono # (Auto)  1.2 H   (0.0-0.8)  K/mm3


 


Seg Neutrophils %  76.9 H   (40.0-70.0)  %


 


Seg Neutrophils #  10.7 H   (1.8-7.7)  K/mm3


 


Sodium   136 L  (137-145)  mmol/L


 


BUN   6 L  (9-20)  mg/dL


 


Creatinine   0.6 L  (0.8-1.3)  mg/dL


 


Glucose   102 H  ()  mg/dL


 


Calcium   7.7 L  (8.4-10.2)  mg/dL


 


AST   131 H  (5-40)  units/L


 


ALT   202 H  (7-56)  units/L


 


Total Creatine Kinase   4245 H  ()  units/L


 


Total Protein   4.9 L  (6.3-8.2)  g/dL


 


Albumin   2.3 L  (3.9-5)  g/dL














Medications & Allergies





- Medications


Allergies/Adverse Reactions: 


                                    Allergies





ceftriaxone [From Rocephin] Allergy (Severe, Verified 05/05/22 10:23)


   Unknown


   SEVERE RASH MOTTLING OF THE SKIN POST ADMINISTRATION OF IM ROCEPHIN PTA 


Penicillins Allergy (Severe, Verified 05/05/22 10:23)


   Unknown


   SEVERE RASH MOTTLING OF THE SKIN POST ADMINISTRATION OF IM ROCEPHIN PTA 








Home Medications: 


                                Home Medications











 Medication  Instructions  Recorded  Confirmed  Last Taken  Type


 


No Known Home Medications [No  05/03/22 05/03/22 Unknown History





Reported Home Medications]     











Active Medications: 














Generic Name Dose Route Start Last Admin





  Trade Name Freq  PRN Reason Stop Dose Admin


 


Albuterol  2.5 mg  05/03/22 06:15 





  Albuterol 2.5 Mg/3 Ml Nebu  IH  





  Q3HRT PRN  





  Shortness Of Breath  


 


Albuterol/Ipratropium  1 ampul  05/04/22 08:00  05/13/22 07:42





  Ipratropium/Albuterol Sulfate 3 Ml Ampul.Neb  IH   1 ampul





  BIDRT CHERYL   Administration


 


Diphenhydramine HCl  25 mg  05/03/22 06:24  05/08/22 18:49





  Diphenhydramine 50 Mg/Ml Vial  IV   25 mg





  Q6H PRN   Administration





  Skin Irritation  


 


Docusate Sodium  100 mg  05/05/22 22:00  05/13/22 09:32





  Docusate Sodium 100 Mg Cap  PO   100 mg





  BID CHERYL   Administration


 


Famotidine  20 mg  05/08/22 10:00  05/13/22 09:32





  Famotidine 20 Mg Tab  PO   20 mg





  BID CHERYL   Administration


 


Gabapentin  100 mg  05/13/22 06:00  05/13/22 05:23





  Gabapentin 100 Mg Cap  PO  05/17/22 14:01  100 mg





  0600,1400 CHERYL   Administration


 


Gabapentin  300 mg  05/13/22 22:00 





  Gabapentin 300 Mg Cap  PO  05/17/22 22:01 





  QHS CHERYL  


 


Gabapentin  300 mg  05/18/22 08:00 





  Gabapentin 300 Mg Cap  PO  





  TID@0800,1600,2200 CHERYL  


 


Heparin Sodium (Porcine)  5,000 unit  05/03/22 10:00  05/13/22 09:32





  Heparin 5,000 Unit/1 Ml Vial  SUB-Q   5,000 unit





  Q12HR CHERYL   Administration


 


Hydralazine HCl  10 mg  05/04/22 18:31  05/04/22 18:50





  Hydralazine 20 Mg/1 Ml Inj  IV   10 mg





  Q3HR PRN   Administration





  Hypertension  


 


Hydromorphone HCl  0.5 mg  05/03/22 06:15  05/12/22 01:08





  Hydromorphone 1 Mg/1 Ml Inj  IV   0.5 mg





  Q3H PRN   Administration





  Pain , Severe (7-10)  


 


Sodium Chloride  1,000 mls @ 200 mls/hr  05/04/22 18:30  05/13/22 11:38





  Nacl 0.9% 1000 Ml  IV   200 mls/hr





  AS DIRECT CHERYL   Administration


 


Morphine Sulfate  2 mg  05/03/22 06:15  05/12/22 21:23





  Morphine 2 Mg/1 Ml Inj  IV   2 mg





  Q4H PRN   Administration





  Pain, Moderate (4-6)  


 


Ondansetron HCl  4 mg  05/03/22 06:15 





  Ondansetron 4 Mg/2 Ml Inj  IV  





  Q8H PRN  





  Nausea And Vomiting  


 


Sodium Chloride  10 ml  05/03/22 10:00  05/13/22 09:33





  Sodium Chloride 0.9% 10 Ml Flush Syringe  IV   10 ml





  BID CHERYL   Administration


 


Sodium Chloride  10 ml  05/03/22 06:15  05/12/22 01:09





  Sodium Chloride 0.9% 10 Ml Flush Syringe  IV   10 ml





  PRN PRN   Administration





  LINE FLUSH  


 


Valsartan  160 mg  05/04/22 22:00  05/13/22 09:32





  Valsartan 160mg Tab  PO   160 mg





  DAILY CHERYL   Administration

## 2022-05-13 NOTE — PROGRESS NOTE
Assessment and Plan


Assessment and plan: 


This is a 36 year old  transgendered female undergoing gender transformation 

with silicone injection to buttocks ([place] ) and breast implants, heavy 

drinker on the weekends (bottle of vodka) who presented to [facility] on 5/3 because 

of adverse drug reaction after reportedly self administration of  

rocephin and PCN for sore throat. Patient reportedly collapsed while checking in

to the emergency department, has complains of severe 10 out of 10 pain in left 

thigh and has noticeable mottling of skin in thigh and left lower abdomen.  

Patient underwent a CT of the left leg which showed possible cellulitis with no 

drainable fluid collection and was started on empiric antibiotics.  Patient 

admitted to the hospital service with diagnosis of acute liver failure, 

rhabdomyolysis, s/p 4 compartment fasciotomy to left lower extremity





Acute liver failure 


-GI consulted, appreciate recommendations


-Per GI: Suspect due to rhabdomyolysis given elevated creatinine kinase levels


   -If ALT continues to rise recommend MRCP


-Abdominal ultrasound showed mild diffuse biliary dilation, obstruction to 

lesion of the distal common bile duct cannot be excluded consider MRCP, no 

evidence of gallstones within the gallbladder


-PPI


-Regular diet


-BR: colace


-Trend LFTs





Compartment syndrome to LLE


-Vascular surgery consulted, appreciate recommendations


-s/p self admin of IM abx to midthigh


-c/o pain, decreased sensation, pulses were dopplarable, paralysis, mottled skin


-s/p 4 compartment tracheotomy on 5/3 with vascular surgery


-Bilateral lower extremity Doppler ultrasound showed no DVT


-Left lower extremity CT with contrast showed appearance of posterior soft 

tissue complex cellulitis with small subcutaneous lymph nodes, no drainable 

fluid collections present, contusion could have similar appearance


-Bilateral lower extremity arterial duplex showed no significant lower extremity

peripheral artery disease


-Normal ABIs





Peripheral neuropathy


-Avoid delirium


-Reorientation as needed


-Maintain sleep-wake cycle


-As needed analgesia


-Neurology consulted, appreciate recommendations


-Gabapentin 





HTN


-Blood pressure monitoring per protocol


-Valsartan


-PRN hydralazine





Rhabdomyolysis





acute kidney injury with vasomotor nephropathy now resolved


-Nephrology consulted, appreciate recommendations


-Strict intake and output


-Renally dose medications


-Avoid nephrotoxic medications


-MIVF


-Sodium bicarb


-Trend BMP, CK








Hospital course to date:





5/3: Venous ultrasound shows no DVT, Vascular surgery consulted who performed a 

fasciotomy and plan to transfer patient to [facility] post intervention.  Neurology 

consulted





: Transfer to ICU, GI and neurology consulted.





: Patient will be transferred back to the floor from ICU.  Her liver enzymes 

and creatinine are trending down.  Will be started on gabapentin per neurology 

recommendations and antibiotics discontinued.





: Continue supportive care.  CPK slowly improving.  Vascular plans for 

closure of fasciotomy early next week.  PT OT evaluation and treatment closure 

done.  Continue wound management.  No evidence of withdrawal noted at this time.

 Continue to monitor LFTs which are also slightly improving.  Plan discussed in 

detail with the patient who verbalized understanding





: Patient seen and examined clinically stable continues to show some 

improvement.  Closure of fasciotomy planned for tomorrow.  Renal function is 

improving.  CK ordered and pending





: Patient seen and examined, wbc mildly elevated likely secondary to 

steroids, will change to po for two additional days and stop, benefit probably 

already achieved, continue wound dressing, sensory function improved, awaiting 

wound vac and closure. Patient advised on clinically progress. 





: Patient seen and examined awaiting her vascular surgery for closure of 

fasciotomy.  CK is decreasing less than 9000 today.  We will continue aggressive

IV fluids until we have the resolution below 5000.  LFTs on the downward trend 

also.  Leukocytosis appears to have peaked steroids was changed to oral for 2 

additional days today is 1 of 2..  Plan of care discussed in detail with the 

patient who verbalized understanding.





5/10:  Left lower extremity compartment syndrome status post fasciotomies.  Cau

se is still unclear and may represent some allergic reaction.  Motor function is

unchanged from original exam.  Sensory function has improved with full sensation

of the foot.vascular surgery to place wound vacs on the leg today.  CK improved 

to the 8000 range today





: Patient underwent excisional debridement of muscle and soft tissue from 

left leg wound yesterday.  Patient also had wound VAC placement left leg 

fasciotomy incisions.  The patient had moderate amount of superficial necrotic 

muscle in the anterior lateral compartments with minimal amount of superficial 

necrotic muscle in the posterior compartment.  Follow-up culture results.  CK 

levels have improved to 6900 range.  AST/ALT continue to trend downward.





: I discussed the case with Dr. Mckeon who recommends home wound VAC as 

well as physical therapy.  I also discussed these needs with case management who

was attempting to arrange for discharge planning.  CK continues to improve with 

levels in the 4000 range.





: Still awaiting for home wound VAC arrangements to be made.  Leukocytosis 

is much improved down to 13,900 today.  LFTs continue to trend down to normal 

range.  CK levels still in the 4000 range.  Nephrology has signed off.  

Anticipate discharge later today or in a.m.





History


Interval history: 





No new issues overnight





Hospitalist Physical





- Constitutional


Vitals: 


                                        











Temp Pulse Resp BP Pulse Ox


 


 99.1 F   96 H  18   101/79   100 


 


 22 05:21  22 07:42  22 07:42  22 05:21  22 07:24











General appearance: Present: no acute distress





- EENT


Eyes: Present: PERRL, EOM intact


ENT: hearing intact, clear oral mucosa, dentition normal





- Neck


Neck: Present: supple, normal ROM





- Respiratory


Respiratory effort: normal


Respiratory: bilateral: CTA





- Cardiovascular


Rhythm: regular


Heart Sounds: Present: S1 & S2.  Absent: gallop, rub





- Extremities


Extremities: no ischemia, No edema, Full ROM





- Abdominal


General gastrointestinal: soft, non-tender, non-distended, normal bowel sounds





- Integumentary


Integumentary: Present: clear, warm, dry





- Neurologic


Neurologic: CNII-XII intact, moves all extremities





Results





- Labs


CBC & Chem 7: 


                                 22 04:59





                                 22 04:59


Labs: 


                             Laboratory Last Values











WBC  13.9 K/mm3 (4.5-11.0)  H  22  04:59    


 


RBC  3.65 M/mm3 (3.65-5.03)   22  04:59    


 


Hgb  12.0 gm/dl (11.8-15.2)   22  04:59    


 


Hct  36.0 % (35.5-45.6)   22  04:59    


 


MCV  99 fl (84-94)  H  22  04:59    


 


MCH  33 pg (28-32)  H  22  04:59    


 


MCHC  33 % (32-34)   22  04:59    


 


RDW  13.0 % (13.2-15.2)  L  22  04:59    


 


Plt Count  420 K/mm3 (140-440)   22  04:59    


 


Lymph % (Auto)  13.9 % (13.4-35.0)   22  04:59    


 


Mono % (Auto)  8.7 % (0.0-7.3)  H  22  04:59    


 


Eos % (Auto)  0.4 % (0.0-4.3)   22  04:59    


 


Baso % (Auto)  0.1 % (0.0-1.8)   22  04:59    


 


Lymph # (Auto)  1.9 K/mm3 (1.2-5.4)   22  04:59    


 


Mono # (Auto)  1.2 K/mm3 (0.0-0.8)  H  22  04:59    


 


Eos # (Auto)  0.0 K/mm3 (0.0-0.4)   22  04:59    


 


Baso # (Auto)  0.0 K/mm3 (0.0-0.1)   22  04:59    


 


Add Manual Diff  Complete   22  04:49    


 


Total Counted  100   22  04:49    


 


Seg Neutrophils %  76.9 % (40.0-70.0)  H  22  04:59    


 


Seg Neuts % (Manual)  83.0 % (40.0-70.0)  H  22  04:49    


 


Band Neutrophils %  2.0 %  22  04:49    


 


Lymphocytes % (Manual)  5.0 % (13.4-35.0)  L  22  04:49    


 


Reactive Lymphs % (Man)  0 %  22  04:49    


 


Monocytes % (Manual)  8.0 % (0.0-7.3)  H  22  04:49    


 


Eosinophils % (Manual)  0 % (0.0-4.3)   22  04:49    


 


Basophils % (Manual)  0 % (0.0-1.8)   22  04:49    


 


Metamyelocytes %  2.0 %  22  04:49    


 


Myelocytes %  0 %  22  04:49    


 


Promyelocytes %  0 %  22  04:49    


 


Blast Cells %  0 %  22  04:49    


 


Nucleated RBC %  Not Reportable   22  04:49    


 


Seg Neutrophils #  10.7 K/mm3 (1.8-7.7)  H  22  04:59    


 


Seg Neutrophils # Man  17.1 K/mm3 (1.8-7.7)  H  22  04:49    


 


Band Neutrophils #  0.4 K/mm3  22  04:49    


 


Lymphocytes # (Manual)  1.0 K/mm3 (1.2-5.4)  L  22  04:49    


 


Abs React Lymphs (Man)  0.0 K/mm3  22  04:49    


 


Monocytes # (Manual)  1.6 K/mm3 (0.0-0.8)  H  22  04:49    


 


Eosinophils # (Manual)  0.0 K/mm3 (0.0-0.4)   22  04:49    


 


Basophils # (Manual)  0.0 K/mm3 (0.0-0.1)   22  04:49    


 


Metamyelocytes #  0.4 K/mm3  22  04:49    


 


Myelocytes #  0.0 K/mm3  22  04:49    


 


Promyelocytes #  0.0 K/mm3  22  04:49    


 


Blast Cells #  0.0 K/mm3  22  04:49    


 


WBC Morphology  Not Reportable   22  04:49    


 


Hypersegmented Neuts  Not Reportable   22  04:49    


 


Hyposegmented Neuts  Rare   22  04:49    


 


Hypogranular Neuts  Not Reportable   22  04:49    


 


Smudge Cells  Not Reportable   22  04:49    


 


Toxic Granulation  Not Reportable   22  04:49    


 


Toxic Vacuolation  Not Reportable   22  04:49    


 


Dohle Bodies  Not Reportable   22  04:49    


 


Pelger-Huet Anomaly  Not Reportable   22  04:49    


 


Jamie Rods  Not Reportable   22  04:49    


 


Platelet Estimate  Consistent w auto   22  04:49    


 


Clumped Platelets  Not Reportable   22  04:49    


 


Plt Clumps, EDTA  Not Reportable   22  04:49    


 


Large Platelets  Rare   22  04:49    


 


Giant Platelets  Not Reportable   22  04:49    


 


Platelet Satelliting  Not Reportable   22  04:49    


 


Plt Morphology Comment  Not Reportable   22  04:49    


 


RBC Morphology  Not Reportable   22  04:49    


 


Dimorphic RBCs  Not Reportable   22  04:49    


 


Polychromasia  Not Reportable   22  04:49    


 


Hypochromasia  Not Reportable   22  04:49    


 


Poikilocytosis  Not Reportable   22  04:49    


 


Anisocytosis  Rare   22  04:49    


 


Microcytosis  Not Reportable   22  04:49    


 


Macrocytosis  Rare   22  04:49    


 


Spherocytes  Not Reportable   22  04:49    


 


Pappenheimer Bodies  Not Reportable   22  04:49    


 


Sickle Cells  Not Reportable   22  04:49    


 


Target Cells  Not Reportable   22  04:49    


 


Tear Drop Cells  Not Reportable   22  04:49    


 


Ovalocytes  Not Reportable   22  04:49    


 


Helmet Cells  Not Reportable   22  04:49    


 


Mccormick-Rotan Bodies  Not Reportable   22  04:49    


 


Cabot Rings  Not Reportable   22  04:49    


 


Eustis Cells  Not Reportable   22  04:49    


 


Bite Cells  Not Reportable   22  04:49    


 


Crenated Cell  Not Reportable   22  04:49    


 


Elliptocytes  Not Reportable   22  04:49    


 


Acanthocytes (Spur)  Not Reportable   22  04:49    


 


Rouleaux  Not Reportable   22  04:49    


 


Hemoglobin C Crystals  Not Reportable   22  04:49    


 


Schistocytes  Not Reportable   22  04:49    


 


Malaria parasites  Not Reportable   22  04:49    


 


Miguel Bodies  Not Reportable   22  04:49    


 


Hem Pathologist Commnt  No   22  04:49    


 


PT  13.4 Sec. (12.2-14.9)   22  13:30    


 


INR  0.92  (0.87-1.13)   22  13:30    


 


APTT  25.8 Sec. (24.2-36.6)   22  13:30    


 


Sodium  136 mmol/L (137-145)  L  22  04:59    


 


Potassium  3.6 mmol/L (3.6-5.0)   22  04:59    


 


Chloride  100.7 mmol/L ()   22  04:59    


 


Carbon Dioxide  28 mmol/L (22-30)   22  04:59    


 


Anion Gap  11 mmol/L  22  04:59    


 


BUN  6 mg/dL (9-20)  L  22  04:59    


 


Creatinine  0.6 mg/dL (0.8-1.3)  L  22  04:59    


 


Estimated GFR  > 60 ml/min  22  04:59    


 


BUN/Creatinine Ratio  10 %  22  04:59    


 


Glucose  102 mg/dL ()  H  22  04:59    


 


Lactic Acid  1.40 mmol/L (0.7-2.0)   22  13:30    


 


Calcium  7.7 mg/dL (8.4-10.2)  L  22  04:59    


 


Phosphorus  2.80 mg/dL (2.5-4.5)   22  05:17    


 


Magnesium  2.20 mg/dL (1.7-2.3)   22  05:17    


 


Total Bilirubin  0.60 mg/dL (0.1-1.2)   22  04:59    


 


Direct Bilirubin  < 0.2 mg/dL (0-0.2)   22  04:59    


 


Indirect Bilirubin  0.4 mg/dL  22  04:59    


 


AST  131 units/L (5-40)  H  22  04:59    


 


ALT  202 units/L (7-56)  H  22  04:59    


 


Alkaline Phosphatase  73 units/L ()   22  04:59    


 


Ammonia  37.0 umol/L (25-60)   22  04:49    


 


Total Creatine Kinase  4245 units/L ()  H  22  04:59    


 


Total Protein  4.9 g/dL (6.3-8.2)  L  22  04:59    


 


Albumin  2.3 g/dL (3.9-5)  L  22  04:59    


 


Albumin/Globulin Ratio  0.9 %  22  04:59    


 


Amylase  25 units/L ()  L  22  04:49    


 


Lipase  10 units/L (13-60)  L  22  04:49    


 


Coronavirus (PCR)  Negative  (Negative)   22  11:40    


 


Hepatitis A IgM Ab  Nonreactive  (NonReactive)   22  13:30    


 


Hep Bs Antigen  Non-reactive  (Negative)   22  13:30    


 


Hep B Core IgM Ab  Non-reactive  (NonReactive)   22  13:30    


 


Hepatitis C Antibody  Non-reactive  (NonReactive)   22  13:30    


 


HIV 1&2 Antibody Rapid  Non react  (Non React)   22  16:42    


 


HIV P24 Antigen  Non react  (Non React)   22  16:42    











Microbiology: 


Microbiology





05/10/22 Unknown   Leg - Left   Surgical Biopsy Culture - Preliminary








Mcwilliams/IV: 


                                        





Voiding Method                   Urinal











Active Medications





- Current Medications


Current Medications: 














Generic Name Dose Route Start Last Admin





  Trade Name Freq  PRN Reason Stop Dose Admin


 


Albuterol  2.5 mg  22 06:15 





  Albuterol 2.5 Mg/3 Ml Nebu  IH  





  Q3HRT PRN  





  Shortness Of Breath  


 


Albuterol/Ipratropium  1 ampul  22 08:00  22 07:42





  Ipratropium/Albuterol Sulfate 3 Ml Ampul.Neb  IH   1 ampul





  BIDRT CHERYL   Administration


 


Diphenhydramine HCl  25 mg  22 06:24  22 18:49





  Diphenhydramine 50 Mg/Ml Vial  IV   25 mg





  Q6H PRN   Administration





  Skin Irritation  


 


Docusate Sodium  100 mg  22 22:00  22 21:12





  Docusate Sodium 100 Mg Cap  PO   100 mg





  BID CHERYL   Administration


 


Famotidine  20 mg  22 10:00  22 21:12





  Famotidine 20 Mg Tab  PO   20 mg





  BID CHERYL   Administration


 


Gabapentin  100 mg  22 06:00  22 05:23





  Gabapentin 100 Mg Cap  PO  22 14:01  100 mg





  0600,1400 CHERYL   Administration


 


Gabapentin  300 mg  22 22:00 





  Gabapentin 300 Mg Cap  PO  22 22:01 





  [facility] CHERYL  


 


Gabapentin  300 mg  22 08:00 





  Gabapentin 300 Mg Cap  PO  





  TID@0800,1600,2200 [facility]  


 


Heparin Sodium (Porcine)  5,000 unit  22 10:00  22 21:12





  Heparin 5,000 Unit/1 Ml Vial  SUB-Q   5,000 unit





  Q12HR CHERYL   Administration


 


Hydralazine HCl  10 mg  22 18:31  22 18:50





  Hydralazine 20 Mg/1 Ml Inj  IV   10 mg





  Q3HR PRN   Administration





  Hypertension  


 


Hydromorphone HCl  0.5 mg  22 06:15  22 01:08





  Hydromorphone 1 Mg/1 Ml Inj  IV   0.5 mg





  Q3H PRN   Administration





  Pain , Severe (7-10)  


 


Sodium Chloride  1,000 mls @ 200 mls/hr  22 18:30  22 05:23





  Nacl 0.9% 1000 Ml  IV   200 mls/hr





  AS DIRECT CHERYL   Administration


 


Morphine Sulfate  2 mg  22 06:15  22 21:23





  Morphine 2 Mg/1 Ml Inj  IV   2 mg





  Q4H PRN   Administration





  Pain, Moderate (4-6)  


 


Ondansetron HCl  4 mg  22 06:15 





  Ondansetron 4 Mg/2 Ml Inj  IV  





  Q8H PRN  





  Nausea And Vomiting  


 


Sodium Chloride  10 ml  22 10:00  22 21:13





  Sodium Chloride 0.9% 10 Ml Flush Syringe  IV   10 ml





  BID CHERYL   Administration


 


Sodium Chloride  10 ml  22 06:15  22 01:09





  Sodium Chloride 0.9% 10 Ml Flush Syringe  IV   10 ml





  PRN PRN   Administration





  LINE FLUSH  


 


Valsartan  160 mg  22 22:00  22 09:33





  Valsartan 160mg Tab  PO   160 mg





  DAILY CHERYL   Administration














Nutrition/Malnutrition Assess





- Dietary Evaluation


Nutrition/Malnutrition Findings: 


                                        





Nutrition Notes                                            Start:  05/10/22 

16:45


Freq:                                                      Status: Active       




Protocol:                                                                       




 Document     22 12:23  HATTIE  (Rec: 22 12:43  HATTIE  [id number])


 Nutrition Notes


     Initial or Follow up                        Brief Note


     Current Diagnosis                           Hypertension


     Other Pertinent Diagnosis                   Rabdomyolysis, Liver Failure,


                                                 s/p L-LE 4 Compartment


                                                 Fasciotomy w/Debridem.


     Current Diet                                Regular Diet + Dietary


                                                 Supplements. (since B ).


     Height                                      5 ft 6 in


     Weight                                      86.183 kg


     Ideal Body Weight (kg)                      64.54


     BMI                                         30.7


     Weight change and time frame                No bnody weight change


                                                 reporterd in 2 days.


     Weight Status                               Obese


     Subjective/Other Information                RD consult for routine F/U on


                                                 dietary advancement.


                                                 Pt is now on PO diet, but no


                                                 reports available on Pt's PO


                                                 intake of meals at the time,


                                                 will assess at F/U.


                                                 Pt is on Room Air, O2


                                                 saturation @ 97%, according to


                                                 Physical Assessment History


                                                 notes.


                                                 Procedure on 05/10: Excisinal


                                                 debridement of muscle and soft


                                                 tissue from L-LE wound, and


                                                 woundVAC placement; Leg


                                                 fasciotomy lateral and medial


                                                 incisions, Well tolerated,


                                                 according to Operative Report.


     Percent of energy/protein needs met:        Prescribed Regular Diet


                                                 provides for energy/protein


                                                 needs (2,289 Kcal/89 g) during


                                                 LOS; additionally, Dietary


                                                 Supplements will support wound


                                                 healing processes with 190


                                                 Kcal and 5 g of protein.


     #1


      Nutrition Diagnosis                        Increased nutrient needs   (


                                                 specify in comment below)


      Comments:                                  Protein to support wound


                                                 healing processes.


      Diagnosis Progress(for reassessment        Continues


       documentation)                            


     Is patient on ventilator?                   No


     Is Patient Ambulatory and/or Out of Bed     No


     REE-([facility]-confined to bed)      [phone]


     Kcal/Kg value to use for calculation        19


     Approximate Energy Requirements Using       1637


      kcal/Kg                                    


     Calculation Used for Recommendations        Kcal/kg


     Additional Notes                            Protein: 1.25-1.5 g/Kg AdjBW;


                                                  g/day.


                                                 Fluids: 1 ml/Kcal, or as per


                                                 MD.


 Nutrition Intervention


     Change Diet Order:                          Continue Regular Diet.


     Add Supplement/Snack (indicate name/kcal    Continue 28.8 g pkt Aidan; BID


      /protein )                                 .


     Provides kCal:                              190


     Provides Protein (gm)                       5


     Goal #1                                     Support, through dietary


                                                 supplementation, wound healing


                                                 processes during LOS.


     Goal #2                                     Adjust the dietary


                                                 intervention to better serve


                                                 Pt's needs and clinical


                                                 conditions during LOS.


     Goal #3                                     Maintain body weight within +/


                                                 -3% of admission body weight


                                                 during LOS.


     Follow-Up By:                               22


     Additional Comments                         Continue monitoring food


                                                 tolerance, %PO intake of meals


                                                 , and BM.

## 2022-05-14 LAB
ALBUMIN SERPL-MCNC: 2.3 G/DL (ref 3.9–5)
ALT SERPL-CCNC: 153 UNITS/L (ref 7–56)
BASOPHILS # (AUTO): 0 K/MM3 (ref 0–0.1)
BASOPHILS NFR BLD AUTO: 0.3 % (ref 0–1.8)
BILIRUB DIRECT SERPL-MCNC: < 0.2 MG/DL (ref 0–0.2)
BUN SERPL-MCNC: 6 MG/DL (ref 9–20)
BUN/CREAT SERPL: 12 %
CALCIUM SERPL-MCNC: 8.3 MG/DL (ref 8.4–10.2)
EOSINOPHIL # BLD AUTO: 0 K/MM3 (ref 0–0.4)
EOSINOPHIL NFR BLD AUTO: 0.3 % (ref 0–4.3)
HCT VFR BLD CALC: 35 % (ref 35.5–45.6)
HEMOLYSIS INDEX: 2
HGB BLD-MCNC: 11.6 GM/DL (ref 11.8–15.2)
LYMPHOCYTES # BLD AUTO: 1.5 K/MM3 (ref 1.2–5.4)
LYMPHOCYTES NFR BLD AUTO: 11.3 % (ref 13.4–35)
MCHC RBC AUTO-ENTMCNC: 33 % (ref 32–34)
MCV RBC AUTO: 99 FL (ref 84–94)
MONOCYTES # (AUTO): 1 K/MM3 (ref 0–0.8)
MONOCYTES % (AUTO): 7.6 % (ref 0–7.3)
PLATELET # BLD: 455 K/MM3 (ref 140–440)
RBC # BLD AUTO: 3.55 M/MM3 (ref 3.65–5.03)

## 2022-05-14 RX ADMIN — HEPARIN SODIUM SCH UNIT: 5000 INJECTION, SOLUTION INTRAVENOUS; SUBCUTANEOUS at 09:32

## 2022-05-14 RX ADMIN — Medication SCH ML: at 21:26

## 2022-05-14 RX ADMIN — IPRATROPIUM BROMIDE AND ALBUTEROL SULFATE SCH AMPUL: .5; 3 SOLUTION RESPIRATORY (INHALATION) at 20:08

## 2022-05-14 RX ADMIN — GABAPENTIN SCH MG: 100 CAPSULE ORAL at 13:12

## 2022-05-14 RX ADMIN — VALSARTAN SCH MG: 160 TABLET, FILM COATED ORAL at 09:32

## 2022-05-14 RX ADMIN — MORPHINE SULFATE PRN MG: 2 INJECTION, SOLUTION INTRAMUSCULAR; INTRAVENOUS at 05:27

## 2022-05-14 RX ADMIN — FAMOTIDINE SCH MG: 20 TABLET ORAL at 21:23

## 2022-05-14 RX ADMIN — DOCUSATE SODIUM SCH MG: 100 CAPSULE, LIQUID FILLED ORAL at 09:32

## 2022-05-14 RX ADMIN — Medication SCH ML: at 09:32

## 2022-05-14 RX ADMIN — GABAPENTIN SCH MG: 300 CAPSULE ORAL at 21:22

## 2022-05-14 RX ADMIN — IPRATROPIUM BROMIDE AND ALBUTEROL SULFATE SCH AMPUL: .5; 3 SOLUTION RESPIRATORY (INHALATION) at 07:53

## 2022-05-14 RX ADMIN — HEPARIN SODIUM SCH UNIT: 5000 INJECTION, SOLUTION INTRAVENOUS; SUBCUTANEOUS at 21:23

## 2022-05-14 RX ADMIN — SODIUM CHLORIDE SCH MLS/HR: 0.9 INJECTION, SOLUTION INTRAVENOUS at 05:23

## 2022-05-14 RX ADMIN — MORPHINE SULFATE PRN MG: 2 INJECTION, SOLUTION INTRAMUSCULAR; INTRAVENOUS at 19:52

## 2022-05-14 RX ADMIN — DOCUSATE SODIUM SCH MG: 100 CAPSULE, LIQUID FILLED ORAL at 21:22

## 2022-05-14 RX ADMIN — GABAPENTIN SCH MG: 100 CAPSULE ORAL at 05:23

## 2022-05-14 RX ADMIN — FAMOTIDINE SCH MG: 20 TABLET ORAL at 09:32

## 2022-05-14 RX ADMIN — SODIUM CHLORIDE SCH MLS/HR: 0.9 INJECTION, SOLUTION INTRAVENOUS at 21:33

## 2022-05-14 NOTE — PROGRESS NOTE
Assessment and Plan


Assessment and plan: 


This is a 36 year old  transgendered female undergoing gender transformation 

with silicone injection to buttocks ([place] ) and breast implants, heavy 

drinker on the weekends (bottle of vodka) who presented to [facility] on 5/3 because 

of adverse drug reaction after reportedly self administration of  

rocephin and PCN for sore throat. Patient reportedly collapsed while checking in

to the emergency department, has complains of severe 10 out of 10 pain in left 

thigh and has noticeable mottling of skin in thigh and left lower abdomen.  

Patient underwent a CT of the left leg which showed possible cellulitis with no 

drainable fluid collection and was started on empiric antibiotics.  Patient 

admitted to the hospital service with diagnosis of acute liver failure, 

rhabdomyolysis, s/p 4 compartment fasciotomy to left lower extremity





Acute liver failure 


-GI consulted, appreciate recommendations


-Per GI: Suspect due to rhabdomyolysis given elevated creatinine kinase levels


   -If ALT continues to rise recommend MRCP


-Abdominal ultrasound showed mild diffuse biliary dilation, obstruction to 

lesion of the distal common bile duct cannot be excluded consider MRCP, no 

evidence of gallstones within the gallbladder


-PPI


-Regular diet


-BR: colace


-Trend LFTs





Compartment syndrome to LLE


-Vascular surgery consulted, appreciate recommendations


-s/p self admin of IM abx to midthigh


-c/o pain, decreased sensation, pulses were dopplarable, paralysis, mottled skin


-s/p 4 compartment tracheotomy on 5/3 with vascular surgery


-Bilateral lower extremity Doppler ultrasound showed no DVT


-Left lower extremity CT with contrast showed appearance of posterior soft 

tissue complex cellulitis with small subcutaneous lymph nodes, no drainable 

fluid collections present, contusion could have similar appearance


-Bilateral lower extremity arterial duplex showed no significant lower extremity

peripheral artery disease


-Normal ABIs





Peripheral neuropathy


-Avoid delirium


-Reorientation as needed


-Maintain sleep-wake cycle


-As needed analgesia


-Neurology consulted, appreciate recommendations


-Gabapentin 





HTN


-Blood pressure monitoring per protocol


-Valsartan


-PRN hydralazine





Rhabdomyolysis





acute kidney injury with vasomotor nephropathy now resolved


-Nephrology consulted, appreciate recommendations


-Strict intake and output


-Renally dose medications


-Avoid nephrotoxic medications


-MIVF


-Sodium bicarb


-Trend BMP, CK








Hospital course to date:





5/3: Venous ultrasound shows no DVT, Vascular surgery consulted who performed a 

fasciotomy and plan to transfer patient to [facility] post intervention.  Neurology 

consulted





: Transfer to ICU, GI and neurology consulted.





: Patient will be transferred back to the floor from ICU.  Her liver enzymes 

and creatinine are trending down.  Will be started on gabapentin per neurology 

recommendations and antibiotics discontinued.





: Continue supportive care.  CPK slowly improving.  Vascular plans for 

closure of fasciotomy early next week.  PT OT evaluation and treatment closure 

done.  Continue wound management.  No evidence of withdrawal noted at this time.

 Continue to monitor LFTs which are also slightly improving.  Plan discussed in 

detail with the patient who verbalized understanding





: Patient seen and examined clinically stable continues to show some 

improvement.  Closure of fasciotomy planned for tomorrow.  Renal function is 

improving.  CK ordered and pending





: Patient seen and examined, wbc mildly elevated likely secondary to 

steroids, will change to po for two additional days and stop, benefit probably 

already achieved, continue wound dressing, sensory function improved, awaiting 

wound vac and closure. Patient advised on clinically progress. 





: Patient seen and examined awaiting her vascular surgery for closure of 

fasciotomy.  CK is decreasing less than 9000 today.  We will continue aggressive

IV fluids until we have the resolution below 5000.  LFTs on the downward trend 

also.  Leukocytosis appears to have peaked steroids was changed to oral for 2 

additional days today is 1 of 2..  Plan of care discussed in detail with the 

patient who verbalized understanding.





5/10:  Left lower extremity compartment syndrome status post fasciotomies.  Cau

se is still unclear and may represent some allergic reaction.  Motor function is

unchanged from original exam.  Sensory function has improved with full sensation

of the foot.vascular surgery to place wound vacs on the leg today.  CK improved 

to the 8000 range today





: Patient underwent excisional debridement of muscle and soft tissue from 

left leg wound yesterday.  Patient also had wound VAC placement left leg 

fasciotomy incisions.  The patient had moderate amount of superficial necrotic 

muscle in the anterior lateral compartments with minimal amount of superficial 

necrotic muscle in the posterior compartment.  Follow-up culture results.  CK 

levels have improved to 6900 range.  AST/ALT continue to trend downward.





: I discussed the case with Dr. Mckeon who recommends home wound VAC as 

well as physical therapy.  I also discussed these needs with case management who

was attempting to arrange for discharge planning.  CK continues to improve with 

levels in the 4000 range.





: Still awaiting for home wound VAC arrangements to be made.  Leukocytosis 

is much improved down to 13,900 today.  LFTs continue to trend down to normal 

range.  CK levels still in the 4000 range.  Nephrology has signed off.  

Anticipate discharge later today or in a.m.





:  Still awaiting for home wound VAC arrangements to be made.  Laboratories 

continue to include including LFTs, CK and leukocytosis.





History


Interval history: 





No new issues overnight





Hospitalist Physical





- Constitutional


Vitals: 


                                        











Temp Pulse Resp BP Pulse Ox


 


 99.4 F   96 H  22   118/64   99 


 


 22 11:33  22 11:33  22 11:33  22 11:33  22 11:33











General appearance: Present: no acute distress





- EENT


Eyes: Present: PERRL, EOM intact


ENT: hearing intact, clear oral mucosa, dentition normal





- Neck


Neck: Present: supple, normal ROM





- Respiratory


Respiratory effort: normal


Respiratory: bilateral: CTA





- Cardiovascular


Rhythm: regular


Heart Sounds: Present: S1 & S2.  Absent: gallop, rub





- Extremities


Extremities: no ischemia, No edema, Full ROM





- Abdominal


General gastrointestinal: soft, non-tender, non-distended, normal bowel sounds





- Integumentary


Integumentary: Present: clear, warm, dry





- Neurologic


Neurologic: CNII-XII intact, moves all extremities





Results





- Labs


CBC & Chem 7: 


                                 22 05:17





                                 22 05:17


Labs: 


                             Laboratory Last Values











WBC  13.6 K/mm3 (4.5-11.0)  H  22  05:17    


 


RBC  3.55 M/mm3 (3.65-5.03)  L  22  05:17    


 


Hgb  11.6 gm/dl (11.8-15.2)  L  22  05:17    


 


Hct  35.0 % (35.5-45.6)  L  22  05:17    


 


MCV  99 fl (84-94)  H  22  05:17    


 


MCH  33 pg (28-32)  H  22  05:17    


 


MCHC  33 % (32-34)   22  05:17    


 


RDW  13.2 % (13.2-15.2)   22  05:17    


 


Plt Count  455 K/mm3 (140-440)  H  22  05:17    


 


Lymph % (Auto)  11.3 % (13.4-35.0)  L  22  05:17    


 


Mono % (Auto)  7.6 % (0.0-7.3)  H  22  05:17    


 


Eos % (Auto)  0.3 % (0.0-4.3)   22  05:17    


 


Baso % (Auto)  0.3 % (0.0-1.8)   22  05:17    


 


Lymph # (Auto)  1.5 K/mm3 (1.2-5.4)   22  05:17    


 


Mono # (Auto)  1.0 K/mm3 (0.0-0.8)  H  22  05:17    


 


Eos # (Auto)  0.0 K/mm3 (0.0-0.4)   22  05:17    


 


Baso # (Auto)  0.0 K/mm3 (0.0-0.1)   22  05:17    


 


Add Manual Diff  Complete   22  04:49    


 


Total Counted  100   22  04:49    


 


Seg Neutrophils %  80.5 % (40.0-70.0)  H  22  05:17    


 


Seg Neuts % (Manual)  83.0 % (40.0-70.0)  H  22  04:49    


 


Band Neutrophils %  2.0 %  22  04:49    


 


Lymphocytes % (Manual)  5.0 % (13.4-35.0)  L  22  04:49    


 


Reactive Lymphs % (Man)  0 %  22  04:49    


 


Monocytes % (Manual)  8.0 % (0.0-7.3)  H  22  04:49    


 


Eosinophils % (Manual)  0 % (0.0-4.3)   22  04:49    


 


Basophils % (Manual)  0 % (0.0-1.8)   22  04:49    


 


Metamyelocytes %  2.0 %  22  04:49    


 


Myelocytes %  0 %  22  04:49    


 


Promyelocytes %  0 %  22  04:49    


 


Blast Cells %  0 %  22  04:49    


 


Nucleated RBC %  Not Reportable   22  04:49    


 


Seg Neutrophils #  11.0 K/mm3 (1.8-7.7)  H  22  05:17    


 


Seg Neutrophils # Man  17.1 K/mm3 (1.8-7.7)  H  22  04:49    


 


Band Neutrophils #  0.4 K/mm3  22  04:49    


 


Lymphocytes # (Manual)  1.0 K/mm3 (1.2-5.4)  L  22  04:49    


 


Abs React Lymphs (Man)  0.0 K/mm3  22  04:49    


 


Monocytes # (Manual)  1.6 K/mm3 (0.0-0.8)  H  22  04:49    


 


Eosinophils # (Manual)  0.0 K/mm3 (0.0-0.4)   22  04:49    


 


Basophils # (Manual)  0.0 K/mm3 (0.0-0.1)   22  04:49    


 


Metamyelocytes #  0.4 K/mm3  22  04:49    


 


Myelocytes #  0.0 K/mm3  22  04:49    


 


Promyelocytes #  0.0 K/mm3  22  04:49    


 


Blast Cells #  0.0 K/mm3  22  04:49    


 


WBC Morphology  Not Reportable   22  04:49    


 


Hypersegmented Neuts  Not Reportable   22  04:49    


 


Hyposegmented Neuts  Rare   22  04:49    


 


Hypogranular Neuts  Not Reportable   22  04:49    


 


Smudge Cells  Not Reportable   22  04:49    


 


Toxic Granulation  Not Reportable   22  04:49    


 


Toxic Vacuolation  Not Reportable   22  04:49    


 


Dohle Bodies  Not Reportable   22  04:49    


 


Pelger-Huet Anomaly  Not Reportable   22  04:49    


 


Jamie Rods  Not Reportable   22  04:49    


 


Platelet Estimate  Consistent w auto   22  04:49    


 


Clumped Platelets  Not Reportable   22  04:49    


 


Plt Clumps, EDTA  Not Reportable   22  04:49    


 


Large Platelets  Rare   22  04:49    


 


Giant Platelets  Not Reportable   22  04:49    


 


Platelet Satelliting  Not Reportable   22  04:49    


 


Plt Morphology Comment  Not Reportable   22  04:49    


 


RBC Morphology  Not Reportable   22  04:49    


 


Dimorphic RBCs  Not Reportable   22  04:49    


 


Polychromasia  Not Reportable   22  04:49    


 


Hypochromasia  Not Reportable   22  04:49    


 


Poikilocytosis  Not Reportable   22  04:49    


 


Anisocytosis  Rare   22  04:49    


 


Microcytosis  Not Reportable   22  04:49    


 


Macrocytosis  Rare   22  04:49    


 


Spherocytes  Not Reportable   22  04:49    


 


Pappenheimer Bodies  Not Reportable   22  04:49    


 


Sickle Cells  Not Reportable   22  04:49    


 


Target Cells  Not Reportable   22  04:49    


 


Tear Drop Cells  Not Reportable   22  04:49    


 


Ovalocytes  Not Reportable   22  04:49    


 


Helmet Cells  Not Reportable   22  04:49    


 


Mccormick-Kenai Bodies  Not Reportable   22  04:49    


 


Cabot Rings  Not Reportable   22  04:49    


 


Cornel Cells  Not Reportable   22  04:49    


 


Bite Cells  Not Reportable   22  04:49    


 


Crenated Cell  Not Reportable   22  04:49    


 


Elliptocytes  Not Reportable   22  04:49    


 


Acanthocytes (Spur)  Not Reportable   22  04:49    


 


Rouleaux  Not Reportable   22  04:49    


 


Hemoglobin C Crystals  Not Reportable   22  04:49    


 


Schistocytes  Not Reportable   22  04:49    


 


Malaria parasites  Not Reportable   22  04:49    


 


Miguel Bodies  Not Reportable   22  04:49    


 


Hem Pathologist Commnt  No   22  04:49    


 


PT  13.4 Sec. (12.2-14.9)   22  13:30    


 


INR  0.92  (0.87-1.13)   22  13:30    


 


APTT  25.8 Sec. (24.2-36.6)   22  13:30    


 


Sodium  136 mmol/L (137-145)  L  22  05:17    


 


Potassium  3.5 mmol/L (3.6-5.0)  L  22  05:17    


 


Chloride  98.9 mmol/L ()   22  05:17    


 


Carbon Dioxide  26 mmol/L (22-30)   22  05:17    


 


Anion Gap  15 mmol/L  22  05:17    


 


BUN  6 mg/dL (9-20)  L  22  05:17    


 


Creatinine  0.5 mg/dL (0.8-1.3)  L  22  05:17    


 


Estimated GFR  > 60 ml/min  22  05:17    


 


BUN/Creatinine Ratio  12 %  22  05:17    


 


Glucose  98 mg/dL ()   22  05:17    


 


Lactic Acid  1.40 mmol/L (0.7-2.0)   22  13:30    


 


Calcium  8.3 mg/dL (8.4-10.2)  L  22  05:17    


 


Phosphorus  2.80 mg/dL (2.5-4.5)   22  05:17    


 


Magnesium  2.20 mg/dL (1.7-2.3)   22  05:17    


 


Total Bilirubin  0.80 mg/dL (0.1-1.2)   22  05:17    


 


Direct Bilirubin  < 0.2 mg/dL (0-0.2)   22  05:17    


 


Indirect Bilirubin  0.6 mg/dL  22  05:17    


 


AST  86 units/L (5-40)  H  22  05:17    


 


ALT  153 units/L (7-56)  H  22  05:17    


 


Alkaline Phosphatase  70 units/L ()   22  05:17    


 


Ammonia  37.0 umol/L (25-60)   22  04:49    


 


Total Creatine Kinase  3203 units/L ()  H  22  05:17    


 


Total Protein  5.1 g/dL (6.3-8.2)  L  22  05:17    


 


Albumin  2.3 g/dL (3.9-5)  L  22  05:17    


 


Albumin/Globulin Ratio  0.8 %  22  05:17    


 


Amylase  25 units/L ()  L  22  04:49    


 


Lipase  10 units/L (13-60)  L  22  04:49    


 


Coronavirus (PCR)  Negative  (Negative)   22  11:40    


 


Hepatitis A IgM Ab  Nonreactive  (NonReactive)   22  13:30    


 


Hep Bs Antigen  Non-reactive  (Negative)   22  13:30    


 


Hep B Core IgM Ab  Non-reactive  (NonReactive)   22  13:30    


 


Hepatitis C Antibody  Non-reactive  (NonReactive)   22  13:30    


 


HIV 1&2 Antibody Rapid  Non react  (Non React)   22  16:42    


 


HIV P24 Antigen  Non react  (Non React)   22  16:42    











Microbiology: 


Microbiology





05/10/22 Unknown   Leg - Left   Surgical Biopsy Culture - Preliminary








Mcwilliams/IV: 


                                        





Voiding Method                   Urinal











Active Medications





- Current Medications


Current Medications: 














Generic Name Dose Route Start Last Admin





  Trade Name Freq  PRN Reason Stop Dose Admin


 


Albuterol  2.5 mg  22 06:15 





  Albuterol 2.5 Mg/3 Ml Nebu  IH  





  Q3HRT PRN  





  Shortness Of Breath  


 


Albuterol/Ipratropium  1 ampul  22 08:00  22 07:53





  Ipratropium/Albuterol Sulfate 3 Ml Ampul.Neb  IH   1 ampul





  BIDRT CHERYL   Administration


 


Diphenhydramine HCl  25 mg  22 06:24  22 18:49





  Diphenhydramine 50 Mg/Ml Vial  IV   25 mg





  Q6H PRN   Administration





  Skin Irritation  


 


Docusate Sodium  100 mg  22 22:00  22 09:32





  Docusate Sodium 100 Mg Cap  PO   100 mg





  BID CHERYL   Administration


 


Famotidine  20 mg  22 10:00  22 09:32





  Famotidine 20 Mg Tab  PO   20 mg





  BID CHERYL   Administration


 


Gabapentin  100 mg  22 06:00  22 05:23





  Gabapentin 100 Mg Cap  PO  22 14:01  100 mg





  0600,1400 CHERYL   Administration


 


Gabapentin  300 mg  22 22:00  22 21:14





  Gabapentin 300 Mg Cap  PO  22 22:01  300 mg





  QHS CHERYL   Administration


 


Gabapentin  300 mg  22 08:00 





  Gabapentin 300 Mg Cap  PO  





  TID@0800,1600,2200 CHERYL  


 


Heparin Sodium (Porcine)  5,000 unit  22 10:00  22 09:32





  Heparin 5,000 Unit/1 Ml Vial  SUB-Q   5,000 unit





  Q12HR CHERYL   Administration


 


Hydralazine HCl  10 mg  22 18:31  22 18:50





  Hydralazine 20 Mg/1 Ml Inj  IV   10 mg





  Q3HR PRN   Administration





  Hypertension  


 


Hydromorphone HCl  0.5 mg  22 06:15  22 01:08





  Hydromorphone 1 Mg/1 Ml Inj  IV   0.5 mg





  Q3H PRN   Administration





  Pain , Severe (7-10)  


 


Sodium Chloride  1,000 mls @ 200 mls/hr  22 18:30  22 05:23





  Nacl 0.9% 1000 Ml  IV   200 mls/hr





  AS DIRECT CHERYL   Administration


 


Morphine Sulfate  2 mg  22 06:15  22 05:27





  Morphine 2 Mg/1 Ml Inj  IV   2 mg





  Q4H PRN   Administration





  Pain, Moderate (4-6)  


 


Ondansetron HCl  4 mg  22 06:15 





  Ondansetron 4 Mg/2 Ml Inj  IV  





  Q8H PRN  





  Nausea And Vomiting  


 


Sodium Chloride  10 ml  22 10:00  22 09:32





  Sodium Chloride 0.9% 10 Ml Flush Syringe  IV   10 ml





  BID CHERYL   Administration


 


Sodium Chloride  10 ml  22 06:15  22 01:09





  Sodium Chloride 0.9% 10 Ml Flush Syringe  IV   10 ml





  PRN PRN   Administration





  LINE FLUSH  


 


Valsartan  160 mg  22 22:00  22 09:32





  Valsartan 160mg Tab  PO   160 mg





  DAILY CHERYL   Administration














Nutrition/Malnutrition Assess





- Dietary Evaluation


Nutrition/Malnutrition Findings: 


                                        





Nutrition Notes                                            Start:  05/10/22 

16:45


Freq:                                                      Status: Active       




Protocol:                                                                       




 Document     22 12:23  HATTIE  (Rec: 22 12:43  HATTIE  [id number])


 Nutrition Notes


     Initial or Follow up                        Brief Note


     Current Diagnosis                           Hypertension


     Other Pertinent Diagnosis                   Rabdomyolysis, Liver Failure,


                                                 s/p L-LE 4 Compartment


                                                 Fasciotomy w/Debridem.


     Current Diet                                Regular Diet + Dietary


                                                 Supplements. (since B ).


     Height                                      5 ft 6 in


     Weight                                      86.183 kg


     Ideal Body Weight (kg)                      64.54


     BMI                                         30.7


     Weight change and time frame                No bnody weight change


                                                 reporterd in 2 days.


     Weight Status                               Obese


     Subjective/Other Information                RD consult for routine F/U on


                                                 dietary advancement.


                                                 Pt is now on PO diet, but no


                                                 reports available on Pt's PO


                                                 intake of meals at the time,


                                                 will assess at F/U.


                                                 Pt is on Room Air, O2


                                                 saturation @ 97%, according to


                                                 Physical Assessment History


                                                 notes.


                                                 Procedure on 05/10: Excisinal


                                                 debridement of muscle and soft


                                                 tissue from L-LE wound, and


                                                 woundVAC placement; Leg


                                                 fasciotomy lateral and medial


                                                 incisions, Well tolerated,


                                                 according to Operative Report.


     Percent of energy/protein needs met:        Prescribed Regular Diet


                                                 provides for energy/protein


                                                 needs (2,289 Kcal/89 g) during


                                                 LOS; additionally, Dietary


                                                 Supplements will support wound


                                                 healing processes with 190


                                                 Kcal and 5 g of protein.


     #1


      Nutrition Diagnosis                        Increased nutrient needs   (


                                                 specify in comment below)


      Comments:                                  Protein to support wound


                                                 healing processes.


      Diagnosis Progress(for reassessment        Continues


       documentation)                            


     Is patient on ventilator?                   No


     Is Patient Ambulatory and/or Out of Bed     No


     REE-([facility]-confined to bed)      [phone]


     Kcal/Kg value to use for calculation        19


     Approximate Energy Requirements Using       1637


      kcal/Kg                                    


     Calculation Used for Recommendations        Kcal/kg


     Additional Notes                            Protein: 1.25-1.5 g/Kg AdjBW;


                                                  g/day.


                                                 Fluids: 1 ml/Kcal, or as per


                                                 MD.


 Nutrition Intervention


     Change Diet Order:                          Continue Regular Diet.


     Add Supplement/Snack (indicate name/kcal    Continue 28.8 g pkt Aidan; BID


      /protein )                                 .


     Provides kCal:                              190


     Provides Protein (gm)                       5


     Goal #1                                     Support, through dietary


                                                 supplementation, wound healing


                                                 processes during LOS.


     Goal #2                                     Adjust the dietary


                                                 intervention to better serve


                                                 Pt's needs and clinical


                                                 conditions during LOS.


     Goal #3                                     Maintain body weight within +/


                                                 -3% of admission body weight


                                                 during LOS.


     Follow-Up By:                               22


     Additional Comments                         Continue monitoring food


                                                 tolerance, %PO intake of meals


                                                 , and BM.

## 2022-05-15 LAB
ALBUMIN SERPL-MCNC: 2.2 G/DL (ref 3.9–5)
ALT SERPL-CCNC: 134 UNITS/L (ref 7–56)
BILIRUB DIRECT SERPL-MCNC: < 0.2 MG/DL (ref 0–0.2)
BUN SERPL-MCNC: 6 MG/DL (ref 9–20)
BUN/CREAT SERPL: 12 %
CALCIUM SERPL-MCNC: 8.1 MG/DL (ref 8.4–10.2)
HEMOLYSIS INDEX: 3

## 2022-05-15 RX ADMIN — GABAPENTIN SCH MG: 100 CAPSULE ORAL at 13:40

## 2022-05-15 RX ADMIN — IPRATROPIUM BROMIDE AND ALBUTEROL SULFATE SCH AMPUL: .5; 3 SOLUTION RESPIRATORY (INHALATION) at 21:24

## 2022-05-15 RX ADMIN — VALSARTAN SCH MG: 160 TABLET, FILM COATED ORAL at 10:17

## 2022-05-15 RX ADMIN — SODIUM CHLORIDE SCH MLS/HR: 0.9 INJECTION, SOLUTION INTRAVENOUS at 04:20

## 2022-05-15 RX ADMIN — HYDROMORPHONE HYDROCHLORIDE PRN MG: 1 INJECTION, SOLUTION INTRAMUSCULAR; INTRAVENOUS; SUBCUTANEOUS at 04:15

## 2022-05-15 RX ADMIN — IPRATROPIUM BROMIDE AND ALBUTEROL SULFATE SCH AMPUL: .5; 3 SOLUTION RESPIRATORY (INHALATION) at 08:18

## 2022-05-15 RX ADMIN — FAMOTIDINE SCH MG: 20 TABLET ORAL at 10:17

## 2022-05-15 RX ADMIN — DOCUSATE SODIUM SCH MG: 100 CAPSULE, LIQUID FILLED ORAL at 10:17

## 2022-05-15 RX ADMIN — FAMOTIDINE SCH MG: 20 TABLET ORAL at 21:09

## 2022-05-15 RX ADMIN — Medication SCH ML: at 10:17

## 2022-05-15 RX ADMIN — HEPARIN SODIUM SCH UNIT: 5000 INJECTION, SOLUTION INTRAVENOUS; SUBCUTANEOUS at 21:08

## 2022-05-15 RX ADMIN — Medication SCH ML: at 21:10

## 2022-05-15 RX ADMIN — DOCUSATE SODIUM SCH MG: 100 CAPSULE, LIQUID FILLED ORAL at 21:09

## 2022-05-15 RX ADMIN — GABAPENTIN SCH MG: 100 CAPSULE ORAL at 05:17

## 2022-05-15 RX ADMIN — HEPARIN SODIUM SCH UNIT: 5000 INJECTION, SOLUTION INTRAVENOUS; SUBCUTANEOUS at 10:17

## 2022-05-15 RX ADMIN — GABAPENTIN SCH MG: 300 CAPSULE ORAL at 21:09

## 2022-05-15 RX ADMIN — MORPHINE SULFATE PRN MG: 2 INJECTION, SOLUTION INTRAMUSCULAR; INTRAVENOUS at 14:39

## 2022-05-15 NOTE — PROGRESS NOTE
Assessment and Plan


Assessment and plan: 


This is a 36 year old  transgendered female undergoing gender transformation 

with silicone injection to buttocks ([place] ) and breast implants, heavy 

drinker on the weekends (bottle of vodka) who presented to [facility] on 5/3 because 

of adverse drug reaction after reportedly self administration of  

rocephin and PCN for sore throat. Patient reportedly collapsed while checking in

to the emergency department, has complains of severe 10 out of 10 pain in left 

thigh and has noticeable mottling of skin in thigh and left lower abdomen.  

Patient underwent a CT of the left leg which showed possible cellulitis with no 

drainable fluid collection and was started on empiric antibiotics.  Patient 

admitted to the hospital service with diagnosis of acute liver failure, 

rhabdomyolysis, s/p 4 compartment fasciotomy to left lower extremity





Acute liver failure 


-GI consulted, appreciate recommendations


-Per GI: Suspect due to rhabdomyolysis given elevated creatinine kinase levels


   -If ALT continues to rise recommend MRCP


-Abdominal ultrasound showed mild diffuse biliary dilation, obstruction to 

lesion of the distal common bile duct cannot be excluded consider MRCP, no 

evidence of gallstones within the gallbladder


-PPI


-Regular diet


-BR: colace


-Trend LFTs





Compartment syndrome to LLE


-Vascular surgery consulted, appreciate recommendations


-s/p self admin of IM abx to midthigh


-c/o pain, decreased sensation, pulses were dopplarable, paralysis, mottled skin


-s/p 4 compartment tracheotomy on 5/3 with vascular surgery


-Bilateral lower extremity Doppler ultrasound showed no DVT


-Left lower extremity CT with contrast showed appearance of posterior soft 

tissue complex cellulitis with small subcutaneous lymph nodes, no drainable 

fluid collections present, contusion could have similar appearance


-Bilateral lower extremity arterial duplex showed no significant lower extremity

peripheral artery disease


-Normal ABIs





Peripheral neuropathy


-Avoid delirium


-Reorientation as needed


-Maintain sleep-wake cycle


-As needed analgesia


-Neurology consulted, appreciate recommendations


-Gabapentin 





HTN


-Blood pressure monitoring per protocol


-Valsartan


-PRN hydralazine





Rhabdomyolysis





acute kidney injury with vasomotor nephropathy now resolved


-Nephrology consulted, appreciate recommendations


-Strict intake and output


-Renally dose medications


-Avoid nephrotoxic medications


-MIVF


-Sodium bicarb


-Trend BMP, CK








Hospital course to date:





5/3: Venous ultrasound shows no DVT, Vascular surgery consulted who performed a 

fasciotomy and plan to transfer patient to [facility] post intervention.  Neurology 

consulted





: Transfer to ICU, GI and neurology consulted.





: Patient will be transferred back to the floor from ICU.  Her liver enzymes 

and creatinine are trending down.  Will be started on gabapentin per neurology 

recommendations and antibiotics discontinued.





: Continue supportive care.  CPK slowly improving.  Vascular plans for 

closure of fasciotomy early next week.  PT OT evaluation and treatment closure 

done.  Continue wound management.  No evidence of withdrawal noted at this time.

 Continue to monitor LFTs which are also slightly improving.  Plan discussed in 

detail with the patient who verbalized understanding





: Patient seen and examined clinically stable continues to show some 

improvement.  Closure of fasciotomy planned for tomorrow.  Renal function is 

improving.  CK ordered and pending





: Patient seen and examined, wbc mildly elevated likely secondary to 

steroids, will change to po for two additional days and stop, benefit probably 

already achieved, continue wound dressing, sensory function improved, awaiting 

wound vac and closure. Patient advised on clinically progress. 





: Patient seen and examined awaiting her vascular surgery for closure of 

fasciotomy.  CK is decreasing less than 9000 today.  We will continue aggressive

IV fluids until we have the resolution below 5000.  LFTs on the downward trend 

also.  Leukocytosis appears to have peaked steroids was changed to oral for 2 

additional days today is 1 of 2..  Plan of care discussed in detail with the 

patient who verbalized understanding.





5/10:  Left lower extremity compartment syndrome status post fasciotomies.  Cau

se is still unclear and may represent some allergic reaction.  Motor function is

unchanged from original exam.  Sensory function has improved with full sensation

of the foot.vascular surgery to place wound vacs on the leg today.  CK improved 

to the 8000 range today





: Patient underwent excisional debridement of muscle and soft tissue from 

left leg wound yesterday.  Patient also had wound VAC placement left leg 

fasciotomy incisions.  The patient had moderate amount of superficial necrotic 

muscle in the anterior lateral compartments with minimal amount of superficial 

necrotic muscle in the posterior compartment.  Follow-up culture results.  CK 

levels have improved to 6900 range.  AST/ALT continue to trend downward.





: I discussed the case with Dr. Mckeon who recommends home wound VAC as 

well as physical therapy.  I also discussed these needs with case management who

was attempting to arrange for discharge planning.  CK continues to improve with 

levels in the 4000 range.





: Still awaiting for home wound VAC arrangements to be made.  Leukocytosis 

is much improved down to 13,900 today.  LFTs continue to trend down to normal 

range.  CK levels still in the 4000 range.  Nephrology has signed off.  

Anticipate discharge later today or in a.m.





:  Still awaiting for home wound VAC arrangements to be made.  Laboratories 

continue to include including LFTs, CK and leukocytosis.





5/15:  Still awaiting for home wound VAC arrangements to be made.





History


Interval history: 





No new issues overnight





Hospitalist Physical





- Constitutional


Vitals: 


                                        











Temp Pulse Resp BP Pulse Ox


 


 98.5 F   93 H  18   120/70   97 


 


 05/15/22 06:22  05/15/22 08:19  05/15/22 08:19  05/15/22 10:17  05/15/22 06:22











General appearance: Present: no acute distress





- EENT


Eyes: Present: PERRL, EOM intact


ENT: hearing intact, clear oral mucosa, dentition normal





- Neck


Neck: Present: supple, normal ROM





- Respiratory


Respiratory effort: normal


Respiratory: bilateral: CTA





- Cardiovascular


Rhythm: regular


Heart Sounds: Present: S1 & S2.  Absent: gallop, rub





- Extremities


Extremities: no ischemia, No edema, Full ROM





- Abdominal


General gastrointestinal: soft, non-tender, non-distended, normal bowel sounds





- Integumentary


Integumentary: Present: clear, warm, dry





- Neurologic


Neurologic: CNII-XII intact, moves all extremities





Results





- Labs


CBC & Chem 7: 


                                 22 05:17





                                 05/15/22 05:07


Labs: 


                             Laboratory Last Values











WBC  13.6 K/mm3 (4.5-11.0)  H  22  05:17    


 


RBC  3.55 M/mm3 (3.65-5.03)  L  22  05:17    


 


Hgb  11.6 gm/dl (11.8-15.2)  L  22  05:17    


 


Hct  35.0 % (35.5-45.6)  L  22  05:17    


 


MCV  99 fl (84-94)  H  22  05:17    


 


MCH  33 pg (28-32)  H  22  05:17    


 


MCHC  33 % (32-34)   22  05:17    


 


RDW  13.2 % (13.2-15.2)   22  05:17    


 


Plt Count  455 K/mm3 (140-440)  H  22  05:17    


 


Lymph % (Auto)  11.3 % (13.4-35.0)  L  22  05:17    


 


Mono % (Auto)  7.6 % (0.0-7.3)  H  22  05:17    


 


Eos % (Auto)  0.3 % (0.0-4.3)   22  05:17    


 


Baso % (Auto)  0.3 % (0.0-1.8)   22  05:17    


 


Lymph # (Auto)  1.5 K/mm3 (1.2-5.4)   22  05:17    


 


Mono # (Auto)  1.0 K/mm3 (0.0-0.8)  H  22  05:17    


 


Eos # (Auto)  0.0 K/mm3 (0.0-0.4)   22  05:17    


 


Baso # (Auto)  0.0 K/mm3 (0.0-0.1)   22  05:17    


 


Add Manual Diff  Complete   22  04:49    


 


Total Counted  100   22  04:49    


 


Seg Neutrophils %  80.5 % (40.0-70.0)  H  22  05:17    


 


Seg Neuts % (Manual)  83.0 % (40.0-70.0)  H  22  04:49    


 


Band Neutrophils %  2.0 %  22  04:49    


 


Lymphocytes % (Manual)  5.0 % (13.4-35.0)  L  22  04:49    


 


Reactive Lymphs % (Man)  0 %  22  04:49    


 


Monocytes % (Manual)  8.0 % (0.0-7.3)  H  22  04:49    


 


Eosinophils % (Manual)  0 % (0.0-4.3)   22  04:49    


 


Basophils % (Manual)  0 % (0.0-1.8)   22  04:49    


 


Metamyelocytes %  2.0 %  22  04:49    


 


Myelocytes %  0 %  22  04:49    


 


Promyelocytes %  0 %  22  04:49    


 


Blast Cells %  0 %  22  04:49    


 


Nucleated RBC %  Not Reportable   22  04:49    


 


Seg Neutrophils #  11.0 K/mm3 (1.8-7.7)  H  22  05:17    


 


Seg Neutrophils # Man  17.1 K/mm3 (1.8-7.7)  H  22  04:49    


 


Band Neutrophils #  0.4 K/mm3  22  04:49    


 


Lymphocytes # (Manual)  1.0 K/mm3 (1.2-5.4)  L  22  04:49    


 


Abs React Lymphs (Man)  0.0 K/mm3  22  04:49    


 


Monocytes # (Manual)  1.6 K/mm3 (0.0-0.8)  H  22  04:49    


 


Eosinophils # (Manual)  0.0 K/mm3 (0.0-0.4)   22  04:49    


 


Basophils # (Manual)  0.0 K/mm3 (0.0-0.1)   22  04:49    


 


Metamyelocytes #  0.4 K/mm3  22  04:49    


 


Myelocytes #  0.0 K/mm3  22  04:49    


 


Promyelocytes #  0.0 K/mm3  22  04:49    


 


Blast Cells #  0.0 K/mm3  22  04:49    


 


WBC Morphology  Not Reportable   22  04:49    


 


Hypersegmented Neuts  Not Reportable   22  04:49    


 


Hyposegmented Neuts  Rare   22  04:49    


 


Hypogranular Neuts  Not Reportable   22  04:49    


 


Smudge Cells  Not Reportable   22  04:49    


 


Toxic Granulation  Not Reportable   22  04:49    


 


Toxic Vacuolation  Not Reportable   22  04:49    


 


Dohle Bodies  Not Reportable   22  04:49    


 


Pelger-Huet Anomaly  Not Reportable   22  04:49    


 


Jamie Rods  Not Reportable   22  04:49    


 


Platelet Estimate  Consistent w auto   22  04:49    


 


Clumped Platelets  Not Reportable   22  04:49    


 


Plt Clumps, EDTA  Not Reportable   22  04:49    


 


Large Platelets  Rare   22  04:49    


 


Giant Platelets  Not Reportable   22  04:49    


 


Platelet Satelliting  Not Reportable   22  04:49    


 


Plt Morphology Comment  Not Reportable   22  04:49    


 


RBC Morphology  Not Reportable   22  04:49    


 


Dimorphic RBCs  Not Reportable   22  04:49    


 


Polychromasia  Not Reportable   22  04:49    


 


Hypochromasia  Not Reportable   22  04:49    


 


Poikilocytosis  Not Reportable   22  04:49    


 


Anisocytosis  Rare   22  04:49    


 


Microcytosis  Not Reportable   22  04:49    


 


Macrocytosis  Rare   22  04:49    


 


Spherocytes  Not Reportable   22  04:49    


 


Pappenheimer Bodies  Not Reportable   22  04:49    


 


Sickle Cells  Not Reportable   22  04:49    


 


Target Cells  Not Reportable   22  04:49    


 


Tear Drop Cells  Not Reportable   22  04:49    


 


Ovalocytes  Not Reportable   22  04:49    


 


Helmet Cells  Not Reportable   22  04:49    


 


[place] Bodies  Not Reportable   22  04:49    


 


Cabot Rings  Not Reportable   22  04:49    


 


Cornel Cells  Not Reportable   22  04:49    


 


Bite Cells  Not Reportable   22  04:49    


 


Crenated Cell  Not Reportable   22  04:49    


 


Elliptocytes  Not Reportable   22  04:49    


 


Acanthocytes (Spur)  Not Reportable   22  04:49    


 


Rouleaux  Not Reportable   22  04:49    


 


Hemoglobin C Crystals  Not Reportable   22  04:49    


 


Schistocytes  Not Reportable   22  04:49    


 


Malaria parasites  Not Reportable   22  04:49    


 


Miguel Bodies  Not Reportable   22  04:49    


 


Hem Pathologist Commnt  No   22  04:49    


 


PT  13.4 Sec. (12.2-14.9)   22  13:30    


 


INR  0.92  (0.87-1.13)   22  13:30    


 


APTT  25.8 Sec. (24.2-36.6)   22  13:30    


 


Sodium  135 mmol/L (137-145)  L  05/15/22  05:07    


 


Potassium  3.7 mmol/L (3.6-5.0)   05/15/22  05:07    


 


Chloride  100.1 mmol/L ()   05/15/22  05:07    


 


Carbon Dioxide  26 mmol/L (22-30)   05/15/22  05:07    


 


Anion Gap  13 mmol/L  05/15/22  05:07    


 


BUN  6 mg/dL (9-20)  L  05/15/22  05:07    


 


Creatinine  0.5 mg/dL (0.8-1.3)  L  05/15/22  05:07    


 


Estimated GFR  > 60 ml/min  05/15/22  05:07    


 


BUN/Creatinine Ratio  12 %  05/15/22  05:07    


 


Glucose  113 mg/dL ()  H  05/15/22  05:07    


 


Lactic Acid  1.40 mmol/L (0.7-2.0)   22  13:30    


 


Calcium  8.1 mg/dL (8.4-10.2)  L  05/15/22  05:07    


 


Phosphorus  2.80 mg/dL (2.5-4.5)   22  05:17    


 


Magnesium  2.20 mg/dL (1.7-2.3)   22  05:17    


 


Total Bilirubin  0.60 mg/dL (0.1-1.2)   05/15/22  05:07    


 


Direct Bilirubin  < 0.2 mg/dL (0-0.2)   05/15/22  05:07    


 


Indirect Bilirubin  0.4 mg/dL  05/15/22  05:07    


 


AST  73 units/L (5-40)  H  05/15/22  05:07    


 


ALT  134 units/L (7-56)  H  05/15/22  05:07    


 


Alkaline Phosphatase  66 units/L ()   05/15/22  05:07    


 


Ammonia  37.0 umol/L (25-60)   22  04:49    


 


Total Creatine Kinase  2431 units/L ()  H  05/15/22  05:07    


 


Total Protein  5.0 g/dL (6.3-8.2)  L  05/15/22  05:07    


 


Albumin  2.2 g/dL (3.9-5)  L  05/15/22  05:07    


 


Albumin/Globulin Ratio  0.8 %  05/15/22  05:07    


 


Amylase  25 units/L ()  L  22  04:49    


 


Lipase  10 units/L (13-60)  L  22  04:49    


 


Coronavirus (PCR)  Negative  (Negative)   22  11:40    


 


Hepatitis A IgM Ab  Nonreactive  (NonReactive)   22  13:30    


 


Hep Bs Antigen  Non-reactive  (Negative)   22  13:30    


 


Hep B Core IgM Ab  Non-reactive  (NonReactive)   22  13:30    


 


Hepatitis C Antibody  Non-reactive  (NonReactive)   22  13:30    


 


HIV 1&2 Antibody Rapid  Non react  (Non React)   22  16:42    


 


HIV P24 Antigen  Non react  (Non React)   22  16:42    











Microbiology: 


Microbiology





05/10/22 Unknown   Leg - Left   Surgical Biopsy Culture - Preliminary


                                Bacillus Species








Mcwilliams/IV: 


                                        





Voiding Method                   Urinal











Active Medications





- Current Medications


Current Medications: 














Generic Name Dose Route Start Last Admin





  Trade Name Freq  PRN Reason Stop Dose Admin


 


Albuterol  2.5 mg  22 06:15 





  Albuterol 2.5 Mg/3 Ml Nebu  IH  





  Q3HRT PRN  





  Shortness Of Breath  


 


Albuterol/Ipratropium  1 ampul  22 08:00  05/15/22 08:18





  Ipratropium/Albuterol Sulfate 3 Ml Ampul.Neb  IH   1 ampul





  BIDRT CHERYL   Administration


 


Diphenhydramine HCl  25 mg  22 06:24  22 18:49





  Diphenhydramine 50 Mg/Ml Vial  IV   25 mg





  Q6H PRN   Administration





  Skin Irritation  


 


Docusate Sodium  100 mg  22 22:00  05/15/22 10:17





  Docusate Sodium 100 Mg Cap  PO   100 mg





  BID CHERYL   Administration


 


Famotidine  20 mg  22 10:00  05/15/22 10:17





  Famotidine 20 Mg Tab  PO   20 mg





  BID CHERYL   Administration


 


Gabapentin  100 mg  22 06:00  05/15/22 05:17





  Gabapentin 100 Mg Cap  PO  22 14:01  100 mg





  0600,1400 CHERYL   Administration


 


Gabapentin  300 mg  22 22:00  22 21:22





  Gabapentin 300 Mg Cap  PO  22 22:01  300 mg





  QHS CHERYL   Administration


 


Gabapentin  300 mg  22 08:00 





  Gabapentin 300 Mg Cap  PO  





  TID@0800,1600,2200 CHERYL  


 


Heparin Sodium (Porcine)  5,000 unit  22 10:00  05/15/22 10:17





  Heparin 5,000 Unit/1 Ml Vial  SUB-Q   5,000 unit





  Q12HR CHERYL   Administration


 


Hydralazine HCl  10 mg  22 18:31  22 18:50





  Hydralazine 20 Mg/1 Ml Inj  IV   10 mg





  Q3HR PRN   Administration





  Hypertension  


 


Hydromorphone HCl  0.5 mg  22 06:15  05/15/22 04:15





  Hydromorphone 1 Mg/1 Ml Inj  IV   0.5 mg





  Q3H PRN   Administration





  Pain , Severe (7-10)  


 


Sodium Chloride  1,000 mls @ 200 mls/hr  22 18:30  05/15/22 04:20





  Nacl 0.9% 1000 Ml  IV   200 mls/hr





  AS DIRECT CHERYL   Administration


 


Morphine Sulfate  2 mg  22 06:15  22 19:52





  Morphine 2 Mg/1 Ml Inj  IV   2 mg





  Q4H PRN   Administration





  Pain, Moderate (4-6)  


 


Ondansetron HCl  4 mg  22 06:15 





  Ondansetron 4 Mg/2 Ml Inj  IV  





  Q8H PRN  





  Nausea And Vomiting  


 


Sodium Chloride  10 ml  22 10:00  05/15/22 10:17





  Sodium Chloride 0.9% 10 Ml Flush Syringe  IV   10 ml





  BID CHERYL   Administration


 


Sodium Chloride  10 ml  22 06:15  22 01:09





  Sodium Chloride 0.9% 10 Ml Flush Syringe  IV   10 ml





  PRN PRN   Administration





  LINE FLUSH  


 


Sodium Chloride  75 ml  05/15/22 11:00 





  Sodium Chloride 0.9% 100 Ml Ivpb  IV  





  AS DIRECT CHERYL  


 


Valsartan  160 mg  22 22:00  05/15/22 10:17





  Valsartan 160mg Tab  PO   160 mg





  DAILY CHERYL   Administration














Nutrition/Malnutrition Assess





- Dietary Evaluation


Nutrition/Malnutrition Findings: 


                                        





Nutrition Notes                                            Start:  05/10/22 

16:45


Freq:                                                      Status: Active       




Protocol:                                                                       




 Document     22 12:23  HATTIE  (Rec: 22 12:43  HATTIE  [id number])


 Nutrition Notes


     Initial or Follow up                        Brief Note


     Current Diagnosis                           Hypertension


     Other Pertinent Diagnosis                   Rabdomyolysis, Liver Failure,


                                                 s/p L-LE 4 Compartment


                                                 Fasciotomy w/Debridem.


     Current Diet                                Regular Diet + Dietary


                                                 Supplements. (since B ).


     Height                                      5 ft 6 in


     Weight                                      86.183 kg


     Ideal Body Weight (kg)                      64.54


     BMI                                         30.7


     Weight change and time frame                No bnody weight change


                                                 reporterd in 2 days.


     Weight Status                               Obese


     Subjective/Other Information                RD consult for routine F/U on


                                                 dietary advancement.


                                                 Pt is now on PO diet, but no


                                                 reports available on Pt's PO


                                                 intake of meals at the time,


                                                 will assess at F/U.


                                                 Pt is on Room Air, O2


                                                 saturation @ 97%, according to


                                                 Physical Assessment History


                                                 notes.


                                                 Procedure on 05/10: Excisinal


                                                 debridement of muscle and soft


                                                 tissue from L-LE wound, and


                                                 woundVAC placement; Leg


                                                 fasciotomy lateral and medial


                                                 incisions, Well tolerated,


                                                 according to Operative Report.


     Percent of energy/protein needs met:        Prescribed Regular Diet


                                                 provides for energy/protein


                                                 needs (2,289 Kcal/89 g) during


                                                 LOS; additionally, Dietary


                                                 Supplements will support wound


                                                 healing processes with 190


                                                 Kcal and 5 g of protein.


     #1


      Nutrition Diagnosis                        Increased nutrient needs   (


                                                 specify in comment below)


      Comments:                                  Protein to support wound


                                                 healing processes.


      Diagnosis Progress(for reassessment        Continues


       documentation)                            


     Is patient on ventilator?                   No


     Is Patient Ambulatory and/or Out of Bed     No


     REE-([facility]-confined to bed)      [phone]


     Kcal/Kg value to use for calculation        19


     Approximate Energy Requirements Using       1637


      kcal/Kg                                    


     Calculation Used for Recommendations        Kcal/kg


     Additional Notes                            Protein: 1.25-1.5 g/Kg AdjBW;


                                                  g/day.


                                                 Fluids: 1 ml/Kcal, or as per


                                                 MD.


 Nutrition Intervention


     Change Diet Order:                          Continue Regular Diet.


     Add Supplement/Snack (indicate name/kcal    Continue 28.8 g pkt Aidan; BID


      /protein )                                 .


     Provides kCal:                              190


     Provides Protein (gm)                       5


     Goal #1                                     Support, through dietary


                                                 supplementation, wound healing


                                                 processes during LOS.


     Goal #2                                     Adjust the dietary


                                                 intervention to better serve


                                                 Pt's needs and clinical


                                                 conditions during LOS.


     Goal #3                                     Maintain body weight within +/


                                                 -3% of admission body weight


                                                 during LOS.


     Follow-Up By:                               22


     Additional Comments                         Continue monitoring food


                                                 tolerance, %PO intake of meals


                                                 , and BM.

## 2022-05-16 LAB
ALBUMIN SERPL-MCNC: 2.8 G/DL (ref 3.9–5)
ALT SERPL-CCNC: 187 UNITS/L (ref 7–56)
BILIRUB DIRECT SERPL-MCNC: < 0.2 MG/DL (ref 0–0.2)

## 2022-05-16 RX ADMIN — FAMOTIDINE SCH MG: 20 TABLET ORAL at 21:33

## 2022-05-16 RX ADMIN — HYDROMORPHONE HYDROCHLORIDE PRN MG: 1 INJECTION, SOLUTION INTRAMUSCULAR; INTRAVENOUS; SUBCUTANEOUS at 03:52

## 2022-05-16 RX ADMIN — MORPHINE SULFATE PRN MG: 2 INJECTION, SOLUTION INTRAMUSCULAR; INTRAVENOUS at 10:39

## 2022-05-16 RX ADMIN — HEPARIN SODIUM SCH UNIT: 5000 INJECTION, SOLUTION INTRAVENOUS; SUBCUTANEOUS at 21:33

## 2022-05-16 RX ADMIN — HEPARIN SODIUM SCH UNIT: 5000 INJECTION, SOLUTION INTRAVENOUS; SUBCUTANEOUS at 10:38

## 2022-05-16 RX ADMIN — IPRATROPIUM BROMIDE AND ALBUTEROL SULFATE SCH AMPUL: .5; 3 SOLUTION RESPIRATORY (INHALATION) at 21:09

## 2022-05-16 RX ADMIN — GABAPENTIN SCH MG: 100 CAPSULE ORAL at 06:03

## 2022-05-16 RX ADMIN — SODIUM CHLORIDE SCH MLS/HR: 0.9 INJECTION, SOLUTION INTRAVENOUS at 13:08

## 2022-05-16 RX ADMIN — ONDANSETRON PRN MG: 2 INJECTION INTRAMUSCULAR; INTRAVENOUS at 10:38

## 2022-05-16 RX ADMIN — VALSARTAN SCH MG: 160 TABLET, FILM COATED ORAL at 10:40

## 2022-05-16 RX ADMIN — GABAPENTIN SCH MG: 100 CAPSULE ORAL at 13:06

## 2022-05-16 RX ADMIN — MORPHINE SULFATE PRN MG: 2 INJECTION, SOLUTION INTRAMUSCULAR; INTRAVENOUS at 17:50

## 2022-05-16 RX ADMIN — SODIUM CHLORIDE SCH MLS/HR: 0.9 INJECTION, SOLUTION INTRAVENOUS at 01:09

## 2022-05-16 RX ADMIN — Medication SCH ML: at 21:34

## 2022-05-16 RX ADMIN — DOCUSATE SODIUM SCH MG: 100 CAPSULE, LIQUID FILLED ORAL at 21:33

## 2022-05-16 RX ADMIN — FAMOTIDINE SCH MG: 20 TABLET ORAL at 10:40

## 2022-05-16 RX ADMIN — IPRATROPIUM BROMIDE AND ALBUTEROL SULFATE SCH AMPUL: .5; 3 SOLUTION RESPIRATORY (INHALATION) at 08:18

## 2022-05-16 RX ADMIN — DOCUSATE SODIUM SCH MG: 100 CAPSULE, LIQUID FILLED ORAL at 10:40

## 2022-05-16 RX ADMIN — MORPHINE SULFATE PRN MG: 2 INJECTION, SOLUTION INTRAMUSCULAR; INTRAVENOUS at 13:06

## 2022-05-16 RX ADMIN — Medication SCH ML: at 10:40

## 2022-05-16 RX ADMIN — GABAPENTIN SCH MG: 300 CAPSULE ORAL at 21:33

## 2022-05-16 NOTE — PROGRESS NOTE
Assessment and Plan


Left lower extremity compartment syndrome status post fasciotomies.  





Doing well with wound VAC.  Needs outpatient wound VAC changing with home 

health.





Has swelling of the left lower extremity.  Ordered DVT ultrasound.








Subjective


Date of service: 05/16/22


Principal diagnosis: abnormal liver enzymes


Interval history: 


Minimal discomfort of the left leg.  Wound VAC without issue. Strongly palpable 

left dorsalis pedis and posterior tibial pulse.  The patient has sensation of 

the foot.  No motor function of the foot.  Motor is unchanged from the original 

exam.  Bandage over fasciotomies.





Objective





- Constitutional


Vitals: 


                               Vital Signs - 12hr











  05/16/22 05/16/22 05/16/22





  05:50 08:18 10:54


 


Temperature 99.0 F  99.1 F


 


Pulse Rate 96 H  94 H


 


Pulse Rate [  107 H 





Anterior   





Bilateral   





Throughout]   


 


Respiratory 20  18





Rate   


 


Respiratory  18 





Rate [Anterior   





Bilateral   





Throughout]   


 


Blood Pressure 118/85  118/69


 


O2 Sat by Pulse 99  97





Oximetry   














  05/16/22 05/16/22





  12:07 12:08


 


Temperature  


 


Pulse Rate 91 H 88


 


Pulse Rate [  





Anterior  





Bilateral  





Throughout]  


 


Respiratory  





Rate  


 


Respiratory  





Rate [Anterior  





Bilateral  





Throughout]  


 


Blood Pressure 106/69 


 


O2 Sat by Pulse 100 99





Oximetry  











General appearance: Present: no acute distress





- EENT


Eyes: EOM intact


ENT: hearing intact





- Respiratory


Respiratory effort: normal


Extremities: pulses intact


Extremity abnormal: edema (2+ LLE), other (see subjective)





- Gastrointestinal


General gastrointestinal: Present: soft, non-tender





- Psychiatric


Psychiatric: appropriate mood/affect, cooperative





- Labs


CBC & Chem 7: 


                                 05/14/22 05:17





                                 05/15/22 05:07


Labs: 


                              Abnormal lab results











  05/16/22 Range/Units





  04:00 


 


AST  93 H  (5-40)  units/L


 


ALT  187 H  (7-56)  units/L


 


Total Protein  5.3 L  (6.3-8.2)  g/dL


 


Albumin  2.8 L  (3.9-5)  g/dL














Medications & Allergies





- Medications


Allergies/Adverse Reactions: 


                                    Allergies





ceftriaxone [From Rocephin] Allergy (Severe, Verified 05/05/22 10:23)


   Unknown


   SEVERE RASH MOTTLING OF THE SKIN POST ADMINISTRATION OF IM ROCEPHIN PTA 


Penicillins Allergy (Severe, Verified 05/05/22 10:23)


   Unknown


   SEVERE RASH MOTTLING OF THE SKIN POST ADMINISTRATION OF IM ROCEPHIN PTA 








Home Medications: 


                                Home Medications











 Medication  Instructions  Recorded  Confirmed  Last Taken  Type


 


No Known Home Medications [No  05/03/22 05/03/22 Unknown History





Reported Home Medications]     











Active Medications: 














Generic Name Dose Route Start Last Admin





  Trade Name Freq  PRN Reason Stop Dose Admin


 


Albuterol  2.5 mg  05/03/22 06:15 





  Albuterol 2.5 Mg/3 Ml Nebu  IH  





  Q3HRT PRN  





  Shortness Of Breath  


 


Albuterol/Ipratropium  1 ampul  05/04/22 08:00  05/16/22 08:18





  Ipratropium/Albuterol Sulfate 3 Ml Ampul.Neb  IH   1 ampul





  BIDRT CHERYL   Administration


 


Diphenhydramine HCl  25 mg  05/03/22 06:24  05/08/22 18:49





  Diphenhydramine 50 Mg/Ml Vial  IV   25 mg





  Q6H PRN   Administration





  Skin Irritation  


 


Docusate Sodium  100 mg  05/05/22 22:00  05/16/22 10:40





  Docusate Sodium 100 Mg Cap  PO   100 mg





  BID CHERYL   Administration


 


Famotidine  20 mg  05/08/22 10:00  05/16/22 10:40





  Famotidine 20 Mg Tab  PO   20 mg





  BID CHERYL   Administration


 


Gabapentin  100 mg  05/13/22 06:00  05/16/22 13:06





  Gabapentin 100 Mg Cap  PO  05/17/22 14:01  100 mg





  0600,1400 CHERYL   Administration


 


Gabapentin  300 mg  05/13/22 22:00  05/15/22 21:09





  Gabapentin 300 Mg Cap  PO  05/17/22 22:01  300 mg





  QHS CHERYL   Administration


 


Gabapentin  300 mg  05/18/22 08:00 





  Gabapentin 300 Mg Cap  PO  





  TID@0800,1600,2200 CHERYL  


 


Heparin Sodium (Porcine)  5,000 unit  05/03/22 10:00  05/16/22 10:38





  Heparin 5,000 Unit/1 Ml Vial  SUB-Q   5,000 unit





  Q12HR CHERYL   Administration


 


Hydralazine HCl  10 mg  05/04/22 18:31  05/04/22 18:50





  Hydralazine 20 Mg/1 Ml Inj  IV   10 mg





  Q3HR PRN   Administration





  Hypertension  


 


Hydromorphone HCl  0.5 mg  05/03/22 06:15  05/16/22 03:52





  Hydromorphone 1 Mg/1 Ml Inj  IV   0.5 mg





  Q3H PRN   Administration





  Pain , Severe (7-10)  


 


Sodium Chloride  1,000 mls @ 75 mls/hr  05/16/22 01:00  05/16/22 13:08





  Nacl 0.9% 1000 Ml  IV   75 mls/hr





  AS DIRECT CHERYL   Administration


 


Sodium Chloride  500 mls @ 999 mls/hr  05/16/22 14:00 





  Nacl 0.9% 500 Ml  IV  05/16/22 14:30 





  ONCE ONE  


 


Morphine Sulfate  2 mg  05/03/22 06:15  05/16/22 13:06





  Morphine 2 Mg/1 Ml Inj  IV   2 mg





  Q4H PRN   Administration





  Pain, Moderate (4-6)  


 


Ondansetron HCl  4 mg  05/03/22 06:15  05/16/22 10:38





  Ondansetron 4 Mg/2 Ml Inj  IV   4 mg





  Q8H PRN   Administration





  Nausea And Vomiting  


 


Sodium Chloride  10 ml  05/03/22 10:00  05/16/22 10:40





  Sodium Chloride 0.9% 10 Ml Flush Syringe  IV   10 ml





  BID CHERYL   Administration


 


Sodium Chloride  10 ml  05/03/22 06:15  05/12/22 01:09





  Sodium Chloride 0.9% 10 Ml Flush Syringe  IV   10 ml





  PRN PRN   Administration





  LINE FLUSH  


 


Valsartan  160 mg  05/04/22 22:00  05/16/22 10:40





  Valsartan 160mg Tab  PO   160 mg





  DAILY CHERYL   Administration

## 2022-05-16 NOTE — PROGRESS NOTE
Assessment and Plan


Assessment and plan: 


This is a 36 year old  transgendered female undergoing gender transformation 

with silicone injection to buttocks ([place] ) and breast implants, heavy 

drinker on the weekends (bottle of vodka) who presented to [facility] on 5/3 because 

of adverse drug reaction after reportedly self administration of  

rocephin and PCN for sore throat. Patient reportedly collapsed while checking in

to the emergency department, has complains of severe 10 out of 10 pain in left 

thigh and has noticeable mottling of skin in thigh and left lower abdomen.  

Patient underwent a CT of the left leg which showed possible cellulitis with no 

drainable fluid collection and was started on empiric antibiotics.  Patient 

admitted to the hospital service with diagnosis of acute liver failure, 

rhabdomyolysis, s/p 4 compartment fasciotomy to left lower extremity





Acute liver failure 


-GI consulted, appreciate recommendations


-Per GI: Suspect due to rhabdomyolysis given elevated creatinine kinase levels


   -If ALT continues to rise recommend MRCP


-Abdominal ultrasound showed mild diffuse biliary dilation, obstruction to 

lesion of the distal common bile duct cannot be excluded consider MRCP, no 

evidence of gallstones within the gallbladder


-PPI


-Regular diet


-BR: colace


-Trend LFTs





Compartment syndrome to LLE


-Vascular surgery consulted, appreciate recommendations


-s/p self admin of IM abx to midthigh


-c/o pain, decreased sensation, pulses were dopplarable, paralysis, mottled skin


-s/p 4 compartment tracheotomy on 5/3 with vascular surgery


-Bilateral lower extremity Doppler ultrasound showed no DVT


-Left lower extremity CT with contrast showed appearance of posterior soft 

tissue complex cellulitis with small subcutaneous lymph nodes, no drainable 

fluid collections present, contusion could have similar appearance


-Bilateral lower extremity arterial duplex showed no significant lower extremity

peripheral artery disease


-Normal ABIs





Peripheral neuropathy


-Avoid delirium


-Reorientation as needed


-Maintain sleep-wake cycle


-As needed analgesia


-Neurology consulted, appreciate recommendations


-Gabapentin 





HTN


-Blood pressure monitoring per protocol


-Valsartan


-PRN hydralazine





Rhabdomyolysis





acute kidney injury with vasomotor nephropathy now resolved


-Nephrology consulted, appreciate recommendations


-Strict intake and output


-Renally dose medications


-Avoid nephrotoxic medications


-MIVF


-Sodium bicarb


-Trend BMP, CK








Hospital course to date:





5/3: Venous ultrasound shows no DVT, Vascular surgery consulted who performed a 

fasciotomy and plan to transfer patient to [facility] post intervention.  Neurology 

consulted





: Transfer to ICU, GI and neurology consulted.





: Patient will be transferred back to the floor from ICU.  Her liver enzymes 

and creatinine are trending down.  Will be started on gabapentin per neurology 

recommendations and antibiotics discontinued.





: Continue supportive care.  CPK slowly improving.  Vascular plans for 

closure of fasciotomy early next week.  PT OT evaluation and treatment closure 

done.  Continue wound management.  No evidence of withdrawal noted at this time.

 Continue to monitor LFTs which are also slightly improving.  Plan discussed in 

detail with the patient who verbalized understanding





: Patient seen and examined clinically stable continues to show some 

improvement.  Closure of fasciotomy planned for tomorrow.  Renal function is 

improving.  CK ordered and pending





: Patient seen and examined, wbc mildly elevated likely secondary to 

steroids, will change to po for two additional days and stop, benefit probably 

already achieved, continue wound dressing, sensory function improved, awaiting 

wound vac and closure. Patient advised on clinically progress. 





: Patient seen and examined awaiting her vascular surgery for closure of 

fasciotomy.  CK is decreasing less than 9000 today.  We will continue aggressive

IV fluids until we have the resolution below 5000.  LFTs on the downward trend 

also.  Leukocytosis appears to have peaked steroids was changed to oral for 2 

additional days today is 1 of 2..  Plan of care discussed in detail with the 

patient who verbalized understanding.





5/10:  Left lower extremity compartment syndrome status post fasciotomies.  Cau

se is still unclear and may represent some allergic reaction.  Motor function is

unchanged from original exam.  Sensory function has improved with full sensation

of the foot.vascular surgery to place wound vacs on the leg today.  CK improved 

to the 8000 range today





: Patient underwent excisional debridement of muscle and soft tissue from 

left leg wound yesterday.  Patient also had wound VAC placement left leg 

fasciotomy incisions.  The patient had moderate amount of superficial necrotic 

muscle in the anterior lateral compartments with minimal amount of superficial 

necrotic muscle in the posterior compartment.  Follow-up culture results.  CK 

levels have improved to 6900 range.  AST/ALT continue to trend downward.





: I discussed the case with Dr. Mckeon who recommends home wound VAC as 

well as physical therapy.  I also discussed these needs with case management who

was attempting to arrange for discharge planning.  CK continues to improve with 

levels in the 4000 range.





: Still awaiting for home wound VAC arrangements to be made.  Leukocytosis 

is much improved down to 13,900 today.  LFTs continue to trend down to normal 

range.  CK levels still in the 4000 range.  Nephrology has signed off.  

Anticipate discharge later today or in a.m.





:  Still awaiting for home wound VAC arrangements to be made.  Laboratories 

continue to include including LFTs, CK and leukocytosis.





5/15:  Still awaiting for home wound VAC arrangements to be made.





: Patient had an episode of syncope while working with PT today.  Nurse 

reports patient lost consciousness for a few seconds and was helped back to the 

bed.  Patient's blood pressure was noted to be hypotensive/orthostatic.  We will

bolus 500 normal saline IV fluid and monitor orthostatics.  Case management 

reports that wound VAC should be delivered today.  LFTs continue to trend down 

to normal range.  CK levels still in the 2000 range.  Nephrology has signed off.

 Anticipate discharge in a.m. given the syncope and once wound VAC arranged.  

Patient with low-grade temp.  We will monitor.





History


Interval history: 





No new issues overnight





Hospitalist Physical





- Constitutional


Vitals: 


                                        











Temp Pulse Resp BP Pulse Ox


 


 99.0 F   107 H  18   118/85   99 


 


 22 05:50  22 08:18  22 08:18  22 05:50  22 05:50











General appearance: Present: no acute distress





- EENT


Eyes: Present: PERRL, EOM intact


ENT: hearing intact, clear oral mucosa, dentition normal





- Neck


Neck: Present: supple, normal ROM





- Respiratory


Respiratory effort: normal


Respiratory: bilateral: CTA





- Cardiovascular


Rhythm: regular


Heart Sounds: Present: S1 & S2.  Absent: gallop, rub





- Extremities


Extremities: no ischemia, No edema, Full ROM





- Abdominal


General gastrointestinal: soft, non-tender, non-distended, normal bowel sounds





- Integumentary


Integumentary: Present: clear, warm, dry





- Neurologic


Neurologic: CNII-XII intact, moves all extremities





Results





- Labs


CBC & Chem 7: 


                                 22 05:17





                                 05/15/22 05:07


Labs: 


                             Laboratory Last Values











WBC  13.6 K/mm3 (4.5-11.0)  H  22  05:17    


 


RBC  3.55 M/mm3 (3.65-5.03)  L  22  05:17    


 


Hgb  11.6 gm/dl (11.8-15.2)  L  22  05:17    


 


Hct  35.0 % (35.5-45.6)  L  22  05:17    


 


MCV  99 fl (84-94)  H  22  05:17    


 


MCH  33 pg (28-32)  H  22  05:17    


 


MCHC  33 % (32-34)   22  05:17    


 


RDW  13.2 % (13.2-15.2)   22  05:17    


 


Plt Count  455 K/mm3 (140-440)  H  22  05:17    


 


Lymph % (Auto)  11.3 % (13.4-35.0)  L  22  05:17    


 


Mono % (Auto)  7.6 % (0.0-7.3)  H  22  05:17    


 


Eos % (Auto)  0.3 % (0.0-4.3)   22  05:17    


 


Baso % (Auto)  0.3 % (0.0-1.8)   22  05:17    


 


Lymph # (Auto)  1.5 K/mm3 (1.2-5.4)   22  05:17    


 


Mono # (Auto)  1.0 K/mm3 (0.0-0.8)  H  22  05:17    


 


Eos # (Auto)  0.0 K/mm3 (0.0-0.4)   22  05:17    


 


Baso # (Auto)  0.0 K/mm3 (0.0-0.1)   22  05:17    


 


Add Manual Diff  Complete   22  04:49    


 


Total Counted  100   22  04:49    


 


Seg Neutrophils %  80.5 % (40.0-70.0)  H  22  05:17    


 


Seg Neuts % (Manual)  83.0 % (40.0-70.0)  H  22  04:49    


 


Band Neutrophils %  2.0 %  22  04:49    


 


Lymphocytes % (Manual)  5.0 % (13.4-35.0)  L  22  04:49    


 


Reactive Lymphs % (Man)  0 %  22  04:49    


 


Monocytes % (Manual)  8.0 % (0.0-7.3)  H  22  04:49    


 


Eosinophils % (Manual)  0 % (0.0-4.3)   22  04:49    


 


Basophils % (Manual)  0 % (0.0-1.8)   22  04:49    


 


Metamyelocytes %  2.0 %  22  04:49    


 


Myelocytes %  0 %  22  04:49    


 


Promyelocytes %  0 %  22  04:49    


 


Blast Cells %  0 %  22  04:49    


 


Nucleated RBC %  Not Reportable   22  04:49    


 


Seg Neutrophils #  11.0 K/mm3 (1.8-7.7)  H  22  05:17    


 


Seg Neutrophils # Man  17.1 K/mm3 (1.8-7.7)  H  22  04:49    


 


Band Neutrophils #  0.4 K/mm3  22  04:49    


 


Lymphocytes # (Manual)  1.0 K/mm3 (1.2-5.4)  L  22  04:49    


 


Abs React Lymphs (Man)  0.0 K/mm3  22  04:49    


 


Monocytes # (Manual)  1.6 K/mm3 (0.0-0.8)  H  22  04:49    


 


Eosinophils # (Manual)  0.0 K/mm3 (0.0-0.4)   22  04:49    


 


Basophils # (Manual)  0.0 K/mm3 (0.0-0.1)   22  04:49    


 


Metamyelocytes #  0.4 K/mm3  22  04:49    


 


Myelocytes #  0.0 K/mm3  22  04:49    


 


Promyelocytes #  0.0 K/mm3  22  04:49    


 


Blast Cells #  0.0 K/mm3  22  04:49    


 


WBC Morphology  Not Reportable   22  04:49    


 


Hypersegmented Neuts  Not Reportable   22  04:49    


 


Hyposegmented Neuts  Rare   22  04:49    


 


Hypogranular Neuts  Not Reportable   22  04:49    


 


Smudge Cells  Not Reportable   22  04:49    


 


Toxic Granulation  Not Reportable   22  04:49    


 


Toxic Vacuolation  Not Reportable   22  04:49    


 


Dohle Bodies  Not Reportable   22  04:49    


 


Pelger-Huet Anomaly  Not Reportable   22  04:49    


 


Jamie Rods  Not Reportable   22  04:49    


 


Platelet Estimate  Consistent w auto   22  04:49    


 


Clumped Platelets  Not Reportable   22  04:49    


 


Plt Clumps, EDTA  Not Reportable   22  04:49    


 


Large Platelets  Rare   22  04:49    


 


Giant Platelets  Not Reportable   22  04:49    


 


Platelet Satelliting  Not Reportable   22  04:49    


 


Plt Morphology Comment  Not Reportable   22  04:49    


 


RBC Morphology  Not Reportable   22  04:49    


 


Dimorphic RBCs  Not Reportable   22  04:49    


 


Polychromasia  Not Reportable   22  04:49    


 


Hypochromasia  Not Reportable   22  04:49    


 


Poikilocytosis  Not Reportable   22  04:49    


 


Anisocytosis  Rare   22  04:49    


 


Microcytosis  Not Reportable   22  04:49    


 


Macrocytosis  Rare   22  04:49    


 


Spherocytes  Not Reportable   22  04:49    


 


Pappenheimer Bodies  Not Reportable   22  04:49    


 


Sickle Cells  Not Reportable   22  04:49    


 


Target Cells  Not Reportable   22  04:49    


 


Tear Drop Cells  Not Reportable   22  04:49    


 


Ovalocytes  Not Reportable   22  04:49    


 


Helmet Cells  Not Reportable   22  04:49    


 


Mccormick-Pettus Bodies  Not Reportable   22  04:49    


 


Cabot Rings  Not Reportable   22  04:49    


 


Cornel Cells  Not Reportable   22  04:49    


 


Bite Cells  Not Reportable   22  04:49    


 


Crenated Cell  Not Reportable   22  04:49    


 


Elliptocytes  Not Reportable   22  04:49    


 


Acanthocytes (Spur)  Not Reportable   22  04:49    


 


Rouleaux  Not Reportable   22  04:49    


 


Hemoglobin C Crystals  Not Reportable   22  04:49    


 


Schistocytes  Not Reportable   22  04:49    


 


Malaria parasites  Not Reportable   22  04:49    


 


Miguel Bodies  Not Reportable   22  04:49    


 


Hem Pathologist Commnt  No   22  04:49    


 


PT  13.4 Sec. (12.2-14.9)   22  13:30    


 


INR  0.92  (0.87-1.13)   22  13:30    


 


APTT  25.8 Sec. (24.2-36.6)   22  13:30    


 


Sodium  135 mmol/L (137-145)  L  05/15/22  05:07    


 


Potassium  3.7 mmol/L (3.6-5.0)   05/15/22  05:07    


 


Chloride  100.1 mmol/L ()   05/15/22  05:07    


 


Carbon Dioxide  26 mmol/L (22-30)   05/15/22  05:07    


 


Anion Gap  13 mmol/L  05/15/22  05:07    


 


BUN  6 mg/dL (9-20)  L  05/15/22  05:07    


 


Creatinine  0.5 mg/dL (0.8-1.3)  L  05/15/22  05:07    


 


Estimated GFR  > 60 ml/min  05/15/22  05:07    


 


BUN/Creatinine Ratio  12 %  05/15/22  05:07    


 


Glucose  113 mg/dL ()  H  05/15/22  05:07    


 


Lactic Acid  1.40 mmol/L (0.7-2.0)   22  13:30    


 


Calcium  8.1 mg/dL (8.4-10.2)  L  05/15/22  05:07    


 


Phosphorus  2.80 mg/dL (2.5-4.5)   22  05:17    


 


Magnesium  2.20 mg/dL (1.7-2.3)   22  05:17    


 


Total Bilirubin  0.60 mg/dL (0.1-1.2)   05/15/22  05:07    


 


Direct Bilirubin  < 0.2 mg/dL (0-0.2)   05/15/22  05:07    


 


Indirect Bilirubin  0.4 mg/dL  05/15/22  05:07    


 


AST  73 units/L (5-40)  H  05/15/22  05:07    


 


ALT  134 units/L (7-56)  H  05/15/22  05:07    


 


Alkaline Phosphatase  66 units/L ()   05/15/22  05:07    


 


Ammonia  37.0 umol/L (25-60)   22  04:49    


 


Total Creatine Kinase  2431 units/L ()  H  05/15/22  05:07    


 


Total Protein  5.0 g/dL (6.3-8.2)  L  05/15/22  05:07    


 


Albumin  2.2 g/dL (3.9-5)  L  05/15/22  05:07    


 


Albumin/Globulin Ratio  0.8 %  05/15/22  05:07    


 


Amylase  25 units/L ()  L  22  04:49    


 


Lipase  10 units/L (13-60)  L  22  04:49    


 


Coronavirus (PCR)  Negative  (Negative)   22  11:40    


 


Hepatitis A IgM Ab  Nonreactive  (NonReactive)   22  13:30    


 


Hep Bs Antigen  Non-reactive  (Negative)   22  13:30    


 


Hep B Core IgM Ab  Non-reactive  (NonReactive)   22  13:30    


 


Hepatitis C Antibody  Non-reactive  (NonReactive)   22  13:30    


 


HIV 1&2 Antibody Rapid  Non react  (Non React)   22  16:42    


 


HIV P24 Antigen  Non react  (Non React)   22  16:42    











Mcwilliams/IV: 


                                        





Voiding Method                   Urinal











Active Medications





- Current Medications


Current Medications: 














Generic Name Dose Route Start Last Admin





  Trade Name Freq  PRN Reason Stop Dose Admin


 


Albuterol  2.5 mg  22 06:15 





  Albuterol 2.5 Mg/3 Ml Nebu  IH  





  Q3HRT PRN  





  Shortness Of Breath  


 


Albuterol/Ipratropium  1 ampul  22 08:00  22 08:18





  Ipratropium/Albuterol Sulfate 3 Ml Ampul.Neb  IH   1 ampul





  BIDRT CHERYL   Administration


 


Diphenhydramine HCl  25 mg  22 06:24  22 18:49





  Diphenhydramine 50 Mg/Ml Vial  IV   25 mg





  Q6H PRN   Administration





  Skin Irritation  


 


Docusate Sodium  100 mg  22 22:00  22 10:40





  Docusate Sodium 100 Mg Cap  PO   100 mg





  BID CHERYL   Administration


 


Famotidine  20 mg  22 10:00  22 10:40





  Famotidine 20 Mg Tab  PO   20 mg





  BID CHERYL   Administration


 


Gabapentin  100 mg  22 06:00  22 06:03





  Gabapentin 100 Mg Cap  PO  22 14:01  100 mg





  0600,1400 CHERYL   Administration


 


Gabapentin  300 mg  22 22:00  05/15/22 21:09





  Gabapentin 300 Mg Cap  PO  22 22:01  300 mg





  QHS CHERYL   Administration


 


Gabapentin  300 mg  22 08:00 





  Gabapentin 300 Mg Cap  PO  





  TID@0800,1600,2200 CHERYL  


 


Heparin Sodium (Porcine)  5,000 unit  22 10:00  22 10:38





  Heparin 5,000 Unit/1 Ml Vial  SUB-Q   5,000 unit





  Q12HR CHERYL   Administration


 


Hydralazine HCl  10 mg  22 18:31  22 18:50





  Hydralazine 20 Mg/1 Ml Inj  IV   10 mg





  Q3HR PRN   Administration





  Hypertension  


 


Hydromorphone HCl  0.5 mg  22 06:15  22 03:52





  Hydromorphone 1 Mg/1 Ml Inj  IV   0.5 mg





  Q3H PRN   Administration





  Pain , Severe (7-10)  


 


Sodium Chloride  1,000 mls @ 75 mls/hr  22 01:00  22 01:09





  Nacl 0.9% 1000 Ml  IV   75 mls/hr





  AS DIRECT CHERYL   Administration


 


Morphine Sulfate  2 mg  22 06:15  22 10:39





  Morphine 2 Mg/1 Ml Inj  IV   2 mg





  Q4H PRN   Administration





  Pain, Moderate (4-6)  


 


Ondansetron HCl  4 mg  22 06:15  22 10:38





  Ondansetron 4 Mg/2 Ml Inj  IV   4 mg





  Q8H PRN   Administration





  Nausea And Vomiting  


 


Sodium Chloride  10 ml  22 10:00  22 10:40





  Sodium Chloride 0.9% 10 Ml Flush Syringe  IV   10 ml





  BID CHERYL   Administration


 


Sodium Chloride  10 ml  22 06:15  22 01:09





  Sodium Chloride 0.9% 10 Ml Flush Syringe  IV   10 ml





  PRN PRN   Administration





  LINE FLUSH  


 


Valsartan  160 mg  22 22:00  22 10:40





  Valsartan 160mg Tab  PO   160 mg





  DAILY CHERYL   Administration














Nutrition/Malnutrition Assess





- Dietary Evaluation


Nutrition/Malnutrition Findings: 


                                        





Nutrition Notes                                            Start:  05/10/22 

16:45


Freq:                                                      Status: Active       




Protocol:                                                                       




 Document     22 12:23  HATTIE  (Rec: 22 12:43  HATTIE  [id number])


 Nutrition Notes


     Initial or Follow up                        Brief Note


     Current Diagnosis                           Hypertension


     Other Pertinent Diagnosis                   Rabdomyolysis, Liver Failure,


                                                 s/p L-LE 4 Compartment


                                                 Fasciotomy w/Debridem.


     Current Diet                                Regular Diet + Dietary


                                                 Supplements. (since B ).


     Height                                      5 ft 6 in


     Weight                                      86.183 kg


     Ideal Body Weight (kg)                      64.54


     BMI                                         30.7


     Weight change and time frame                No bnody weight change


                                                 reporterd in 2 days.


     Weight Status                               Obese


     Subjective/Other Information                RD consult for routine F/U on


                                                 dietary advancement.


                                                 Pt is now on PO diet, but no


                                                 reports available on Pt's PO


                                                 intake of meals at the time,


                                                 will assess at F/U.


                                                 Pt is on Room Air, O2


                                                 saturation @ 97%, according to


                                                 Physical Assessment History


                                                 notes.


                                                 Procedure on 05/10: Excisinal


                                                 debridement of muscle and soft


                                                 tissue from L-LE wound, and


                                                 woundVAC placement; Leg


                                                 fasciotomy lateral and medial


                                                 incisions, Well tolerated,


                                                 according to Operative Report.


     Percent of energy/protein needs met:        Prescribed Regular Diet


                                                 provides for energy/protein


                                                 needs (2,289 Kcal/89 g) during


                                                 LOS; additionally, Dietary


                                                 Supplements will support wound


                                                 healing processes with 190


                                                 Kcal and 5 g of protein.


     #1


      Nutrition Diagnosis                        Increased nutrient needs   (


                                                 specify in comment below)


      Comments:                                  Protein to support wound


                                                 healing processes.


      Diagnosis Progress(for reassessment        Continues


       documentation)                            


     Is patient on ventilator?                   No


     Is Patient Ambulatory and/or Out of Bed     No


     REE-([facility]-confined to bed)      [phone]


     Kcal/Kg value to use for calculation        19


     Approximate Energy Requirements Using       1637


      kcal/Kg                                    


     Calculation Used for Recommendations        Kcal/kg


     Additional Notes                            Protein: 1.25-1.5 g/Kg AdjBW;


                                                  g/day.


                                                 Fluids: 1 ml/Kcal, or as per


                                                 MD.


 Nutrition Intervention


     Change Diet Order:                          Continue Regular Diet.


     Add Supplement/Snack (indicate name/kcal    Continue 28.8 g pkt Aidan; BID


      /protein )                                 .


     Provides kCal:                              190


     Provides Protein (gm)                       5


     Goal #1                                     Support, through dietary


                                                 supplementation, wound healing


                                                 processes during LOS.


     Goal #2                                     Adjust the dietary


                                                 intervention to better serve


                                                 Pt's needs and clinical


                                                 conditions during LOS.


     Goal #3                                     Maintain body weight within +/


                                                 -3% of admission body weight


                                                 during LOS.


     Follow-Up By:                               22


     Additional Comments                         Continue monitoring food


                                                 tolerance, %PO intake of meals


                                                 , and BM.

## 2022-05-16 NOTE — VASCULAR LAB REPORT
DUPLEX DOPPLER LOWER EXTREMITY VEINS, LEFT



INDICATION / CLINICAL INFORMATION:

swelling, DVT.



TECHNIQUE:

Duplex doppler imaging was performed through the veins of the left lower extremity using venous compr
ession and other maneuvers.



COMPARISON: 

5/3/2022



FINDINGS:



LEFT COMMON FEMORAL VEIN: Negative.

LEFT FEMORAL VEIN: Negative.

LEFT POPLITEAL VEIN: Negative.

LEFT CALF VEINS: Not well visualized due to overlying bandaging and wound VAC.



ADDITIONAL FINDINGS: None.



IMPRESSION:

1. No sonographic evidence for DVT in the left lower extremity.



Signer Name: Donovan Bunn MD 

Signed: 5/16/2022 4:07 PM

Workstation Name: [employer]

## 2022-05-17 LAB
BASOPHILS # (AUTO): 0 K/MM3 (ref 0–0.1)
BASOPHILS NFR BLD AUTO: 0.3 % (ref 0–1.8)
BUN SERPL-MCNC: 5 MG/DL (ref 9–20)
BUN/CREAT SERPL: 8 %
CALCIUM SERPL-MCNC: 8.4 MG/DL (ref 8.4–10.2)
EOSINOPHIL # BLD AUTO: 0 K/MM3 (ref 0–0.4)
EOSINOPHIL NFR BLD AUTO: 0.4 % (ref 0–4.3)
HCT VFR BLD CALC: 32 % (ref 35.5–45.6)
HEMOLYSIS INDEX: 3
HGB BLD-MCNC: 10.7 GM/DL (ref 11.8–15.2)
LYMPHOCYTES # BLD AUTO: 1.4 K/MM3 (ref 1.2–5.4)
LYMPHOCYTES NFR BLD AUTO: 12.9 % (ref 13.4–35)
MCHC RBC AUTO-ENTMCNC: 33 % (ref 32–34)
MCV RBC AUTO: 99 FL (ref 84–94)
MONOCYTES # (AUTO): 0.7 K/MM3 (ref 0–0.8)
MONOCYTES % (AUTO): 6.9 % (ref 0–7.3)
PLATELET # BLD: 427 K/MM3 (ref 140–440)
RBC # BLD AUTO: 3.21 M/MM3 (ref 3.65–5.03)

## 2022-05-17 RX ADMIN — Medication SCH ML: at 21:15

## 2022-05-17 RX ADMIN — FAMOTIDINE SCH MG: 20 TABLET ORAL at 09:12

## 2022-05-17 RX ADMIN — HEPARIN SODIUM SCH UNIT: 5000 INJECTION, SOLUTION INTRAVENOUS; SUBCUTANEOUS at 09:11

## 2022-05-17 RX ADMIN — HYDROMORPHONE HYDROCHLORIDE PRN MG: 1 INJECTION, SOLUTION INTRAMUSCULAR; INTRAVENOUS; SUBCUTANEOUS at 14:05

## 2022-05-17 RX ADMIN — FAMOTIDINE SCH MG: 20 TABLET ORAL at 21:15

## 2022-05-17 RX ADMIN — SODIUM CHLORIDE SCH MLS/HR: 0.9 INJECTION, SOLUTION INTRAVENOUS at 21:15

## 2022-05-17 RX ADMIN — SODIUM CHLORIDE SCH MLS/HR: 0.9 INJECTION, SOLUTION INTRAVENOUS at 02:39

## 2022-05-17 RX ADMIN — IPRATROPIUM BROMIDE AND ALBUTEROL SULFATE SCH AMPUL: .5; 3 SOLUTION RESPIRATORY (INHALATION) at 09:10

## 2022-05-17 RX ADMIN — IPRATROPIUM BROMIDE AND ALBUTEROL SULFATE SCH AMPUL: .5; 3 SOLUTION RESPIRATORY (INHALATION) at 20:28

## 2022-05-17 RX ADMIN — GABAPENTIN SCH MG: 300 CAPSULE ORAL at 21:15

## 2022-05-17 RX ADMIN — GABAPENTIN SCH MG: 100 CAPSULE ORAL at 14:06

## 2022-05-17 RX ADMIN — DOCUSATE SODIUM SCH: 100 CAPSULE, LIQUID FILLED ORAL at 21:15

## 2022-05-17 RX ADMIN — VALSARTAN SCH: 160 TABLET, FILM COATED ORAL at 09:11

## 2022-05-17 RX ADMIN — DOCUSATE SODIUM SCH: 100 CAPSULE, LIQUID FILLED ORAL at 09:11

## 2022-05-17 RX ADMIN — HEPARIN SODIUM SCH UNIT: 5000 INJECTION, SOLUTION INTRAVENOUS; SUBCUTANEOUS at 21:14

## 2022-05-17 RX ADMIN — Medication SCH ML: at 09:12

## 2022-05-17 RX ADMIN — GABAPENTIN SCH MG: 100 CAPSULE ORAL at 06:27

## 2022-05-17 NOTE — PROGRESS NOTE
Assessment and Plan


Assessment and plan: 


This is a 36 year old  transgendered female undergoing gender transformation 

with silicone injection to buttocks ([place] ) and breast implants, heavy 

drinker on the weekends (bottle of vodka) who presented to [facility] on 5/3 because 

of adverse drug reaction after reportedly self administration of  

rocephin and PCN for sore throat. Patient reportedly collapsed while checking in

to the emergency department, has complains of severe 10 out of 10 pain in left 

thigh and has noticeable mottling of skin in thigh and left lower abdomen.  

Patient underwent a CT of the left leg which showed possible cellulitis with no 

drainable fluid collection and was started on empiric antibiotics.  Patient 

admitted to the hospital service with diagnosis of acute liver failure, 

rhabdomyolysis, s/p 4 compartment fasciotomy to left lower extremity





Acute liver failure 


-GI consulted, appreciate recommendations


-Per GI: Suspect due to rhabdomyolysis given elevated creatinine kinase levels


   -If ALT continues to rise recommend MRCP


-Abdominal ultrasound showed mild diffuse biliary dilation, obstruction to 

lesion of the distal common bile duct cannot be excluded consider MRCP, no 

evidence of gallstones within the gallbladder


-PPI


-Regular diet


-BR: colace


-Trend LFTs





Compartment syndrome to LLE


-Vascular surgery consulted, appreciate recommendations


-s/p self admin of IM abx to midthigh


-c/o pain, decreased sensation, pulses were dopplarable, paralysis, mottled skin


-s/p 4 compartment tracheotomy on 5/3 with vascular surgery


-Bilateral lower extremity Doppler ultrasound showed no DVT


-Left lower extremity CT with contrast showed appearance of posterior soft 

tissue complex cellulitis with small subcutaneous lymph nodes, no drainable 

fluid collections present, contusion could have similar appearance


-Bilateral lower extremity arterial duplex showed no significant lower extremity

peripheral artery disease


-Normal ABIs





Peripheral neuropathy


-Avoid delirium


-Reorientation as needed


-Maintain sleep-wake cycle


-As needed analgesia


-Neurology consulted, appreciate recommendations


-Gabapentin 





HTN


-Blood pressure monitoring per protocol


-Valsartan


-PRN hydralazine





Rhabdomyolysis





acute kidney injury with vasomotor nephropathy now resolved


-Nephrology consulted, appreciate recommendations


-Strict intake and output


-Renally dose medications


-Avoid nephrotoxic medications


-MIVF


-Sodium bicarb


-Trend BMP, CK








Hospital course to date:





5/3: Venous ultrasound shows no DVT, Vascular surgery consulted who performed a 

fasciotomy and plan to transfer patient to [facility] post intervention.  Neurology 

consulted





: Transfer to ICU, GI and neurology consulted.





: Patient will be transferred back to the floor from ICU.  Her liver enzymes 

and creatinine are trending down.  Will be started on gabapentin per neurology 

recommendations and antibiotics discontinued.





: Continue supportive care.  CPK slowly improving.  Vascular plans for 

closure of fasciotomy early next week.  PT OT evaluation and treatment closure 

done.  Continue wound management.  No evidence of withdrawal noted at this time.

 Continue to monitor LFTs which are also slightly improving.  Plan discussed in 

detail with the patient who verbalized understanding





: Patient seen and examined clinically stable continues to show some 

improvement.  Closure of fasciotomy planned for tomorrow.  Renal function is 

improving.  CK ordered and pending





: Patient seen and examined, wbc mildly elevated likely secondary to 

steroids, will change to po for two additional days and stop, benefit probably 

already achieved, continue wound dressing, sensory function improved, awaiting 

wound vac and closure. Patient advised on clinically progress. 





: Patient seen and examined awaiting her vascular surgery for closure of 

fasciotomy.  CK is decreasing less than 9000 today.  We will continue aggressive

IV fluids until we have the resolution below 5000.  LFTs on the downward trend 

also.  Leukocytosis appears to have peaked steroids was changed to oral for 2 

additional days today is 1 of 2..  Plan of care discussed in detail with the 

patient who verbalized understanding.





5/10:  Left lower extremity compartment syndrome status post fasciotomies.  Cau

se is still unclear and may represent some allergic reaction.  Motor function is

unchanged from original exam.  Sensory function has improved with full sensation

of the foot.vascular surgery to place wound vacs on the leg today.  CK improved 

to the 8000 range today





: Patient underwent excisional debridement of muscle and soft tissue from 

left leg wound yesterday.  Patient also had wound VAC placement left leg 

fasciotomy incisions.  The patient had moderate amount of superficial necrotic 

muscle in the anterior lateral compartments with minimal amount of superficial 

necrotic muscle in the posterior compartment.  Follow-up culture results.  CK 

levels have improved to 6900 range.  AST/ALT continue to trend downward.





: I discussed the case with Dr. Mckeon who recommends home wound VAC as 

well as physical therapy.  I also discussed these needs with case management who

was attempting to arrange for discharge planning.  CK continues to improve with 

levels in the 4000 range.





: Still awaiting for home wound VAC arrangements to be made.  Leukocytosis 

is much improved down to 13,900 today.  LFTs continue to trend down to normal 

range.  CK levels still in the 4000 range.  Nephrology has signed off.  

Anticipate discharge later today or in a.m.





:  Still awaiting for home wound VAC arrangements to be made.  Laboratories 

continue to include including LFTs, CK and leukocytosis.





5/15:  Still awaiting for home wound VAC arrangements to be made.





: Patient had an episode of syncope while working with PT today.  Nurse 

reports patient lost consciousness for a few seconds and was helped back to the 

bed.  Patient's blood pressure was noted to be hypotensive/orthostatic.  We will

bolus 500 normal saline IV fluid and monitor orthostatics.  Case management 

reports that wound VAC should be delivered today.  LFTs continue to trend down 

to normal range.  CK levels still in the 2000 range.  Nephrology has signed off.

 Anticipate discharge in a.m. given the syncope and once wound VAC arranged.  

Patient with low-grade temp.  We will monitor.





; patient is anxious to go home, wound VAC is delivered to the bedside, 

continue wound care


Multiple social issues, DC planning per case management











History


Interval history: 


I have seen and examined the patient at the bedside


Patient's chart and medications reviewed.


Patient received wound VAC at the bedside


Feels better no new complaints


Anxious to go home


Vital signs noted








Hospitalist Physical





- Constitutional


Vitals: 


                                        











Temp Pulse Resp BP Pulse Ox


 


 99.4 F   102 H  18   106/66   98 


 


 22 04:50  22 04:50  22 04:50  22 04:50  22 04:50











General appearance: Present: no acute distress, well-nourished, obese





- EENT


Eyes: Present: PERRL, EOM intact





- Neck


Neck: Present: supple, normal ROM





- Respiratory


Respiratory effort: normal


Respiratory: bilateral: diminished, negative: rales, rhonchi, wheezing





- Cardiovascular


Rhythm: regular


Heart Sounds: Present: S1 & S2





- Extremities


Extremities: no ischemia, No edema





- Abdominal


General gastrointestinal: soft, non-tender, non-distended, normal bowel sounds





- Integumentary


Integumentary: Present: clear, warm





- Psychiatric


Psychiatric: appropriate mood/affect, cooperative





- Neurologic


Neurologic: CNII-XII intact, moves all extremities





Results





- Labs


CBC & Chem 7: 


                                 22 05:01





                                 22 05:01


Labs: 


                             Laboratory Last Values











WBC  10.6 K/mm3 (4.5-11.0)   22  05:01    


 


RBC  3.21 M/mm3 (3.65-5.03)  L  22  05:01    


 


Hgb  10.7 gm/dl (11.8-15.2)  L  22  05:01    


 


Hct  32.0 % (35.5-45.6)  L  22  05:01    


 


MCV  99 fl (84-94)  H  22  05:01    


 


MCH  33 pg (28-32)  H  22  05:01    


 


MCHC  33 % (32-34)   22  05:01    


 


RDW  13.3 % (13.2-15.2)   22  05:01    


 


Plt Count  427 K/mm3 (140-440)   22  05:01    


 


Lymph % (Auto)  12.9 % (13.4-35.0)  L  22  05:01    


 


Mono % (Auto)  6.9 % (0.0-7.3)   22  05:01    


 


Eos % (Auto)  0.4 % (0.0-4.3)   22  05:01    


 


Baso % (Auto)  0.3 % (0.0-1.8)   22  05:01    


 


Lymph # (Auto)  1.4 K/mm3 (1.2-5.4)   22  05:01    


 


Mono # (Auto)  0.7 K/mm3 (0.0-0.8)   22  05:01    


 


Eos # (Auto)  0.0 K/mm3 (0.0-0.4)   22  05:01    


 


Baso # (Auto)  0.0 K/mm3 (0.0-0.1)   22  05:01    


 


Add Manual Diff  Complete   22  04:49    


 


Total Counted  100   22  04:49    


 


Seg Neutrophils %  79.5 % (40.0-70.0)  H  22  05:01    


 


Seg Neuts % (Manual)  83.0 % (40.0-70.0)  H  22  04:49    


 


Band Neutrophils %  2.0 %  22  04:49    


 


Lymphocytes % (Manual)  5.0 % (13.4-35.0)  L  22  04:49    


 


Reactive Lymphs % (Man)  0 %  22  04:49    


 


Monocytes % (Manual)  8.0 % (0.0-7.3)  H  22  04:49    


 


Eosinophils % (Manual)  0 % (0.0-4.3)   22  04:49    


 


Basophils % (Manual)  0 % (0.0-1.8)   22  04:49    


 


Metamyelocytes %  2.0 %  22  04:49    


 


Myelocytes %  0 %  22  04:49    


 


Promyelocytes %  0 %  22  04:49    


 


Blast Cells %  0 %  22  04:49    


 


Nucleated RBC %  Not Reportable   22  04:49    


 


Seg Neutrophils #  8.4 K/mm3 (1.8-7.7)  H  22  05:01    


 


Seg Neutrophils # Man  17.1 K/mm3 (1.8-7.7)  H  22  04:49    


 


Band Neutrophils #  0.4 K/mm3  22  04:49    


 


Lymphocytes # (Manual)  1.0 K/mm3 (1.2-5.4)  L  22  04:49    


 


Abs React Lymphs (Man)  0.0 K/mm3  22  04:49    


 


Monocytes # (Manual)  1.6 K/mm3 (0.0-0.8)  H  22  04:49    


 


Eosinophils # (Manual)  0.0 K/mm3 (0.0-0.4)   22  04:49    


 


Basophils # (Manual)  0.0 K/mm3 (0.0-0.1)   22  04:49    


 


Metamyelocytes #  0.4 K/mm3  22  04:49    


 


Myelocytes #  0.0 K/mm3  22  04:49    


 


Promyelocytes #  0.0 K/mm3  22  04:49    


 


Blast Cells #  0.0 K/mm3  22  04:49    


 


WBC Morphology  Not Reportable   22  04:49    


 


Hypersegmented Neuts  Not Reportable   22  04:49    


 


Hyposegmented Neuts  Rare   22  04:49    


 


Hypogranular Neuts  Not Reportable   22  04:49    


 


Smudge Cells  Not Reportable   22  04:49    


 


Toxic Granulation  Not Reportable   22  04:49    


 


Toxic Vacuolation  Not Reportable   22  04:49    


 


Dohle Bodies  Not Reportable   22  04:49    


 


Pelger-Huet Anomaly  Not Reportable   22  04:49    


 


Jamie Rods  Not Reportable   22  04:49    


 


Platelet Estimate  Consistent w auto   22  04:49    


 


Clumped Platelets  Not Reportable   22  04:49    


 


Plt Clumps, EDTA  Not Reportable   22  04:49    


 


Large Platelets  Rare   22  04:49    


 


Giant Platelets  Not Reportable   22  04:49    


 


Platelet Satelliting  Not Reportable   22  04:49    


 


Plt Morphology Comment  Not Reportable   22  04:49    


 


RBC Morphology  Not Reportable   22  04:49    


 


Dimorphic RBCs  Not Reportable   22  04:49    


 


Polychromasia  Not Reportable   22  04:49    


 


Hypochromasia  Not Reportable   22  04:49    


 


Poikilocytosis  Not Reportable   22  04:49    


 


Anisocytosis  Rare   22  04:49    


 


Microcytosis  Not Reportable   22  04:49    


 


Macrocytosis  Rare   22  04:49    


 


Spherocytes  Not Reportable   22  04:49    


 


Pappenheimer Bodies  Not Reportable   22  04:49    


 


Sickle Cells  Not Reportable   22  04:49    


 


Target Cells  Not Reportable   22  04:49    


 


Tear Drop Cells  Not Reportable   22  04:49    


 


Ovalocytes  Not Reportable   22  04:49    


 


Helmet Cells  Not Reportable   22  04:49    


 


[place] Bodies  Not Reportable   22  04:49    


 


Cabot Rings  Not Reportable   22  04:49    


 


[place] Cells  Not Reportable   22  04:49    


 


Bite Cells  Not Reportable   22  04:49    


 


Crenated Cell  Not Reportable   22  04:49    


 


Elliptocytes  Not Reportable   22  04:49    


 


Acanthocytes (Spur)  Not Reportable   22  04:49    


 


Rouleaux  Not Reportable   22  04:49    


 


Hemoglobin C Crystals  Not Reportable   22  04:49    


 


Schistocytes  Not Reportable   22  04:49    


 


Malaria parasites  Not Reportable   22  04:49    


 


Miguel Bodies  Not Reportable   22  04:49    


 


Hem Pathologist Commnt  No   22  04:49    


 


PT  13.4 Sec. (12.2-14.9)   22  13:30    


 


INR  0.92  (0.87-1.13)   22  13:30    


 


APTT  25.8 Sec. (24.2-36.6)   22  13:30    


 


Sodium  138 mmol/L (137-145)   22  05:01    


 


Potassium  3.8 mmol/L (3.6-5.0)   22  05:01    


 


Chloride  102.8 mmol/L ()   22  05:01    


 


Carbon Dioxide  27 mmol/L (22-30)   22  05:01    


 


Anion Gap  12 mmol/L  22  05:01    


 


BUN  5 mg/dL (9-20)  L  22  05:01    


 


Creatinine  0.6 mg/dL (0.8-1.3)  L  22  05:01    


 


Estimated GFR  > 60 ml/min  22  05:01    


 


BUN/Creatinine Ratio  8 %  22  05:01    


 


Glucose  105 mg/dL ()  H  22  05:01    


 


Lactic Acid  1.40 mmol/L (0.7-2.0)   22  13:30    


 


Calcium  8.4 mg/dL (8.4-10.2)   22  05:01    


 


Phosphorus  2.80 mg/dL (2.5-4.5)   22  05:17    


 


Magnesium  2.20 mg/dL (1.7-2.3)   22  05:17    


 


Total Bilirubin  0.50 mg/dL (0.1-1.2)   22  04:00    


 


Direct Bilirubin  < 0.2 mg/dL (0-0.2)   22  04:00    


 


Indirect Bilirubin  0.3 mg/dL  22  04:00    


 


AST  93 units/L (5-40)  H  22  04:00    


 


ALT  187 units/L (7-56)  H  22  04:00    


 


Alkaline Phosphatase  85 units/L ()   22  04:00    


 


Ammonia  37.0 umol/L (25-60)   22  04:49    


 


Total Creatine Kinase  2431 units/L ()  H  05/15/22  05:07    


 


Total Protein  5.3 g/dL (6.3-8.2)  L  22  04:00    


 


Albumin  2.8 g/dL (3.9-5)  L  22  04:00    


 


Albumin/Globulin Ratio  1.1 %  22  04:00    


 


Amylase  25 units/L ()  L  22  04:49    


 


Lipase  10 units/L (13-60)  L  22  04:49    


 


Coronavirus (PCR)  Negative  (Negative)   22  11:40    


 


Hepatitis A IgM Ab  Nonreactive  (NonReactive)   22  13:30    


 


Hep Bs Antigen  Non-reactive  (Negative)   22  13:30    


 


Hep B Core IgM Ab  Non-reactive  (NonReactive)   22  13:30    


 


Hepatitis C Antibody  Non-reactive  (NonReactive)   22  13:30    


 


HIV 1&2 Antibody Rapid  Non react  (Non React)   22  16:42    


 


HIV P24 Antigen  Non react  (Non React)   22  16:42    











Mcwilliams/IV: 


                                        





Voiding Method                   Urinal











Active Medications





- Current Medications


Current Medications: 














Generic Name Dose Route Start Last Admin





  Trade Name Freq  PRN Reason Stop Dose Admin


 


Albuterol  2.5 mg  22 06:15 





  Albuterol 2.5 Mg/3 Ml Nebu  IH  





  Q3HRT PRN  





  Shortness Of Breath  


 


Albuterol/Ipratropium  1 ampul  22 08:00  22 21:09





  Ipratropium/Albuterol Sulfate 3 Ml Ampul.Neb  IH   1 ampul





  BIDRT CHERYL   Administration


 


Diphenhydramine HCl  25 mg  22 06:24  22 18:49





  Diphenhydramine 50 Mg/Ml Vial  IV   25 mg





  Q6H PRN   Administration





  Skin Irritation  


 


Docusate Sodium  100 mg  22 22:00  22 21:33





  Docusate Sodium 100 Mg Cap  PO   100 mg





  BID CHERYL   Administration


 


Famotidine  20 mg  22 10:00  22 21:33





  Famotidine 20 Mg Tab  PO   20 mg





  BID CHERYL   Administration


 


Gabapentin  100 mg  22 06:00  22 06:27





  Gabapentin 100 Mg Cap  PO  22 14:01  100 mg





  0600,1400 CHERYL   Administration


 


Gabapentin  300 mg  22 22:00  22 21:33





  Gabapentin 300 Mg Cap  PO  22 22:01  300 mg





  QHS CHERYL   Administration


 


Gabapentin  300 mg  22 08:00 





  Gabapentin 300 Mg Cap  PO  





  TID@0800,1600,2200 CHERYL  


 


Heparin Sodium (Porcine)  5,000 unit  22 10:00  22 21:33





  Heparin 5,000 Unit/1 Ml Vial  SUB-Q   5,000 unit





  Q12HR CHERYL   Administration


 


Hydralazine HCl  10 mg  22 18:31  22 18:50





  Hydralazine 20 Mg/1 Ml Inj  IV   10 mg





  Q3HR PRN   Administration





  Hypertension  


 


Hydromorphone HCl  0.5 mg  22 06:15  22 03:52





  Hydromorphone 1 Mg/1 Ml Inj  IV   0.5 mg





  Q3H PRN   Administration





  Pain , Severe (7-10)  


 


Sodium Chloride  1,000 mls @ 75 mls/hr  22 01:00  22 02:39





  Nacl 0.9% 1000 Ml  IV   75 mls/hr





  AS DIRECT CHERYL   Administration


 


Morphine Sulfate  2 mg  22 06:15  22 17:50





  Morphine 2 Mg/1 Ml Inj  IV   2 mg





  Q4H PRN   Administration





  Pain, Moderate (4-6)  


 


Ondansetron HCl  4 mg  22 06:15  22 10:38





  Ondansetron 4 Mg/2 Ml Inj  IV   4 mg





  Q8H PRN   Administration





  Nausea And Vomiting  


 


Sodium Chloride  10 ml  22 10:00  22 21:34





  Sodium Chloride 0.9% 10 Ml Flush Syringe  IV   10 ml





  BID CHERYL   Administration


 


Sodium Chloride  10 ml  22 06:15  22 01:09





  Sodium Chloride 0.9% 10 Ml Flush Syringe  IV   10 ml





  PRN PRN   Administration





  LINE FLUSH  


 


Valsartan  160 mg  22 22:00  22 10:40





  Valsartan 160mg Tab  PO   160 mg





  DAILY CHERYL   Administration














Nutrition/Malnutrition Assess





- Dietary Evaluation


Nutrition/Malnutrition Findings: 


                                        





Nutrition Notes                                            Start:  05/10/22 

16:45


Freq:                                                      Status: Active       




Protocol:                                                                       




 Document     22 12:23  HATTIE  (Rec: 22 12:43  HATTIE  [id number])


 Nutrition Notes


     Initial or Follow up                        Brief Note


     Current Diagnosis                           Hypertension


     Other Pertinent Diagnosis                   Rabdomyolysis, Liver Failure,


                                                 s/p L-LE 4 Compartment


                                                 Fasciotomy w/Debridem.


     Current Diet                                Regular Diet + Dietary


                                                 Supplements. (since B ).


     Height                                      5 ft 6 in


     Weight                                      86.183 kg


     Ideal Body Weight (kg)                      64.54


     BMI                                         30.7


     Weight change and time frame                No bnody weight change


                                                 reporterd in 2 days.


     Weight Status                               Obese


     Subjective/Other Information                RD consult for routine F/U on


                                                 dietary advancement.


                                                 Pt is now on PO diet, but no


                                                 reports available on Pt's PO


                                                 intake of meals at the time,


                                                 will assess at F/U.


                                                 Pt is on Room Air, O2


                                                 saturation @ 97%, according to


                                                 Physical Assessment History


                                                 notes.


                                                 Procedure on 05/10: Excisinal


                                                 debridement of muscle and soft


                                                 tissue from L-LE wound, and


                                                 woundVAC placement; Leg


                                                 fasciotomy lateral and medial


                                                 incisions, Well tolerated,


                                                 according to Operative Report.


     Percent of energy/protein needs met:        Prescribed Regular Diet


                                                 provides for energy/protein


                                                 needs (2,289 Kcal/89 g) during


                                                 LOS; additionally, Dietary


                                                 Supplements will support wound


                                                 healing processes with 190


                                                 Kcal and 5 g of protein.


     #1


      Nutrition Diagnosis                        Increased nutrient needs   (


                                                 specify in comment below)


      Comments:                                  Protein to support wound


                                                 healing processes.


      Diagnosis Progress(for reassessment        Continues


       documentation)                            


     Is patient on ventilator?                   No


     Is Patient Ambulatory and/or Out of Bed     No


     REE-([facility]-confined to bed)      [phone]


     Kcal/Kg value to use for calculation        19


     Approximate Energy Requirements Using       1637


      kcal/Kg                                    


     Calculation Used for Recommendations        Kcal/kg


     Additional Notes                            Protein: 1.25-1.5 g/Kg AdjBW;


                                                  g/day.


                                                 Fluids: 1 ml/Kcal, or as per


                                                 MD.


 Nutrition Intervention


     Change Diet Order:                          Continue Regular Diet.


     Add Supplement/Snack (indicate name/kcal    Continue 28.8 g pkt Aidan; BID


      /protein )                                 .


     Provides kCal:                              190


     Provides Protein (gm)                       5


     Goal #1                                     Support, through dietary


                                                 supplementation, wound healing


                                                 processes during LOS.


     Goal #2                                     Adjust the dietary


                                                 intervention to better serve


                                                 Pt's needs and clinical


                                                 conditions during LOS.


     Goal #3                                     Maintain body weight within +/


                                                 -3% of admission body weight


                                                 during LOS.


     Follow-Up By:                               22


     Additional Comments                         Continue monitoring food


                                                 tolerance, %PO intake of meals


                                                 , and BM.

## 2022-05-18 RX ADMIN — SODIUM CHLORIDE SCH MLS/HR: 0.9 INJECTION, SOLUTION INTRAVENOUS at 21:27

## 2022-05-18 RX ADMIN — IPRATROPIUM BROMIDE AND ALBUTEROL SULFATE SCH: .5; 3 SOLUTION RESPIRATORY (INHALATION) at 19:42

## 2022-05-18 RX ADMIN — GABAPENTIN SCH MG: 300 CAPSULE ORAL at 21:27

## 2022-05-18 RX ADMIN — GABAPENTIN SCH MG: 300 CAPSULE ORAL at 17:12

## 2022-05-18 RX ADMIN — HEPARIN SODIUM SCH UNIT: 5000 INJECTION, SOLUTION INTRAVENOUS; SUBCUTANEOUS at 21:27

## 2022-05-18 RX ADMIN — ACETAMINOPHEN PRN MG: 325 TABLET ORAL at 00:08

## 2022-05-18 RX ADMIN — Medication SCH ML: at 21:59

## 2022-05-18 RX ADMIN — VALSARTAN SCH MG: 160 TABLET, FILM COATED ORAL at 09:09

## 2022-05-18 RX ADMIN — SODIUM CHLORIDE SCH MLS/HR: 0.9 INJECTION, SOLUTION INTRAVENOUS at 09:11

## 2022-05-18 RX ADMIN — FAMOTIDINE SCH MG: 20 TABLET ORAL at 09:10

## 2022-05-18 RX ADMIN — GABAPENTIN SCH MG: 300 CAPSULE ORAL at 09:10

## 2022-05-18 RX ADMIN — HEPARIN SODIUM SCH UNIT: 5000 INJECTION, SOLUTION INTRAVENOUS; SUBCUTANEOUS at 09:11

## 2022-05-18 RX ADMIN — Medication SCH ML: at 11:32

## 2022-05-18 RX ADMIN — IPRATROPIUM BROMIDE AND ALBUTEROL SULFATE SCH AMPUL: .5; 3 SOLUTION RESPIRATORY (INHALATION) at 08:58

## 2022-05-18 RX ADMIN — DOCUSATE SODIUM SCH MG: 100 CAPSULE, LIQUID FILLED ORAL at 21:27

## 2022-05-18 RX ADMIN — DOCUSATE SODIUM SCH MG: 100 CAPSULE, LIQUID FILLED ORAL at 09:09

## 2022-05-18 RX ADMIN — FAMOTIDINE SCH MG: 20 TABLET ORAL at 21:27

## 2022-05-18 NOTE — PROGRESS NOTE
Assessment and Plan


Assessment and plan: 


This is a 36 year old  transgendered female undergoing gender transformation 

with silicone injection to buttocks ([place] ) and breast implants, heavy 

drinker on the weekends (bottle of vodka) who presented to [facility] on 5/3 because 

of adverse drug reaction after reportedly self administration of  

rocephin and PCN for sore throat. Patient reportedly collapsed while checking in

to the emergency department, has complains of severe 10 out of 10 pain in left 

thigh and has noticeable mottling of skin in thigh and left lower abdomen.  

Patient underwent a CT of the left leg which showed possible cellulitis with no 

drainable fluid collection and was started on empiric antibiotics.  Patient 

admitted to the hospital service with diagnosis of acute liver failure, 

rhabdomyolysis, s/p 4 compartment fasciotomy to left lower extremity





Acute liver failure 


-GI consulted, appreciate recommendations


-Per GI: Suspect due to rhabdomyolysis given elevated creatinine kinase levels


   -If ALT continues to rise recommend MRCP


-Abdominal ultrasound showed mild diffuse biliary dilation, obstruction to 

lesion of the distal common bile duct cannot be excluded consider MRCP, no 

evidence of gallstones within the gallbladder


-PPI


-Regular diet


-BR: colace


-Trend LFTs





Compartment syndrome to LLE


-Vascular surgery consulted, appreciate recommendations


-s/p self admin of IM abx to midthigh


-c/o pain, decreased sensation, pulses were dopplarable, paralysis, mottled skin


-s/p 4 compartment tracheotomy on 5/3 with vascular surgery


-Bilateral lower extremity Doppler ultrasound showed no DVT


-Left lower extremity CT with contrast showed appearance of posterior soft 

tissue complex cellulitis with small subcutaneous lymph nodes, no drainable 

fluid collections present, contusion could have similar appearance


-Bilateral lower extremity arterial duplex showed no significant lower extremity

peripheral artery disease


-Normal ABIs





Peripheral neuropathy


-Avoid delirium


-Reorientation as needed


-Maintain sleep-wake cycle


-As needed analgesia


-Neurology consulted, appreciate recommendations


-Gabapentin 





HTN


-Blood pressure monitoring per protocol


-Valsartan


-PRN hydralazine





Rhabdomyolysis





acute kidney injury with vasomotor nephropathy now resolved


-Nephrology consulted, appreciate recommendations


-Strict intake and output


-Renally dose medications


-Avoid nephrotoxic medications


-MIVF


-Sodium bicarb


-Trend BMP, CK








Hospital course to date:





5/3: Venous ultrasound shows no DVT, Vascular surgery consulted who performed a 

fasciotomy and plan to transfer patient to [facility] post intervention.  Neurology 

consulted





: Transfer to ICU, GI and neurology consulted.





: Patient will be transferred back to the floor from ICU.  Her liver enzymes 

and creatinine are trending down.  Will be started on gabapentin per neurology 

recommendations and antibiotics discontinued.





: Continue supportive care.  CPK slowly improving.  Vascular plans for 

closure of fasciotomy early next week.  PT OT evaluation and treatment closure 

done.  Continue wound management.  No evidence of withdrawal noted at this time.

 Continue to monitor LFTs which are also slightly improving.  Plan discussed in 

detail with the patient who verbalized understanding





: Patient seen and examined clinically stable continues to show some 

improvement.  Closure of fasciotomy planned for tomorrow.  Renal function is 

improving.  CK ordered and pending





: Patient seen and examined, wbc mildly elevated likely secondary to 

steroids, will change to po for two additional days and stop, benefit probably 

already achieved, continue wound dressing, sensory function improved, awaiting 

wound vac and closure. Patient advised on clinically progress. 





: Patient seen and examined awaiting her vascular surgery for closure of 

fasciotomy.  CK is decreasing less than 9000 today.  We will continue aggressive

IV fluids until we have the resolution below 5000.  LFTs on the downward trend 

also.  Leukocytosis appears to have peaked steroids was changed to oral for 2 

additional days today is 1 of 2..  Plan of care discussed in detail with the 

patient who verbalized understanding.





5/10:  Left lower extremity compartment syndrome status post fasciotomies.  Cau

se is still unclear and may represent some allergic reaction.  Motor function is

unchanged from original exam.  Sensory function has improved with full sensation

of the foot.vascular surgery to place wound vacs on the leg today.  CK improved 

to the 8000 range today





: Patient underwent excisional debridement of muscle and soft tissue from 

left leg wound yesterday.  Patient also had wound VAC placement left leg 

fasciotomy incisions.  The patient had moderate amount of superficial necrotic 

muscle in the anterior lateral compartments with minimal amount of superficial 

necrotic muscle in the posterior compartment.  Follow-up culture results.  CK 

levels have improved to 6900 range.  AST/ALT continue to trend downward.





: I discussed the case with Dr. Mckeon who recommends home wound VAC as 

well as physical therapy.  I also discussed these needs with case management who

was attempting to arrange for discharge planning.  CK continues to improve with 

levels in the 4000 range.





: Still awaiting for home wound VAC arrangements to be made.  Leukocytosis 

is much improved down to 13,900 today.  LFTs continue to trend down to normal 

range.  CK levels still in the 4000 range.  Nephrology has signed off.  

Anticipate discharge later today or in a.m.





:  Still awaiting for home wound VAC arrangements to be made.  Laboratories 

continue to include including LFTs, CK and leukocytosis.





5/15:  Still awaiting for home wound VAC arrangements to be made.





: Patient had an episode of syncope while working with PT today.  Nurse 

reports patient lost consciousness for a few seconds and was helped back to the 

bed.  Patient's blood pressure was noted to be hypotensive/orthostatic.  We will

bolus 500 normal saline IV fluid and monitor orthostatics.  Case management 

reports that wound VAC should be delivered today.  LFTs continue to trend down 

to normal range.  CK levels still in the 2000 range.  Nephrology has signed off.

 Anticipate discharge in a.m. given the syncope and once wound VAC arranged.  

Patient with low-grade temp.  We will monitor.





; patient is anxious to go home, wound VAC is delivered to the bedside, 

continue wound care


Multiple social issues, DC planning per case management














History


Interval history: 


I have 


Lower extremity venous Doppler negative for DVT 


Vital signs noted seen and examined the patient at the bedside


Patient's chart and medications reviewed


Patient feels slightly better








Hospitalist Physical





- Constitutional


Vitals: 


                                        











Temp Pulse Resp BP Pulse Ox


 


 99.2 F   101 H  16   114/70   100 


 


 22 18:07  22 18:07  22 18:07  22 18:07  22 18:07











General appearance: Present: no acute distress, well-nourished, obese





- EENT


Eyes: Present: PERRL, EOM intact





- Neck


Neck: Present: supple, normal ROM





- Respiratory


Respiratory effort: normal


Respiratory: bilateral: diminished, negative: rales, rhonchi, wheezing





- Cardiovascular


Rhythm: regular


Heart Sounds: Present: S1 & S2





- Extremities


Extremities: no ischemia, No edema





- Abdominal


General gastrointestinal: soft, non-tender, non-distended, normal bowel sounds





- Integumentary


Integumentary: Present: clear, warm





- Psychiatric


Psychiatric: appropriate mood/affect





- Neurologic


Neurologic: moves all extremities





Results





- Labs


CBC & Chem 7: 


                                 22 05:01





                                 22 05:01


Labs: 


                             Laboratory Last Values











WBC  10.6 K/mm3 (4.5-11.0)   22  05:01    


 


RBC  3.21 M/mm3 (3.65-5.03)  L  22  05:01    


 


Hgb  10.7 gm/dl (11.8-15.2)  L  22  05:01    


 


Hct  32.0 % (35.5-45.6)  L  22  05:01    


 


MCV  99 fl (84-94)  H  22  05:01    


 


MCH  33 pg (28-32)  H  22  05:01    


 


MCHC  33 % (32-34)   22  05:01    


 


RDW  13.3 % (13.2-15.2)   22  05:01    


 


Plt Count  427 K/mm3 (140-440)   22  05:01    


 


Lymph % (Auto)  12.9 % (13.4-35.0)  L  22  05:01    


 


Mono % (Auto)  6.9 % (0.0-7.3)   22  05:01    


 


Eos % (Auto)  0.4 % (0.0-4.3)   22  05:01    


 


Baso % (Auto)  0.3 % (0.0-1.8)   22  05:01    


 


Lymph # (Auto)  1.4 K/mm3 (1.2-5.4)   22  05:01    


 


Mono # (Auto)  0.7 K/mm3 (0.0-0.8)   22  05:01    


 


Eos # (Auto)  0.0 K/mm3 (0.0-0.4)   22  05:01    


 


Baso # (Auto)  0.0 K/mm3 (0.0-0.1)   22  05:01    


 


Add Manual Diff  Complete   22  04:49    


 


Total Counted  100   22  04:49    


 


Seg Neutrophils %  79.5 % (40.0-70.0)  H  22  05:01    


 


Seg Neuts % (Manual)  83.0 % (40.0-70.0)  H  22  04:49    


 


Band Neutrophils %  2.0 %  22  04:49    


 


Lymphocytes % (Manual)  5.0 % (13.4-35.0)  L  22  04:49    


 


Reactive Lymphs % (Man)  0 %  22  04:49    


 


Monocytes % (Manual)  8.0 % (0.0-7.3)  H  22  04:49    


 


Eosinophils % (Manual)  0 % (0.0-4.3)   22  04:49    


 


Basophils % (Manual)  0 % (0.0-1.8)   22  04:49    


 


Metamyelocytes %  2.0 %  22  04:49    


 


Myelocytes %  0 %  22  04:49    


 


Promyelocytes %  0 %  22  04:49    


 


Blast Cells %  0 %  22  04:49    


 


Nucleated RBC %  Not Reportable   22  04:49    


 


Seg Neutrophils #  8.4 K/mm3 (1.8-7.7)  H  22  05:01    


 


Seg Neutrophils # Man  17.1 K/mm3 (1.8-7.7)  H  22  04:49    


 


Band Neutrophils #  0.4 K/mm3  22  04:49    


 


Lymphocytes # (Manual)  1.0 K/mm3 (1.2-5.4)  L  22  04:49    


 


Abs React Lymphs (Man)  0.0 K/mm3  22  04:49    


 


Monocytes # (Manual)  1.6 K/mm3 (0.0-0.8)  H  22  04:49    


 


Eosinophils # (Manual)  0.0 K/mm3 (0.0-0.4)   22  04:49    


 


Basophils # (Manual)  0.0 K/mm3 (0.0-0.1)   22  04:49    


 


Metamyelocytes #  0.4 K/mm3  22  04:49    


 


Myelocytes #  0.0 K/mm3  22  04:49    


 


Promyelocytes #  0.0 K/mm3  22  04:49    


 


Blast Cells #  0.0 K/mm3  22  04:49    


 


WBC Morphology  Not Reportable   22  04:49    


 


Hypersegmented Neuts  Not Reportable   22  04:49    


 


Hyposegmented Neuts  Rare   22  04:49    


 


Hypogranular Neuts  Not Reportable   22  04:49    


 


Smudge Cells  Not Reportable   22  04:49    


 


Toxic Granulation  Not Reportable   22  04:49    


 


Toxic Vacuolation  Not Reportable   22  04:49    


 


Dohle Bodies  Not Reportable   22  04:49    


 


Pelger-Huet Anomaly  Not Reportable   22  04:49    


 


Jamie Rods  Not Reportable   22  04:49    


 


Platelet Estimate  Consistent w auto   22  04:49    


 


Clumped Platelets  Not Reportable   22  04:49    


 


Plt Clumps, EDTA  Not Reportable   22  04:49    


 


Large Platelets  Rare   22  04:49    


 


Giant Platelets  Not Reportable   22  04:49    


 


Platelet Satelliting  Not Reportable   22  04:49    


 


Plt Morphology Comment  Not Reportable   22  04:49    


 


RBC Morphology  Not Reportable   22  04:49    


 


Dimorphic RBCs  Not Reportable   22  04:49    


 


Polychromasia  Not Reportable   22  04:49    


 


Hypochromasia  Not Reportable   22  04:49    


 


Poikilocytosis  Not Reportable   22  04:49    


 


Anisocytosis  Rare   22  04:49    


 


Microcytosis  Not Reportable   22  04:49    


 


Macrocytosis  Rare   22  04:49    


 


Spherocytes  Not Reportable   22  04:49    


 


Pappenheimer Bodies  Not Reportable   22  04:49    


 


Sickle Cells  Not Reportable   22  04:49    


 


Target Cells  Not Reportable   22  04:49    


 


Tear Drop Cells  Not Reportable   22  04:49    


 


Ovalocytes  Not Reportable   22  04:49    


 


Helmet Cells  Not Reportable   22  04:49    


 


Mccormick-[place]side Bodies  Not Reportable   22  04:49    


 


Cabot Rings  Not Reportable   22  04:49    


 


[place] Cells  Not Reportable   22  04:49    


 


Bite Cells  Not Reportable   22  04:49    


 


Crenated Cell  Not Reportable   22  04:49    


 


Elliptocytes  Not Reportable   22  04:49    


 


Acanthocytes (Spur)  Not Reportable   22  04:49    


 


Rouleaux  Not Reportable   22  04:49    


 


Hemoglobin C Crystals  Not Reportable   22  04:49    


 


Schistocytes  Not Reportable   22  04:49    


 


Malaria parasites  Not Reportable   22  04:49    


 


Miguel Bodies  Not Reportable   22  04:49    


 


Hem Pathologist Commnt  No   22  04:49    


 


PT  13.4 Sec. (12.2-14.9)   22  13:30    


 


INR  0.92  (0.87-1.13)   22  13:30    


 


APTT  25.8 Sec. (24.2-36.6)   22  13:30    


 


Sodium  138 mmol/L (137-145)   22  05:01    


 


Potassium  3.8 mmol/L (3.6-5.0)   22  05:01    


 


Chloride  102.8 mmol/L ()   22  05:01    


 


Carbon Dioxide  27 mmol/L (22-30)   22  05:01    


 


Anion Gap  12 mmol/L  22  05:01    


 


BUN  5 mg/dL (9-20)  L  22  05:01    


 


Creatinine  0.6 mg/dL (0.8-1.3)  L  22  05:01    


 


Estimated GFR  > 60 ml/min  22  05:01    


 


BUN/Creatinine Ratio  8 %  22  05:01    


 


Glucose  105 mg/dL ()  H  22  05:01    


 


Lactic Acid  1.40 mmol/L (0.7-2.0)   22  13:30    


 


Calcium  8.4 mg/dL (8.4-10.2)   22  05:01    


 


Phosphorus  2.80 mg/dL (2.5-4.5)   22  05:17    


 


Magnesium  2.20 mg/dL (1.7-2.3)   22  05:17    


 


Total Bilirubin  0.50 mg/dL (0.1-1.2)   22  04:00    


 


Direct Bilirubin  < 0.2 mg/dL (0-0.2)   22  04:00    


 


Indirect Bilirubin  0.3 mg/dL  22  04:00    


 


AST  93 units/L (5-40)  H  22  04:00    


 


ALT  187 units/L (7-56)  H  22  04:00    


 


Alkaline Phosphatase  85 units/L ()   22  04:00    


 


Ammonia  37.0 umol/L (25-60)   22  04:49    


 


Total Creatine Kinase  2431 units/L ()  H  05/15/22  05:07    


 


Total Protein  5.3 g/dL (6.3-8.2)  L  22  04:00    


 


Albumin  2.8 g/dL (3.9-5)  L  22  04:00    


 


Albumin/Globulin Ratio  1.1 %  22  04:00    


 


Amylase  25 units/L ()  L  22  04:49    


 


Lipase  10 units/L (13-60)  L  22  04:49    


 


Coronavirus (PCR)  Negative  (Negative)   22  11:40    


 


Hepatitis A IgM Ab  Nonreactive  (NonReactive)   22  13:30    


 


Hep Bs Antigen  Non-reactive  (Negative)   22  13:30    


 


Hep B Core IgM Ab  Non-reactive  (NonReactive)   22  13:30    


 


Hepatitis C Antibody  Non-reactive  (NonReactive)   22  13:30    


 


HIV 1&2 Antibody Rapid  Non react  (Non React)   22  16:42    


 


HIV P24 Antigen  Non react  (Non React)   22  16:42    











Mcwilliams/IV: 


                                        





Voiding Method                   Urinal











Active Medications





- Current Medications


Current Medications: 














Generic Name Dose Route Start Last Admin





  Trade Name Freq  PRN Reason Stop Dose Admin


 


Acetaminophen  650 mg  22 22:14  22 00:08





  Acetaminophen 325 Mg Tab  PO   650 mg





  Q6H PRN   Administration





  Pain, Mild (1-3)  


 


Albuterol  2.5 mg  22 06:15 





  Albuterol 2.5 Mg/3 Ml Nebu  IH  





  Q3HRT PRN  





  Shortness Of Breath  


 


Albuterol/Ipratropium  1 ampul  22 08:00  22 19:42





  Ipratropium/Albuterol Sulfate 3 Ml Ampul.Neb  IH   Not Given





  BIDRT CHERYL  


 


Diphenhydramine HCl  25 mg  22 06:24  22 17:12





  Diphenhydramine 50 Mg/Ml Vial  IV   25 mg





  Q6H PRN   Administration





  Skin Irritation  


 


Docusate Sodium  100 mg  22 22:00  22 09:09





  Docusate Sodium 100 Mg Cap  PO   100 mg





  BID CHERYL   Administration


 


Famotidine  20 mg  22 10:00  22 09:10





  Famotidine 20 Mg Tab  PO   20 mg





  BID CHERYL   Administration


 


Gabapentin  300 mg  22 08:00  22 17:12





  Gabapentin 300 Mg Cap  PO   300 mg





  TID@0800,1600,2200 CHERYL   Administration


 


Heparin Sodium (Porcine)  5,000 unit  22 10:00  22 09:11





  Heparin 5,000 Unit/1 Ml Vial  SUB-Q   5,000 unit





  Q12HR CHERYL   Administration


 


Hydralazine HCl  10 mg  22 18:31  22 18:50





  Hydralazine 20 Mg/1 Ml Inj  IV   10 mg





  Q3HR PRN   Administration





  Hypertension  


 


Hydromorphone HCl  0.5 mg  22 06:15  22 14:05





  Hydromorphone 1 Mg/1 Ml Inj  IV   0.5 mg





  Q3H PRN   Administration





  Pain , Severe (7-10)  


 


Sodium Chloride  1,000 mls @ 75 mls/hr  22 01:00  22 09:11





  Nacl 0.9% 1000 Ml  IV   75 mls/hr





  AS DIRECT CHERYL   Administration


 


Morphine Sulfate  2 mg  22 06:15  22 17:50





  Morphine 2 Mg/1 Ml Inj  IV   2 mg





  Q4H PRN   Administration





  Pain, Moderate (4-6)  


 


Ondansetron HCl  4 mg  22 06:15  22 10:38





  Ondansetron 4 Mg/2 Ml Inj  IV   4 mg





  Q8H PRN   Administration





  Nausea And Vomiting  


 


Sodium Chloride  10 ml  22 10:00  22 11:32





  Sodium Chloride 0.9% 10 Ml Flush Syringe  IV   10 ml





  BID CHERYL   Administration


 


Sodium Chloride  10 ml  22 06:15  22 01:09





  Sodium Chloride 0.9% 10 Ml Flush Syringe  IV   10 ml





  PRN PRN   Administration





  LINE FLUSH  


 


Valsartan  160 mg  22 22:00  22 09:09





  Valsartan 160mg Tab  PO   160 mg





  DAILY CHERYL   Administration














Nutrition/Malnutrition Assess





- Dietary Evaluation


Nutrition/Malnutrition Findings: 


                                        





Nutrition Notes                                            Start:  05/10/22 

16:45


Freq:                                                      Status: Active       




Protocol:                                                                       




 Document     22 12:23  HATTIE  (Rec: 22 12:43  HATTIE  [id number])


 Nutrition Notes


     Initial or Follow up                        Brief Note


     Current Diagnosis                           Hypertension


     Other Pertinent Diagnosis                   Rabdomyolysis, Liver Failure,


                                                 s/p L-LE 4 Compartment


                                                 Fasciotomy w/Debridem.


     Current Diet                                Regular Diet + Dietary


                                                 Supplements. (since B ).


     Height                                      5 ft 6 in


     Weight                                      86.183 kg


     Ideal Body Weight (kg)                      64.54


     BMI                                         30.7


     Weight change and time frame                No bnody weight change


                                                 reporterd in 2 days.


     Weight Status                               Obese


     Subjective/Other Information                RD consult for routine F/U on


                                                 dietary advancement.


                                                 Pt is now on PO diet, but no


                                                 reports available on Pt's PO


                                                 intake of meals at the time,


                                                 will assess at F/U.


                                                 Pt is on Room Air, O2


                                                 saturation @ 97%, according to


                                                 Physical Assessment History


                                                 notes.


                                                 Procedure on 05/10: Excisinal


                                                 debridement of muscle and soft


                                                 tissue from L-LE wound, and


                                                 woundVAC placement; Leg


                                                 fasciotomy lateral and medial


                                                 incisions, Well tolerated,


                                                 according to Operative Report.


     Percent of energy/protein needs met:        Prescribed Regular Diet


                                                 provides for energy/protein


                                                 needs (2,289 Kcal/89 g) during


                                                 LOS; additionally, Dietary


                                                 Supplements will support wound


                                                 healing processes with 190


                                                 Kcal and 5 g of protein.


     #1


      Nutrition Diagnosis                        Increased nutrient needs   (


                                                 specify in comment below)


      Comments:                                  Protein to support wound


                                                 healing processes.


      Diagnosis Progress(for reassessment        Continues


       documentation)                            


     Is patient on ventilator?                   No


     Is Patient Ambulatory and/or Out of Bed     No


     REE-([facility]-confined to bed)      [phone]


     Kcal/Kg value to use for calculation        19


     Approximate Energy Requirements Using       1637


      kcal/Kg                                    


     Calculation Used for Recommendations        Kcal/kg


     Additional Notes                            Protein: 1.25-1.5 g/Kg AdjBW;


                                                  g/day.


                                                 Fluids: 1 ml/Kcal, or as per


                                                 MD.


 Nutrition Intervention


     Change Diet Order:                          Continue Regular Diet.


     Add Supplement/Snack (indicate name/kcal    Continue 28.8 g pkt Aidan; BID


      /protein )                                 .


     Provides kCal:                              190


     Provides Protein (gm)                       5


     Goal #1                                     Support, through dietary


                                                 supplementation, wound healing


                                                 processes during LOS.


     Goal #2                                     Adjust the dietary


                                                 intervention to better serve


                                                 Pt's needs and clinical


                                                 conditions during LOS.


     Goal #3                                     Maintain body weight within +/


                                                 -3% of admission body weight


                                                 during LOS.


     Follow-Up By:                               22


     Additional Comments                         Continue monitoring food


                                                 tolerance, %PO intake of meals


                                                 , and BM.

## 2022-05-19 LAB
ALBUMIN SERPL-MCNC: 2.9 G/DL (ref 3.9–5)
ALT SERPL-CCNC: 130 UNITS/L (ref 7–56)
BUN SERPL-MCNC: 5 MG/DL (ref 9–20)
BUN/CREAT SERPL: 7 %
CALCIUM SERPL-MCNC: 8.8 MG/DL (ref 8.4–10.2)
HEMOLYSIS INDEX: 4

## 2022-05-19 RX ADMIN — VALSARTAN SCH MG: 160 TABLET, FILM COATED ORAL at 09:26

## 2022-05-19 RX ADMIN — GABAPENTIN SCH MG: 300 CAPSULE ORAL at 09:25

## 2022-05-19 RX ADMIN — DOCUSATE SODIUM SCH MG: 100 CAPSULE, LIQUID FILLED ORAL at 09:26

## 2022-05-19 RX ADMIN — HEPARIN SODIUM SCH UNIT: 5000 INJECTION, SOLUTION INTRAVENOUS; SUBCUTANEOUS at 22:06

## 2022-05-19 RX ADMIN — IPRATROPIUM BROMIDE AND ALBUTEROL SULFATE SCH AMPUL: .5; 3 SOLUTION RESPIRATORY (INHALATION) at 21:06

## 2022-05-19 RX ADMIN — Medication SCH ML: at 17:51

## 2022-05-19 RX ADMIN — DOCUSATE SODIUM SCH MG: 100 CAPSULE, LIQUID FILLED ORAL at 22:06

## 2022-05-19 RX ADMIN — HEPARIN SODIUM SCH UNIT: 5000 INJECTION, SOLUTION INTRAVENOUS; SUBCUTANEOUS at 09:26

## 2022-05-19 RX ADMIN — FAMOTIDINE SCH MG: 20 TABLET ORAL at 22:06

## 2022-05-19 RX ADMIN — SODIUM CHLORIDE SCH MLS/HR: 0.9 INJECTION, SOLUTION INTRAVENOUS at 09:30

## 2022-05-19 RX ADMIN — Medication SCH ML: at 22:07

## 2022-05-19 RX ADMIN — IPRATROPIUM BROMIDE AND ALBUTEROL SULFATE SCH AMPUL: .5; 3 SOLUTION RESPIRATORY (INHALATION) at 08:48

## 2022-05-19 RX ADMIN — GABAPENTIN SCH MG: 300 CAPSULE ORAL at 22:06

## 2022-05-19 RX ADMIN — GABAPENTIN SCH MG: 300 CAPSULE ORAL at 16:55

## 2022-05-19 RX ADMIN — FAMOTIDINE SCH MG: 20 TABLET ORAL at 09:26

## 2022-05-20 RX ADMIN — HEPARIN SODIUM SCH UNIT: 5000 INJECTION, SOLUTION INTRAVENOUS; SUBCUTANEOUS at 11:19

## 2022-05-20 RX ADMIN — SODIUM CHLORIDE SCH MLS/HR: 0.9 INJECTION, SOLUTION INTRAVENOUS at 03:45

## 2022-05-20 RX ADMIN — HYDROMORPHONE HYDROCHLORIDE PRN MG: 1 INJECTION, SOLUTION INTRAMUSCULAR; INTRAVENOUS; SUBCUTANEOUS at 20:01

## 2022-05-20 RX ADMIN — Medication SCH ML: at 21:21

## 2022-05-20 RX ADMIN — DOCUSATE SODIUM SCH: 100 CAPSULE, LIQUID FILLED ORAL at 21:22

## 2022-05-20 RX ADMIN — GABAPENTIN SCH: 300 CAPSULE ORAL at 16:40

## 2022-05-20 RX ADMIN — DOCUSATE SODIUM SCH: 100 CAPSULE, LIQUID FILLED ORAL at 11:24

## 2022-05-20 RX ADMIN — IPRATROPIUM BROMIDE AND ALBUTEROL SULFATE SCH: .5; 3 SOLUTION RESPIRATORY (INHALATION) at 08:25

## 2022-05-20 RX ADMIN — OXYCODONE AND ACETAMINOPHEN PRN TAB: 5; 325 TABLET ORAL at 11:20

## 2022-05-20 RX ADMIN — GABAPENTIN SCH MG: 300 CAPSULE ORAL at 21:21

## 2022-05-20 RX ADMIN — Medication SCH ML: at 11:24

## 2022-05-20 RX ADMIN — FAMOTIDINE SCH MG: 20 TABLET ORAL at 11:20

## 2022-05-20 RX ADMIN — Medication PRN ML: at 20:02

## 2022-05-20 RX ADMIN — VALSARTAN SCH MG: 160 TABLET, FILM COATED ORAL at 11:19

## 2022-05-20 RX ADMIN — FAMOTIDINE SCH MG: 20 TABLET ORAL at 21:21

## 2022-05-20 RX ADMIN — GABAPENTIN SCH MG: 300 CAPSULE ORAL at 11:19

## 2022-05-20 RX ADMIN — HEPARIN SODIUM SCH UNIT: 5000 INJECTION, SOLUTION INTRAVENOUS; SUBCUTANEOUS at 21:22

## 2022-05-20 NOTE — PROGRESS NOTE
Assessment and Plan


Assessment and plan: 


This is a 36 year old  transgendered female undergoing gender transformation 

with silicone injection to buttocks ([place] ) and breast implants, heavy 

drinker on the weekends (bottle of vodka) who presented to [facility] on 5/3 because 

of adverse drug reaction after reportedly self administration of  

rocephin and PCN for sore throat. Patient reportedly collapsed while checking in

to the emergency department, has complains of severe 10 out of 10 pain in left 

thigh and has noticeable mottling of skin in thigh and left lower abdomen.  

Patient underwent a CT of the left leg which showed possible cellulitis with no 

drainable fluid collection and was started on empiric antibiotics.  Patient 

admitted to the hospital service with diagnosis of acute liver failure, 

rhabdomyolysis, s/p 4 compartment fasciotomy to left lower extremity





Acute liver failure 


-GI consulted, appreciate recommendations


-Per GI: Suspect due to rhabdomyolysis given elevated creatinine kinase levels


   -If ALT continues to rise recommend MRCP


-Abdominal ultrasound showed mild diffuse biliary dilation, obstruction to 

lesion of the distal common bile duct cannot be excluded consider MRCP, no 

evidence of gallstones within the gallbladder


-PPI


-Regular diet


-BR: colace


-Trend LFTs





Compartment syndrome to LLE


-Vascular surgery consulted, appreciate recommendations


-s/p self admin of IM abx to midthigh


-c/o pain, decreased sensation, pulses were dopplarable, paralysis, mottled skin


-s/p 4 compartment tracheotomy on 5/3 with vascular surgery


-Bilateral lower extremity Doppler ultrasound showed no DVT


-Left lower extremity CT with contrast showed appearance of posterior soft 

tissue complex cellulitis with small subcutaneous lymph nodes, no drainable 

fluid collections present, contusion could have similar appearance


-Bilateral lower extremity arterial duplex showed no significant lower extremity

peripheral artery disease


-Normal ABIs





Peripheral neuropathy


-Avoid delirium


-Reorientation as needed


-Maintain sleep-wake cycle


-As needed analgesia


-Neurology consulted, appreciate recommendations


-Gabapentin 





HTN


-Blood pressure monitoring per protocol


-Valsartan


-PRN hydralazine





Rhabdomyolysis





acute kidney injury with vasomotor nephropathy now resolved


-Nephrology consulted, appreciate recommendations


-Strict intake and output


-Renally dose medications


-Avoid nephrotoxic medications


-MIVF


-Sodium bicarb


-Trend BMP, CK








Hospital course to date:





5/3: Venous ultrasound shows no DVT, Vascular surgery consulted who performed a 

fasciotomy and plan to transfer patient to [facility] post intervention.  Neurology 

consulted





: Transfer to ICU, GI and neurology consulted.





: Patient will be transferred back to the floor from ICU.  Her liver enzymes 

and creatinine are trending down.  Will be started on gabapentin per neurology 

recommendations and antibiotics discontinued.





: Continue supportive care.  CPK slowly improving.  Vascular plans for 

closure of fasciotomy early next week.  PT OT evaluation and treatment closure 

done.  Continue wound management.  No evidence of withdrawal noted at this time.

 Continue to monitor LFTs which are also slightly improving.  Plan discussed in 

detail with the patient who verbalized understanding





: Patient seen and examined clinically stable continues to show some 

improvement.  Closure of fasciotomy planned for tomorrow.  Renal function is 

improving.  CK ordered and pending





: Patient seen and examined, wbc mildly elevated likely secondary to 

steroids, will change to po for two additional days and stop, benefit probably 

already achieved, continue wound dressing, sensory function improved, awaiting 

wound vac and closure. Patient advised on clinically progress. 





: Patient seen and examined awaiting her vascular surgery for closure of 

fasciotomy.  CK is decreasing less than 9000 today.  We will continue aggressive

IV fluids until we have the resolution below 5000.  LFTs on the downward trend 

also.  Leukocytosis appears to have peaked steroids was changed to oral for 2 

additional days today is 1 of 2..  Plan of care discussed in detail with the 

patient who verbalized understanding.





5/10:  Left lower extremity compartment syndrome status post fasciotomies.  Cau

se is still unclear and may represent some allergic reaction.  Motor function is

unchanged from original exam.  Sensory function has improved with full sensation

of the foot.vascular surgery to place wound vacs on the leg today.  CK improved 

to the 8000 range today





: Patient underwent excisional debridement of muscle and soft tissue from 

left leg wound yesterday.  Patient also had wound VAC placement left leg 

fasciotomy incisions.  The patient had moderate amount of superficial necrotic 

muscle in the anterior lateral compartments with minimal amount of superficial 

necrotic muscle in the posterior compartment.  Follow-up culture results.  CK 

levels have improved to 6900 range.  AST/ALT continue to trend downward.





: I discussed the case with Dr. Mckeon who recommends home wound VAC as 

well as physical therapy.  I also discussed these needs with case management who

was attempting to arrange for discharge planning.  CK continues to improve with 

levels in the 4000 range.





: Still awaiting for home wound VAC arrangements to be made.  Leukocytosis 

is much improved down to 13,900 today.  LFTs continue to trend down to normal 

range.  CK levels still in the 4000 range.  Nephrology has signed off.  

Anticipate discharge later today or in a.m.





:  Still awaiting for home wound VAC arrangements to be made.  Laboratories 

continue to include including LFTs, CK and leukocytosis.





5/15:  Still awaiting for home wound VAC arrangements to be made.





: Patient had an episode of syncope while working with PT today.  Nurse 

reports patient lost consciousness for a few seconds and was helped back to the 

bed.  Patient's blood pressure was noted to be hypotensive/orthostatic.  We will

bolus 500 normal saline IV fluid and monitor orthostatics.  Case management 

reports that wound VAC should be delivered today.  LFTs continue to trend down 

to normal range.  CK levels still in the 2000 range.  Nephrology has signed off.

 Anticipate discharge in a.m. given the syncope and once wound VAC arranged.  

Patient with low-grade temp.  We will monitor.





; patient is anxious to go home, wound VAC is delivered to the bedside, 

continue wound care


Multiple social issues, DC planning per case management, left lower extremity 

Doppler negative for DVT





; discharge planning, multiple social issues





; continue current management, wound VAC in place, DC planning issues





; discharge planning per case management, setting up wound VAC, setting up 

outpatient wound care


Setting up follow-up visits with physicians, patient has multiple social issues 

and no payer source


Continue current management discharge when medically stable











History


Interval history: 


Seen and examined the patient at the bedside


Patient's chart and medications reviewed


Patient feels slightly better


Wound VAC is functional


No new complaints anxious to go home


Vital signs noted








Hospitalist Physical





- Constitutional


Vitals: 


                                        











Temp Pulse Resp BP Pulse Ox


 


 98.6 F   103 H  20   111/62   98 


 


 22 06:04  22 06:04  22 06:04  22 06:04  22 08:22











General appearance: Present: no acute distress, well-nourished, obese





- EENT


Eyes: Present: PERRL, EOM intact





- Neck


Neck: Present: supple, normal ROM





- Respiratory


Respiratory effort: normal


Respiratory: bilateral: diminished, negative: rales, rhonchi, wheezing





- Cardiovascular


Rhythm: regular


Heart Sounds: Present: S1 & S2





- Extremities


Extremities: abnormal (Left lower extremity swelling wound VAC in place)


Extremity abnormal: edema





- Abdominal


General gastrointestinal: soft, non-tender, non-distended, normal bowel sounds





- Integumentary


Integumentary: Present: clear, warm





- Psychiatric


Psychiatric: appropriate mood/affect, cooperative





- Neurologic


Neurologic: CNII-XII intact, moves all extremities





Results





- Labs


CBC & Chem 7: 


                                 22 05:01





                                 22 08:55


Labs: 


                             Laboratory Last Values











WBC  10.6 K/mm3 (4.5-11.0)   22  05:01    


 


RBC  3.21 M/mm3 (3.65-5.03)  L  22  05:01    


 


Hgb  10.7 gm/dl (11.8-15.2)  L  22  05:01    


 


Hct  32.0 % (35.5-45.6)  L  22  05:01    


 


MCV  99 fl (84-94)  H  22  05:01    


 


MCH  33 pg (28-32)  H  22  05:01    


 


MCHC  33 % (32-34)   22  05:01    


 


RDW  13.3 % (13.2-15.2)   22  05:01    


 


Plt Count  427 K/mm3 (140-440)   22  05:01    


 


Lymph % (Auto)  12.9 % (13.4-35.0)  L  22  05:01    


 


Mono % (Auto)  6.9 % (0.0-7.3)   22  05:01    


 


Eos % (Auto)  0.4 % (0.0-4.3)   22  05:01    


 


Baso % (Auto)  0.3 % (0.0-1.8)   22  05:01    


 


Lymph # (Auto)  1.4 K/mm3 (1.2-5.4)   22  05:01    


 


Mono # (Auto)  0.7 K/mm3 (0.0-0.8)   22  05:01    


 


Eos # (Auto)  0.0 K/mm3 (0.0-0.4)   22  05:01    


 


Baso # (Auto)  0.0 K/mm3 (0.0-0.1)   22  05:01    


 


Add Manual Diff  Complete   22  04:49    


 


Total Counted  100   22  04:49    


 


Seg Neutrophils %  79.5 % (40.0-70.0)  H  22  05:01    


 


Seg Neuts % (Manual)  83.0 % (40.0-70.0)  H  22  04:49    


 


Band Neutrophils %  2.0 %  22  04:49    


 


Lymphocytes % (Manual)  5.0 % (13.4-35.0)  L  22  04:49    


 


Reactive Lymphs % (Man)  0 %  22  04:49    


 


Monocytes % (Manual)  8.0 % (0.0-7.3)  H  22  04:49    


 


Eosinophils % (Manual)  0 % (0.0-4.3)   22  04:49    


 


Basophils % (Manual)  0 % (0.0-1.8)   22  04:49    


 


Metamyelocytes %  2.0 %  22  04:49    


 


Myelocytes %  0 %  22  04:49    


 


Promyelocytes %  0 %  22  04:49    


 


Blast Cells %  0 %  22  04:49    


 


Nucleated RBC %  Not Reportable   22  04:49    


 


Seg Neutrophils #  8.4 K/mm3 (1.8-7.7)  H  22  05:01    


 


Seg Neutrophils # Man  17.1 K/mm3 (1.8-7.7)  H  22  04:49    


 


Band Neutrophils #  0.4 K/mm3  22  04:49    


 


Lymphocytes # (Manual)  1.0 K/mm3 (1.2-5.4)  L  22  04:49    


 


Abs React Lymphs (Man)  0.0 K/mm3  22  04:49    


 


Monocytes # (Manual)  1.6 K/mm3 (0.0-0.8)  H  22  04:49    


 


Eosinophils # (Manual)  0.0 K/mm3 (0.0-0.4)   22  04:49    


 


Basophils # (Manual)  0.0 K/mm3 (0.0-0.1)   22  04:49    


 


Metamyelocytes #  0.4 K/mm3  22  04:49    


 


Myelocytes #  0.0 K/mm3  22  04:49    


 


Promyelocytes #  0.0 K/mm3  22  04:49    


 


Blast Cells #  0.0 K/mm3  22  04:49    


 


WBC Morphology  Not Reportable   22  04:49    


 


Hypersegmented Neuts  Not Reportable   22  04:49    


 


Hyposegmented Neuts  Rare   22  04:49    


 


Hypogranular Neuts  Not Reportable   22  04:49    


 


Smudge Cells  Not Reportable   22  04:49    


 


Toxic Granulation  Not Reportable   22  04:49    


 


Toxic Vacuolation  Not Reportable   22  04:49    


 


Dohle Bodies  Not Reportable   22  04:49    


 


Pelger-Huet Anomaly  Not Reportable   22  04:49    


 


Jamie Rods  Not Reportable   22  04:49    


 


Platelet Estimate  Consistent w auto   22  04:49    


 


Clumped Platelets  Not Reportable   22  04:49    


 


Plt Clumps, EDTA  Not Reportable   22  04:49    


 


Large Platelets  Rare   22  04:49    


 


Giant Platelets  Not Reportable   22  04:49    


 


Platelet Satelliting  Not Reportable   22  04:49    


 


Plt Morphology Comment  Not Reportable   22  04:49    


 


RBC Morphology  Not Reportable   22  04:49    


 


Dimorphic RBCs  Not Reportable   22  04:49    


 


Polychromasia  Not Reportable   22  04:49    


 


Hypochromasia  Not Reportable   22  04:49    


 


Poikilocytosis  Not Reportable   22  04:49    


 


Anisocytosis  Rare   22  04:49    


 


Microcytosis  Not Reportable   22  04:49    


 


Macrocytosis  Rare   22  04:49    


 


Spherocytes  Not Reportable   22  04:49    


 


Pappenheimer Bodies  Not Reportable   22  04:49    


 


Sickle Cells  Not Reportable   22  04:49    


 


Target Cells  Not Reportable   22  04:49    


 


Tear Drop Cells  Not Reportable   22  04:49    


 


Ovalocytes  Not Reportable   22  04:49    


 


Helmet Cells  Not Reportable   22  04:49    


 


Mccormick-Tull Bodies  Not Reportable   22  04:49    


 


Cabot Rings  Not Reportable   22  04:49    


 


Cornel Cells  Not Reportable   22  04:49    


 


Bite Cells  Not Reportable   22  04:49    


 


Crenated Cell  Not Reportable   22  04:49    


 


Elliptocytes  Not Reportable   22  04:49    


 


Acanthocytes (Spur)  Not Reportable   22  04:49    


 


Rouleaux  Not Reportable   22  04:49    


 


Hemoglobin C Crystals  Not Reportable   22  04:49    


 


Schistocytes  Not Reportable   22  04:49    


 


Malaria parasites  Not Reportable   22  04:49    


 


Miguel Bodies  Not Reportable   22  04:49    


 


Hem Pathologist Commnt  No   22  04:49    


 


PT  13.4 Sec. (12.2-14.9)   22  13:30    


 


INR  0.92  (0.87-1.13)   22  13:30    


 


APTT  25.8 Sec. (24.2-36.6)   22  13:30    


 


Sodium  137 mmol/L (137-145)   22  08:55    


 


Potassium  3.7 mmol/L (3.6-5.0)   22  08:55    


 


Chloride  101.1 mmol/L ()   22  08:55    


 


Carbon Dioxide  27 mmol/L (22-30)   22  08:55    


 


Anion Gap  13 mmol/L  22  08:55    


 


BUN  5 mg/dL (9-20)  L  22  08:55    


 


Creatinine  0.7 mg/dL (0.8-1.3)  L  22  08:55    


 


Estimated GFR  > 60 ml/min  22  08:55    


 


BUN/Creatinine Ratio  7 %  22  08:55    


 


Glucose  85 mg/dL ()   22  08:55    


 


Lactic Acid  1.40 mmol/L (0.7-2.0)   22  13:30    


 


Calcium  8.8 mg/dL (8.4-10.2)   22  08:55    


 


Phosphorus  2.80 mg/dL (2.5-4.5)   22  05:17    


 


Magnesium  2.20 mg/dL (1.7-2.3)   22  05:17    


 


Total Bilirubin  0.40 mg/dL (0.1-1.2)   22  08:55    


 


Direct Bilirubin  < 0.2 mg/dL (0-0.2)   22  04:00    


 


Indirect Bilirubin  0.3 mg/dL  22  04:00    


 


AST  46 units/L (5-40)  H  22  08:55    


 


ALT  130 units/L (7-56)  H  22  08:55    


 


Alkaline Phosphatase  76 units/L ()   22  08:55    


 


Ammonia  37.0 umol/L (25-60)   22  04:49    


 


Total Creatine Kinase  1001 units/L ()  H  22  08:55    


 


Total Protein  6.0 g/dL (6.3-8.2)  L  22  08:55    


 


Albumin  2.9 g/dL (3.9-5)  L  22  08:55    


 


Albumin/Globulin Ratio  0.9 %  22  08:55    


 


Amylase  25 units/L ()  L  22  04:49    


 


Lipase  10 units/L (13-60)  L  22  04:49    


 


Coronavirus (PCR)  Negative  (Negative)   22  11:40    


 


Hepatitis A IgM Ab  Nonreactive  (NonReactive)   22  13:30    


 


Hep Bs Antigen  Non-reactive  (Negative)   22  13:30    


 


Hep B Core IgM Ab  Non-reactive  (NonReactive)   22  13:30    


 


Hepatitis C Antibody  Non-reactive  (NonReactive)   22  13:30    


 


HIV 1&2 Antibody Rapid  Non react  (Non React)   22  16:42    


 


HIV P24 Antigen  Non react  (Non React)   22  16:42    











Mcwilliams/IV: 


                                        





Voiding Method                   Urinal











Active Medications





- Current Medications


Current Medications: 














Generic Name Dose Route Start Last Admin





  Trade Name Freq  PRN Reason Stop Dose Admin


 


Acetaminophen  650 mg  22 22:14  22 00:08





  Acetaminophen 325 Mg Tab  PO   650 mg





  Q6H PRN   Administration





  Pain, Mild (1-3)  


 


Albuterol  2.5 mg  22 06:15 





  Albuterol 2.5 Mg/3 Ml Nebu  IH  





  Q3HRT PRN  





  Shortness Of Breath  


 


Diphenhydramine HCl  25 mg  22 06:24  22 17:12





  Diphenhydramine 50 Mg/Ml Vial  IV   25 mg





  Q6H PRN   Administration





  Skin Irritation  


 


Docusate Sodium  100 mg  22 22:00  22 22:06





  Docusate Sodium 100 Mg Cap  PO   100 mg





  BID CHERYL   Administration


 


Famotidine  20 mg  22 10:00  22 22:06





  Famotidine 20 Mg Tab  PO   20 mg





  BID CHERYL   Administration


 


Gabapentin  300 mg  22 08:00  22 22:06





  Gabapentin 300 Mg Cap  PO   300 mg





  TID@0800,1600,2200 CHERYL   Administration


 


Heparin Sodium (Porcine)  5,000 unit  22 10:00  22 22:06





  Heparin 5,000 Unit/1 Ml Vial  SUB-Q   5,000 unit





  Q12HR CHERYL   Administration


 


Hydralazine HCl  10 mg  22 18:31  22 18:50





  Hydralazine 20 Mg/1 Ml Inj  IV   10 mg





  Q3HR PRN   Administration





  Hypertension  


 


Hydromorphone HCl  0.5 mg  22 06:15  22 14:05





  Hydromorphone 1 Mg/1 Ml Inj  IV   0.5 mg





  Q3H PRN   Administration





  Pain , Severe (7-10)  


 


Sodium Chloride  1,000 mls @ 75 mls/hr  22 01:00  22 03:45





  Nacl 0.9% 1000 Ml  IV   75 mls/hr





  AS DIRECT CHERYL   Administration


 


Morphine Sulfate  2 mg  22 06:15  22 17:50





  Morphine 2 Mg/1 Ml Inj  IV   2 mg





  Q4H PRN   Administration





  Pain, Moderate (4-6)  


 


Ondansetron HCl  4 mg  22 06:15  22 10:38





  Ondansetron 4 Mg/2 Ml Inj  IV   4 mg





  Q8H PRN   Administration





  Nausea And Vomiting  


 


Sodium Chloride  10 ml  22 10:00  22 22:07





  Sodium Chloride 0.9% 10 Ml Flush Syringe  IV   10 ml





  BID CHERYL   Administration


 


Sodium Chloride  10 ml  22 06:15  22 01:09





  Sodium Chloride 0.9% 10 Ml Flush Syringe  IV   10 ml





  PRN PRN   Administration





  LINE FLUSH  


 


Valsartan  160 mg  22 22:00  22 09:26





  Valsartan 160mg Tab  PO   160 mg





  DAILY CHERYL   Administration














Nutrition/Malnutrition Assess





- Dietary Evaluation


Nutrition/Malnutrition Findings: 


                                        





Nutrition Notes                                            Start:  05/10/22 

16:45


Freq:                                                      Status: Active       




Protocol:                                                                       




 Document     22 14:21  HATTIE  (Rec: 22 14:46  HATTIE  [id number])


 Nutrition Notes


     Initial or Follow up                        Reassessment


     Current Diagnosis                           Hypertension


     Other Pertinent Diagnosis                   Rabdomyolysis, Liver Failure,


                                                 s/p L-LE 4 Compartment


                                                 Fasciotomy w/Debridem.


     Current Diet                                Regular Diet + Dietary


                                                 Supplements. (since B ).


     Labs/Tests                                  : BUN 5, Crea 0.7.


     Pertinent Medications                       : Nutritionally


                                                 unremarkable.


     Height                                      5 ft 6 in


     Weight                                      86.183 kg


     Ideal Body Weight (kg)                      64.54


     BMI                                         30.7


     Weight change and time frame                No body weight change


                                                 reporterd in 9 days.


     Weight Status                               Obese


     Subjective/Other Information                RD consult for routine F/U on


                                                 dietary advancement.


                                                 Pt's PO intake of meals has


                                                 been Good (100%), according to


                                                 ADL notes.


                                                 Pt is on Room Air, O2


                                                 saturation @ 96%, according to


                                                 Physical Assessment History


                                                 notes.


                                                 Pt presents L-LE Pitting Edema


                                                 2+, according to Physical


                                                 Assessment History notes.


                                                 Pt presents L-LE Vascular


                                                 Wound with WoundVAC since , according to Physical


                                                 Assessment History notes.


                                                 Event on , Pt suffered a


                                                 syncope during PT session,


                                                 according to Progress notes.


                                                 Pt is clearedd for discharge,


                                                 it is pending due to several


                                                 social issues, according to


                                                 Progress notes.


     Percent of energy/protein needs met:        Prescribed Regular Diet


                                                 provides for energy/protein


                                                 needs (2,289 Kcal/89 g) during


                                                 LOS


     Burn                                        Absent


     Trauma                                      Absent


     GI Symptoms                                 None


     Food Allergy                                No


     Skin Integrity/Comment                      L-LE Vascular Wound


     Current % PO                                Good (%)


     Minimum of two criteria                     No


     #1


      Nutrition Diagnosis                        Increased nutrient needs   (


                                                 specify in comment below)


      Comments:                                  Protein to support wound


                                                 healing processes.


      Diagnosis Progress(for reassessment        Continues


       documentation)                            


     Is patient on ventilator?                   No


     Is Patient Ambulatory and/or Out of Bed     No


     REE-(Vigo-St. Jeor-confined to bed)      [phone]


     Kcal/Kg value to use for calculation        19


     Approximate Energy Requirements Using       1637


      kcal/Kg                                    


     Calculation Used for Recommendations        Kcal/kg


     Additional Notes                            Protein: 1.25-1.5 g/Kg AdjBW;


                                                  g/day.


                                                 Fluids: 1 ml/Kcal, or as per


                                                 MD.


 Nutrition Intervention


     Change Diet Order:                          Continue Regular Diet.


     Add Supplement/Snack (indicate name/kcal    Continue 28.8 g pkt Aidan; BID


      /protein )                                 .


     Provides kCal:                              190


     Provides Protein (gm)                       5


     Goal #1                                     Support, through dietary


                                                 supplementation, wound healing


                                                 processes during LOS.


     Goal #2                                     Adjust the dietary


                                                 intervention to better serve


                                                 Pt's needs and clinical


                                                 conditions during LOS.


     Goal #3                                     Maintain body weight within +/


                                                 -3% of admission body weight


                                                 during LOS.


     Revisit per MD consult or patient           Sign Off


      request:                                   


     Additional Comments                         Continue monitoring food


                                                 tolerance, %PO intake of meals


                                                 , and BM.

## 2022-05-21 LAB
ALBUMIN SERPL-MCNC: 3.1 G/DL (ref 3.9–5)
ALT SERPL-CCNC: 108 UNITS/L (ref 7–56)
BUN SERPL-MCNC: 5 MG/DL (ref 9–20)
BUN/CREAT SERPL: 7 %
CALCIUM SERPL-MCNC: 8.9 MG/DL (ref 8.4–10.2)
HEMOLYSIS INDEX: 0

## 2022-05-21 RX ADMIN — DOCUSATE SODIUM SCH MG: 100 CAPSULE, LIQUID FILLED ORAL at 09:40

## 2022-05-21 RX ADMIN — HEPARIN SODIUM SCH UNIT: 5000 INJECTION, SOLUTION INTRAVENOUS; SUBCUTANEOUS at 09:40

## 2022-05-21 RX ADMIN — VALSARTAN SCH MG: 160 TABLET, FILM COATED ORAL at 09:40

## 2022-05-21 RX ADMIN — OXYCODONE AND ACETAMINOPHEN PRN TAB: 5; 325 TABLET ORAL at 05:40

## 2022-05-21 RX ADMIN — Medication SCH ML: at 13:33

## 2022-05-21 RX ADMIN — GABAPENTIN SCH MG: 300 CAPSULE ORAL at 16:25

## 2022-05-21 RX ADMIN — SODIUM CHLORIDE SCH MLS/HR: 0.9 INJECTION, SOLUTION INTRAVENOUS at 21:48

## 2022-05-21 RX ADMIN — OXYCODONE AND ACETAMINOPHEN PRN TAB: 5; 325 TABLET ORAL at 11:54

## 2022-05-21 RX ADMIN — HEPARIN SODIUM SCH UNIT: 5000 INJECTION, SOLUTION INTRAVENOUS; SUBCUTANEOUS at 21:45

## 2022-05-21 RX ADMIN — FAMOTIDINE SCH MG: 20 TABLET ORAL at 21:44

## 2022-05-21 RX ADMIN — SODIUM CHLORIDE SCH MLS/HR: 0.9 INJECTION, SOLUTION INTRAVENOUS at 05:42

## 2022-05-21 RX ADMIN — GABAPENTIN SCH MG: 300 CAPSULE ORAL at 09:40

## 2022-05-21 RX ADMIN — DOCUSATE SODIUM SCH MG: 100 CAPSULE, LIQUID FILLED ORAL at 21:45

## 2022-05-21 RX ADMIN — GABAPENTIN SCH MG: 300 CAPSULE ORAL at 21:44

## 2022-05-21 RX ADMIN — FAMOTIDINE SCH MG: 20 TABLET ORAL at 09:40

## 2022-05-21 RX ADMIN — OXYCODONE AND ACETAMINOPHEN PRN TAB: 5; 325 TABLET ORAL at 21:58

## 2022-05-21 RX ADMIN — Medication SCH ML: at 21:45

## 2022-05-21 NOTE — PROGRESS NOTE
Assessment and Plan


Assessment and plan: 


This is a 36 year old  transgendered female undergoing gender transformation 

with silicone injection to buttocks ([place] ) and breast implants, heavy 

drinker on the weekends (bottle of vodka) who presented to [facility] on 5/3 because 

of adverse drug reaction after reportedly self administration of  

rocephin and PCN for sore throat. Patient reportedly collapsed while checking in

to the emergency department, has complains of severe 10 out of 10 pain in left 

thigh and has noticeable mottling of skin in thigh and left lower abdomen.  

Patient underwent a CT of the left leg which showed possible cellulitis with no 

drainable fluid collection and was started on empiric antibiotics.  Patient 

admitted to the hospital service with diagnosis of acute liver failure, 

rhabdomyolysis, s/p 4 compartment fasciotomy to left lower extremity





Acute liver failure 


-GI consulted, appreciate recommendations


-Per GI: Suspect due to rhabdomyolysis given elevated creatinine kinase levels


   -If ALT continues to rise recommend MRCP


-Abdominal ultrasound showed mild diffuse biliary dilation, obstruction to 

lesion of the distal common bile duct cannot be excluded consider MRCP, no 

evidence of gallstones within the gallbladder


-PPI


-Regular diet


-BR: colace


-Trend LFTs





Compartment syndrome to LLE


-Vascular surgery consulted, appreciate recommendations


-s/p self admin of IM abx to midthigh


-c/o pain, decreased sensation, pulses were dopplarable, paralysis, mottled skin


-s/p 4 compartment tracheotomy on 5/3 with vascular surgery


-Bilateral lower extremity Doppler ultrasound showed no DVT


-Left lower extremity CT with contrast showed appearance of posterior soft 

tissue complex cellulitis with small subcutaneous lymph nodes, no drainable 

fluid collections present, contusion could have similar appearance


-Bilateral lower extremity arterial duplex showed no significant lower extremity

peripheral artery disease


-Normal ABIs





Peripheral neuropathy


-Avoid delirium


-Reorientation as needed


-Maintain sleep-wake cycle


-As needed analgesia


-Neurology consulted, appreciate recommendations


-Gabapentin 





HTN


-Blood pressure monitoring per protocol


-Valsartan


-PRN hydralazine





Rhabdomyolysis





acute kidney injury with vasomotor nephropathy now resolved


-Nephrology consulted, appreciate recommendations


-Strict intake and output


-Renally dose medications


-Avoid nephrotoxic medications


-MIVF


-Sodium bicarb


-Trend BMP, CK








Hospital course to date:





5/3: Venous ultrasound shows no DVT, Vascular surgery consulted who performed a 

fasciotomy and plan to transfer patient to [facility] post intervention.  Neurology 

consulted





: Transfer to ICU, GI and neurology consulted.





: Patient will be transferred back to the floor from ICU.  Her liver enzymes 

and creatinine are trending down.  Will be started on gabapentin per neurology 

recommendations and antibiotics discontinued.





: Continue supportive care.  CPK slowly improving.  Vascular plans for 

closure of fasciotomy early next week.  PT OT evaluation and treatment closure 

done.  Continue wound management.  No evidence of withdrawal noted at this time.

 Continue to monitor LFTs which are also slightly improving.  Plan discussed in 

detail with the patient who verbalized understanding





: Patient seen and examined clinically stable continues to show some 

improvement.  Closure of fasciotomy planned for tomorrow.  Renal function is 

improving.  CK ordered and pending





: Patient seen and examined, wbc mildly elevated likely secondary to 

steroids, will change to po for two additional days and stop, benefit probably 

already achieved, continue wound dressing, sensory function improved, awaiting 

wound vac and closure. Patient advised on clinically progress. 





: Patient seen and examined awaiting her vascular surgery for closure of 

fasciotomy.  CK is decreasing less than 9000 today.  We will continue aggressive

IV fluids until we have the resolution below 5000.  LFTs on the downward trend 

also.  Leukocytosis appears to have peaked steroids was changed to oral for 2 

additional days today is 1 of 2..  Plan of care discussed in detail with the 

patient who verbalized understanding.





5/10:  Left lower extremity compartment syndrome status post fasciotomies.  Cau

se is still unclear and may represent some allergic reaction.  Motor function is

unchanged from original exam.  Sensory function has improved with full sensation

of the foot.vascular surgery to place wound vacs on the leg today.  CK improved 

to the 8000 range today





: Patient underwent excisional debridement of muscle and soft tissue from 

left leg wound yesterday.  Patient also had wound VAC placement left leg 

fasciotomy incisions.  The patient had moderate amount of superficial necrotic 

muscle in the anterior lateral compartments with minimal amount of superficial 

necrotic muscle in the posterior compartment.  Follow-up culture results.  CK 

levels have improved to 6900 range.  AST/ALT continue to trend downward.





: I discussed the case with Dr. Mckeon who recommends home wound VAC as 

well as physical therapy.  I also discussed these needs with case management who

was attempting to arrange for discharge planning.  CK continues to improve with 

levels in the 4000 range.





: Still awaiting for home wound VAC arrangements to be made.  Leukocytosis 

is much improved down to 13,900 today.  LFTs continue to trend down to normal 

range.  CK levels still in the 4000 range.  Nephrology has signed off.  

Anticipate discharge later today or in a.m.





:  Still awaiting for home wound VAC arrangements to be made.  Laboratories 

continue to include including LFTs, CK and leukocytosis.





5/15:  Still awaiting for home wound VAC arrangements to be made.





: Patient had an episode of syncope while working with PT today.  Nurse 

reports patient lost consciousness for a few seconds and was helped back to the 

bed.  Patient's blood pressure was noted to be hypotensive/orthostatic.  We will

bolus 500 normal saline IV fluid and monitor orthostatics.  Case management 

reports that wound VAC should be delivered today.  LFTs continue to trend down 

to normal range.  CK levels still in the 2000 range.  Nephrology has signed off.

 Anticipate discharge in a.m. given the syncope and once wound VAC arranged.  

Patient with low-grade temp.  We will monitor.





; patient is anxious to go home, wound VAC is delivered to the bedside, 

continue wound care


Multiple social issues, DC planning per case management, left lower extremity 

Doppler negative for DVT





; discharge planning, multiple social issues





; continue current management, wound VAC in place, DC planning issues





; discharge planning per case management, setting up wound VAC, setting up 

outpatient wound care


Setting up follow-up visits with physicians, patient has multiple social issues 

and no payer source


Continue current management discharge when medically stable





; patient's LFTs are trending down, continue current management


Wound VAC care, wound VAC change per instructions, wound care as indicated


Discharge planning per case management














History


Interval history: 


I have seen and examined the patient at the bedside


Patient's chart and medications reviewed


Patient feels comfortable anxious to go home


Awaiting wound VAC placement and education 


and wound care and change of dressing prior to discharge


Vital signs noted








Hospitalist Physical





- Constitutional


Vitals: 


                                        











Temp Pulse Resp BP Pulse Ox


 


 99.0 F   96 H  18   113/65   100 


 


 22 04:52  22 04:52  22 04:52  22 04:52  22 04:52











General appearance: Present: no acute distress, well-nourished, obese





- EENT


Eyes: Present: PERRL, EOM intact





- Neck


Neck: Present: supple, normal ROM





- Respiratory


Respiratory effort: normal


Respiratory: bilateral: diminished, negative: rales, rhonchi, wheezing





- Cardiovascular


Rhythm: regular


Heart Sounds: Present: S1 & S2





- Extremities


Extremities: no ischemia, No edema





- Abdominal


General gastrointestinal: soft, non-tender, non-distended, normal bowel sounds





- Integumentary


Integumentary: Present: clear, warm





- Psychiatric


Psychiatric: appropriate mood/affect, cooperative





- Neurologic


Neurologic: CNII-XII intact, moves all extremities





Results





- Labs


CBC & Chem 7: 


                                 22 05:01





                                 22 05:22


Labs: 


                             Laboratory Last Values











WBC  10.6 K/mm3 (4.5-11.0)   22  05:01    


 


RBC  3.21 M/mm3 (3.65-5.03)  L  22  05:01    


 


Hgb  10.7 gm/dl (11.8-15.2)  L  22  05:01    


 


Hct  32.0 % (35.5-45.6)  L  22  05:01    


 


MCV  99 fl (84-94)  H  22  05:01    


 


MCH  33 pg (28-32)  H  22  05:01    


 


MCHC  33 % (32-34)   22  05:01    


 


RDW  13.3 % (13.2-15.2)   22  05:01    


 


Plt Count  427 K/mm3 (140-440)   22  05:01    


 


Lymph % (Auto)  12.9 % (13.4-35.0)  L  22  05:01    


 


Mono % (Auto)  6.9 % (0.0-7.3)   22  05:01    


 


Eos % (Auto)  0.4 % (0.0-4.3)   22  05:01    


 


Baso % (Auto)  0.3 % (0.0-1.8)   22  05:01    


 


Lymph # (Auto)  1.4 K/mm3 (1.2-5.4)   22  05:01    


 


Mono # (Auto)  0.7 K/mm3 (0.0-0.8)   22  05:01    


 


Eos # (Auto)  0.0 K/mm3 (0.0-0.4)   22  05:01    


 


Baso # (Auto)  0.0 K/mm3 (0.0-0.1)   22  05:01    


 


Add Manual Diff  Complete   22  04:49    


 


Total Counted  100   22  04:49    


 


Seg Neutrophils %  79.5 % (40.0-70.0)  H  22  05:01    


 


Seg Neuts % (Manual)  83.0 % (40.0-70.0)  H  22  04:49    


 


Band Neutrophils %  2.0 %  22  04:49    


 


Lymphocytes % (Manual)  5.0 % (13.4-35.0)  L  22  04:49    


 


Reactive Lymphs % (Man)  0 %  22  04:49    


 


Monocytes % (Manual)  8.0 % (0.0-7.3)  H  22  04:49    


 


Eosinophils % (Manual)  0 % (0.0-4.3)   22  04:49    


 


Basophils % (Manual)  0 % (0.0-1.8)   22  04:49    


 


Metamyelocytes %  2.0 %  22  04:49    


 


Myelocytes %  0 %  22  04:49    


 


Promyelocytes %  0 %  22  04:49    


 


Blast Cells %  0 %  22  04:49    


 


Nucleated RBC %  Not Reportable   22  04:49    


 


Seg Neutrophils #  8.4 K/mm3 (1.8-7.7)  H  22  05:01    


 


Seg Neutrophils # Man  17.1 K/mm3 (1.8-7.7)  H  22  04:49    


 


Band Neutrophils #  0.4 K/mm3  22  04:49    


 


Lymphocytes # (Manual)  1.0 K/mm3 (1.2-5.4)  L  22  04:49    


 


Abs React Lymphs (Man)  0.0 K/mm3  22  04:49    


 


Monocytes # (Manual)  1.6 K/mm3 (0.0-0.8)  H  22  04:49    


 


Eosinophils # (Manual)  0.0 K/mm3 (0.0-0.4)   22  04:49    


 


Basophils # (Manual)  0.0 K/mm3 (0.0-0.1)   22  04:49    


 


Metamyelocytes #  0.4 K/mm3  22  04:49    


 


Myelocytes #  0.0 K/mm3  22  04:49    


 


Promyelocytes #  0.0 K/mm3  22  04:49    


 


Blast Cells #  0.0 K/mm3  22  04:49    


 


WBC Morphology  Not Reportable   22  04:49    


 


Hypersegmented Neuts  Not Reportable   22  04:49    


 


Hyposegmented Neuts  Rare   22  04:49    


 


Hypogranular Neuts  Not Reportable   22  04:49    


 


Smudge Cells  Not Reportable   22  04:49    


 


Toxic Granulation  Not Reportable   22  04:49    


 


Toxic Vacuolation  Not Reportable   22  04:49    


 


Dohle Bodies  Not Reportable   22  04:49    


 


Pelger-Huet Anomaly  Not Reportable   22  04:49    


 


Jamie Rods  Not Reportable   22  04:49    


 


Platelet Estimate  Consistent w auto   22  04:49    


 


Clumped Platelets  Not Reportable   22  04:49    


 


Plt Clumps, EDTA  Not Reportable   22  04:49    


 


Large Platelets  Rare   22  04:49    


 


Giant Platelets  Not Reportable   22  04:49    


 


Platelet Satelliting  Not Reportable   22  04:49    


 


Plt Morphology Comment  Not Reportable   22  04:49    


 


RBC Morphology  Not Reportable   22  04:49    


 


Dimorphic RBCs  Not Reportable   22  04:49    


 


Polychromasia  Not Reportable   22  04:49    


 


Hypochromasia  Not Reportable   22  04:49    


 


Poikilocytosis  Not Reportable   22  04:49    


 


Anisocytosis  Rare   22  04:49    


 


Microcytosis  Not Reportable   22  04:49    


 


Macrocytosis  Rare   22  04:49    


 


Spherocytes  Not Reportable   22  04:49    


 


Pappenheimer Bodies  Not Reportable   22  04:49    


 


Sickle Cells  Not Reportable   22  04:49    


 


Target Cells  Not Reportable   22  04:49    


 


Tear Drop Cells  Not Reportable   22  04:49    


 


Ovalocytes  Not Reportable   22  04:49    


 


Helmet Cells  Not Reportable   22  04:49    


 


M[place]ormick[place] Bodies  Not Reportable   22  04:49    


 


Cabot Rings  Not Reportable   22  04:49    


 


Summit Cells  Not Reportable   22  04:49    


 


Bite Cells  Not Reportable   22  04:49    


 


Crenated Cell  Not Reportable   22  04:49    


 


Elliptocytes  Not Reportable   22  04:49    


 


Acanthocytes (Spur)  Not Reportable   22  04:49    


 


Rouleaux  Not Reportable   22  04:49    


 


Hemoglobin C Crystals  Not Reportable   22  04:49    


 


Schistocytes  Not Reportable   22  04:49    


 


Malaria parasites  Not Reportable   22  04:49    


 


Miguel Bodies  Not Reportable   22  04:49    


 


Hem Pathologist Commnt  No   22  04:49    


 


PT  13.4 Sec. (12.2-14.9)   22  13:30    


 


INR  0.92  (0.87-1.13)   22  13:30    


 


APTT  25.8 Sec. (24.2-36.6)   22  13:30    


 


Sodium  136 mmol/L (137-145)  L  22  05:22    


 


Potassium  3.8 mmol/L (3.6-5.0)   22  05:22    


 


Chloride  101.0 mmol/L ()   22  05:22    


 


Carbon Dioxide  29 mmol/L (22-30)   22  05:22    


 


Anion Gap  10 mmol/L  22  05:22    


 


BUN  5 mg/dL (9-20)  L  22  05:22    


 


Creatinine  0.7 mg/dL (0.8-1.3)  L  22  05:22    


 


Estimated GFR  > 60 ml/min  22  05:22    


 


BUN/Creatinine Ratio  7 %  22  05:22    


 


Glucose  103 mg/dL ()  H  22  05:22    


 


Lactic Acid  1.40 mmol/L (0.7-2.0)   22  13:30    


 


Calcium  8.9 mg/dL (8.4-10.2)   22  05:22    


 


Phosphorus  2.80 mg/dL (2.5-4.5)   22  05:17    


 


Magnesium  2.20 mg/dL (1.7-2.3)   22  05:17    


 


Total Bilirubin  0.50 mg/dL (0.1-1.2)   22  05:22    


 


Direct Bilirubin  < 0.2 mg/dL (0-0.2)   22  04:00    


 


Indirect Bilirubin  0.3 mg/dL  22  04:00    


 


AST  42 units/L (5-40)  H  22  05:22    


 


ALT  108 units/L (7-56)  H  22  05:22    


 


Alkaline Phosphatase  77 units/L ()   22  05:22    


 


Ammonia  37.0 umol/L (25-60)   22  04:49    


 


Total Creatine Kinase  1001 units/L ()  H  22  08:55    


 


Total Protein  6.2 g/dL (6.3-8.2)  L  22  05:22    


 


Albumin  3.1 g/dL (3.9-5)  L  22  05:22    


 


Albumin/Globulin Ratio  1.0 %  22  05:22    


 


Amylase  25 units/L ()  L  22  04:49    


 


Lipase  10 units/L (13-60)  L  22  04:49    


 


Coronavirus (PCR)  Negative  (Negative)   22  11:40    


 


Hepatitis A IgM Ab  Nonreactive  (NonReactive)   22  13:30    


 


Hep Bs Antigen  Non-reactive  (Negative)   22  13:30    


 


Hep B Core IgM Ab  Non-reactive  (NonReactive)   22  13:30    


 


Hepatitis C Antibody  Non-reactive  (NonReactive)   22  13:30    


 


HIV 1&2 Antibody Rapid  Non react  (Non React)   22  16:42    


 


HIV P24 Antigen  Non react  (Non React)   22  16:42    











Microbiology: 


Microbiology





05/10/22 Unknown   Leg - Left   Surgical Biopsy Culture - Preliminary


                                Bacillus Species








Mcwilliams/IV: 


                                        





Voiding Method                   Urinal











Active Medications





- Current Medications


Current Medications: 














Generic Name Dose Route Start Last Admin





  Trade Name Freq  PRN Reason Stop Dose Admin


 


Acetaminophen  650 mg  22 22:14  22 00:08





  Acetaminophen 325 Mg Tab  PO   650 mg





  Q6H PRN   Administration





  Pain, Mild (1-3)  


 


Albuterol  2.5 mg  22 06:15 





  Albuterol 2.5 Mg/3 Ml Nebu  IH  





  Q3HRT PRN  





  Shortness Of Breath  


 


Diphenhydramine HCl  25 mg  22 06:24  22 17:12





  Diphenhydramine 50 Mg/Ml Vial  IV   25 mg





  Q6H PRN   Administration





  Skin Irritation  


 


Docusate Sodium  100 mg  22 22:00  22 21:22





  Docusate Sodium 100 Mg Cap  PO   Not Given





  BID CHERYL  


 


Famotidine  20 mg  22 10:00  22 21:21





  Famotidine 20 Mg Tab  PO   20 mg





  BID CHERYL   Administration


 


Gabapentin  300 mg  22 08:00  22 21:21





  Gabapentin 300 Mg Cap  PO   300 mg





  TID@0800,1600,2200 CHERYL   Administration


 


Heparin Sodium (Porcine)  5,000 unit  22 10:00  22 21:22





  Heparin 5,000 Unit/1 Ml Vial  SUB-Q   5,000 unit





  Q12HR CHERYL   Administration


 


Hydralazine HCl  10 mg  22 18:31  22 18:50





  Hydralazine 20 Mg/1 Ml Inj  IV   10 mg





  Q3HR PRN   Administration





  Hypertension  


 


Hydromorphone HCl  0.5 mg  22 06:15  22 20:01





  Hydromorphone 1 Mg/1 Ml Inj  IV   0.5 mg





  Q3H PRN   Administration





  Pain , Severe (7-10)  


 


Sodium Chloride  1,000 mls @ 75 mls/hr  22 01:00  22 05:42





  Nacl 0.9% 1000 Ml  IV   75 mls/hr





  AS DIRECT CHERYL   Administration


 


Ondansetron HCl  4 mg  22 06:15  22 10:38





  Ondansetron 4 Mg/2 Ml Inj  IV   4 mg





  Q8H PRN   Administration





  Nausea And Vomiting  


 


Oxycodone/Acetaminophen  1 tab  22 10:57  22 05:40





  Oxycodone /Acetaminophen 5-325mg Tab  PO   1 tab





  Q6H PRN   Administration





  Pain, Moderate (4-6)  


 


Sodium Chloride  10 ml  22 10:00  22 21:21





  Sodium Chloride 0.9% 10 Ml Flush Syringe  IV   10 ml





  BID CHERYL   Administration


 


Sodium Chloride  10 ml  22 06:15  22 20:02





  Sodium Chloride 0.9% 10 Ml Flush Syringe  IV   10 ml





  PRN PRN   Administration





  LINE FLUSH  


 


Valsartan  160 mg  22 22:00  22 11:19





  Valsartan 160mg Tab  PO   160 mg





  DAILY CHERYL   Administration














Nutrition/Malnutrition Assess





- Dietary Evaluation


Nutrition/Malnutrition Findings: 


                                        





Nutrition Notes                                            Start:  05/10/22 

16:45


Freq:                                                      Status: Active       




Protocol:                                                                       




 Document     22 14:21  HATTIE  (Rec: 22 14:46  HATTIE  [id number])


 Nutrition Notes


     Initial or Follow up                        Reassessment


     Current Diagnosis                           Hypertension


     Other Pertinent Diagnosis                   Rabdomyolysis, Liver Failure,


                                                 s/p L-LE 4 Compartment


                                                 Fasciotomy w/Debridem.


     Current Diet                                Regular Diet + Dietary


                                                 Supplements. (since B ).


     Labs/Tests                                  : BUN 5, Crea 0.7.


     Pertinent Medications                       : Nutritionally


                                                 unremarkable.


     Height                                      5 ft 6 in


     Weight                                      86.183 kg


     Ideal Body Weight (kg)                      64.54


     BMI                                         30.7


     Weight change and time frame                No body weight change


                                                 reporterd in 9 days.


     Weight Status                               Obese


     Subjective/Other Information                RD consult for routine F/U on


                                                 dietary advancement.


                                                 Pt's PO intake of meals has


                                                 been Good (100%), according to


                                                 ADL notes.


                                                 Pt is on Room Air, O2


                                                 saturation @ 96%, according to


                                                 Physical Assessment History


                                                 notes.


                                                 Pt presents L-LE Pitting Edema


                                                 2+, according to Physical


                                                 Assessment History notes.


                                                 Pt presents L-LE Vascular


                                                 Wound with WoundVAC since , according to Physical


                                                 Assessment History notes.


                                                 Event on , Pt suffered a


                                                 syncope during PT session,


                                                 according to Progress notes.


                                                 Pt is clearedd for discharge,


                                                 it is pending due to several


                                                 social issues, according to


                                                 Progress notes.


     Percent of energy/protein needs met:        Prescribed Regular Diet


                                                 provides for energy/protein


                                                 needs (2,289 Kcal/89 g) during


                                                 LOS


     Burn                                        Absent


     Trauma                                      Absent


     GI Symptoms                                 None


     Food Allergy                                No


     Skin Integrity/Comment                      L-LE Vascular Wound


     Current % PO                                Good (%)


     Minimum of two criteria                     No


     #1


      Nutrition Diagnosis                        Increased nutrient needs   (


                                                 specify in comment below)


      Comments:                                  Protein to support wound


                                                 healing processes.


      Diagnosis Progress(for reassessment        Continues


       documentation)                            


     Is patient on ventilator?                   No


     Is Patient Ambulatory and/or Out of Bed     No


     REE-([facility]-confined to bed)      [phone]


     Kcal/Kg value to use for calculation        19


     Approximate Energy Requirements Using       1637


      kcal/Kg                                    


     Calculation Used for Recommendations        Kcal/kg


     Additional Notes                            Protein: 1.25-1.5 g/Kg AdjBW;


                                                  g/day.


                                                 Fluids: 1 ml/Kcal, or as per


                                                 MD.


 Nutrition Intervention


     Change Diet Order:                          Continue Regular Diet.


     Add Supplement/Snack (indicate name/kcal    Continue 28.8 g pkt Aidan; BID


      /protein )                                 .


     Provides kCal:                              190


     Provides Protein (gm)                       5


     Goal #1                                     Support, through dietary


                                                 supplementation, wound healing


                                                 processes during LOS.


     Goal #2                                     Adjust the dietary


                                                 intervention to better serve


                                                 Pt's needs and clinical


                                                 conditions during LOS.


     Goal #3                                     Maintain body weight within +/


                                                 -3% of admission body weight


                                                 during LOS.


     Revisit per MD consult or patient           Sign Off


      request:                                   


     Additional Comments                         Continue monitoring food


                                                 tolerance, %PO intake of meals


                                                 , and BM.

## 2022-05-22 RX ADMIN — GABAPENTIN SCH MG: 300 CAPSULE ORAL at 22:11

## 2022-05-22 RX ADMIN — FAMOTIDINE SCH MG: 20 TABLET ORAL at 22:11

## 2022-05-22 RX ADMIN — OXYCODONE AND ACETAMINOPHEN PRN TAB: 5; 325 TABLET ORAL at 23:05

## 2022-05-22 RX ADMIN — HEPARIN SODIUM SCH UNIT: 5000 INJECTION, SOLUTION INTRAVENOUS; SUBCUTANEOUS at 22:11

## 2022-05-22 RX ADMIN — GABAPENTIN SCH MG: 300 CAPSULE ORAL at 15:19

## 2022-05-22 RX ADMIN — Medication SCH ML: at 22:11

## 2022-05-22 RX ADMIN — FAMOTIDINE SCH MG: 20 TABLET ORAL at 09:41

## 2022-05-22 RX ADMIN — OXYCODONE AND ACETAMINOPHEN PRN TAB: 5; 325 TABLET ORAL at 08:06

## 2022-05-22 RX ADMIN — HEPARIN SODIUM SCH UNIT: 5000 INJECTION, SOLUTION INTRAVENOUS; SUBCUTANEOUS at 09:41

## 2022-05-22 RX ADMIN — VALSARTAN SCH MG: 160 TABLET, FILM COATED ORAL at 09:41

## 2022-05-22 RX ADMIN — OXYCODONE AND ACETAMINOPHEN PRN TAB: 5; 325 TABLET ORAL at 15:19

## 2022-05-22 RX ADMIN — GABAPENTIN SCH MG: 300 CAPSULE ORAL at 08:06

## 2022-05-22 RX ADMIN — Medication SCH ML: at 09:47

## 2022-05-22 RX ADMIN — DOCUSATE SODIUM SCH MG: 100 CAPSULE, LIQUID FILLED ORAL at 09:41

## 2022-05-22 RX ADMIN — DOCUSATE SODIUM SCH MG: 100 CAPSULE, LIQUID FILLED ORAL at 22:11

## 2022-05-22 NOTE — PROGRESS NOTE
Assessment and Plan


Assessment and plan: 


This is a 36 year old  transgendered female undergoing gender transformation 

with silicone injection to buttocks ([place] ) and breast implants, heavy 

drinker on the weekends (bottle of vodka) who presented to [facility] on 5/3 because 

of adverse drug reaction after reportedly self administration of  

rocephin and PCN for sore throat. Patient reportedly collapsed while checking in

to the emergency department, has complains of severe 10 out of 10 pain in left 

thigh and has noticeable mottling of skin in thigh and left lower abdomen.  

Patient underwent a CT of the left leg which showed possible cellulitis with no 

drainable fluid collection and was started on empiric antibiotics.  Patient 

admitted to the hospital service with diagnosis of acute liver failure, 

rhabdomyolysis, s/p 4 compartment fasciotomy to left lower extremity





Acute liver failure 


-GI consulted, appreciate recommendations


-Per GI: Suspect due to rhabdomyolysis given elevated creatinine kinase levels


   -If ALT continues to rise recommend MRCP


-Abdominal ultrasound showed mild diffuse biliary dilation, obstruction to 

lesion of the distal common bile duct cannot be excluded consider MRCP, no 

evidence of gallstones within the gallbladder


-PPI


-Regular diet


-BR: colace


-Trend LFTs





Compartment syndrome to LLE


-Vascular surgery consulted, appreciate recommendations


-s/p self admin of IM abx to midthigh


-c/o pain, decreased sensation, pulses were dopplarable, paralysis, mottled skin


-s/p 4 compartment tracheotomy on 5/3 with vascular surgery


-Bilateral lower extremity Doppler ultrasound showed no DVT


-Left lower extremity CT with contrast showed appearance of posterior soft 

tissue complex cellulitis with small subcutaneous lymph nodes, no drainable 

fluid collections present, contusion could have similar appearance


-Bilateral lower extremity arterial duplex showed no significant lower extremity

peripheral artery disease


-Normal ABIs





Vascular follow-up with Dr. Vikas Laboy on 2022 at 10 AM





Peripheral neuropathy


-Avoid delirium


-Reorientation as needed


-Maintain sleep-wake cycle


-As needed analgesia


-Neurology consulted, appreciate recommendations


-Gabapentin 





HTN


-Blood pressure monitoring per protocol


-Valsartan


-PRN hydralazine





Rhabdomyolysis





acute kidney injury with vasomotor nephropathy now resolved


-Nephrology consulted, appreciate recommendations


-Strict intake and output


-Renally dose medications


-Avoid nephrotoxic medications


-MIVF


-Sodium bicarb


-Trend BMP, CK








Hospital course to date:





5/3: Venous ultrasound shows no DVT, Vascular surgery consulted who performed a 

fasciotomy and plan to transfer patient to [facility] post intervention.  Neurology 

consulted





: Transfer to ICU, GI and neurology consulted.





: Patient will be transferred back to the floor from ICU.  Her liver enzymes 

and creatinine are trending down.  Will be started on gabapentin per neurology 

recommendations and antibiotics discontinued.





: Continue supportive care.  CPK slowly improving.  Vascular plans for 

closure of fasciotomy early next week.  PT OT evaluation and treatment closure 

done.  Continue wound management.  No evidence of withdrawal noted at this time.

 Continue to monitor LFTs which are also slightly improving.  Plan discussed in 

detail with the patient who verbalized understanding





: Patient seen and examined clinically stable continues to show some 

improvement.  Closure of fasciotomy planned for tomorrow.  Renal function is 

improving.  CK ordered and pending





: Patient seen and examined, wbc mildly elevated likely secondary to 

steroids, will change to po for two additional days and stop, benefit probably 

already achieved, continue wound dressing, sensory function improved, awaiting 

wound vac and closure. Patient advised on clinically progress. 





: Patient seen and examined awaiting her vascular surgery for closure of 

fasciotomy.  CK is decreasing less than 9000 today.  We will continue aggressive

IV fluids until we have the resolution below 5000.  LFTs on the downward trend 

also.  Leukocytosis appears to have peaked steroids was changed to oral for 2 

additional days today is 1 of 2..  Plan of care discussed in detail with the 

patient who verbalized understanding.





5/10:  Left lower extremity compartment syndrome status post fasciotomies.  

Cause is still unclear and may represent some allergic reaction.  Motor function

is unchanged from original exam.  Sensory function has improved with full 

sensation of the foot.vascular surgery to place wound vacs on the leg today.  CK

improved to the 8000 range today





: Patient underwent excisional debridement of muscle and soft tissue from 

left leg wound yesterday.  Patient also had wound VAC placement left leg 

fasciotomy incisions.  The patient had moderate amount of superficial necrotic 

muscle in the anterior lateral compartments with minimal amount of superficial 

necrotic muscle in the posterior compartment.  Follow-up culture results.  CK 

levels have improved to 6900 range.  AST/ALT continue to trend downward.





: I discussed the case with Dr. Mckeon who recommends home wound VAC as 

well as physical therapy.  I also discussed these needs with case management who

was attempting to arrange for discharge planning.  CK continues to improve with 

levels in the 4000 range.





: Still awaiting for home wound VAC arrangements to be made.  Leukocytosis 

is much improved down to 13,900 today.  LFTs continue to trend down to normal 

range.  CK levels still in the 4000 range.  Nephrology has signed off.  

Anticipate discharge later today or in a.m.





:  Still awaiting for home wound VAC arrangements to be made.  Laboratories 

continue to include including LFTs, CK and leukocytosis.





5/15:  Still awaiting for home wound VAC arrangements to be made.





: Patient had an episode of syncope while working with PT today.  Nurse 

reports patient lost consciousness for a few seconds and was helped back to the 

bed.  Patient's blood pressure was noted to be hypotensive/orthostatic.  We will

bolus 500 normal saline IV fluid and monitor orthostatics.  Case management 

reports that wound VAC should be delivered today.  LFTs continue to trend down 

to normal range.  CK levels still in the 2000 range.  Nephrology has signed off.

 Anticipate discharge in a.m. given the syncope and once wound VAC arranged.  

Patient with low-grade temp.  We will monitor.





; patient is anxious to go home, wound VAC is delivered to the bedside, 

continue wound care


Multiple social issues, DC planning per case management, left lower extremity 

Doppler negative for DVT





; discharge planning, multiple social issues





; continue current management, wound VAC in place, DC planning issues





; discharge planning per case management, setting up wound VAC, setting up 

outpatient wound care


Setting up follow-up visits with physicians, patient has multiple social issues 

and no payer source


Continue current management discharge when medically stable





; patient's LFTs are trending down, continue current management


Wound VAC care, wound VAC change per instructions, wound care as indicated


Discharge planning per case management





; patient is medically stable for discharge


Awaiting discharge planning coordinating with wound VAC and wound care


Post discharge follow-up visit with vascular Dr. Vikas Laboy on 2020 at 10 AM











History


Interval history: 


I have seen and examined the patient at the bedside


Patient's chart and medications reviewed


Patient feels better, no new complaints


Awaiting discharge, case management assisting with the discharge plan











Hospitalist Physical





- Constitutional


Vitals: 


                                        











Temp Pulse Resp BP Pulse Ox


 


 98.0 F   98 H  18   115/79   98 


 


 05/22/22 05:17  22 05:17  22 05:17  22 05:17  22 05:17











General appearance: Present: no acute distress, well-nourished, obese





- EENT


Eyes: Present: PERRL, EOM intact





- Neck


Neck: Present: supple, normal ROM





- Respiratory


Respiratory effort: normal


Respiratory: bilateral: diminished, negative: rales, rhonchi, wheezing





- Cardiovascular


Rhythm: regular


Heart Sounds: Present: S1 & S2





- Extremities


Extremities: abnormal (Left lower extremity wound VAC in place, swelling 

erythema)


Extremity abnormal: edema, erythema





- Abdominal


General gastrointestinal: soft, non-tender, non-distended, normal bowel sounds





- Integumentary


Integumentary: Present: clear, warm





- Psychiatric


Psychiatric: appropriate mood/affect, cooperative





- Neurologic


Neurologic: moves all extremities





Results





- Labs


CBC & Chem 7: 


                                 22 05:01





                                 22 05:22


Labs: 


                             Laboratory Last Values











WBC  10.6 K/mm3 (4.5-11.0)   22  05:01    


 


RBC  3.21 M/mm3 (3.65-5.03)  L  22  05:01    


 


Hgb  10.7 gm/dl (11.8-15.2)  L  22  05:01    


 


Hct  32.0 % (35.5-45.6)  L  22  05:01    


 


MCV  99 fl (84-94)  H  22  05:01    


 


MCH  33 pg (28-32)  H  22  05:01    


 


MCHC  33 % (32-34)   22  05:01    


 


RDW  13.3 % (13.2-15.2)   22  05:01    


 


Plt Count  427 K/mm3 (140-440)   22  05:01    


 


Lymph % (Auto)  12.9 % (13.4-35.0)  L  22  05:01    


 


Mono % (Auto)  6.9 % (0.0-7.3)   22  05:01    


 


Eos % (Auto)  0.4 % (0.0-4.3)   22  05:01    


 


Baso % (Auto)  0.3 % (0.0-1.8)   22  05:01    


 


Lymph # (Auto)  1.4 K/mm3 (1.2-5.4)   22  05:01    


 


Mono # (Auto)  0.7 K/mm3 (0.0-0.8)   22  05:01    


 


Eos # (Auto)  0.0 K/mm3 (0.0-0.4)   22  05:01    


 


Baso # (Auto)  0.0 K/mm3 (0.0-0.1)   22  05:01    


 


Add Manual Diff  Complete   22  04:49    


 


Total Counted  100   22  04:49    


 


Seg Neutrophils %  79.5 % (40.0-70.0)  H  22  05:01    


 


Seg Neuts % (Manual)  83.0 % (40.0-70.0)  H  22  04:49    


 


Band Neutrophils %  2.0 %  22  04:49    


 


Lymphocytes % (Manual)  5.0 % (13.4-35.0)  L  22  04:49    


 


Reactive Lymphs % (Man)  0 %  22  04:49    


 


Monocytes % (Manual)  8.0 % (0.0-7.3)  H  22  04:49    


 


Eosinophils % (Manual)  0 % (0.0-4.3)   22  04:49    


 


Basophils % (Manual)  0 % (0.0-1.8)   22  04:49    


 


Metamyelocytes %  2.0 %  22  04:49    


 


Myelocytes %  0 %  22  04:49    


 


Promyelocytes %  0 %  22  04:49    


 


Blast Cells %  0 %  22  04:49    


 


Nucleated RBC %  Not Reportable   22  04:49    


 


Seg Neutrophils #  8.4 K/mm3 (1.8-7.7)  H  22  05:01    


 


Seg Neutrophils # Man  17.1 K/mm3 (1.8-7.7)  H  22  04:49    


 


Band Neutrophils #  0.4 K/mm3  22  04:49    


 


Lymphocytes # (Manual)  1.0 K/mm3 (1.2-5.4)  L  22  04:49    


 


Abs React Lymphs (Man)  0.0 K/mm3  22  04:49    


 


Monocytes # (Manual)  1.6 K/mm3 (0.0-0.8)  H  22  04:49    


 


Eosinophils # (Manual)  0.0 K/mm3 (0.0-0.4)   22  04:49    


 


Basophils # (Manual)  0.0 K/mm3 (0.0-0.1)   22  04:49    


 


Metamyelocytes #  0.4 K/mm3  22  04:49    


 


Myelocytes #  0.0 K/mm3  22  04:49    


 


Promyelocytes #  0.0 K/mm3  22  04:49    


 


Blast Cells #  0.0 K/mm3  22  04:49    


 


WBC Morphology  Not Reportable   22  04:49    


 


Hypersegmented Neuts  Not Reportable   22  04:49    


 


Hyposegmented Neuts  Rare   22  04:49    


 


Hypogranular Neuts  Not Reportable   22  04:49    


 


Smudge Cells  Not Reportable   22  04:49    


 


Toxic Granulation  Not Reportable   22  04:49    


 


Toxic Vacuolation  Not Reportable   22  04:49    


 


Dohle Bodies  Not Reportable   22  04:49    


 


Pelger-Huet Anomaly  Not Reportable   22  04:49    


 


Jamie Rods  Not Reportable   22  04:49    


 


Platelet Estimate  Consistent w auto   22  04:49    


 


Clumped Platelets  Not Reportable   22  04:49    


 


Plt Clumps, EDTA  Not Reportable   22  04:49    


 


Large Platelets  Rare   22  04:49    


 


Giant Platelets  Not Reportable   22  04:49    


 


Platelet Satelliting  Not Reportable   22  04:49    


 


Plt Morphology Comment  Not Reportable   22  04:49    


 


RBC Morphology  Not Reportable   22  04:49    


 


Dimorphic RBCs  Not Reportable   22  04:49    


 


Polychromasia  Not Reportable   22  04:49    


 


Hypochromasia  Not Reportable   22  04:49    


 


Poikilocytosis  Not Reportable   22  04:49    


 


Anisocytosis  Rare   22  04:49    


 


Microcytosis  Not Reportable   22  04:49    


 


Macrocytosis  Rare   22  04:49    


 


Spherocytes  Not Reportable   22  04:49    


 


Pappenheimer Bodies  Not Reportable   22  04:49    


 


Sickle Cells  Not Reportable   22  04:49    


 


Target Cells  Not Reportable   22  04:49    


 


Tear Drop Cells  Not Reportable   22  04:49    


 


Ovalocytes  Not Reportable   22  04:49    


 


Helmet Cells  Not Reportable   22  04:49    


 


Mccormick-Thatcher Bodies  Not Reportable   22  04:49    


 


Cabot Rings  Not Reportable   22  04:49    


 


Cornel Cells  Not Reportable   22  04:49    


 


Bite Cells  Not Reportable   22  04:49    


 


Crenated Cell  Not Reportable   22  04:49    


 


Elliptocytes  Not Reportable   22  04:49    


 


Acanthocytes (Spur)  Not Reportable   22  04:49    


 


Rouleaux  Not Reportable   22  04:49    


 


Hemoglobin C Crystals  Not Reportable   22  04:49    


 


Schistocytes  Not Reportable   22  04:49    


 


Malaria parasites  Not Reportable   22  04:49    


 


Miguel Bodies  Not Reportable   22  04:49    


 


Hem Pathologist Commnt  No   22  04:49    


 


PT  13.4 Sec. (12.2-14.9)   22  13:30    


 


INR  0.92  (0.87-1.13)   22  13:30    


 


APTT  25.8 Sec. (24.2-36.6)   22  13:30    


 


Sodium  136 mmol/L (137-145)  L  22  05:22    


 


Potassium  3.8 mmol/L (3.6-5.0)   22  05:22    


 


Chloride  101.0 mmol/L ()   22  05:22    


 


Carbon Dioxide  29 mmol/L (22-30)   22  05:22    


 


Anion Gap  10 mmol/L  22  05:22    


 


BUN  5 mg/dL (9-20)  L  22  05:22    


 


Creatinine  0.7 mg/dL (0.8-1.3)  L  22  05:22    


 


Estimated GFR  > 60 ml/min  22  05:22    


 


BUN/Creatinine Ratio  7 %  22  05:22    


 


Glucose  103 mg/dL ()  H  22  05:22    


 


Lactic Acid  1.40 mmol/L (0.7-2.0)   22  13:30    


 


Calcium  8.9 mg/dL (8.4-10.2)   22  05:22    


 


Phosphorus  2.80 mg/dL (2.5-4.5)   22  05:17    


 


Magnesium  2.20 mg/dL (1.7-2.3)   22  05:17    


 


Total Bilirubin  0.50 mg/dL (0.1-1.2)   22  05:22    


 


Direct Bilirubin  < 0.2 mg/dL (0-0.2)   22  04:00    


 


Indirect Bilirubin  0.3 mg/dL  22  04:00    


 


AST  42 units/L (5-40)  H  22  05:22    


 


ALT  108 units/L (7-56)  H  22  05:22    


 


Alkaline Phosphatase  77 units/L ()   22  05:22    


 


Ammonia  37.0 umol/L (25-60)   22  04:49    


 


Total Creatine Kinase  1001 units/L ()  H  22  08:55    


 


Total Protein  6.2 g/dL (6.3-8.2)  L  22  05:22    


 


Albumin  3.1 g/dL (3.9-5)  L  22  05:22    


 


Albumin/Globulin Ratio  1.0 %  22  05:22    


 


Amylase  25 units/L ()  L  22  04:49    


 


Lipase  10 units/L (13-60)  L  22  04:49    


 


Coronavirus (PCR)  Negative  (Negative)   22  11:40    


 


Hepatitis A IgM Ab  Nonreactive  (NonReactive)   22  13:30    


 


Hep Bs Antigen  Non-reactive  (Negative)   22  13:30    


 


Hep B Core IgM Ab  Non-reactive  (NonReactive)   22  13:30    


 


Hepatitis C Antibody  Non-reactive  (NonReactive)   22  13:30    


 


HIV 1&2 Antibody Rapid  Non react  (Non React)   22  16:42    


 


HIV P24 Antigen  Non react  (Non React)   22  16:42    











Mcwilliams/IV: 


                                        





Voiding Method                   Urinal











Active Medications





- Current Medications


Current Medications: 














Generic Name Dose Route Start Last Admin





  Trade Name Freq  PRN Reason Stop Dose Admin


 


Acetaminophen  650 mg  22 22:14  22 00:08





  Acetaminophen 325 Mg Tab  PO   650 mg





  Q6H PRN   Administration





  Pain, Mild (1-3)  


 


Albuterol  2.5 mg  22 06:15 





  Albuterol 2.5 Mg/3 Ml Nebu  IH  





  Q3HRT PRN  





  Shortness Of Breath  


 


Diphenhydramine HCl  25 mg  22 06:24  22 17:12





  Diphenhydramine 50 Mg/Ml Vial  IV   25 mg





  Q6H PRN   Administration





  Skin Irritation  


 


Docusate Sodium  100 mg  22 22:00  22 21:45





  Docusate Sodium 100 Mg Cap  PO   100 mg





  BID CHERYL   Administration


 


Famotidine  20 mg  22 10:00  22 21:44





  Famotidine 20 Mg Tab  PO   20 mg





  BID CHERYL   Administration


 


Gabapentin  300 mg  22 08:00  22 08:06





  Gabapentin 300 Mg Cap  PO   300 mg





  TID@0800,1600,2200 CHERYL   Administration


 


Heparin Sodium (Porcine)  5,000 unit  22 10:00  22 21:45





  Heparin 5,000 Unit/1 Ml Vial  SUB-Q   5,000 unit





  Q12HR CHERYL   Administration


 


Hydralazine HCl  10 mg  22 18:31  22 18:50





  Hydralazine 20 Mg/1 Ml Inj  IV   10 mg





  Q3HR PRN   Administration





  Hypertension  


 


Hydromorphone HCl  0.5 mg  22 06:15  22 20:01





  Hydromorphone 1 Mg/1 Ml Inj  IV   0.5 mg





  Q3H PRN   Administration





  Pain , Severe (7-10)  


 


Sodium Chloride  1,000 mls @ 75 mls/hr  22 01:00  22 21:48





  Nacl 0.9% 1000 Ml  IV   75 mls/hr





  AS DIRECT CHERYL   Administration


 


Ondansetron HCl  4 mg  22 06:15  22 10:38





  Ondansetron 4 Mg/2 Ml Inj  IV   4 mg





  Q8H PRN   Administration





  Nausea And Vomiting  


 


Oxycodone/Acetaminophen  1 tab  22 10:57  22 08:06





  Oxycodone /Acetaminophen 5-325mg Tab  PO   1 tab





  Q6H PRN   Administration





  Pain, Moderate (4-6)  


 


Sodium Chloride  10 ml  22 10:00  22 21:45





  Sodium Chloride 0.9% 10 Ml Flush Syringe  IV   10 ml





  BID CHERYL   Administration


 


Sodium Chloride  10 ml  22 06:15  22 20:02





  Sodium Chloride 0.9% 10 Ml Flush Syringe  IV   10 ml





  PRN PRN   Administration





  LINE FLUSH  


 


Valsartan  160 mg  22 22:00  22 09:40





  Valsartan 160mg Tab  PO   160 mg





  DAILY CHERYL   Administration














Nutrition/Malnutrition Assess





- Dietary Evaluation


Nutrition/Malnutrition Findings: 


                                        





Nutrition Notes                                            Start:  05/10/22 

16:45


Freq:                                                      Status: Active       




Protocol:                                                                       




 Document     22 14:21  HATTIE  (Rec: 22 14:46  HATTIE  [id number])


 Nutrition Notes


     Initial or Follow up                        Reassessment


     Current Diagnosis                           Hypertension


     Other Pertinent Diagnosis                   Rabdomyolysis, Liver Failure,


                                                 s/p L-LE 4 Compartment


                                                 Fasciotomy w/Debridem.


     Current Diet                                Regular Diet + Dietary


                                                 Supplements. (since B ).


     Labs/Tests                                  : BUN 5, Crea 0.7.


     Pertinent Medications                       : Nutritionally


                                                 unremarkable.


     Height                                      5 ft 6 in


     Weight                                      86.183 kg


     Ideal Body Weight (kg)                      64.54


     BMI                                         30.7


     Weight change and time frame                No body weight change


                                                 reporterd in 9 days.


     Weight Status                               Obese


     Subjective/Other Information                RD consult for routine F/U on


                                                 dietary advancement.


                                                 Pt's PO intake of meals has


                                                 been Good (100%), according to


                                                 ADL notes.


                                                 Pt is on Room Air, O2


                                                 saturation @ 96%, according to


                                                 Physical Assessment History


                                                 notes.


                                                 Pt presents L-LE Pitting Edema


                                                 2+, according to Physical


                                                 Assessment History notes.


                                                 Pt presents L-LE Vascular


                                                 Wound with WoundVAC since , according to Physical


                                                 Assessment History notes.


                                                 Event on , Pt suffered a


                                                 syncope during PT session,


                                                 according to Progress notes.


                                                 Pt is clearedd for discharge,


                                                 it is pending due to several


                                                 social issues, according to


                                                 Progress notes.


     Percent of energy/protein needs met:        Prescribed Regular Diet


                                                 provides for energy/protein


                                                 needs (2,289 Kcal/89 g) during


                                                 LOS


     Burn                                        Absent


     Trauma                                      Absent


     GI Symptoms                                 None


     Food Allergy                                No


     Skin Integrity/Comment                      L-LE Vascular Wound


     Current % PO                                Good (%)


     Minimum of two criteria                     No


     #1


      Nutrition Diagnosis                        Increased nutrient needs   (


                                                 specify in comment below)


      Comments:                                  Protein to support wound


                                                 healing processes.


      Diagnosis Progress(for reassessment        Continues


       documentation)                            


     Is patient on ventilator?                   No


     Is Patient Ambulatory and/or Out of Bed     No


     [facility]      [phone]


     Kcal/Kg value to use for calculation        19


     Approximate Energy Requirements Using       1637


      kcal/Kg                                    


     Calculation Used for Recommendations        Kcal/kg


     Additional Notes                            Protein: 1.25-1.5 g/Kg AdjBW;


                                                  g/day.


                                                 Fluids: 1 ml/Kcal, or as per


                                                 MD.


 Nutrition Intervention


     Change Diet Order:                          Continue Regular Diet.


     Add Supplement/Snack (indicate name/kcal    Continue 28.8 g pkt Aidan; BID


      /protein )                                 .


     Provides kCal:                              190


     Provides Protein (gm)                       5


     Goal #1                                     Support, through dietary


                                                 supplementation, wound healing


                                                 processes during LOS.


     Goal #2                                     Adjust the dietary


                                                 intervention to better serve


                                                 Pt's needs and clinical


                                                 conditions during LOS.


     Goal #3                                     Maintain body weight within +/


                                                 -3% of admission body weight


                                                 during LOS.


     Revisit per MD consult or patient           Sign Off


      request:                                   


     Additional Comments                         Continue monitoring food


                                                 tolerance, %PO intake [email or web address] meals


                                                 , and BM.

## 2022-05-23 LAB
HCT VFR BLD CALC: 33.3 % (ref 35.5–45.6)
HGB BLD-MCNC: 11.3 GM/DL (ref 11.8–15.2)
MCHC RBC AUTO-ENTMCNC: 34 % (ref 32–34)
MCV RBC AUTO: 98 FL (ref 84–94)
PLATELET # BLD: 450 K/MM3 (ref 140–440)
RBC # BLD AUTO: 3.41 M/MM3 (ref 3.65–5.03)

## 2022-05-23 RX ADMIN — FAMOTIDINE SCH MG: 20 TABLET ORAL at 09:53

## 2022-05-23 RX ADMIN — HEPARIN SODIUM SCH: 5000 INJECTION, SOLUTION INTRAVENOUS; SUBCUTANEOUS at 21:06

## 2022-05-23 RX ADMIN — GABAPENTIN SCH: 300 CAPSULE ORAL at 21:05

## 2022-05-23 RX ADMIN — FAMOTIDINE SCH: 20 TABLET ORAL at 21:06

## 2022-05-23 RX ADMIN — FAMOTIDINE SCH MG: 20 TABLET ORAL at 20:20

## 2022-05-23 RX ADMIN — GABAPENTIN SCH MG: 300 CAPSULE ORAL at 20:20

## 2022-05-23 RX ADMIN — GABAPENTIN SCH MG: 300 CAPSULE ORAL at 09:53

## 2022-05-23 RX ADMIN — Medication SCH: at 21:06

## 2022-05-23 RX ADMIN — OXYCODONE AND ACETAMINOPHEN PRN TAB: 5; 325 TABLET ORAL at 17:17

## 2022-05-23 RX ADMIN — DOCUSATE SODIUM SCH MG: 100 CAPSULE, LIQUID FILLED ORAL at 20:20

## 2022-05-23 RX ADMIN — VALSARTAN SCH MG: 160 TABLET, FILM COATED ORAL at 09:53

## 2022-05-23 RX ADMIN — HEPARIN SODIUM SCH UNIT: 5000 INJECTION, SOLUTION INTRAVENOUS; SUBCUTANEOUS at 20:21

## 2022-05-23 RX ADMIN — HEPARIN SODIUM SCH UNIT: 5000 INJECTION, SOLUTION INTRAVENOUS; SUBCUTANEOUS at 09:53

## 2022-05-23 RX ADMIN — DOCUSATE SODIUM SCH: 100 CAPSULE, LIQUID FILLED ORAL at 21:05

## 2022-05-23 RX ADMIN — GABAPENTIN SCH MG: 300 CAPSULE ORAL at 17:24

## 2022-05-23 RX ADMIN — Medication SCH ML: at 09:57

## 2022-05-23 RX ADMIN — HYDROMORPHONE HYDROCHLORIDE PRN MG: 1 INJECTION, SOLUTION INTRAMUSCULAR; INTRAVENOUS; SUBCUTANEOUS at 18:40

## 2022-05-23 RX ADMIN — SODIUM CHLORIDE PRN ML: 0.9 IRRIGANT IRRIGATION at 19:28

## 2022-05-23 RX ADMIN — DOCUSATE SODIUM SCH MG: 100 CAPSULE, LIQUID FILLED ORAL at 09:56

## 2022-05-23 RX ADMIN — DOXYCYCLINE SCH MLS/HR: 100 INJECTION, POWDER, LYOPHILIZED, FOR SOLUTION INTRAVENOUS at 18:41

## 2022-05-23 RX ADMIN — Medication PRN ML: at 20:21

## 2022-05-23 RX ADMIN — SODIUM CHLORIDE PRN ML: 0.9 IRRIGANT IRRIGATION at 19:32

## 2022-05-23 RX ADMIN — OXYCODONE AND ACETAMINOPHEN PRN TAB: 5; 325 TABLET ORAL at 05:21

## 2022-05-23 NOTE — PROGRESS NOTE
Assessment and Plan


Assessment and plan: 


This is a 36 year old  transgendered female undergoing gender transformation 

with silicone injection to buttocks ([place] ) and breast implants, heavy 

drinker on the weekends (bottle of vodka) who presented to [facility] on 5/3 because 

of adverse drug reaction after reportedly self administration of  

rocephin and PCN for sore throat. Patient reportedly collapsed while checking in

to the emergency department, has complains of severe 10 out of 10 pain in left 

thigh and has noticeable mottling of skin in thigh and left lower abdomen.  

Patient underwent a CT of the left leg which showed possible cellulitis with no 

drainable fluid collection and was started on empiric antibiotics.  Patient 

admitted to the hospital service with diagnosis of acute liver failure, 

rhabdomyolysis, s/p 4 compartment fasciotomy to left lower extremity





Acute liver failure 


-GI consulted, appreciate recommendations


-Per GI: Suspect due to rhabdomyolysis given elevated creatinine kinase levels


   -If ALT continues to rise recommend MRCP


-Abdominal ultrasound showed mild diffuse biliary dilation, obstruction to 

lesion of the 


distal common bile duct cannot be excluded consider MRCP, no evidence of 

gallstones within the gallbladder


-PPI


-Regular diet


-BR: colace


-Trend LFTs





Compartment syndrome to LLE


-Vascular surgery consulted, appreciate recommendations


-s/p self admin of IM abx to midthigh


-c/o pain, decreased sensation, pulses were dopplarable, paralysis, mottled skin


-s/p 4 compartment tracheotomy on 5/3 with vascular surgery


-Bilateral lower extremity Doppler ultrasound showed no DVT


-Left lower extremity CT with contrast showed appearance of posterior soft 

tissue complex 


cellulitis with small subcutaneous lymph nodes, no drainable fluid collections 

present, contusion 


could have similar appearance


-Bilateral lower extremity arterial duplex showed no significant lower extremity

peripheral artery disease


-Normal ABIs





Vascular follow-up with Dr. Vikas Laboy on 2022 at 10 AM





Peripheral neuropathy


-Avoid delirium


-Reorientation as needed


-Maintain sleep-wake cycle


-As needed analgesia


-Neurology consulted, appreciate recommendations


-Gabapentin 





HTN


-Blood pressure monitoring per protocol


-Valsartan


-PRN hydralazine





Rhabdomyolysis





acute kidney injury with vasomotor nephropathy now resolved


-Nephrology consulted, appreciate recommendations


-Strict intake and output


-Renally dose medications


-Avoid nephrotoxic medications


-MIVF


-Sodium bicarb


-Trend BMP, CK








Hospital course to date:





5/3: Venous ultrasound shows no DVT, Vascular surgery consulted who performed a 

fasciotomy and plan to transfer patient to [facility] post intervention.  Neurology 

consulted





: Transfer to ICU, GI and neurology consulted.





: Patient will be transferred back to the floor from ICU.  Her liver enzymes 

and creatinine are trending down.  Will be started on gabapentin per neurology 

recommendations and antibiotics discontinued.





: Continue supportive care.  CPK slowly improving.  Vascular plans for 

closure of fasciotomy early next week.  PT OT evaluation and treatment closure 

done.  Continue wound management.  No evidence of withdrawal noted at this time.

 Continue to monitor LFTs which are also slightly improving.  Plan discussed in 

detail with the patient who verbalized understanding





: Patient seen and examined clinically stable continues to show some 

improvement.  Closure of fasciotomy planned for tomorrow.  Renal function is 

improving.  CK ordered and pending





: Patient seen and examined, wbc mildly elevated likely secondary to 

steroids, will change to po for two additional days and stop, benefit probably 

already achieved, continue wound dressing, sensory function improved, awaiting 

wound vac and closure. Patient advised on clinically progress. 





: Patient seen and examined awaiting her vascular surgery for closure of 

fasciotomy.  CK is decreasing less than 9000 today.  We will continue aggressive

IV fluids until we have the resolution below 5000.  LFTs on the downward trend 

also.  Leukocytosis appears to have peaked steroids was changed to oral for 2 

additional days today is 1 of 2..  Plan of care discussed in detail with the 

patient who verbalized understanding.





5/10:  Left lower extremity compartment syndrome status post fasciotomies.  

Cause is still unclear and may represent some allergic reaction.  Motor function

is unchanged from original exam.  Sensory function has improved with full 

sensation of the foot.vascular surgery to place wound vacs on the leg today.  CK

improved to the 8000 range today





: Patient underwent excisional debridement of muscle and soft tissue from 

left leg wound yesterday.  Patient also had wound VAC placement left leg 

fasciotomy incisions.  The patient had moderate amount of superficial necrotic 

muscle in the anterior lateral compartments with minimal amount of superficial 

necrotic muscle in the posterior compartment.  Follow-up culture results.  CK 

levels have improved to 6900 range.  AST/ALT continue to trend downward.





: I discussed the case with Dr. Mckeon who recommends home wound VAC as 

well as physical therapy.  I also discussed these needs with case management who

was attempting to arrange for discharge planning.  CK continues to improve with 

levels in the 4000 range.





: Still awaiting for home wound VAC arrangements to be made.  Leukocytosis 

is much improved down to 13,900 today.  LFTs continue to trend down to normal 

range.  CK levels still in the 4000 range.  Nephrology has signed off.  

Anticipate discharge later today or in a.m.





:  Still awaiting for home wound VAC arrangements to be made.  Laboratories 

continue to include including LFTs, CK and leukocytosis.





5/15:  Still awaiting for home wound VAC arrangements to be made.





: Patient had an episode of syncope while working with PT today.  Nurse 

reports patient lost consciousness for a few seconds and was helped back to the 

bed.  Patient's blood pressure was noted to be hypotensive/orthostatic.  We will

bolus 500 normal saline IV fluid and monitor orthostatics.  Case management 

reports that wound VAC should be delivered today.  LFTs continue to trend down 

to normal range.  CK levels still in the 2000 range.  Nephrology has signed off.

 Anticipate discharge in a.m. given the syncope and once wound VAC arranged.  

Patient with low-grade temp.  We will monitor.





; patient is anxious to go home, wound VAC is delivered to the bedside, 

continue wound care


Multiple social issues, DC planning per case management, left lower extremity 

Doppler negative for DVT





; discharge planning, multiple social issues





; continue current management, wound VAC in place, DC planning issues





; discharge planning per case management, setting up wound VAC, setting up 

outpatient wound care


Setting up follow-up visits with physicians, patient has multiple social issues 

and no payer source


Continue current management discharge when medically stable





; patient's LFTs are trending down, continue current management


Wound VAC care, wound VAC change per instructions, wound care as indicated


Discharge planning per case management





; patient is medically stable for discharge


Awaiting discharge planning coordinating with wound VAC and wound care


Post discharge follow-up visit with vascular Dr. Vikas Laboy on 2020 at 10 AM





; vascular /IR to reevaluate with the patient today


Continue current management














History


Interval history: 


I have seen and examined the patient at the bedside


Patient's chart and medications reviewed


Patient's wound VAC and wound infection has some foul smell


Still awaiting wound care personnel to do the wound care and dressing


Patient is anxious to go home


Vital signs noted








Hospitalist Physical





- Constitutional


Vitals: 


                                        











Temp Pulse Resp BP Pulse Ox


 


 98.0 F   98 H  18   115/79   98 


 


 22 05:17  22 05:17  22 06:21  22 05:17  22 22:00











General appearance: Present: no acute distress, well-nourished, obese





- EENT


Eyes: Present: PERRL, EOM intact





- Neck


Neck: Present: supple, normal ROM





- Respiratory


Respiratory effort: normal


Respiratory: bilateral: diminished, negative: rales, rhonchi, wheezing





- Cardiovascular


Rhythm: regular


Heart Sounds: Present: S1 & S2





- Extremities


Extremities: no ischemia, No edema, abnormal (Left lower extremity wound VAC in 

place)


Extremity abnormal: other (Swelling and wound infection)





- Abdominal


General gastrointestinal: soft, non-tender, non-distended, normal bowel sounds





- Integumentary


Integumentary: Present: clear, warm





- Psychiatric


Psychiatric: appropriate mood/affect, cooperative





- Neurologic


Neurologic: moves all extremities





Results





- Labs


CBC & Chem 7: 


                                 22 19:01





                                 22 05:22


Labs: 


                             Laboratory Last Values











WBC  10.6 K/mm3 (4.5-11.0)   22  05:01    


 


RBC  3.21 M/mm3 (3.65-5.03)  L  22  05:01    


 


Hgb  10.7 gm/dl (11.8-15.2)  L  22  05:01    


 


Hct  32.0 % (35.5-45.6)  L  22  05:01    


 


MCV  99 fl (84-94)  H  22  05:01    


 


MCH  33 pg (28-32)  H  22  05:01    


 


MCHC  33 % (32-34)   22  05:01    


 


RDW  13.3 % (13.2-15.2)   22  05:01    


 


Plt Count  427 K/mm3 (140-440)   22  05:01    


 


Lymph % (Auto)  12.9 % (13.4-35.0)  L  22  05:01    


 


Mono % (Auto)  6.9 % (0.0-7.3)   22  05:01    


 


Eos % (Auto)  0.4 % (0.0-4.3)   22  05:01    


 


Baso % (Auto)  0.3 % (0.0-1.8)   22  05:01    


 


Lymph # (Auto)  1.4 K/mm3 (1.2-5.4)   22  05:01    


 


Mono # (Auto)  0.7 K/mm3 (0.0-0.8)   22  05:01    


 


Eos # (Auto)  0.0 K/mm3 (0.0-0.4)   22  05:01    


 


Baso # (Auto)  0.0 K/mm3 (0.0-0.1)   22  05:01    


 


Add Manual Diff  Complete   22  04:49    


 


Total Counted  100   22  04:49    


 


Seg Neutrophils %  79.5 % (40.0-70.0)  H  22  05:01    


 


Seg Neuts % (Manual)  83.0 % (40.0-70.0)  H  22  04:49    


 


Band Neutrophils %  2.0 %  22  04:49    


 


Lymphocytes % (Manual)  5.0 % (13.4-35.0)  L  22  04:49    


 


Reactive Lymphs % (Man)  0 %  22  04:49    


 


Monocytes % (Manual)  8.0 % (0.0-7.3)  H  22  04:49    


 


Eosinophils % (Manual)  0 % (0.0-4.3)   22  04:49    


 


Basophils % (Manual)  0 % (0.0-1.8)   22  04:49    


 


Metamyelocytes %  2.0 %  22  04:49    


 


Myelocytes %  0 %  22  04:49    


 


Promyelocytes %  0 %  22  04:49    


 


Blast Cells %  0 %  22  04:49    


 


Nucleated RBC %  Not Reportable   22  04:49    


 


Seg Neutrophils #  8.4 K/mm3 (1.8-7.7)  H  22  05:01    


 


Seg Neutrophils # Man  17.1 K/mm3 (1.8-7.7)  H  22  04:49    


 


Band Neutrophils #  0.4 K/mm3  22  04:49    


 


Lymphocytes # (Manual)  1.0 K/mm3 (1.2-5.4)  L  22  04:49    


 


Abs React Lymphs (Man)  0.0 K/mm3  22  04:49    


 


Monocytes # (Manual)  1.6 K/mm3 (0.0-0.8)  H  22  04:49    


 


Eosinophils # (Manual)  0.0 K/mm3 (0.0-0.4)   22  04:49    


 


Basophils # (Manual)  0.0 K/mm3 (0.0-0.1)   22  04:49    


 


Metamyelocytes #  0.4 K/mm3  22  04:49    


 


Myelocytes #  0.0 K/mm3  22  04:49    


 


Promyelocytes #  0.0 K/mm3  22  04:49    


 


Blast Cells #  0.0 K/mm3  22  04:49    


 


WBC Morphology  Not Reportable   22  04:49    


 


Hypersegmented Neuts  Not Reportable   22  04:49    


 


Hyposegmented Neuts  Rare   22  04:49    


 


Hypogranular Neuts  Not Reportable   22  04:49    


 


Smudge Cells  Not Reportable   22  04:49    


 


Toxic Granulation  Not Reportable   22  04:49    


 


Toxic Vacuolation  Not Reportable   22  04:49    


 


Dohle Bodies  Not Reportable   22  04:49    


 


Pelger-Huet Anomaly  Not Reportable   22  04:49    


 


Jamie Rods  Not Reportable   22  04:49    


 


Platelet Estimate  Consistent w auto   22  04:49    


 


Clumped Platelets  Not Reportable   22  04:49    


 


Plt Clumps, EDTA  Not Reportable   22  04:49    


 


Large Platelets  Rare   22  04:49    


 


Giant Platelets  Not Reportable   22  04:49    


 


Platelet Satelliting  Not Reportable   22  04:49    


 


Plt Morphology Comment  Not Reportable   22  04:49    


 


RBC Morphology  Not Reportable   22  04:49    


 


Dimorphic RBCs  Not Reportable   22  04:49    


 


Polychromasia  Not Reportable   22  04:49    


 


Hypochromasia  Not Reportable   22  04:49    


 


Poikilocytosis  Not Reportable   22  04:49    


 


Anisocytosis  Rare   22  04:49    


 


Microcytosis  Not Reportable   22  04:49    


 


Macrocytosis  Rare   22  04:49    


 


Spherocytes  Not Reportable   22  04:49    


 


Pappenheimer Bodies  Not Reportable   22  04:49    


 


Sickle Cells  Not Reportable   22  04:49    


 


Target Cells  Not Reportable   22  04:49    


 


Tear Drop Cells  Not Reportable   22  04:49    


 


Ovalocytes  Not Reportable   22  04:49    


 


Helmet Cells  Not Reportable   22  04:49    


 


Mccormick-[place] Bodies  Not Reportable   22  04:49    


 


Cabot Rings  Not Reportable   22  04:49    


 


Cornel Cells  Not Reportable   22  04:49    


 


Bite Cells  Not Reportable   22  04:49    


 


Crenated Cell  Not Reportable   22  04:49    


 


Elliptocytes  Not Reportable   22  04:49    


 


Acanthocytes (Spur)  Not Reportable   22  04:49    


 


Rouleaux  Not Reportable   22  04:49    


 


Hemoglobin C Crystals  Not Reportable   22  04:49    


 


Schistocytes  Not Reportable   22  04:49    


 


Malaria parasites  Not Reportable   22  04:49    


 


Miguel Bodies  Not Reportable   22  04:49    


 


Hem Pathologist Commnt  No   22  04:49    


 


PT  13.4 Sec. (12.2-14.9)   22  13:30    


 


INR  0.92  (0.87-1.13)   22  13:30    


 


APTT  25.8 Sec. (24.2-36.6)   22  13:30    


 


Sodium  136 mmol/L (137-145)  L  22  05:22    


 


Potassium  3.8 mmol/L (3.6-5.0)   22  05:22    


 


Chloride  101.0 mmol/L ()   22  05:22    


 


Carbon Dioxide  29 mmol/L (22-30)   22  05:22    


 


Anion Gap  10 mmol/L  22  05:22    


 


BUN  5 mg/dL (9-20)  L  22  05:22    


 


Creatinine  0.7 mg/dL (0.8-1.3)  L  22  05:22    


 


Estimated GFR  > 60 ml/min  22  05:22    


 


BUN/Creatinine Ratio  7 %  22  05:22    


 


Glucose  103 mg/dL ()  H  22  05:22    


 


Lactic Acid  1.40 mmol/L (0.7-2.0)   22  13:30    


 


Calcium  8.9 mg/dL (8.4-10.2)   22  05:22    


 


Phosphorus  2.80 mg/dL (2.5-4.5)   22  05:17    


 


Magnesium  2.20 mg/dL (1.7-2.3)   22  05:17    


 


Total Bilirubin  0.50 mg/dL (0.1-1.2)   22  05:22    


 


Direct Bilirubin  < 0.2 mg/dL (0-0.2)   22  04:00    


 


Indirect Bilirubin  0.3 mg/dL  22  04:00    


 


AST  42 units/L (5-40)  H  22  05:22    


 


ALT  108 units/L (7-56)  H  22  05:22    


 


Alkaline Phosphatase  77 units/L ()   22  05:22    


 


Ammonia  37.0 umol/L (25-60)   22  04:49    


 


Total Creatine Kinase  1001 units/L ()  H  22  08:55    


 


Total Protein  6.2 g/dL (6.3-8.2)  L  22  05:22    


 


Albumin  3.1 g/dL (3.9-5)  L  22  05:22    


 


Albumin/Globulin Ratio  1.0 %  22  05:22    


 


Amylase  25 units/L ()  L  22  04:49    


 


Lipase  10 units/L (13-60)  L  22  04:49    


 


Coronavirus (PCR)  Negative  (Negative)   22  11:40    


 


Hepatitis A IgM Ab  Nonreactive  (NonReactive)   22  13:30    


 


Hep Bs Antigen  Non-reactive  (Negative)   22  13:30    


 


Hep B Core IgM Ab  Non-reactive  (NonReactive)   22  13:30    


 


Hepatitis C Antibody  Non-reactive  (NonReactive)   22  13:30    


 


HIV 1&2 Antibody Rapid  Non react  (Non React)   22  16:42    


 


HIV P24 Antigen  Non react  (Non React)   22  16:42    











Mcwilliams/IV: 


                                        





Voiding Method                   Urinal











Active Medications





- Current Medications


Current Medications: 














Generic Name Dose Route Start Last Admin





  Trade Name Freq  PRN Reason Stop Dose Admin


 


Acetaminophen  650 mg  22 22:14  22 00:08





  Acetaminophen 325 Mg Tab  PO   650 mg





  Q6H PRN   Administration





  Pain, Mild (1-3)  


 


Albuterol  2.5 mg  22 06:15 





  Albuterol 2.5 Mg/3 Ml Nebu  IH  





  Q3HRT PRN  





  Shortness Of Breath  


 


Diphenhydramine HCl  25 mg  22 06:24  22 17:12





  Diphenhydramine 50 Mg/Ml Vial  IV   25 mg





  Q6H PRN   Administration





  Skin Irritation  


 


Docusate Sodium  100 mg  22 22:00  22 09:56





  Docusate Sodium 100 Mg Cap  PO   100 mg





  BID CHREYL   Administration


 


Famotidine  20 mg  22 10:00  22 09:53





  Famotidine 20 Mg Tab  PO   20 mg





  BID CHERYL   Administration


 


Gabapentin  300 mg  22 08:00  22 09:53





  Gabapentin 300 Mg Cap  PO   300 mg





  TID@0800,1600,2200 CHERYL   Administration


 


Heparin Sodium (Porcine)  5,000 unit  22 10:00  22 09:53





  Heparin 5,000 Unit/1 Ml Vial  SUB-Q   5,000 unit





  Q12HR CHERYL   Administration


 


Hydralazine HCl  10 mg  22 18:31  22 18:50





  Hydralazine 20 Mg/1 Ml Inj  IV   10 mg





  Q3HR PRN   Administration





  Hypertension  


 


Hydromorphone HCl  0.5 mg  22 06:15  22 20:01





  Hydromorphone 1 Mg/1 Ml Inj  IV   0.5 mg





  Q3H PRN   Administration





  Pain , Severe (7-10)  


 


Ondansetron HCl  4 mg  22 06:15  22 10:38





  Ondansetron 4 Mg/2 Ml Inj  IV   4 mg





  Q8H PRN   Administration





  Nausea And Vomiting  


 


Oxycodone/Acetaminophen  1 tab  22 10:57  22 05:21





  Oxycodone /Acetaminophen 5-325mg Tab  PO   1 tab





  Q6H PRN   Administration





  Pain, Moderate (4-6)  


 


Sodium Chloride  10 ml  22 10:00  22 09:57





  Sodium Chloride 0.9% 10 Ml Flush Syringe  IV   10 ml





  BID CHERYL   Administration


 


Sodium Chloride  10 ml  22 06:15  22 20:02





  Sodium Chloride 0.9% 10 Ml Flush Syringe  IV   10 ml





  PRN PRN   Administration





  LINE FLUSH  


 


Valsartan  160 mg  22 22:00  22 09:53





  Valsartan 160mg Tab  PO   160 mg





  DAILY CHERYL   Administration














Nutrition/Malnutrition Assess





- Dietary Evaluation


Nutrition/Malnutrition Findings: 


                                        





Nutrition Notes                                            Start:  05/10/22 

16:45


Freq:                                                      Status: Active       




Protocol:                                                                       




 Document     22 14:21  HATTIE  (Rec: 22 14:46  HATTIE  [id number])


 Nutrition Notes


     Initial or Follow up                        Reassessment


     Current Diagnosis                           Hypertension


     Other Pertinent Diagnosis                   Rabdomyolysis, Liver Failure,


                                                 s/p L-LE 4 Compartment


                                                 Fasciotomy w/Debridem.


     Current Diet                                Regular Diet + Dietary


                                                 Supplements. (since B ).


     Labs/Tests                                  : BUN 5, Crea 0.7.


     Pertinent Medications                       : Nutritionally


                                                 unremarkable.


     Height                                      5 ft 6 in


     Weight                                      86.183 kg


     Ideal Body Weight (kg)                      64.54


     BMI                                         30.7


     Weight change and time frame                No body weight change


                                                 reporterd in 9 days.


     Weight Status                               Obese


     Subjective/Other Information                RD consult for routine F/U on


                                                 dietary advancement.


                                                 Pt's PO intake of meals has


                                                 been Good (100%), according to


                                                 ADL notes.


                                                 Pt is on Room Air, O2


                                                 saturation @ 96%, according to


                                                 Physical Assessment History


                                                 notes.


                                                 Pt presents L-LE Pitting Edema


                                                 2+, according to Physical


                                                 Assessment History notes.


                                                 Pt presents L-LE Vascular


                                                 Wound with WoundVAC since , according to Physical


                                                 Assessment History notes.


                                                 Event on , Pt suffered a


                                                 syncope during PT session,


                                                 according to Progress notes.


                                                 Pt is clearedd for discharge,


                                                 it is pending due to several


                                                 social issues, according to


                                                 Progress notes.


     Percent of energy/protein needs met:        Prescribed Regular Diet


                                                 provides for energy/protein


                                                 needs (2,289 Kcal/89 g) during


                                                 LOS


     Burn                                        Absent


     Trauma                                      Absent


     GI Symptoms                                 None


     Food Allergy                                No


     Skin Integrity/Comment                      L-LE Vascular Wound


     Current % PO                                Good (%)


     Minimum of two criteria                     No


     #1


      Nutrition Diagnosis                        Increased nutrient needs   (


                                                 specify in comment below)


      Comments:                                  Protein to support wound


                                                 healing processes.


      Diagnosis Progress(for reassessment        Continues


       documentation)                            


     Is patient on ventilator?                   No


     Is Patient Ambulatory and/or Out of Bed     No


     REE-([facility]-confined to bed)      [phone]


     Kcal/Kg value to use for calculation        19


     Approximate Energy Requirements Using       1637


      kcal/Kg                                    


     Calculation Used for Recommendations        Kcal/kg


     Additional Notes                            Protein: 1.25-1.5 g/Kg AdjBW;


                                                  g/day.


                                                 Fluids: 1 ml/Kcal, or as per


                                                 MD.


 Nutrition Intervention


     Change Diet Order:                          Continue Regular Diet.


     Add Supplement/Snack (indicate name/kcal    Continue 28.8 g pkt Aidan; BID


      /protein )                                 .


     Provides kCal:                              190


     Provides Protein (gm)                       5


     Goal #1                                     Support, through dietary


                                                 supplementation, wound healing


                                                 processes during LOS.


     Goal #2                                     Adjust the dietary


                                                 intervention to better serve


                                                 Pt's needs and clinical


                                                 conditions during LOS.


     Goal #3                                     Maintain body weight within +/


                                                 -3% of admission body weight


                                                 during LOS.


     Revisit per MD consult or patient           Sign Off


      request:                                   


     Additional Comments                         Continue monitoring food


                                                 tolerance, %PO intake of meals


                                                 , and BM.

## 2022-05-23 NOTE — PROGRESS NOTE
Assessment and Plan


Left lower extremity compartment syndrome status post fasciotomies.  





There is a smell compatible with a infection.  Started patient on doxycycline 

for 7 days.  Prior wound cultures not back.





Contacted [facility] about getting wound care to see the patient more regularly for 

wound VAC changes.  If there is an infection, may need Dakin's for a few days 

prior to returning to wound VAC. Wound care will be seeing patient today for 

wound VAC removal and evaluation.








Subjective


Date of service: 05/23/22


Principal diagnosis: abnormal liver enzymes


Interval history: 





Palpable left dorsalis pedis and posterior tibial pulse.  Sensory intact to 

foot.  Still cannot move foot/toes.  Wound VAC has not been changed in 6 days.  

Requires wound VAC change with evaluation for Dakin's.











Objective





- Constitutional


Vitals: 


                               Vital Signs - 12hr











  05/23/22 05/23/22 05/23/22





  05:21 06:21 10:00


 


Respiratory 18 18 





Rate   


 


O2 Sat by Pulse   98





Oximetry   











General appearance: Present: no acute distress





- EENT


Eyes: EOM intact


ENT: hearing intact





- Respiratory


Respiratory effort: normal


Extremities: normal temperature, normal color, abnormal (Fasciotomies with wound

vacs on the left leg)





- Gastrointestinal


General gastrointestinal: Present: soft, non-tender





- Psychiatric


Psychiatric: appropriate mood/affect, cooperative





- Labs


CBC & Chem 7: 


                                 05/17/22 05:01





                                 05/21/22 05:22





Medications & Allergies





- Medications


Allergies/Adverse Reactions: 


                                    Allergies





ceftriaxone [From Rocephin] Allergy (Severe, Verified 05/05/22 10:23)


   Unknown


   SEVERE RASH MOTTLING OF THE SKIN POST ADMINISTRATION OF IM ROCEPHIN PTA 


Penicillins Allergy (Severe, Verified 05/05/22 10:23)


   Unknown


   SEVERE RASH MOTTLING OF THE SKIN POST ADMINISTRATION OF IM ROCEPHIN PTA 








Home Medications: 


                                Home Medications











 Medication  Instructions  Recorded  Confirmed  Last Taken  Type


 


No Known Home Medications [No  05/03/22 05/03/22 Unknown History





Reported Home Medications]     











Active Medications: 














Generic Name Dose Route Start Last Admin





  Trade Name Freq  PRN Reason Stop Dose Admin


 


Acetaminophen  650 mg  05/17/22 22:14  05/18/22 00:08





  Acetaminophen 325 Mg Tab  PO   650 mg





  Q6H PRN   Administration





  Pain, Mild (1-3)  


 


Albuterol  2.5 mg  05/03/22 06:15 





  Albuterol 2.5 Mg/3 Ml Nebu  IH  





  Q3HRT PRN  





  Shortness Of Breath  


 


Diphenhydramine HCl  25 mg  05/03/22 06:24  05/18/22 17:12





  Diphenhydramine 50 Mg/Ml Vial  IV   25 mg





  Q6H PRN   Administration





  Skin Irritation  


 


Docusate Sodium  100 mg  05/05/22 22:00  05/23/22 09:56





  Docusate Sodium 100 Mg Cap  PO   100 mg





  BID CHERYL   Administration


 


Famotidine  20 mg  05/08/22 10:00  05/23/22 09:53





  Famotidine 20 Mg Tab  PO   20 mg





  BID CHERYL   Administration


 


Gabapentin  300 mg  05/18/22 08:00  05/23/22 09:53





  Gabapentin 300 Mg Cap  PO   300 mg





  TID@0800,1600,2200 CHERYL   Administration


 


Heparin Sodium (Porcine)  5,000 unit  05/03/22 10:00  05/23/22 09:53





  Heparin 5,000 Unit/1 Ml Vial  SUB-Q   5,000 unit





  Q12HR CHERYL   Administration


 


Hydralazine HCl  10 mg  05/04/22 18:31  05/04/22 18:50





  Hydralazine 20 Mg/1 Ml Inj  IV   10 mg





  Q3HR PRN   Administration





  Hypertension  


 


Hydromorphone HCl  0.5 mg  05/03/22 06:15  05/20/22 20:01





  Hydromorphone 1 Mg/1 Ml Inj  IV   0.5 mg





  Q3H PRN   Administration





  Pain , Severe (7-10)  


 


Ondansetron HCl  4 mg  05/03/22 06:15  05/16/22 10:38





  Ondansetron 4 Mg/2 Ml Inj  IV   4 mg





  Q8H PRN   Administration





  Nausea And Vomiting  


 


Oxycodone/Acetaminophen  1 tab  05/20/22 10:57  05/23/22 05:21





  Oxycodone /Acetaminophen 5-325mg Tab  PO   1 tab





  Q6H PRN   Administration





  Pain, Moderate (4-6)  


 


Sodium Chloride  10 ml  05/03/22 10:00  05/23/22 09:57





  Sodium Chloride 0.9% 10 Ml Flush Syringe  IV   10 ml





  BID CHERYL   Administration


 


Sodium Chloride  10 ml  05/03/22 06:15  05/20/22 20:02





  Sodium Chloride 0.9% 10 Ml Flush Syringe  IV   10 ml





  PRN PRN   Administration





  LINE FLUSH  


 


Valsartan  160 mg  05/04/22 22:00  05/23/22 09:53





  Valsartan 160mg Tab  PO   160 mg





  DAILY CHERYL   Administration

## 2022-05-24 RX ADMIN — OXYCODONE AND ACETAMINOPHEN PRN TAB: 5; 325 TABLET ORAL at 13:42

## 2022-05-24 RX ADMIN — HEPARIN SODIUM SCH UNIT: 5000 INJECTION, SOLUTION INTRAVENOUS; SUBCUTANEOUS at 21:55

## 2022-05-24 RX ADMIN — Medication SCH ML: at 09:17

## 2022-05-24 RX ADMIN — HEPARIN SODIUM SCH UNIT: 5000 INJECTION, SOLUTION INTRAVENOUS; SUBCUTANEOUS at 09:16

## 2022-05-24 RX ADMIN — DOCUSATE SODIUM SCH MG: 100 CAPSULE, LIQUID FILLED ORAL at 09:16

## 2022-05-24 RX ADMIN — FAMOTIDINE SCH MG: 20 TABLET ORAL at 21:55

## 2022-05-24 RX ADMIN — VALSARTAN SCH MG: 160 TABLET, FILM COATED ORAL at 09:16

## 2022-05-24 RX ADMIN — GABAPENTIN SCH MG: 300 CAPSULE ORAL at 21:55

## 2022-05-24 RX ADMIN — Medication SCH: at 13:48

## 2022-05-24 RX ADMIN — DOXYCYCLINE SCH MLS/HR: 100 INJECTION, POWDER, LYOPHILIZED, FOR SOLUTION INTRAVENOUS at 07:19

## 2022-05-24 RX ADMIN — HYDROMORPHONE HYDROCHLORIDE PRN MG: 1 INJECTION, SOLUTION INTRAMUSCULAR; INTRAVENOUS; SUBCUTANEOUS at 05:09

## 2022-05-24 RX ADMIN — DOXYCYCLINE SCH MLS/HR: 100 INJECTION, POWDER, LYOPHILIZED, FOR SOLUTION INTRAVENOUS at 17:09

## 2022-05-24 RX ADMIN — GABAPENTIN SCH MG: 300 CAPSULE ORAL at 15:41

## 2022-05-24 RX ADMIN — HYDROMORPHONE HYDROCHLORIDE PRN MG: 1 INJECTION, SOLUTION INTRAMUSCULAR; INTRAVENOUS; SUBCUTANEOUS at 12:30

## 2022-05-24 RX ADMIN — OXYCODONE AND ACETAMINOPHEN PRN TAB: 5; 325 TABLET ORAL at 19:25

## 2022-05-24 RX ADMIN — Medication PRN ML: at 05:09

## 2022-05-24 RX ADMIN — Medication SCH ML: at 21:55

## 2022-05-24 RX ADMIN — Medication PRN ML: at 07:19

## 2022-05-24 RX ADMIN — GABAPENTIN SCH MG: 300 CAPSULE ORAL at 08:00

## 2022-05-24 RX ADMIN — FAMOTIDINE SCH MG: 20 TABLET ORAL at 09:16

## 2022-05-24 RX ADMIN — DOCUSATE SODIUM SCH MG: 100 CAPSULE, LIQUID FILLED ORAL at 21:54

## 2022-05-24 NOTE — PROGRESS NOTE
Assessment and Plan


Assessment and plan: 


This is a 36 year old  transgendered female undergoing gender transformation 

with silicone injection to buttocks ([place] ) and breast implants, heavy 

drinker on the weekends (bottle of vodka) who presented to [facility] on 5/3 because 

of adverse drug reaction after reportedly self administration of  

rocephin and PCN for sore throat. Patient reportedly collapsed while checking in

to the emergency department, has complains of severe 10 out of 10 pain in left 

thigh and has noticeable mottling of skin in thigh and left lower abdomen.  

Patient underwent a CT of the left leg which showed possible cellulitis with no 

drainable fluid collection and was started on empiric antibiotics.  Patient 

admitted to the hospital service with diagnosis of acute liver failure, 

rhabdomyolysis, s/p 4 compartment fasciotomy to left lower extremity.  Patient 

received antibiotics, wound care, wound VAC treatment and was planning to be 

discharged


However developed a wound infection, wound VAC was removed, medical personnel 

aggressively treating the patient.  Discharge was held





Assessment and plan;


Compartment syndrome to E


-Vascular surgery consulted, appreciate recommendations


-s/p self admin of IM abx to [facility]


-c/o pain, decreased sensation, pulses were dopplarable, paralysis, mottled skin


-s/p 4 compartment tracheotomy on 5/3 with vascular surgery


-Bilateral lower extremity Doppler ultrasound showed no DVT


-Left lower extremity CT with contrast showed appearance of posterior soft 

tissue complex 


cellulitis with small subcutaneous lymph nodes, no drainable fluid collections 

present, contusion 


could have similar appearance


-Bilateral lower extremity arterial duplex showed no significant lower extremity

peripheral artery disease


-Normal ABIs





Patient received wound VAC, with aggressive management and frequent changing.  

And with supportive care


Patient was being prepared to discharge with home health and outpatient wound 

care 3 times a week.





However patient's wound got significantly infected, wound VAC was removed, wound

care personal consulted and they are aggressively treating the wounds.


Once the wound infection improves, patient may receive wound VAC and then 

patient may be discharged home with follow-up visit with Dr. Vikas Laboy


Outpatient wound care 3 times a week





Peripheral neuropathy


Continue gabapentin


Neurology evaluated





HTN


Moderate control, continue current antihypertensives


As needed medications





Rhabdomyolysis;


Improved from 95,000 CK -1001 week ago


Closely monitor IV hydration





acute kidney injury with vasomotor nephropathy now resolved


Monitor renal function


Avoid nephrotoxin. gentle hydration as needed





Hospital course to date:





5/3: Venous ultrasound shows no DVT, Vascular surgery consulted who performed a 

fasciotomy and plan to transfer patient to [facility] post intervention.  Neurology 

consulted





: Transfer to ICU, GI and neurology consulted.





: Patient will be transferred back to the floor from ICU.  Her liver enzymes 

and creatinine are trending down.  Will be started on gabapentin per neurology 

recommendations and antibiotics discontinued.





: Continue supportive care.  CPK slowly improving.  Vascular plans for 

closure of fasciotomy early next week.  PT OT evaluation and treatment closure 

done.  Continue wound management.  No evidence of withdrawal noted at this time.

 Continue to monitor LFTs which are also slightly improving.  Plan discussed in 

detail with the patient who verbalized understanding





: Patient seen and examined clinically stable continues to show some improvem

ent.  Closure of fasciotomy planned for tomorrow.  Renal function is improving. 

CK ordered and pending





: Patient seen and examined, wbc mildly elevated likely secondary to 

steroids, will change to po for two additional days and stop, benefit probably 

already achieved, continue wound dressing, sensory function improved, awaiting 

wound vac and closure. Patient advised on clinically progress. 





: Patient seen and examined awaiting her vascular surgery for closure of 

fasciotomy.  CK is decreasing less than 9000 today.  We will continue aggressive

IV fluids until we have the resolution below 5000.  LFTs on the downward trend 

also.  Leukocytosis appears to have peaked steroids was changed to oral for 2 

additional days today is 1 of 2..  Plan of care discussed in detail with the 

patient who verbalized understanding.





5/10:  Left lower extremity compartment syndrome status post fasciotomies.  

Cause is still unclear and may represent some allergic reaction.  Motor function

is unchanged from original exam.  Sensory function has improved with full 

sensation of the foot.vascular surgery to place wound vacs on the leg today.  CK

improved to the 8000 range today





: Patient underwent excisional debridement of muscle and soft tissue from 

left leg wound yesterday.  Patient also had wound VAC placement left leg 

fasciotomy incisions.  The patient had moderate amount of superficial necrotic 

muscle in the anterior lateral compartments with minimal amount of superficial 

necrotic muscle in the posterior compartment.  Follow-up culture results.  CK 

levels have improved to 6900 range.  AST/ALT continue to trend downward.





: I discussed the case with Dr. Mckeon who recommends home wound VAC as 

well as physical therapy.  I also discussed these needs with case management who

was attempting to arrange for discharge planning.  CK continues to improve with 

levels in the 4000 range.





: Still awaiting for home wound VAC arrangements to be made.  Leukocytosis 

is much improved down to 13,900 today.  LFTs continue to trend down to normal 

range.  CK levels still in the 4000 range.  Nephrology has signed off.  

Anticipate discharge later today or in a.m.





:  Still awaiting for home wound VAC arrangements to be made.  Laboratories 

continue to include including LFTs, CK and leukocytosis.





5/15:  Still awaiting for home wound VAC arrangements to be made.





: Patient had an episode of syncope while working with PT today.  Nurse 

reports patient lost consciousness for a few seconds and was helped back to the 

bed.  Patient's blood pressure was noted to be hypotensive/orthostatic.  We will

bolus 500 normal saline IV fluid and monitor orthostatics.  Case management 

reports that wound VAC should be delivered today.  LFTs continue to trend down 

to normal range.  CK levels still in the 2000 range.  Nephrology has signed off.

 Anticipate discharge in a.m. given the syncope and once wound VAC arranged.  

Patient with low-grade temp.  We will monitor.





; patient is anxious to go home, wound VAC is delivered to the bedside, 

continue wound care


Multiple social issues, DC planning per case management, left lower extremity 

Doppler negative for DVT





; discharge planning, multiple social issues





; continue current management, wound VAC in place, DC planning issues





; discharge planning per case management, setting up wound VAC, setting up 

outpatient wound care


Setting up follow-up visits with physicians, patient has multiple social issues 

and no payer source


Continue current management discharge when medically stable





; patient's LFTs are trending down, continue current management


Wound VAC care, wound VAC change per instructions, wound care as indicated


Discharge planning per case management





; patient is medically stable for discharge


Awaiting discharge planning coordinating with wound VAC and wound care


Post discharge follow-up visit with vascular Dr. Vikas Laboy on 2020 at 10 AM





; vascular /IR Dr. Cantrell evaluation and recommendations noted and appreciated


Patient's wound had some peculiar infection smell, started on doxycycline


And recommended aggressive wound care.





; iron  talk to Dr. Cantrell regarding the treatment and discharge 

planning


Dr. Cantrell informed that wound care should follow regularly and aggressively 

manage the wounds


And once the wounds show significant healing wound VAC can be placed and patient

may be discharged home with home health.  Outpatient wound clinic 3 times a week

and follow-up with vascular IR per schedule





Patient is not ready for discharge at this point.





Plan of care reviewed in detail with the patient and her nurse





Disposition; continue aggressive wound care, once infection is well controlled 

wound VAC can be applied and patient will be discharged in a stable condition











History


Interval history: 


Patient's leg is slightly swollen, wound VAC is removed


Some smell from the infected wound.  Vascular recommended 1 week of doxycycline


Wound care consulted Wound care evaluated the patient today, patient had 

extensive wound healing.


Vascular Dr. Cantrell recommended wound treatment per wound care personal


And once the infection is controlled, patient will receive wound VAC and then 

will be discharged with home health


Dr. Cantrell stated that the patient is not stable for discharge at this point.





Patient is upset and was anxious to go home


I explained to the patient the above-mentioned treatment plan in detail.


Vital signs reviewed








Hospitalist Physical





- Constitutional


Vitals: 


                                        











Temp Pulse Resp BP Pulse Ox


 


 98.8 F   117 H  18   118/59   96 


 


 22 16:31  22 16:31  22 16:31  22 16:31  22 16:31











General appearance: Present: mild distress, well-nourished, obese





- EENT


Eyes: Present: PERRL, EOM intact


ENT: clear oral mucosa





- Neck


Neck: Present: supple, normal ROM





- Respiratory


Respiratory effort: normal


Respiratory: bilateral: diminished, rhonchi, negative: rales, wheezing





- Cardiovascular


Rhythm: regular


Heart Sounds: Present: S1 & S2





- Extremities


Extremities: no ischemia, No edema, abnormal (Left lower extremity, dressing in 

place, elevated)





- Abdominal


General gastrointestinal: soft, non-tender, non-distended, normal bowel sounds





- Integumentary


Integumentary: Present: clear, warm





- Psychiatric


Psychiatric: appropriate mood/affect, cooperative





- Neurologic


Neurologic: CNII-XII intact, moves all extremities





Results





- Labs


CBC & Chem 7: 


                                 22 19:01





                                 22 05:22


Labs: 


                             Laboratory Last Values











WBC  9.4 K/mm3 (4.5-11.0)   22  19:01    


 


RBC  3.41 M/mm3 (3.65-5.03)  L  22  19:01    


 


Hgb  11.3 gm/dl (11.8-15.2)  L  22  19:01    


 


Hct  33.3 % (35.5-45.6)  L  22  19:01    


 


MCV  98 fl (84-94)  H  22  19:01    


 


MCH  33 pg (28-32)  H  22  19:01    


 


MCHC  34 % (32-34)   22  19:01    


 


RDW  13.3 % (13.2-15.2)   22  19:01    


 


Plt Count  450 K/mm3 (140-440)  H  22  19:01    


 


Lymph % (Auto)  12.9 % (13.4-35.0)  L  22  05:01    


 


Mono % (Auto)  6.9 % (0.0-7.3)   22  05:01    


 


Eos % (Auto)  0.4 % (0.0-4.3)   22  05:01    


 


Baso % (Auto)  0.3 % (0.0-1.8)   22  05:01    


 


Lymph # (Auto)  1.4 K/mm3 (1.2-5.4)   22  05:01    


 


Mono # (Auto)  0.7 K/mm3 (0.0-0.8)   22  05:01    


 


Eos # (Auto)  0.0 K/mm3 (0.0-0.4)   22  05:01    


 


Baso # (Auto)  0.0 K/mm3 (0.0-0.1)   22  05:01    


 


Add Manual Diff  Complete   22  04:49    


 


Total Counted  100   22  04:49    


 


Seg Neutrophils %  79.5 % (40.0-70.0)  H  22  05:01    


 


Seg Neuts % (Manual)  83.0 % (40.0-70.0)  H  22  04:49    


 


Band Neutrophils %  2.0 %  22  04:49    


 


Lymphocytes % (Manual)  5.0 % (13.4-35.0)  L  22  04:49    


 


Reactive Lymphs % (Man)  0 %  22  04:49    


 


Monocytes % (Manual)  8.0 % (0.0-7.3)  H  22  04:49    


 


Eosinophils % (Manual)  0 % (0.0-4.3)   22  04:49    


 


Basophils % (Manual)  0 % (0.0-1.8)   22  04:49    


 


Metamyelocytes %  2.0 %  22  04:49    


 


Myelocytes %  0 %  22  04:49    


 


Promyelocytes %  0 %  22  04:49    


 


Blast Cells %  0 %  22  04:49    


 


Nucleated RBC %  Not Reportable   22  04:49    


 


Seg Neutrophils #  8.4 K/mm3 (1.8-7.7)  H  22  05:01    


 


Seg Neutrophils # Man  17.1 K/mm3 (1.8-7.7)  H  22  04:49    


 


Band Neutrophils #  0.4 K/mm3  22  04:49    


 


Lymphocytes # (Manual)  1.0 K/mm3 (1.2-5.4)  L  22  04:49    


 


Abs React Lymphs (Man)  0.0 K/mm3  22  04:49    


 


Monocytes # (Manual)  1.6 K/mm3 (0.0-0.8)  H  22  04:49    


 


Eosinophils # (Manual)  0.0 K/mm3 (0.0-0.4)   22  04:49    


 


Basophils # (Manual)  0.0 K/mm3 (0.0-0.1)   22  04:49    


 


Metamyelocytes #  0.4 K/mm3  22  04:49    


 


Myelocytes #  0.0 K/mm3  22  04:49    


 


Promyelocytes #  0.0 K/mm3  22  04:49    


 


Blast Cells #  0.0 K/mm3  22  04:49    


 


WBC Morphology  Not Reportable   22  04:49    


 


Hypersegmented Neuts  Not Reportable   22  04:49    


 


Hyposegmented Neuts  Rare   22  04:49    


 


Hypogranular Neuts  Not Reportable   22  04:49    


 


Smudge Cells  Not Reportable   22  04:49    


 


Toxic Granulation  Not Reportable   22  04:49    


 


Toxic Vacuolation  Not Reportable   22  04:49    


 


Dohle Bodies  Not Reportable   22  04:49    


 


Pelger-Huet Anomaly  Not Reportable   22  04:49    


 


Jamie Rods  Not Reportable   22  04:49    


 


Platelet Estimate  Consistent w auto   22  04:49    


 


Clumped Platelets  Not Reportable   22  04:49    


 


Plt Clumps, EDTA  Not Reportable   22  04:49    


 


Large Platelets  Rare   22  04:49    


 


Giant Platelets  Not Reportable   22  04:49    


 


Platelet Satelliting  Not Reportable   22  04:49    


 


Plt Morphology Comment  Not Reportable   22  04:49    


 


RBC Morphology  Not Reportable   22  04:49    


 


Dimorphic RBCs  Not Reportable   22  04:49    


 


Polychromasia  Not Reportable   22  04:49    


 


Hypochromasia  Not Reportable   22  04:49    


 


Poikilocytosis  Not Reportable   22  04:49    


 


Anisocytosis  Rare   22  04:49    


 


Microcytosis  Not Reportable   22  04:49    


 


Macrocytosis  Rare   22  04:49    


 


Spherocytes  Not Reportable   22  04:49    


 


Pappenheimer Bodies  Not Reportable   22  04:49    


 


Sickle Cells  Not Reportable   22  04:49    


 


Target Cells  Not Reportable   22  04:49    


 


Tear Drop Cells  Not Reportable   22  04:49    


 


Ovalocytes  Not Reportable   22  04:49    


 


Helmet Cells  Not Reportable   22  04:49    


 


Mccormick-Spearville Bodies  Not Reportable   22  04:49    


 


Cabot Rings  Not Reportable   22  04:49    


 


Cornel Cells  Not Reportable   22  04:49    


 


Bite Cells  Not Reportable   22  04:49    


 


Crenated Cell  Not Reportable   22  04:49    


 


Elliptocytes  Not Reportable   22  04:49    


 


Acanthocytes (Spur)  Not Reportable   22  04:49    


 


Rouleaux  Not Reportable   22  04:49    


 


Hemoglobin C Crystals  Not Reportable   22  04:49    


 


Schistocytes  Not Reportable   22  04:49    


 


Malaria parasites  Not Reportable   22  04:49    


 


Miguel Bodies  Not Reportable   22  04:49    


 


Hem Pathologist Commnt  No   22  04:49    


 


PT  13.4 Sec. (12.2-14.9)   22  13:30    


 


INR  0.92  (0.87-1.13)   22  13:30    


 


APTT  25.8 Sec. (24.2-36.6)   22  13:30    


 


Sodium  136 mmol/L (137-145)  L  22  05:22    


 


Potassium  3.8 mmol/L (3.6-5.0)   22  05:22    


 


Chloride  101.0 mmol/L ()   22  05:22    


 


Carbon Dioxide  29 mmol/L (22-30)   22  05:22    


 


Anion Gap  10 mmol/L  22  05:22    


 


BUN  5 mg/dL (9-20)  L  22  05:22    


 


Creatinine  0.7 mg/dL (0.8-1.3)  L  22  05:22    


 


Estimated GFR  > 60 ml/min  22  05:22    


 


BUN/Creatinine Ratio  7 %  22  05:22    


 


Glucose  103 mg/dL ()  H  22  05:22    


 


Lactic Acid  1.40 mmol/L (0.7-2.0)   22  13:30    


 


Calcium  8.9 mg/dL (8.4-10.2)   22  05:22    


 


Phosphorus  2.80 mg/dL (2.5-4.5)   22  05:17    


 


Magnesium  2.20 mg/dL (1.7-2.3)   22  05:17    


 


Total Bilirubin  0.50 mg/dL (0.1-1.2)   22  05:22    


 


Direct Bilirubin  < 0.2 mg/dL (0-0.2)   22  04:00    


 


Indirect Bilirubin  0.3 mg/dL  22  04:00    


 


AST  42 units/L (5-40)  H  22  05:22    


 


ALT  108 units/L (7-56)  H  22  05:22    


 


Alkaline Phosphatase  77 units/L ()   22  05:22    


 


Ammonia  37.0 umol/L (25-60)   22  04:49    


 


Total Creatine Kinase  1001 units/L ()  H  22  08:55    


 


Total Protein  6.2 g/dL (6.3-8.2)  L  22  05:22    


 


Albumin  3.1 g/dL (3.9-5)  L  22  05:22    


 


Albumin/Globulin Ratio  1.0 %  22  05:22    


 


Amylase  25 units/L ()  L  22  04:49    


 


Lipase  10 units/L (13-60)  L  22  04:49    


 


Coronavirus (PCR)  Negative  (Negative)   22  11:40    


 


Hepatitis A IgM Ab  Nonreactive  (NonReactive)   22  13:30    


 


Hep Bs Antigen  Non-reactive  (Negative)   22  13:30    


 


Hep B Core IgM Ab  Non-reactive  (NonReactive)   22  13:30    


 


Hepatitis C Antibody  Non-reactive  (NonReactive)   22  13:30    


 


HIV 1&2 Antibody Rapid  Non react  (Non React)   22  16:42    


 


HIV P24 Antigen  Non react  (Non React)   22  16:42    











Mcwilliams/IV: 


                                        





Voiding Method                   Urinal











Active Medications





- Current Medications


Current Medications: 














Generic Name Dose Route Start Last Admin





  Trade Name Freq  PRN Reason Stop Dose Admin


 


Acetaminophen  650 mg  22 22:14  22 00:08





  Acetaminophen 325 Mg Tab  PO   650 mg





  Q6H PRN   Administration





  Pain, Mild (1-3)  


 


Albuterol  2.5 mg  22 06:15 





  Albuterol 2.5 Mg/3 Ml Nebu  IH  





  Q3HRT PRN  





  Shortness Of Breath  


 


Diphenhydramine HCl  25 mg  22 06:24  22 18:40





  Diphenhydramine 50 Mg/Ml Vial  IV   25 mg





  Q6H PRN   Administration





  Skin Irritation  


 


Docusate Sodium  100 mg  22 22:00  22 09:16





  Docusate Sodium 100 Mg Cap  PO   100 mg





  BID CHERYL   Administration


 


Famotidine  20 mg  22 10:00  22 09:16





  Famotidine 20 Mg Tab  PO   20 mg





  BID CHERYL   Administration


 


Gabapentin  300 mg  22 08:00  22 15:41





  Gabapentin 300 Mg Cap  PO   300 mg





  TID@0800,1600,2200 CHERYL   Administration


 


Heparin Sodium (Porcine)  5,000 unit  22 10:00  22 09:16





  Heparin 5,000 Unit/1 Ml Vial  SUB-Q   5,000 unit





  Q12HR CHERYL   Administration


 


Hydralazine HCl  10 mg  22 18:31  22 18:50





  Hydralazine 20 Mg/1 Ml Inj  IV   10 mg





  Q3HR PRN   Administration





  Hypertension  


 


Hydromorphone HCl  0.5 mg  22 06:15  22 12:30





  Hydromorphone 1 Mg/1 Ml Inj  IV   0.5 mg





  Q3H PRN   Administration





  Pain , Severe (7-10)  


 


Doxycycline Hyclate 100 mg/  250 mls @ 250 mls/hr  22 18:00  22 

17:09





  Sodium Chloride  IV  22 06:59  250 mls/hr





  Q12H CHERYL   Administration





  Protocol  


 


Ondansetron HCl  4 mg  22 06:15  22 10:38





  Ondansetron 4 Mg/2 Ml Inj  IV   4 mg





  Q8H PRN   Administration





  Nausea And Vomiting  


 


Oxycodone/Acetaminophen  1 tab  22 10:57  22 13:42





  Oxycodone /Acetaminophen 5-325mg Tab  PO   1 tab





  Q6H PRN   Administration





  Pain, Moderate (4-6)  


 


Sodium Chloride  10 ml  22 10:00  22 09:17





  Sodium Chloride 0.9% 10 Ml Flush Syringe  IV   10 ml





  BID CHERYL   Administration


 


Sodium Chloride  10 ml  22 06:15  22 07:19





  Sodium Chloride 0.9% 10 Ml Flush Syringe  IV   10 ml





  PRN PRN   Administration





  LINE FLUSH  


 


Sodium Chloride  500 ml  22 19:10  22 19:32





  Sodium Chloride 0.9% Irr 500 Ml Bottle  IR   500 ml





  AS DIRECT PRN   Administration





  Wound Care  


 


Sodium Hypochlorite  1 applic  22 13:00  22 13:48





  Sodium Hypochlorite, Dakin's Full Strength (0.5%) 473 Ml Topical Soln  TP   

Not Given





  DAILY CHERYL  


 


Valsartan  160 mg  22 22:00  22 09:16





  Valsartan 160mg Tab  PO   160 mg





  DAILY CHERYL   Administration














Nutrition/Malnutrition Assess





- Dietary Evaluation


Nutrition/Malnutrition Findings: 


                                        





Nutrition Notes                                            Start:  05/10/22 

16:45


Freq:                                                      Status: Active       




Protocol:                                                                       




 Document     22 14:21  HATTIE  (Rec: 22 14:46  HATTIE  [id number])


 Nutrition Notes


     Initial or Follow up                        Reassessment


     Current Diagnosis                           Hypertension


     Other Pertinent Diagnosis                   Rabdomyolysis, Liver Failure,


                                                 s/p L-LE 4 Compartment


                                                 Fasciotomy w/Debridem.


     Current Diet                                Regular Diet + Dietary


                                                 Supplements. (since B ).


     Labs/Tests                                  : BUN 5, Crea 0.7.


     Pertinent Medications                       : Nutritionally


                                                 unremarkable.


     Height                                      5 ft 6 in


     Weight                                      86.183 kg


     Ideal Body Weight (kg)                      64.54


     BMI                                         30.7


     Weight change and time frame                No body weight change


                                                 reporterd in 9 days.


     Weight Status                               Obese


     Subjective/Other Information                RD consult for routine F/U on


                                                 dietary advancement.


                                                 Pt's PO intake of meals has


                                                 been Good (100%), according to


                                                 ADL notes.


                                                 Pt is on Room Air, O2


                                                 saturation @ 96%, according to


                                                 Physical Assessment History


                                                 notes.


                                                 Pt presents L-LE Pitting Edema


                                                 2+, according to Physical


                                                 Assessment History notes.


                                                 Pt presents L-LE Vascular


                                                 Wound with WoundVAC since , according to Physical


                                                 Assessment History notes.


                                                 Event on , Pt suffered a


                                                 syncope during PT session,


                                                 according to Progress notes.


                                                 Pt is clearedd for discharge,


                                                 it is pending due to several


                                                 social issues, according to


                                                 Progress notes.


     Percent of energy/protein needs met:        Prescribed Regular Diet


                                                 provides for energy/protein


                                                 needs (2,289 Kcal/89 g) during


                                                 LOS


     Burn                                        Absent


     Trauma                                      Absent


     GI Symptoms                                 None


     Food Allergy                                No


     Skin Integrity/Comment                      L-LE Vascular Wound


     Current % PO                                Good (%)


     Minimum of two criteria                     No


     #1


      Nutrition Diagnosis                        Increased nutrient needs   (


                                                 specify in comment below)


      Comments:                                  Protein to support wound


                                                 healing processes.


      Diagnosis Progress(for reassessment        Continues


       documentation)                            


     Is patient on ventilator?                   No


     Is Patient Ambulatory and/or Out of Bed     No


     REE-([facility]-confined to bed)      [phone]


     Kcal/Kg value to use for calculation        19


     Approximate Energy Requirements Using       1637


      kcal/Kg                                    


     Calculation Used for Recommendations        Kcal/kg


     Additional Notes                            Protein: 1.25-1.5 g/Kg AdjBW;


                                                  g/day.


                                                 Fluids: 1 ml/Kcal, or as per


                                                 MD.


 Nutrition Intervention


     Change Diet Order:                          Continue Regular Diet.


     Add Supplement/Snack (indicate name/kcal    Continue 28.8 g pkt Aidan; BID


      /protein )                                 .


     Provides kCal:                              190


     Provides Protein (gm)                       5


     Goal #1                                     Support, through dietary


                                                 supplementation, wound healing


                                                 processes during LOS.


     Goal #2                                     Adjust the dietary


                                                 intervention to better serve


                                                 Pt's needs and clinical


                                                 conditions during LOS.


     Goal #3                                     Maintain body weight within +/


                                                 -3% of admission body weight


                                                 during LOS.


     Revisit per MD consult or patient           Sign Off


      request:                                   


     Additional Comments                         Continue monitoring food


                                                 tolerance, %PO intake of meals


                                                 , and BM.

## 2022-05-25 LAB
ALBUMIN SERPL-MCNC: 2.9 G/DL (ref 3.9–5)
ALT SERPL-CCNC: 55 UNITS/L (ref 7–56)
BASOPHILS # (AUTO): 0 K/MM3 (ref 0–0.1)
BASOPHILS NFR BLD AUTO: 0.4 % (ref 0–1.8)
BUN SERPL-MCNC: 9 MG/DL (ref 9–20)
BUN/CREAT SERPL: 15 %
CALCIUM SERPL-MCNC: 9 MG/DL (ref 8.4–10.2)
EOSINOPHIL # BLD AUTO: 0 K/MM3 (ref 0–0.4)
EOSINOPHIL NFR BLD AUTO: 0.4 % (ref 0–4.3)
HCT VFR BLD CALC: 32.5 % (ref 35.5–45.6)
HEMOLYSIS INDEX: 6
HGB BLD-MCNC: 10.7 GM/DL (ref 11.8–15.2)
LYMPHOCYTES # BLD AUTO: 2.2 K/MM3 (ref 1.2–5.4)
LYMPHOCYTES NFR BLD AUTO: 27.8 % (ref 13.4–35)
MCHC RBC AUTO-ENTMCNC: 33 % (ref 32–34)
MCV RBC AUTO: 98 FL (ref 84–94)
MONOCYTES # (AUTO): 0.7 K/MM3 (ref 0–0.8)
MONOCYTES % (AUTO): 8.8 % (ref 0–7.3)
PLATELET # BLD: 425 K/MM3 (ref 140–440)
RBC # BLD AUTO: 3.31 M/MM3 (ref 3.65–5.03)

## 2022-05-25 RX ADMIN — HYDROMORPHONE HYDROCHLORIDE PRN MG: 1 INJECTION, SOLUTION INTRAMUSCULAR; INTRAVENOUS; SUBCUTANEOUS at 01:32

## 2022-05-25 RX ADMIN — GABAPENTIN SCH MG: 300 CAPSULE ORAL at 15:36

## 2022-05-25 RX ADMIN — OXYCODONE AND ACETAMINOPHEN PRN TAB: 5; 325 TABLET ORAL at 05:52

## 2022-05-25 RX ADMIN — HEPARIN SODIUM SCH UNIT: 5000 INJECTION, SOLUTION INTRAVENOUS; SUBCUTANEOUS at 09:58

## 2022-05-25 RX ADMIN — HEPARIN SODIUM SCH UNIT: 5000 INJECTION, SOLUTION INTRAVENOUS; SUBCUTANEOUS at 22:29

## 2022-05-25 RX ADMIN — VALSARTAN SCH MG: 160 TABLET, FILM COATED ORAL at 10:00

## 2022-05-25 RX ADMIN — OXYCODONE AND ACETAMINOPHEN PRN TAB: 5; 325 TABLET ORAL at 23:46

## 2022-05-25 RX ADMIN — FAMOTIDINE SCH MG: 20 TABLET ORAL at 22:29

## 2022-05-25 RX ADMIN — GABAPENTIN SCH MG: 300 CAPSULE ORAL at 09:57

## 2022-05-25 RX ADMIN — DOXYCYCLINE SCH MLS/HR: 100 INJECTION, POWDER, LYOPHILIZED, FOR SOLUTION INTRAVENOUS at 05:52

## 2022-05-25 RX ADMIN — Medication PRN ML: at 01:33

## 2022-05-25 RX ADMIN — ONDANSETRON PRN MG: 2 INJECTION INTRAMUSCULAR; INTRAVENOUS at 22:29

## 2022-05-25 RX ADMIN — HYDROMORPHONE HYDROCHLORIDE PRN MG: 1 INJECTION, SOLUTION INTRAMUSCULAR; INTRAVENOUS; SUBCUTANEOUS at 12:20

## 2022-05-25 RX ADMIN — Medication SCH ML: at 22:30

## 2022-05-25 RX ADMIN — ACETAMINOPHEN PRN MG: 325 TABLET ORAL at 01:36

## 2022-05-25 RX ADMIN — DOCUSATE SODIUM SCH MG: 100 CAPSULE, LIQUID FILLED ORAL at 22:29

## 2022-05-25 RX ADMIN — HYDROMORPHONE HYDROCHLORIDE PRN MG: 1 INJECTION, SOLUTION INTRAMUSCULAR; INTRAVENOUS; SUBCUTANEOUS at 15:36

## 2022-05-25 RX ADMIN — FAMOTIDINE SCH MG: 20 TABLET ORAL at 09:57

## 2022-05-25 RX ADMIN — VALSARTAN SCH: 160 TABLET, FILM COATED ORAL at 10:06

## 2022-05-25 RX ADMIN — DOCUSATE SODIUM SCH MG: 100 CAPSULE, LIQUID FILLED ORAL at 09:58

## 2022-05-25 RX ADMIN — Medication SCH ML: at 09:58

## 2022-05-25 RX ADMIN — DOXYCYCLINE SCH MLS/HR: 100 INJECTION, POWDER, LYOPHILIZED, FOR SOLUTION INTRAVENOUS at 17:30

## 2022-05-25 RX ADMIN — GABAPENTIN SCH MG: 300 CAPSULE ORAL at 22:29

## 2022-05-25 RX ADMIN — Medication SCH 1000UNITS: at 09:59

## 2022-05-25 NOTE — PROGRESS NOTE
Assessment and Plan





This is a 36 year old  transgendered female undergoing gender transformation 

with silicone injection to buttocks ([place] ) and breast implants, heavy 

drinker on the weekends (bottle of vodka) who presented to [facility] on 5/3 because 

of adverse drug reaction after reportedly self administration of  

rocephin and PCN for sore throat. Patient reportedly collapsed while checking in

to the emergency department, has complains of severe 10 out of 10 pain in left 

thigh and has noticeable mottling of skin in thigh and left lower abdomen.  

Patient underwent a CT of the left leg which showed possible cellulitis with no 

drainable fluid collection and was started on empiric antibiotics.  Patient 

admitted to the hospital service with diagnosis of acute liver failure, 

rhabdomyolysis, s/p 4 compartment fasciotomy to left lower extremity.  Patient 

received antibiotics, wound care, wound VAC treatment and was planning to be 

discharged


However developed a wound infection, wound VAC was removed, medical personnel 

aggressively treating the patient.  Discharge was held





Hospital course to date:





5/3: Venous ultrasound shows no DVT, Vascular surgery consulted who performed a 

fasciotomy and plan to transfer patient to [facility] post intervention.  Neurology 

consulted





: Transfer to ICU, GI and neurology consulted.





: Patient will be transferred back to the floor from ICU.  Her liver enzymes 

and creatinine are trending down.  Will be started on gabapentin per neurology 

recommendations and antibiotics discontinued.





: Continue supportive care.  CPK slowly improving.  Vascular plans for 

closure of fasciotomy early next week.  PT OT evaluation and treatment closure 

done.  Continue wound management.  No evidence of withdrawal noted at this time.

 Continue to monitor LFTs which are also slightly improving.  Plan discussed in 

detail with the patient who verbalized understanding





: Patient seen and examined clinically stable continues to show some 

improvement.  Closure of fasciotomy planned for tomorrow.  Renal function is 

improving.  CK ordered and pending





: Patient seen and examined, wbc mildly elevated likely secondary to 

steroids, will change to po for two additional days and stop, benefit probably 

already achieved, continue wound dressing, sensory function improved, awaiting 

wound vac and closure. Patient advised on clinically progress. 





: Patient seen and examined awaiting her vascular surgery for closure of 

fasciotomy.  CK is decreasing less than 9000 today.  We will continue aggressive

IV fluids until we have the resolution below 5000.  LFTs on the downward trend 

also.  Leukocytosis appears to have peaked steroids was changed to oral for 2 

additional days today is 1 of 2..  Plan of care discussed in detail with the 

patient who verbalized understanding.





5/10:  Left lower extremity compartment syndrome status post fasciotomies.  

Cause is still unclear and may represent some allergic reaction.  Motor function

is unchanged from original exam.  Sensory function has improved with full 

sensation of the foot.vascular surgery to place wound vacs on the leg today.  CK

improved to the 8000 range today





: Patient underwent excisional debridement of muscle and soft tissue from 

left leg wound yesterday.  Patient also had wound VAC placement left leg 

fasciotomy incisions.  The patient had moderate amount of superficial necrotic 

muscle in the anterior lateral compartments with minimal amount of superficial 

necrotic muscle in the posterior compartment.  Follow-up culture results.  CK 

levels have improved to 6900 range.  AST/ALT continue to trend downward.





: I discussed the case with Dr. Mckeon who recommends home wound VAC as 

well as physical therapy.  I also discussed these needs with case management who

was attempting to arrange for discharge planning.  CK continues to improve with 

levels in the 4000 range.





: Still awaiting for home wound VAC arrangements to be made.  Leukocytosis 

is much improved down to 13,900 today.  LFTs continue to trend down to normal 

range.  CK levels still in the 4000 range.  Nephrology has signed off.  

Anticipate discharge later today or in a.m.





:  Still awaiting for home wound VAC arrangements to be made.  Laboratories 

continue to include including LFTs, CK and leukocytosis.





5/15:  Still awaiting for home wound VAC arrangements to be made.





: Patient had an episode of syncope while working with PT today.  Nurse 

reports patient lost consciousness for a few seconds and was helped back to the 

bed.  Patient's blood pressure was noted to be hypotensive/orthostatic.  We will

bolus 500 normal saline IV fluid and monitor orthostatics.  Case management 

reports that wound VAC should be delivered today.  LFTs continue to trend down 

to normal range.  CK levels still in the 2000 range.  Nephrology has signed off.

 Anticipate discharge in a.m. given the syncope and once wound VAC arranged.  

Patient with low-grade temp.  We will monitor.





; patient is anxious to go home, wound VAC is delivered to the bedside, 

continue wound care


Multiple social issues, DC planning per case management, left lower extremity 

Doppler negative for DVT





; discharge planning, multiple social issues





; continue current management, wound VAC in place, DC planning issues





; discharge planning per case management, setting up wound VAC, setting up 

outpatient wound care


Setting up follow-up visits with physicians, patient has multiple social issues 

and no payer source


Continue current management discharge when medically stable





; patient's LFTs are trending down, continue current management


Wound VAC care, wound VAC change per instructions, wound care as indicated


Discharge planning per case management





; patient is medically stable for discharge


Awaiting discharge planning coordinating with wound VAC and wound care


Post discharge follow-up visit with vascular Dr. Vikas Laboy on 2020 at 10 AM





; vascular /IR Dr. Cantrell evaluation and recommendations noted and appreciated


Patient's wound had some peculiar infection smell, started on doxycycline


And recommended aggressive wound care.





; iron  talk to Dr. Cantrell regarding the treatment and discharge 

planning


Dr. Cantrell informed that wound care should follow regularly and aggressively 

manage the wounds


And once the wounds show significant healing wound VAC can be placed and patient

may be discharged home with home health.  Outpatient wound clinic 3 times a week

and follow-up with vascular IR per schedule





: Continue dressing change per wound care.  Patient initiated on 

doxycycline, surgical culture is growing bacillus.  Discharge planning per 

vascular recommendation once wound VAC is placed.  Continue supportive care. 





Assessment and plan;


--Compartment syndrome to LLE


-Vascular surgery consulted, appreciate recommendations


-s/p self admin of IM abx to midthigh


-c/o pain, decreased sensation, pulses were dopplarable, paralysis, mottled skin


-s/p 4 compartment tracheotomy on 5/3 with vascular surgery


-Bilateral lower extremity Doppler ultrasound showed no DVT


-Left lower extremity CT with contrast showed appearance of posterior soft 

tissue complex 


cellulitis with small subcutaneous lymph nodes, no drainable fluid collections 

present, contusion 


could have similar appearance


-Bilateral lower extremity arterial duplex showed no significant lower extremity

peripheral artery disease


-Normal ABIs





Patient received wound VAC, with aggressive management and frequent changing.  

And with supportive care


Patient was being prepared to discharge with home health and outpatient wound 

care 3 times a week.





However patient's wound got significantly infected, wound VAC was removed, wound

care personal consulted and they are aggressively treating the wounds.


Once the wound infection improves, patient may receive wound VAC and then 

patient may be discharged home with follow-up visit with Dr. Vikas Laboy


Outpatient wound care 3 times a week





--Peripheral neuropathy


Continue gabapentin


Neurology evaluated





--HTN


Moderate control, continue current antihypertensives


As needed medications





--Rhabdomyolysis;


Improved from 95,000 CK -1001 week ago


Closely monitor IV hydration





--acute kidney injury with vasomotor nephropathy now resolved


Monitor renal function


Avoid nephrotoxin. gentle hydration as needed








Patient is not ready for discharge at this point.


Plan of care reviewed in detail with the patient and her nurse





Disposition; continue aggressive wound care, once infection is well controlled 

wound VAC can be applied and patient will be discharged in a stable condition














Subjective


Date of service: 22


Principal diagnosis: abnormal liver enzymes


Interval history: 





Patient seen and examined.  Medical records and medication list reviewed.  


No acute event overnight noted by the RN.  


Patient denies any chest pain or difficulty breathing.  Patient is tolerating 

diet.  


Discussed plan of care at bedside with patient.Waiting on dressing change








Objective





- Exam


Narrative Exam: 





General appearance: Present: mild distress, well-nourished, obese





- EENT


Eyes: Present: PERRL, EOM intact


ENT: clear oral mucosa





- Neck


Neck: Present: supple, normal ROM





- Respiratory


Respiratory effort: normal


Respiratory: bilateral: diminished, rhonchi, negative: rales, wheezing





- Cardiovascular


Rhythm: regular


Heart Sounds: Present: S1 & S2





- Extremities


Extremities: no ischemia, No edema, abnormal (Left lower extremity, dressing in 

place, elevated)





- Abdominal


General gastrointestinal: soft, non-tender, non-distended, normal bowel sounds





- Integumentary


Integumentary: Present: clear, warm





- Psychiatric


Psychiatric: appropriate mood/affect, cooperative





- Neurologic


Neurologic: CNII-XII intact, moves all extremities





- Constitutional


Vitals: 


                               Vital Signs - 12hr











  22





  10:00 12:20 15:36


 


Respiratory  18 20





Rate   


 


O2 Sat by Pulse 98  





Oximetry   














- Labs


CBC & Chem 7: 


                                 22 06:06





                                 22 06:06


Labs: 


                              Abnormal lab results











  22 Range/Units





  06:06 06:06 


 


RBC  3.31 L   (3.65-5.03)  M/mm3


 


Hgb  10.7 L   (11.8-15.2)  gm/dl


 


Hct  32.5 L   (35.5-45.6)  %


 


MCV  98 H   (84-94)  fl


 


MCH  33 H   (28-32)  pg


 


Mono % (Auto)  8.8 H   (0.0-7.3)  %


 


Sodium   134 L  (137-145)  mmol/L


 


Chloride   97.0 L  ()  mmol/L


 


Creatinine   0.6 L  (0.8-1.3)  mg/dL


 


Glucose   109 H  ()  mg/dL


 


Albumin   2.9 L  (3.9-5)  g/dL

## 2022-05-26 RX ADMIN — VALSARTAN SCH: 160 TABLET, FILM COATED ORAL at 09:22

## 2022-05-26 RX ADMIN — HEPARIN SODIUM SCH UNIT: 5000 INJECTION, SOLUTION INTRAVENOUS; SUBCUTANEOUS at 09:23

## 2022-05-26 RX ADMIN — OXYCODONE AND ACETAMINOPHEN PRN TAB: 5; 325 TABLET ORAL at 06:00

## 2022-05-26 RX ADMIN — Medication SCH ML: at 09:23

## 2022-05-26 RX ADMIN — GABAPENTIN SCH MG: 300 CAPSULE ORAL at 21:04

## 2022-05-26 RX ADMIN — Medication SCH 1000UNITS: at 09:24

## 2022-05-26 RX ADMIN — DOCUSATE SODIUM SCH MG: 100 CAPSULE, LIQUID FILLED ORAL at 21:04

## 2022-05-26 RX ADMIN — HEPARIN SODIUM SCH UNIT: 5000 INJECTION, SOLUTION INTRAVENOUS; SUBCUTANEOUS at 21:05

## 2022-05-26 RX ADMIN — HYDROMORPHONE HYDROCHLORIDE PRN MG: 1 INJECTION, SOLUTION INTRAMUSCULAR; INTRAVENOUS; SUBCUTANEOUS at 13:38

## 2022-05-26 RX ADMIN — FAMOTIDINE SCH MG: 20 TABLET ORAL at 09:22

## 2022-05-26 RX ADMIN — Medication SCH ML: at 21:05

## 2022-05-26 RX ADMIN — OXYCODONE AND ACETAMINOPHEN PRN TAB: 5; 325 TABLET ORAL at 21:08

## 2022-05-26 RX ADMIN — GABAPENTIN SCH MG: 300 CAPSULE ORAL at 17:14

## 2022-05-26 RX ADMIN — FAMOTIDINE SCH MG: 20 TABLET ORAL at 21:05

## 2022-05-26 RX ADMIN — DOXYCYCLINE SCH MLS/HR: 100 INJECTION, POWDER, LYOPHILIZED, FOR SOLUTION INTRAVENOUS at 17:14

## 2022-05-26 RX ADMIN — DOXYCYCLINE SCH MLS/HR: 100 INJECTION, POWDER, LYOPHILIZED, FOR SOLUTION INTRAVENOUS at 05:44

## 2022-05-26 RX ADMIN — DOCUSATE SODIUM SCH MG: 100 CAPSULE, LIQUID FILLED ORAL at 09:22

## 2022-05-26 RX ADMIN — GABAPENTIN SCH MG: 300 CAPSULE ORAL at 09:22

## 2022-05-26 NOTE — PROGRESS NOTE
Assessment and Plan





This is a 36 year old  transgendered female undergoing gender transformation 

with silicone injection to buttocks ([place] ) and breast implants, heavy 

drinker on the weekends (bottle of vodka) who presented to [facility] on 5/3 because 

of adverse drug reaction after reportedly self administration of  

rocephin and PCN for sore throat. Patient reportedly collapsed while checking in

to the emergency department, has complains of severe 10 out of 10 pain in left 

thigh and has noticeable mottling of skin in thigh and left lower abdomen.  

Patient underwent a CT of the left leg which showed possible cellulitis with no 

drainable fluid collection and was started on empiric antibiotics.  Patient 

admitted to the hospital service with diagnosis of acute liver failure, 

rhabdomyolysis, s/p 4 compartment fasciotomy to left lower extremity.  Patient 

received antibiotics, wound care, wound VAC treatment and was planning to be 

discharged


However developed a wound infection, wound VAC was removed, medical personnel 

aggressively treating the patient.  Discharge was held





Hospital course to date:





5/3: Venous ultrasound shows no DVT, Vascular surgery consulted who performed a 

fasciotomy and plan to transfer patient to [facility] post intervention.  Neurology 

consulted





: Transfer to ICU, GI and neurology consulted.





: Patient will be transferred back to the floor from ICU.  Her liver enzymes 

and creatinine are trending down.  Will be started on gabapentin per neurology 

recommendations and antibiotics discontinued.





: Continue supportive care.  CPK slowly improving.  Vascular plans for 

closure of fasciotomy early next week.  PT OT evaluation and treatment closure 

done.  Continue wound management.  No evidence of withdrawal noted at this time.

 Continue to monitor LFTs which are also slightly improving.  Plan discussed in 

detail with the patient who verbalized understanding





: Patient seen and examined clinically stable continues to show some 

improvement.  Closure of fasciotomy planned for tomorrow.  Renal function is 

improving.  CK ordered and pending





: Patient seen and examined, wbc mildly elevated likely secondary to 

steroids, will change to po for two additional days and stop, benefit probably 

already achieved, continue wound dressing, sensory function improved, awaiting 

wound vac and closure. Patient advised on clinically progress. 





: Patient seen and examined awaiting her vascular surgery for closure of 

fasciotomy.  CK is decreasing less than 9000 today.  We will continue aggressive

IV fluids until we have the resolution below 5000.  LFTs on the downward trend 

also.  Leukocytosis appears to have peaked steroids was changed to oral for 2 

additional days today is 1 of 2..  Plan of care discussed in detail with the 

patient who verbalized understanding.





5/10:  Left lower extremity compartment syndrome status post fasciotomies.  

Cause is still unclear and may represent some allergic reaction.  Motor function

is unchanged from original exam.  Sensory function has improved with full 

sensation of the foot.vascular surgery to place wound vacs on the leg today.  CK

improved to the 8000 range today





: Patient underwent excisional debridement of muscle and soft tissue from 

left leg wound yesterday.  Patient also had wound VAC placement left leg 

fasciotomy incisions.  The patient had moderate amount of superficial necrotic 

muscle in the anterior lateral compartments with minimal amount of superficial 

necrotic muscle in the posterior compartment.  Follow-up culture results.  CK 

levels have improved to 6900 range.  AST/ALT continue to trend downward.





: I discussed the case with Dr. Mckeon who recommends home wound VAC as 

well as physical therapy.  I also discussed these needs with case management who

was attempting to arrange for discharge planning.  CK continues to improve with 

levels in the 4000 range.





: Still awaiting for home wound VAC arrangements to be made.  Leukocytosis 

is much improved down to 13,900 today.  LFTs continue to trend down to normal 

range.  CK levels still in the 4000 range.  Nephrology has signed off.  

Anticipate discharge later today or in a.m.





:  Still awaiting for home wound VAC arrangements to be made.  Laboratories 

continue to include including LFTs, CK and leukocytosis.





5/15:  Still awaiting for home wound VAC arrangements to be made.





: Patient had an episode of syncope while working with PT today.  Nurse 

reports patient lost consciousness for a few seconds and was helped back to the 

bed.  Patient's blood pressure was noted to be hypotensive/orthostatic.  We will

bolus 500 normal saline IV fluid and monitor orthostatics.  Case management 

reports that wound VAC should be delivered today.  LFTs continue to trend down 

to normal range.  CK levels still in the 2000 range.  Nephrology has signed off.

 Anticipate discharge in a.m. given the syncope and once wound VAC arranged.  

Patient with low-grade temp.  We will monitor.





; patient is anxious to go home, wound VAC is delivered to the bedside, 

continue wound care


Multiple social issues, DC planning per case management, left lower extremity 

Doppler negative for DVT





; discharge planning, multiple social issues





; continue current management, wound VAC in place, DC planning issues





; discharge planning per case management, setting up wound VAC, setting up 

outpatient wound care


Setting up follow-up visits with physicians, patient has multiple social issues 

and no payer source


Continue current management discharge when medically stable





; patient's LFTs are trending down, continue current management


Wound VAC care, wound VAC change per instructions, wound care as indicated


Discharge planning per case management





; patient is medically stable for discharge


Awaiting discharge planning coordinating with wound VAC and wound care


Post discharge follow-up visit with vascular Dr. Vikas Laboy on 2020 at 10 AM





; vascular /IR Dr. Cantrell evaluation and recommendations noted and appreciated


Patient's wound had some peculiar infection smell, started on doxycycline


And recommended aggressive wound care.





; iron  talk to Dr. Cantrell regarding the treatment and discharge 

planning


Dr. Cantrell informed that wound care should follow regularly and aggressively 

manage the wounds


And once the wounds show significant healing wound VAC can be placed and patient

may be discharged home with home health.  Outpatient wound clinic 3 times a week

and follow-up with vascular IR per schedule





: Continue dressing change per wound care.  Patient initiated on 

doxycycline, surgical culture is growing bacillus.  Discharge planning per 

vascular recommendation once wound VAC is placed.  Continue supportive care. 





: Continue regular wound care, continue empiric antibiotic, wait on culture 

sensitivity.  Discharge planning per vascular surgery. 








Assessment and plan;


--Compartment syndrome to LLE


-Vascular surgery consulted, appreciate recommendations


-s/p self admin of IM abx to [facility]


-c/o pain, decreased sensation, pulses were dopplarable, paralysis, mottled skin


-s/p 4 compartment tracheotomy on 5/3 with vascular surgery


-Bilateral lower extremity Doppler ultrasound showed no DVT


-Left lower extremity CT with contrast showed appearance of posterior soft t

issue complex 


cellulitis with small subcutaneous lymph nodes, no drainable fluid collections 

present, contusion 


could have similar appearance


-Bilateral lower extremity arterial duplex showed no significant lower extremity

peripheral artery disease


-Normal ABIs





Patient received wound VAC, with aggressive management and frequent changing.  

And with supportive care


Patient was being prepared to discharge with home health and outpatient wound 

care 3 times a week.





However patient's wound got significantly infected, wound VAC was removed, wound

care personal consulted and they are aggressively treating the wounds.


Once the wound infection improves, patient may receive wound VAC and then patie

nt may be discharged home with follow-up visit with Dr. Vikas Laboy


Outpatient wound care 3 times a week





--Peripheral neuropathy


Continue gabapentin


Neurology evaluated





--HTN


Moderate control, continue current antihypertensives


As needed medications





--Rhabdomyolysis;


Improved from 95,000 CK -1001 week ago


Closely monitor IV hydration





--acute kidney injury with vasomotor nephropathy now resolved


Monitor renal function


Avoid nephrotoxin. gentle hydration as needed








Patient is not ready for discharge at this point.


Plan of care reviewed in detail with the patient and her nurse





Disposition; continue aggressive wound care, once infection is well controlled 

wound VAC can be applied and patient will be discharged in a stable condition














Subjective


Date of service: 22


Principal diagnosis: abnormal liver enzymes


Interval history: 





Patient seen and examined.  Medical records and medication list reviewed.  


No acute event overnight noted by the RN.  


Patient denies any chest pain or difficulty breathing.  Patient is tolerating 

diet.  


Discussed plan of care at bedside with patient.








Objective





- Exam


Narrative Exam: 





General appearance: Present: mild distress, well-nourished, obese





- EENT


Eyes: Present: PERRL, EOM intact


ENT: clear oral mucosa





- Neck


Neck: Present: supple, normal ROM





- Respiratory


Respiratory effort: normal


Respiratory: bilateral: diminished, rhonchi, negative: rales, wheezing





- Cardiovascular


Rhythm: regular


Heart Sounds: Present: S1 & S2





- Extremities


Extremities: no ischemia, No edema, abnormal (Left lower extremity, dressing in 

place, elevated)





- Abdominal


General gastrointestinal: soft, non-tender, non-distended, normal bowel sounds





- Integumentary


Integumentary: Present: clear, warm





- Psychiatric


Psychiatric: appropriate mood/affect, cooperative





- Neurologic


Neurologic: CNII-XII intact, moves all extremities





- Constitutional


Vitals: 


                               Vital Signs - 12hr











  22





  06:00 08:38 09:22


 


Temperature   


 


Pulse Rate   94 H


 


Respiratory 20  





Rate   


 


Blood Pressure   100/58


 


O2 Sat by Pulse  98 





Oximetry   














  22





  11:01


 


Temperature 98.5 F


 


Pulse Rate 91 H


 


Respiratory 16





Rate 


 


Blood Pressure 104/59


 


O2 Sat by Pulse 98





Oximetry 














- Labs


CBC & Chem 7: 


                                 22 06:06





                                 22 06:06

## 2022-05-27 LAB
BUN SERPL-MCNC: 8 MG/DL (ref 9–20)
BUN/CREAT SERPL: 13 %
CALCIUM SERPL-MCNC: 9 MG/DL (ref 8.4–10.2)
HCT VFR BLD CALC: 32.4 % (ref 35.5–45.6)
HEMOLYSIS INDEX: 12
HGB BLD-MCNC: 10.7 GM/DL (ref 11.8–15.2)

## 2022-05-27 RX ADMIN — Medication SCH ML: at 09:46

## 2022-05-27 RX ADMIN — HYDROMORPHONE HYDROCHLORIDE PRN MG: 1 INJECTION, SOLUTION INTRAMUSCULAR; INTRAVENOUS; SUBCUTANEOUS at 00:00

## 2022-05-27 RX ADMIN — FAMOTIDINE SCH MG: 20 TABLET ORAL at 09:44

## 2022-05-27 RX ADMIN — DOXYCYCLINE SCH MLS/HR: 100 INJECTION, POWDER, LYOPHILIZED, FOR SOLUTION INTRAVENOUS at 06:19

## 2022-05-27 RX ADMIN — DOCUSATE SODIUM SCH MG: 100 CAPSULE, LIQUID FILLED ORAL at 21:22

## 2022-05-27 RX ADMIN — GABAPENTIN SCH MG: 300 CAPSULE ORAL at 21:21

## 2022-05-27 RX ADMIN — OXYCODONE AND ACETAMINOPHEN PRN TAB: 5; 325 TABLET ORAL at 06:42

## 2022-05-27 RX ADMIN — OXYCODONE AND ACETAMINOPHEN PRN TAB: 5; 325 TABLET ORAL at 15:50

## 2022-05-27 RX ADMIN — GABAPENTIN SCH MG: 300 CAPSULE ORAL at 09:44

## 2022-05-27 RX ADMIN — Medication SCH ML: at 21:21

## 2022-05-27 RX ADMIN — HEPARIN SODIUM SCH UNIT: 5000 INJECTION, SOLUTION INTRAVENOUS; SUBCUTANEOUS at 21:21

## 2022-05-27 RX ADMIN — VALSARTAN SCH: 160 TABLET, FILM COATED ORAL at 09:45

## 2022-05-27 RX ADMIN — OXYCODONE AND ACETAMINOPHEN PRN TAB: 5; 325 TABLET ORAL at 23:55

## 2022-05-27 RX ADMIN — FAMOTIDINE SCH MG: 20 TABLET ORAL at 21:20

## 2022-05-27 RX ADMIN — HYDROMORPHONE HYDROCHLORIDE PRN MG: 1 INJECTION, SOLUTION INTRAMUSCULAR; INTRAVENOUS; SUBCUTANEOUS at 16:02

## 2022-05-27 RX ADMIN — GABAPENTIN SCH MG: 300 CAPSULE ORAL at 15:50

## 2022-05-27 RX ADMIN — DOXYCYCLINE SCH MLS/HR: 100 INJECTION, POWDER, LYOPHILIZED, FOR SOLUTION INTRAVENOUS at 18:23

## 2022-05-27 RX ADMIN — DOCUSATE SODIUM SCH MG: 100 CAPSULE, LIQUID FILLED ORAL at 09:45

## 2022-05-27 RX ADMIN — Medication SCH 1000UNITS: at 15:50

## 2022-05-27 RX ADMIN — HEPARIN SODIUM SCH UNIT: 5000 INJECTION, SOLUTION INTRAVENOUS; SUBCUTANEOUS at 09:45

## 2022-05-27 NOTE — PROGRESS NOTE
Assessment and Plan





This is a 36 year old  transgendered female undergoing gender transformation 

with silicone injection to buttocks ([place] ) and breast implants, heavy 

drinker on the weekends (bottle of vodka) who presented to [facility] on 5/3 because 

of adverse drug reaction after reportedly self administration of  

rocephin and PCN for sore throat. Patient reportedly collapsed while checking in

to the emergency department, has complains of severe 10 out of 10 pain in left 

thigh and has noticeable mottling of skin in thigh and left lower abdomen.  

Patient underwent a CT of the left leg which showed possible cellulitis with no 

drainable fluid collection and was started on empiric antibiotics.  Patient 

admitted to the hospital service with diagnosis of acute liver failure, 

rhabdomyolysis, s/p 4 compartment fasciotomy to left lower extremity.  Patient 

received antibiotics, wound care, wound VAC treatment and was planning to be 

discharged


However developed a wound infection, wound VAC was removed, medical personnel 

aggressively treating the patient.  Discharge was held





Hospital course to date:





5/3: Venous ultrasound shows no DVT, Vascular surgery consulted who performed a 

fasciotomy and plan to transfer patient to [facility] post intervention.  Neurology 

consulted





: Transfer to ICU, GI and neurology consulted.





: Patient will be transferred back to the floor from ICU.  Her liver enzymes 

and creatinine are trending down.  Will be started on gabapentin per neurology 

recommendations and antibiotics discontinued.





: Continue supportive care.  CPK slowly improving.  Vascular plans for 

closure of fasciotomy early next week.  PT OT evaluation and treatment closure 

done.  Continue wound management.  No evidence of withdrawal noted at this time.

 Continue to monitor LFTs which are also slightly improving.  Plan discussed in 

detail with the patient who verbalized understanding





: Patient seen and examined clinically stable continues to show some 

improvement.  Closure of fasciotomy planned for tomorrow.  Renal function is 

improving.  CK ordered and pending





: Patient seen and examined, wbc mildly elevated likely secondary to 

steroids, will change to po for two additional days and stop, benefit probably 

already achieved, continue wound dressing, sensory function improved, awaiting 

wound vac and closure. Patient advised on clinically progress. 





: Patient seen and examined awaiting her vascular surgery for closure of 

fasciotomy.  CK is decreasing less than 9000 today.  We will continue aggressive

IV fluids until we have the resolution below 5000.  LFTs on the downward trend 

also.  Leukocytosis appears to have peaked steroids was changed to oral for 2 

additional days today is 1 of 2..  Plan of care discussed in detail with the 

patient who verbalized understanding.





5/10:  Left lower extremity compartment syndrome status post fasciotomies.  

Cause is still unclear and may represent some allergic reaction.  Motor function

is unchanged from original exam.  Sensory function has improved with full 

sensation of the foot.vascular surgery to place wound vacs on the leg today.  CK

improved to the 8000 range today





: Patient underwent excisional debridement of muscle and soft tissue from 

left leg wound yesterday.  Patient also had wound VAC placement left leg 

fasciotomy incisions.  The patient had moderate amount of superficial necrotic 

muscle in the anterior lateral compartments with minimal amount of superficial 

necrotic muscle in the posterior compartment.  Follow-up culture results.  CK 

levels have improved to 6900 range.  AST/ALT continue to trend downward.





: I discussed the case with Dr. Mckeon who recommends home wound VAC as 

well as physical therapy.  I also discussed these needs with case management who

was attempting to arrange for discharge planning.  CK continues to improve with 

levels in the 4000 range.





: Still awaiting for home wound VAC arrangements to be made.  Leukocytosis 

is much improved down to 13,900 today.  LFTs continue to trend down to normal 

range.  CK levels still in the 4000 range.  Nephrology has signed off.  

Anticipate discharge later today or in a.m.





:  Still awaiting for home wound VAC arrangements to be made.  Laboratories 

continue to include including LFTs, CK and leukocytosis.





5/15:  Still awaiting for home wound VAC arrangements to be made.





: Patient had an episode of syncope while working with PT today.  Nurse 

reports patient lost consciousness for a few seconds and was helped back to the 

bed.  Patient's blood pressure was noted to be hypotensive/orthostatic.  We will

bolus 500 normal saline IV fluid and monitor orthostatics.  Case management 

reports that wound VAC should be delivered today.  LFTs continue to trend down 

to normal range.  CK levels still in the 2000 range.  Nephrology has signed off.

 Anticipate discharge in a.m. given the syncope and once wound VAC arranged.  

Patient with low-grade temp.  We will monitor.





; patient is anxious to go home, wound VAC is delivered to the bedside, 

continue wound care


Multiple social issues, DC planning per case management, left lower extremity 

Doppler negative for DVT





; discharge planning, multiple social issues





; continue current management, wound VAC in place, DC planning issues





; discharge planning per case management, setting up wound VAC, setting up 

outpatient wound care


Setting up follow-up visits with physicians, patient has multiple social issues 

and no payer source


Continue current management discharge when medically stable





; patient's LFTs are trending down, continue current management


Wound VAC care, wound VAC change per instructions, wound care as indicated


Discharge planning per case management





; patient is medically stable for discharge


Awaiting discharge planning coordinating with wound VAC and wound care


Post discharge follow-up visit with vascular Dr. Vikas Laboy on 2020 at 10 AM





; vascular /IR Dr. Cantrell evaluation and recommendations noted and appreciated


Patient's wound had some peculiar infection smell, started on doxycycline


And recommended aggressive wound care.





; iron  talk to Dr. Cantrell regarding the treatment and discharge 

planning


Dr. Cantrell informed that wound care should follow regularly and aggressively 

manage the wounds


And once the wounds show significant healing wound VAC can be placed and patient

may be discharged home with home health.  Outpatient wound clinic 3 times a week

and follow-up with vascular IR per schedule





: Continue dressing change per wound care.  Patient initiated on 

doxycycline, surgical culture is growing bacillus.  Discharge planning per 

vascular recommendation once wound VAC is placed.  Continue supportive care. 





: Continue regular wound care, continue empiric antibiotic, wait on culture 

sensitivity.  Discharge planning per vascular surgery. 





: continue empiric antibiotic, wait on culture sensitivity.  Discharge 

planning per vascular surgery. Continue regular wound care,





Assessment and plan;


--Compartment syndrome to LLE


-Vascular surgery consulted, appreciate recommendations


-s/p self admin of IM abx to [facility]


-c/o pain, decreased sensation, pulses were dopplarable, paralysis, mottled skin


-s/p 4 compartment tracheotomy on 5/3 with vascular surgery


-Bilateral lower extremity Doppler ultrasound showed no DVT


-Left lower extremity CT with contrast showed appearance of posterior soft 

tissue complex 


cellulitis with small subcutaneous lymph nodes, no drainable fluid collections 

present, contusion 


could have similar appearance


-Bilateral lower extremity arterial duplex showed no significant lower extremity

peripheral artery disease


-Normal ABIs





Patient received wound VAC, with aggressive management and frequent changing.  

And with supportive care


Patient was being prepared to discharge with home health and outpatient wound 

care 3 times a week.





However patient's wound got significantly infected, wound VAC was removed, wound

care personal consulted and they are aggressively treating the wounds.


Once the wound infection improves, patient may receive wound VAC and then 

patient may be discharged home with follow-up visit with Dr. Vikas Laboy


Outpatient wound care 3 times a week





--Peripheral neuropathy


Continue gabapentin


Neurology evaluated





--HTN


Moderate control, continue current antihypertensives


As needed medications





--Rhabdomyolysis;


Improved from 95,000 CK -1001 week ago


Closely monitor IV hydration





--acute kidney injury with vasomotor nephropathy now resolved


Monitor renal function


Avoid nephrotoxin. gentle hydration as needed








Patient is not ready for discharge at this point.


Plan of care reviewed in detail with the patient and her nurse





Disposition; continue aggressive wound care, once infection is well controlled 

wound VAC can be applied and patient will be discharged in a stable condition














Subjective


Date of service: 22


Principal diagnosis: abnormal liver enzymes


Interval history: 





Patient seen and examined.  Medical records and medication list reviewed.  


No acute event overnight noted by the RN.  


Patient denies any chest pain or difficulty breathing.  Patient is tolerating 

diet.  


Discussed plan of care at bedside with patient.








Objective





- Exam


Narrative Exam: 





General appearance: Present: mild distress, well-nourished, obese





- EENT


Eyes: Present: PERRL, EOM intact


ENT: clear oral mucosa





- Neck


Neck: Present: supple, normal ROM





- Respiratory


Respiratory effort: normal


Respiratory: bilateral: diminished, rhonchi, negative: rales, wheezing





- Cardiovascular


Rhythm: regular


Heart Sounds: Present: S1 & S2





- Extremities


Extremities: no ischemia, No edema, abnormal (Left lower extremity, dressing in 

place, elevated)





- Abdominal


General gastrointestinal: soft, non-tender, non-distended, normal bowel sounds





- Integumentary


Integumentary: Present: clear, warm





- Psychiatric


Psychiatric: appropriate mood/affect, cooperative





- Neurologic


Neurologic: CNII-XII intact, moves all extremities





- Constitutional


Vitals: 


                               Vital Signs - 12hr











  22





  05:46 10:00 11:29


 


Temperature 98.4 F  97.6 F


 


Pulse Rate 86  92 H


 


Respiratory 18  18





Rate   


 


Blood Pressure 105/56  113/76


 


O2 Sat by Pulse 97 97 98





Oximetry   














- Labs


CBC & Chem 7: 


                                 22 05:37





                                 22 05:37


Labs: 


                              Abnormal lab results











  22 Range/Units





  05:37 05:37 


 


Hgb  10.7 L   (11.8-15.2)  gm/dl


 


Hct  32.4 L   (35.5-45.6)  %


 


Sodium   134 L  (137-145)  mmol/L


 


Chloride   97.4 L  ()  mmol/L


 


BUN   8 L  (9-20)  mg/dL


 


Creatinine   0.6 L  (0.8-1.3)  mg/dL


 


Glucose   105 H  ()  mg/dL

## 2022-05-28 RX ADMIN — FAMOTIDINE SCH MG: 20 TABLET ORAL at 23:10

## 2022-05-28 RX ADMIN — HYDROMORPHONE HYDROCHLORIDE PRN MG: 1 INJECTION, SOLUTION INTRAMUSCULAR; INTRAVENOUS; SUBCUTANEOUS at 15:44

## 2022-05-28 RX ADMIN — HYDROMORPHONE HYDROCHLORIDE PRN MG: 1 INJECTION, SOLUTION INTRAMUSCULAR; INTRAVENOUS; SUBCUTANEOUS at 23:21

## 2022-05-28 RX ADMIN — DOCUSATE SODIUM SCH MG: 100 CAPSULE, LIQUID FILLED ORAL at 23:10

## 2022-05-28 RX ADMIN — Medication SCH ML: at 23:23

## 2022-05-28 RX ADMIN — DOXYCYCLINE SCH MLS/HR: 100 INJECTION, POWDER, LYOPHILIZED, FOR SOLUTION INTRAVENOUS at 05:57

## 2022-05-28 RX ADMIN — Medication SCH 1000UNITS: at 09:11

## 2022-05-28 RX ADMIN — VALSARTAN SCH MG: 160 TABLET, FILM COATED ORAL at 09:09

## 2022-05-28 RX ADMIN — HEPARIN SODIUM SCH UNIT: 5000 INJECTION, SOLUTION INTRAVENOUS; SUBCUTANEOUS at 09:10

## 2022-05-28 RX ADMIN — DOCUSATE SODIUM SCH MG: 100 CAPSULE, LIQUID FILLED ORAL at 09:09

## 2022-05-28 RX ADMIN — FAMOTIDINE SCH MG: 20 TABLET ORAL at 09:09

## 2022-05-28 RX ADMIN — DOXYCYCLINE SCH MLS/HR: 100 INJECTION, POWDER, LYOPHILIZED, FOR SOLUTION INTRAVENOUS at 18:51

## 2022-05-28 RX ADMIN — HEPARIN SODIUM SCH UNIT: 5000 INJECTION, SOLUTION INTRAVENOUS; SUBCUTANEOUS at 23:10

## 2022-05-28 RX ADMIN — GABAPENTIN SCH MG: 300 CAPSULE ORAL at 15:45

## 2022-05-28 RX ADMIN — Medication SCH ML: at 09:10

## 2022-05-28 RX ADMIN — GABAPENTIN SCH MG: 300 CAPSULE ORAL at 23:10

## 2022-05-28 RX ADMIN — OXYCODONE AND ACETAMINOPHEN PRN TAB: 5; 325 TABLET ORAL at 18:55

## 2022-05-28 RX ADMIN — GABAPENTIN SCH MG: 300 CAPSULE ORAL at 09:10

## 2022-05-28 NOTE — PROGRESS NOTE
Assessment and Plan





This is a 36 year old  transgendered female undergoing gender transformation 

with silicone injection to buttocks ([place] ) and breast implants, heavy 

drinker on the weekends (bottle of vodka) who presented to [facility] on 5/3 because 

of adverse drug reaction after reportedly self administration of  

rocephin and PCN for sore throat. Patient reportedly collapsed while checking in

to the emergency department, has complains of severe 10 out of 10 pain in left 

thigh and has noticeable mottling of skin in thigh and left lower abdomen.  

Patient underwent a CT of the left leg which showed possible cellulitis with no 

drainable fluid collection and was started on empiric antibiotics.  Patient 

admitted to the hospital service with diagnosis of acute liver failure, 

rhabdomyolysis, s/p 4 compartment fasciotomy to left lower extremity.  Patient 

received antibiotics, wound care, wound VAC treatment and was planning to be 

discharged


However developed a wound infection, wound VAC was removed, medical personnel 

aggressively treating the patient.  Discharge was held





Hospital course to date:





5/3: Venous ultrasound shows no DVT, Vascular surgery consulted who performed a 

fasciotomy and plan to transfer patient to [facility] post intervention.  Neurology 

consulted





: Transfer to ICU, GI and neurology consulted.





: Patient will be transferred back to the floor from ICU.  Her liver enzymes 

and creatinine are trending down.  Will be started on gabapentin per neurology 

recommendations and antibiotics discontinued.





: Continue supportive care.  CPK slowly improving.  Vascular plans for 

closure of fasciotomy early next week.  PT OT evaluation and treatment closure 

done.  Continue wound management.  No evidence of withdrawal noted at this time.

 Continue to monitor LFTs which are also slightly improving.  Plan discussed in 

detail with the patient who verbalized understanding





: Patient seen and examined clinically stable continues to show some 

improvement.  Closure of fasciotomy planned for tomorrow.  Renal function is 

improving.  CK ordered and pending





: Patient seen and examined, wbc mildly elevated likely secondary to 

steroids, will change to po for two additional days and stop, benefit probably 

already achieved, continue wound dressing, sensory function improved, awaiting 

wound vac and closure. Patient advised on clinically progress. 





: Patient seen and examined awaiting her vascular surgery for closure of 

fasciotomy.  CK is decreasing less than 9000 today.  We will continue aggressive

IV fluids until we have the resolution below 5000.  LFTs on the downward trend 

also.  Leukocytosis appears to have peaked steroids was changed to oral for 2 

additional days today is 1 of 2..  Plan of care discussed in detail with the 

patient who verbalized understanding.





5/10:  Left lower extremity compartment syndrome status post fasciotomies.  

Cause is still unclear and may represent some allergic reaction.  Motor function

is unchanged from original exam.  Sensory function has improved with full 

sensation of the foot.vascular surgery to place wound vacs on the leg today.  CK

improved to the 8000 range today





: Patient underwent excisional debridement of muscle and soft tissue from 

left leg wound yesterday.  Patient also had wound VAC placement left leg 

fasciotomy incisions.  The patient had moderate amount of superficial necrotic 

muscle in the anterior lateral compartments with minimal amount of superficial 

necrotic muscle in the posterior compartment.  Follow-up culture results.  CK 

levels have improved to 6900 range.  AST/ALT continue to trend downward.





: I discussed the case with Dr. Mckeon who recommends home wound VAC as 

well as physical therapy.  I also discussed these needs with case management who

was attempting to arrange for discharge planning.  CK continues to improve with 

levels in the 4000 range.





: Still awaiting for home wound VAC arrangements to be made.  Leukocytosis 

is much improved down to 13,900 today.  LFTs continue to trend down to normal 

range.  CK levels still in the 4000 range.  Nephrology has signed off.  

Anticipate discharge later today or in a.m.





:  Still awaiting for home wound VAC arrangements to be made.  Laboratories 

continue to include including LFTs, CK and leukocytosis.





5/15:  Still awaiting for home wound VAC arrangements to be made.





: Patient had an episode of syncope while working with PT today.  Nurse 

reports patient lost consciousness for a few seconds and was helped back to the 

bed.  Patient's blood pressure was noted to be hypotensive/orthostatic.  We will

bolus 500 normal saline IV fluid and monitor orthostatics.  Case management 

reports that wound VAC should be delivered today.  LFTs continue to trend down 

to normal range.  CK levels still in the 2000 range.  Nephrology has signed off.

 Anticipate discharge in a.m. given the syncope and once wound VAC arranged.  

Patient with low-grade temp.  We will monitor.





; patient is anxious to go home, wound VAC is delivered to the bedside, 

continue wound care


Multiple social issues, DC planning per case management, left lower extremity 

Doppler negative for DVT





; discharge planning, multiple social issues





; continue current management, wound VAC in place, DC planning issues





; discharge planning per case management, setting up wound VAC, setting up 

outpatient wound care


Setting up follow-up visits with physicians, patient has multiple social issues 

and no payer source


Continue current management discharge when medically stable





; patient's LFTs are trending down, continue current management


Wound VAC care, wound VAC change per instructions, wound care as indicated


Discharge planning per case management





; patient is medically stable for discharge


Awaiting discharge planning coordinating with wound VAC and wound care


Post discharge follow-up visit with vascular Dr. Vikas Laboy on 2020 at 10 AM





; vascular /IR Dr. Cantrell evaluation and recommendations noted and appreciated


Patient's wound had some peculiar infection smell, started on doxycycline


And recommended aggressive wound care.





; iron  talk to Dr. Cantrell regarding the treatment and discharge 

planning


Dr. Cantrell informed that wound care should follow regularly and aggressively 

manage the wounds


And once the wounds show significant healing wound VAC can be placed and patient

may be discharged home with home health.  Outpatient wound clinic 3 times a week

and follow-up with vascular IR per schedule





: Continue dressing change per wound care.  Patient initiated on 

doxycycline, surgical culture is growing bacillus.  Discharge planning per 

vascular recommendation once wound VAC is placed.  Continue supportive care. 





: Continue regular wound care, continue empiric antibiotic, wait on culture 

sensitivity.  Discharge planning per vascular surgery. 





: continue empiric antibiotic, wait on culture sensitivity.  Discharge 

planning per vascular surgery. Continue regular wound care,





: Continue empiric antibiotics, wait for better wound healing to place wound

VAC, plan to DC patient once wound VAC will be placed. consult surgery for 

debridement 





Assessment and plan;


--Compartment syndrome to LLE


-Vascular surgery consulted, appreciate recommendations


-s/p self admin of IM abx to midthigh


-c/o pain, decreased sensation, pulses were dopplarable, paralysis, mottled skin


-s/p 4 compartment tracheotomy on 5/3 with vascular surgery


-Bilateral lower extremity Doppler ultrasound showed no DVT


-Left lower extremity CT with contrast showed appearance of posterior soft 

tissue complex 


cellulitis with small subcutaneous lymph nodes, no drainable fluid collections p

resent, contusion 


could have similar appearance


-Bilateral lower extremity arterial duplex showed no significant lower extremity

peripheral artery disease


-Normal ABIs





Patient received wound VAC, with aggressive management and frequent changing.  

And with supportive care


Patient was being prepared to discharge with home health and outpatient wound 

care 3 times a week.





However patient's wound got significantly infected, wound VAC was removed, wound

care personal consulted and they are aggressively treating the wounds.


Once the wound infection improves, patient may receive wound VAC and then 

patient may be discharged home with follow-up visit with Dr. Vikas Laboy


Outpatient wound care 3 times a week





--Peripheral neuropathy


Continue gabapentin


Neurology evaluated





--HTN


Moderate control, continue current antihypertensives


As needed medications





--Rhabdomyolysis;


Improved from 95,000 CK -1001 week ago


Closely monitor IV hydration





--acute kidney injury with vasomotor nephropathy now resolved


Monitor renal function


Avoid nephrotoxin. gentle hydration as needed








Patient is not ready for discharge at this point.


Plan of care reviewed in detail with the patient and her nurse





Disposition; continue aggressive wound care, once infection is well controlled 

wound VAC can be applied and patient will be discharged in a stable condition














Subjective


Date of service: 22


Principal diagnosis: abnormal liver enzymes


Interval history: 





Patient seen and examined.  Medical records and medication list reviewed.  


No acute event overnight noted by the RN.  


Patient denies any chest pain or difficulty breathing.  Patient is tolerating 

diet.  


Discussed plan of care at bedside with patient.








Objective





- Exam


Narrative Exam: 





General appearance: Present: mild distress, well-nourished, obese





- EENT


Eyes: Present: PERRL, EOM intact


ENT: clear oral mucosa





- Neck


Neck: Present: supple, normal ROM





- Respiratory


Respiratory effort: normal


Respiratory: bilateral: diminished, rhonchi, negative: rales, wheezing





- Cardiovascular


Rhythm: regular


Heart Sounds: Present: S1 & S2





- Extremities


Extremities: no ischemia, No edema, abnormal (Left lower extremity, dressing in 

place, elevated)





- Abdominal


General gastrointestinal: soft, non-tender, non-distended, normal bowel sounds





- Integumentary


Integumentary: Present: clear, warm





- Psychiatric


Psychiatric: appropriate mood/affect, cooperative





- Neurologic


Neurologic: CNII-XII intact, moves all extremities





- Constitutional


Vitals: 


                               Vital Signs - 12hr











  22





  08:51


 


O2 Sat by Pulse 97





Oximetry 














- Labs


CBC & Chem 7: 


                                 22 05:37





                                 22 05:37

## 2022-05-29 RX ADMIN — DOXYCYCLINE SCH MLS/HR: 100 INJECTION, POWDER, LYOPHILIZED, FOR SOLUTION INTRAVENOUS at 18:22

## 2022-05-29 RX ADMIN — Medication SCH ML: at 22:20

## 2022-05-29 RX ADMIN — DOXYCYCLINE SCH MLS/HR: 100 INJECTION, POWDER, LYOPHILIZED, FOR SOLUTION INTRAVENOUS at 05:28

## 2022-05-29 RX ADMIN — Medication SCH 1000UNITS: at 17:00

## 2022-05-29 RX ADMIN — Medication SCH ML: at 10:47

## 2022-05-29 RX ADMIN — HYDROMORPHONE HYDROCHLORIDE PRN MG: 1 INJECTION, SOLUTION INTRAMUSCULAR; INTRAVENOUS; SUBCUTANEOUS at 05:32

## 2022-05-29 RX ADMIN — HYDROMORPHONE HYDROCHLORIDE PRN MG: 1 INJECTION, SOLUTION INTRAMUSCULAR; INTRAVENOUS; SUBCUTANEOUS at 22:20

## 2022-05-29 RX ADMIN — FAMOTIDINE SCH MG: 20 TABLET ORAL at 10:44

## 2022-05-29 RX ADMIN — DOCUSATE SODIUM SCH MG: 100 CAPSULE, LIQUID FILLED ORAL at 10:44

## 2022-05-29 RX ADMIN — VALSARTAN SCH MG: 160 TABLET, FILM COATED ORAL at 10:44

## 2022-05-29 RX ADMIN — GABAPENTIN SCH MG: 300 CAPSULE ORAL at 16:06

## 2022-05-29 RX ADMIN — OXYCODONE AND ACETAMINOPHEN PRN TAB: 5; 325 TABLET ORAL at 14:46

## 2022-05-29 RX ADMIN — HYDROMORPHONE HYDROCHLORIDE PRN MG: 1 INJECTION, SOLUTION INTRAMUSCULAR; INTRAVENOUS; SUBCUTANEOUS at 16:40

## 2022-05-29 RX ADMIN — GABAPENTIN SCH MG: 300 CAPSULE ORAL at 10:44

## 2022-05-29 RX ADMIN — GABAPENTIN SCH MG: 300 CAPSULE ORAL at 22:19

## 2022-05-29 RX ADMIN — FAMOTIDINE SCH MG: 20 TABLET ORAL at 22:19

## 2022-05-29 RX ADMIN — DOCUSATE SODIUM SCH MG: 100 CAPSULE, LIQUID FILLED ORAL at 22:19

## 2022-05-29 RX ADMIN — HEPARIN SODIUM SCH UNIT: 5000 INJECTION, SOLUTION INTRAVENOUS; SUBCUTANEOUS at 10:46

## 2022-05-29 RX ADMIN — HEPARIN SODIUM SCH UNIT: 5000 INJECTION, SOLUTION INTRAVENOUS; SUBCUTANEOUS at 22:19

## 2022-05-29 NOTE — PROGRESS NOTE
Assessment and Plan





This is a 36 year old  transgendered female undergoing gender transformation 

with silicone injection to buttocks ([place] ) and breast implants, heavy 

drinker on the weekends (bottle of vodka) who presented to [facility] on 5/3 because 

of adverse drug reaction after reportedly self administration of  

rocephin and PCN for sore throat. Patient reportedly collapsed while checking in

to the emergency department, has complains of severe 10 out of 10 pain in left 

thigh and has noticeable mottling of skin in thigh and left lower abdomen.  

Patient underwent a CT of the left leg which showed possible cellulitis with no 

drainable fluid collection and was started on empiric antibiotics.  Patient 

admitted to the hospital service with diagnosis of acute liver failure, 

rhabdomyolysis, s/p 4 compartment fasciotomy to left lower extremity.  Patient 

received antibiotics, wound care, wound VAC treatment and was planning to be 

discharged


However developed a wound infection, wound VAC was removed, medical personnel 

aggressively treating the patient.  Discharge was held





Hospital course to date:





5/3: Venous ultrasound shows no DVT, Vascular surgery consulted who performed a 

fasciotomy and plan to transfer patient to [facility] post intervention.  Neurology 

consulted





: Transfer to ICU, GI and neurology consulted.





: Patient will be transferred back to the floor from ICU.  Her liver enzymes 

and creatinine are trending down.  Will be started on gabapentin per neurology 

recommendations and antibiotics discontinued.





: Continue supportive care.  CPK slowly improving.  Vascular plans for 

closure of fasciotomy early next week.  PT OT evaluation and treatment closure 

done.  Continue wound management.  No evidence of withdrawal noted at this time.

 Continue to monitor LFTs which are also slightly improving.  Plan discussed in 

detail with the patient who verbalized understanding





: Patient seen and examined clinically stable continues to show some 

improvement.  Closure of fasciotomy planned for tomorrow.  Renal function is 

improving.  CK ordered and pending





: Patient seen and examined, wbc mildly elevated likely secondary to 

steroids, will change to po for two additional days and stop, benefit probably 

already achieved, continue wound dressing, sensory function improved, awaiting 

wound vac and closure. Patient advised on clinically progress. 





: Patient seen and examined awaiting her vascular surgery for closure of 

fasciotomy.  CK is decreasing less than 9000 today.  We will continue aggressive

IV fluids until we have the resolution below 5000.  LFTs on the downward trend 

also.  Leukocytosis appears to have peaked steroids was changed to oral for 2 

additional days today is 1 of 2..  Plan of care discussed in detail with the 

patient who verbalized understanding.





5/10:  Left lower extremity compartment syndrome status post fasciotomies.  

Cause is still unclear and may represent some allergic reaction.  Motor function

is unchanged from original exam.  Sensory function has improved with full 

sensation of the foot.vascular surgery to place wound vacs on the leg today.  CK

improved to the 8000 range today





: Patient underwent excisional debridement of muscle and soft tissue from 

left leg wound yesterday.  Patient also had wound VAC placement left leg 

fasciotomy incisions.  The patient had moderate amount of superficial necrotic 

muscle in the anterior lateral compartments with minimal amount of superficial 

necrotic muscle in the posterior compartment.  Follow-up culture results.  CK 

levels have improved to 6900 range.  AST/ALT continue to trend downward.





: I discussed the case with Dr. Mckeon who recommends home wound VAC as 

well as physical therapy.  I also discussed these needs with case management who

was attempting to arrange for discharge planning.  CK continues to improve with 

levels in the 4000 range.





: Still awaiting for home wound VAC arrangements to be made.  Leukocytosis 

is much improved down to 13,900 today.  LFTs continue to trend down to normal 

range.  CK levels still in the 4000 range.  Nephrology has signed off.  

Anticipate discharge later today or in a.m.





:  Still awaiting for home wound VAC arrangements to be made.  Laboratories 

continue to include including LFTs, CK and leukocytosis.





5/15:  Still awaiting for home wound VAC arrangements to be made.





: Patient had an episode of syncope while working with PT today.  Nurse 

reports patient lost consciousness for a few seconds and was helped back to the 

bed.  Patient's blood pressure was noted to be hypotensive/orthostatic.  We will

bolus 500 normal saline IV fluid and monitor orthostatics.  Case management 

reports that wound VAC should be delivered today.  LFTs continue to trend down 

to normal range.  CK levels still in the 2000 range.  Nephrology has signed off.

 Anticipate discharge in a.m. given the syncope and once wound VAC arranged.  

Patient with low-grade temp.  We will monitor.





; patient is anxious to go home, wound VAC is delivered to the bedside, 

continue wound care


Multiple social issues, DC planning per case management, left lower extremity 

Doppler negative for DVT





; discharge planning, multiple social issues





; continue current management, wound VAC in place, DC planning issues





; discharge planning per case management, setting up wound VAC, setting up 

outpatient wound care


Setting up follow-up visits with physicians, patient has multiple social issues 

and no payer source


Continue current management discharge when medically stable





; patient's LFTs are trending down, continue current management


Wound VAC care, wound VAC change per instructions, wound care as indicated


Discharge planning per case management





; patient is medically stable for discharge


Awaiting discharge planning coordinating with wound VAC and wound care


Post discharge follow-up visit with vascular Dr. Vikas Laboy on 2020 at 10 AM





; vascular /IR Dr. Cantrell evaluation and recommendations noted and appreciated


Patient's wound had some peculiar infection smell, started on doxycycline


And recommended aggressive wound care.





; iron  talk to Dr. Cantrell regarding the treatment and discharge 

planning


Dr. Cantrell informed that wound care should follow regularly and aggressively 

manage the wounds


And once the wounds show significant healing wound VAC can be placed and patient

may be discharged home with home health.  Outpatient wound clinic 3 times a week

and follow-up with vascular IR per schedule





: Continue dressing change per wound care.  Patient initiated on 

doxycycline, surgical culture is growing bacillus.  Discharge planning per 

vascular recommendation once wound VAC is placed.  Continue supportive care. 





: Continue regular wound care, continue empiric antibiotic, wait on culture 

sensitivity.  Discharge planning per vascular surgery. 





: continue empiric antibiotic, wait on culture sensitivity.  Discharge 

planning per vascular surgery. Continue regular wound care,





: Continue empiric antibiotics, wait for better wound healing to place wound

VAC, plan to DC patient once wound VAC will be placed. consult surgery for 

debridement.





: Discussed with Dr. Sanchez yesterday, if patient need any debridement 

possibly will be done on next week Tuesday.  Continue wound care And antibiotics





Assessment and plan;


--Compartment syndrome to LLE


-Vascular surgery consulted, appreciate recommendations


-s/p self admin of IM abx to midthigh


-c/o pain, decreased sensation, pulses were dopplarable, paralysis, mottled skin


-s/p 4 compartment tracheotomy on 5/3 with vascular surgery


-Bilateral lower extremity Doppler ultrasound showed no DVT


-Left lower extremity CT with contrast showed appearance of posterior soft 

tissue complex 


cellulitis with small subcutaneous lymph nodes, no drainable fluid collections 

present, contusion 


could have similar appearance


-Bilateral lower extremity arterial duplex showed no significant lower extremity

peripheral artery disease


-Normal ABIs





Patient received wound VAC, with aggressive management and frequent changing.  

And with supportive care


Patient was being prepared to discharge with home health and outpatient wound 

care 3 times a week.





However patient's wound got significantly infected, wound VAC was removed, wound

care personal consulted and they are aggressively treating the wounds.


Once the wound infection improves, patient may receive wound VAC and then 

patient may be discharged home with follow-up visit with Dr. Vikas Laboy


Outpatient wound care 3 times a week





--Peripheral neuropathy


Continue gabapentin


Neurology evaluated





--HTN


Moderate control, continue current antihypertensives


As needed medications





--Rhabdomyolysis;


Improved from 95,000 CK -1001 week ago


Closely monitor IV hydration





--acute kidney injury with vasomotor nephropathy now resolved


Monitor renal function


Avoid nephrotoxin. gentle hydration as needed








Patient is not ready for discharge at this point.


Plan of care reviewed in detail with the patient and her nurse





Disposition; continue aggressive wound care, once infection is well controlled 

wound VAC can be applied and patient will be discharged in a stable condition














Subjective


Date of service: 22


Principal diagnosis: abnormal liver enzymes


Interval history: 





Patient seen and examined.  Medical records and medication list reviewed.  


No acute event overnight noted by the RN.  


Patient denies any chest pain or difficulty breathing.  Patient is tolerating 

diet.  


Discussed plan of care at bedside with patient.








Objective





- Exam


Narrative Exam: 





General appearance: Present: mild distress, well-nourished, obese





- EENT


Eyes: Present: PERRL, EOM intact


ENT: clear oral mucosa





- Neck


Neck: Present: supple, normal ROM





- Respiratory


Respiratory effort: normal


Respiratory: bilateral: diminished, rhonchi, negative: rales, wheezing





- Cardiovascular


Rhythm: regular


Heart Sounds: Present: S1 & S2





- Extremities


Extremities: no ischemia, No edema, abnormal (Left lower extremity, dressing in 

place, elevated)





- Abdominal


General gastrointestinal: soft, non-tender, non-distended, normal bowel sounds





- Integumentary


Integumentary: Present: clear, warm





- Psychiatric


Psychiatric: appropriate mood/affect, cooperative





- Neurologic


Neurologic: CNII-XII intact, moves all extremities





- Constitutional


Vitals: 


                               Vital Signs - 12hr











  22





  04:31 10:42 10:44


 


Temperature 98.6 F 98.6 F 


 


Pulse Rate 77 77 77


 


Respiratory 18 16 





Rate   


 


Blood Pressure 110/69  116/66


 


Blood Pressure  116/66 





[Right]   


 


O2 Sat by Pulse 99 97 





Oximetry   














- Labs


CBC & Chem 7: 


                                 22 05:37





                                 22 05:37

## 2022-05-29 NOTE — CONSULTATION
History of Present Illness


Consult date: 05/29/22


Reason for consult: wound care





- History of present illness


History of present illness: 





36-year-old fenced female was reconsulted from general surgery for evaluation of

wound care.  Patient was admitted with compartment syndrome after injecting a 

substance in her leg.  This resulted in subsequent fasciotomies.  Patient has 

been getting local wound care and intermittent wound VAC changes.  Patient says 

that she still has minimal motor function but has returned sensory function in 

her left leg.





Past History


Past Medical History: No medical history


Past Surgical History: Other (Buttock injection performed in [place] in 2017)


Social history: other (Unknown)


Family history: no significant family history





Medications and Allergies


                                    Allergies











Allergy/AdvReac Type Severity Reaction Status Date / Time


 


ceftriaxone [From Rocephin] Allergy Severe Unknown Verified 05/05/22 10:23


 


Penicillins Allergy Severe Unknown Verified 05/05/22 10:23











                                Home Medications











 Medication  Instructions  Recorded  Confirmed  Last Taken  Type


 


No Known Home Medications [No  05/03/22 05/03/22 Unknown History





Reported Home Medications]     











Active Meds: 


Active Medications





Acetaminophen (Acetaminophen 325 Mg Tab)  650 mg PO Q6H PRN


   PRN Reason: Pain, Mild (1-3)


   Last Admin: 05/25/22 01:36 Dose:  650 mg


   


Albuterol (Albuterol 2.5 Mg/3 Ml Nebu)  2.5 mg IH Q3HRT PRN


   PRN Reason: Shortness Of Breath


Diphenhydramine HCl (Diphenhydramine 50 Mg/Ml Vial)  25 mg IV Q6H PRN


   PRN Reason: Skin Irritation


   Last Admin: 05/23/22 18:40 Dose:  25 mg


   


Docusate Sodium (Docusate Sodium 100 Mg Cap)  100 mg PO BID [facility] hospital


   Last Admin: 05/29/22 10:44 Dose:  100 mg


   


Famotidine (Famotidine 20 Mg Tab)  20 mg PO BID [facility] hospital


   Last Admin: 05/29/22 10:44 Dose:  20 mg


   


Gabapentin (Gabapentin 300 Mg Cap)  300 mg PO TID@0800,1600,2200 [facility] hospital


   Last Admin: 05/29/22 16:06 Dose:  300 mg


   


Heparin Sodium (Porcine) (Heparin 5,000 Unit/1 Ml Vial)  5,000 unit SUB-Q Q12HR 

Critical [facility] hospital


   Last Admin: 05/29/22 10:46 Dose:  5,000 unit


   


Hydralazine HCl (Hydralazine 20 Mg/1 Ml Inj)  10 mg IV Q3HR PRN


   PRN Reason: Hypertension


   Last Admin: 05/04/22 18:50 Dose:  10 mg


   


Hydromorphone HCl (Hydromorphone 1 Mg/1 Ml Inj)  0.5 mg IV Q3H PRN


   PRN Reason: Pain , Severe (7-10)


   Last Admin: 05/29/22 16:40 Dose:  0.5 mg


   


Doxycycline Hyclate 100 mg/ (Sodium Chloride)  250 mls @ 250 mls/hr IV Q12H [facility];

Protocol


   Stop: 05/30/22 06:59


   Last Admin: 05/29/22 05:28 Dose:  250 mls/hr


   


Ondansetron HCl (Ondansetron 4 Mg/2 Ml Inj)  4 mg IV Q8H PRN


   PRN Reason: Nausea And Vomiting


   Last Admin: 05/25/22 22:29 Dose:  4 mg


   


Oxycodone/Acetaminophen (Oxycodone /Acetaminophen 5-325mg Tab)  1 tab PO Q6H PRN


   PRN Reason: Pain, Moderate (4-6)


   Last Admin: 05/29/22 14:46 Dose:  1 tab


   


Sodium Chloride (Sodium Chloride 0.9% 10 Ml Flush Syringe)  10 ml IV BID [facility] hospital


   Last Admin: 05/29/22 10:47 Dose:  10 ml


   


Sodium Chloride (Sodium Chloride 0.9% 10 Ml Flush Syringe)  10 ml IV PRN PRN


   PRN Reason: LINE FLUSH


   Last Admin: 05/25/22 01:33 Dose:  10 ml


   


Sodium Chloride (Sodium Chloride 0.9% Irr 500 Ml Bottle)  500 ml IR AS DIRECT 

PRN


   PRN Reason: Wound Care


   Last Admin: 05/23/22 19:32 Dose:  500 ml


   


Sodium Hypochlorite (Sodium Hypochlorite, Dakin's Full Strength (0.5%) 473 Ml 

Topical Soln)  1 applic TP DAILY [facility] hospital


   Last Admin: 05/28/22 09:11 Dose:  0.5 1000units


   


Valsartan (Valsartan 160mg Tab)  160 mg PO DAILY [facility]


   Last Admin: 05/29/22 10:44 Dose:  160 mg


   











Review of Systems


All systems: negative





- Muskuloskeletal


shooting leg pain, limitation of motion





- Integumentary


wounds





Exam


                                   Vital Signs











Temp Pulse Resp BP Pulse Ox


 


 98.6 F   110 H  24   174/94   100 


 


 05/02/22 23:11  05/02/22 23:11  05/02/22 23:11  05/02/22 23:11  05/02/22 23:11














- General physical appearance


Positive: well developed, no distress, no pain





- Eyes


Positive: PERRL





- Respiratory


Positive: normal expansion, normal respiratory effort





- Cardiovascular


Heart Sounds: Present: S1 & S2





- Extremities


Extremity abnormal: other (left leg with both medial & lateral calf wounds.  c/w

pictures of wounds taken by wound care nurse from 5/24/2022. Medial wound clean 

w/ beefy granulation tissue. Lateral wound w/ beefy granulation tissue 

interspersed w/ some eschar & fibrinous exudate, Minimal odor. Numerous tendons 

exposed )





Results





- Labs





                                 05/27/22 05:37





                                 05/27/22 05:37





Assessment and Plan





36-year-old female status post left leg fasciotomy for compartment syndrome.  

Patient has wounds that are slow to heal.  I conferred with Dr. Murrell who does 

outpatient wound care about treatment options at this time.  Wound VAC is not 

recommended currently due to exposed tendons. 


There was superficial debridement of some necrotic eschar and fibrinous exudate 

on the lateral side.  Cannot be too aggressive due to significant visible 

exposed tendons.


Recommend Adaptic followed by wet-to-dry dressings over wounds.  Dakin solution 

could be placed on lateral aspect.  Recommend antibiotics tailored to culture 

results.


Patient can be seen in outpatient wound care clinic.  Medihoney which is not 

available in the hospital could be used on lateral aspect to help clean up wound

and dissolve eschar.

## 2022-05-30 LAB
BASOPHILS # (AUTO): 0 K/MM3 (ref 0–0.1)
BASOPHILS NFR BLD AUTO: 0.3 % (ref 0–1.8)
BUN SERPL-MCNC: 8 MG/DL (ref 9–20)
BUN/CREAT SERPL: 13 %
CALCIUM SERPL-MCNC: 9.3 MG/DL (ref 8.4–10.2)
EOSINOPHIL # BLD AUTO: 0.1 K/MM3 (ref 0–0.4)
EOSINOPHIL NFR BLD AUTO: 1.9 % (ref 0–4.3)
HCT VFR BLD CALC: 32.9 % (ref 35.5–45.6)
HEMOLYSIS INDEX: 9
HGB BLD-MCNC: 11.4 GM/DL (ref 11.8–15.2)
LYMPHOCYTES # BLD AUTO: 2.2 K/MM3 (ref 1.2–5.4)
LYMPHOCYTES NFR BLD AUTO: 37.8 % (ref 13.4–35)
MCHC RBC AUTO-ENTMCNC: 35 % (ref 32–34)
MCV RBC AUTO: 95 FL (ref 84–94)
MONOCYTES # (AUTO): 0.8 K/MM3 (ref 0–0.8)
MONOCYTES % (AUTO): 13.4 % (ref 0–7.3)
PLATELET # BLD: 444 K/MM3 (ref 140–440)
RBC # BLD AUTO: 3.48 M/MM3 (ref 3.65–5.03)

## 2022-05-30 RX ADMIN — GABAPENTIN SCH MG: 300 CAPSULE ORAL at 09:05

## 2022-05-30 RX ADMIN — HYDROMORPHONE HYDROCHLORIDE PRN MG: 1 INJECTION, SOLUTION INTRAMUSCULAR; INTRAVENOUS; SUBCUTANEOUS at 11:16

## 2022-05-30 RX ADMIN — DOCUSATE SODIUM SCH MG: 100 CAPSULE, LIQUID FILLED ORAL at 09:05

## 2022-05-30 RX ADMIN — Medication SCH 1000UNITS: at 11:16

## 2022-05-30 RX ADMIN — VALSARTAN SCH MG: 160 TABLET, FILM COATED ORAL at 11:17

## 2022-05-30 RX ADMIN — HYDROMORPHONE HYDROCHLORIDE PRN MG: 1 INJECTION, SOLUTION INTRAMUSCULAR; INTRAVENOUS; SUBCUTANEOUS at 06:05

## 2022-05-30 RX ADMIN — GABAPENTIN SCH MG: 300 CAPSULE ORAL at 22:09

## 2022-05-30 RX ADMIN — FAMOTIDINE SCH MG: 20 TABLET ORAL at 22:09

## 2022-05-30 RX ADMIN — GABAPENTIN SCH MG: 300 CAPSULE ORAL at 17:47

## 2022-05-30 RX ADMIN — HEPARIN SODIUM SCH UNIT: 5000 INJECTION, SOLUTION INTRAVENOUS; SUBCUTANEOUS at 09:06

## 2022-05-30 RX ADMIN — FAMOTIDINE SCH MG: 20 TABLET ORAL at 09:06

## 2022-05-30 RX ADMIN — HEPARIN SODIUM SCH UNIT: 5000 INJECTION, SOLUTION INTRAVENOUS; SUBCUTANEOUS at 22:09

## 2022-05-30 RX ADMIN — HYDROMORPHONE HYDROCHLORIDE PRN MG: 1 INJECTION, SOLUTION INTRAMUSCULAR; INTRAVENOUS; SUBCUTANEOUS at 20:07

## 2022-05-30 RX ADMIN — DOXYCYCLINE SCH MLS/HR: 100 INJECTION, POWDER, LYOPHILIZED, FOR SOLUTION INTRAVENOUS at 06:00

## 2022-05-30 RX ADMIN — OXYCODONE AND ACETAMINOPHEN PRN TAB: 5; 325 TABLET ORAL at 09:05

## 2022-05-30 RX ADMIN — DOCUSATE SODIUM SCH MG: 100 CAPSULE, LIQUID FILLED ORAL at 22:09

## 2022-05-30 RX ADMIN — Medication SCH ML: at 11:16

## 2022-05-30 RX ADMIN — Medication SCH ML: at 22:10

## 2022-05-30 NOTE — PROGRESS NOTE
Assessment and Plan





This is a 36 year old  transgendered female undergoing gender transformation 

with silicone injection to buttocks ([place] ) and breast implants, heavy 

drinker on the weekends (bottle of vodka) who presented to [facility] on 5/3 because 

of adverse drug reaction after reportedly self administration of  

rocephin and PCN for sore throat. Patient reportedly collapsed while checking in

to the emergency department, has complains of severe 10 out of 10 pain in left 

thigh and has noticeable mottling of skin in thigh and left lower abdomen.  

Patient underwent a CT of the left leg which showed possible cellulitis with no 

drainable fluid collection and was started on empiric antibiotics.  Patient 

admitted to the hospital service with diagnosis of acute liver failure, 

rhabdomyolysis, s/p 4 compartment fasciotomy to left lower extremity.  Patient 

received antibiotics, wound care, wound VAC treatment and was planning to be 

discharged


However developed a wound infection, wound VAC was removed, medical personnel 

aggressively treating the patient.  Discharge was held





Hospital course to date:





5/3: Venous ultrasound shows no DVT, Vascular surgery consulted who performed a 

fasciotomy and plan to transfer patient to [facility] post intervention.  Neurology 

consulted





: Transfer to ICU, GI and neurology consulted.





: Patient will be transferred back to the floor from ICU.  Her liver enzymes 

and creatinine are trending down.  Will be started on gabapentin per neurology 

recommendations and antibiotics discontinued.





: Continue supportive care.  CPK slowly improving.  Vascular plans for 

closure of fasciotomy early next week.  PT OT evaluation and treatment closure 

done.  Continue wound management.  No evidence of withdrawal noted at this time.

 Continue to monitor LFTs which are also slightly improving.  Plan discussed in 

detail with the patient who verbalized understanding





: Patient seen and examined clinically stable continues to show some 

improvement.  Closure of fasciotomy planned for tomorrow.  Renal function is 

improving.  CK ordered and pending





: Patient seen and examined, wbc mildly elevated likely secondary to 

steroids, will change to po for two additional days and stop, benefit probably 

already achieved, continue wound dressing, sensory function improved, awaiting 

wound vac and closure. Patient advised on clinically progress. 





: Patient seen and examined awaiting her vascular surgery for closure of 

fasciotomy.  CK is decreasing less than 9000 today.  We will continue aggressive

IV fluids until we have the resolution below 5000.  LFTs on the downward trend 

also.  Leukocytosis appears to have peaked steroids was changed to oral for 2 

additional days today is 1 of 2..  Plan of care discussed in detail with the 

patient who verbalized understanding.





5/10:  Left lower extremity compartment syndrome status post fasciotomies.  

Cause is still unclear and may represent some allergic reaction.  Motor function

is unchanged from original exam.  Sensory function has improved with full 

sensation of the foot.vascular surgery to place wound vacs on the leg today.  CK

improved to the 8000 range today





: Patient underwent excisional debridement of muscle and soft tissue from 

left leg wound yesterday.  Patient also had wound VAC placement left leg 

fasciotomy incisions.  The patient had moderate amount of superficial necrotic 

muscle in the anterior lateral compartments with minimal amount of superficial 

necrotic muscle in the posterior compartment.  Follow-up culture results.  CK 

levels have improved to 6900 range.  AST/ALT continue to trend downward.





: I discussed the case with Dr. Mckeon who recommends home wound VAC as 

well as physical therapy.  I also discussed these needs with case management who

was attempting to arrange for discharge planning.  CK continues to improve with 

levels in the 4000 range.





: Still awaiting for home wound VAC arrangements to be made.  Leukocytosis 

is much improved down to 13,900 today.  LFTs continue to trend down to normal 

range.  CK levels still in the 4000 range.  Nephrology has signed off.  

Anticipate discharge later today or in a.m.





:  Still awaiting for home wound VAC arrangements to be made.  Laboratories 

continue to include including LFTs, CK and leukocytosis.





5/15:  Still awaiting for home wound VAC arrangements to be made.





: Patient had an episode of syncope while working with PT today.  Nurse 

reports patient lost consciousness for a few seconds and was helped back to the 

bed.  Patient's blood pressure was noted to be hypotensive/orthostatic.  We will

bolus 500 normal saline IV fluid and monitor orthostatics.  Case management 

reports that wound VAC should be delivered today.  LFTs continue to trend down 

to normal range.  CK levels still in the 2000 range.  Nephrology has signed off.

 Anticipate discharge in a.m. given the syncope and once wound VAC arranged.  

Patient with low-grade temp.  We will monitor.





; patient is anxious to go home, wound VAC is delivered to the bedside, 

continue wound care


Multiple social issues, DC planning per case management, left lower extremity 

Doppler negative for DVT





; discharge planning, multiple social issues





; continue current management, wound VAC in place, DC planning issues





; discharge planning per case management, setting up wound VAC, setting up 

outpatient wound care


Setting up follow-up visits with physicians, patient has multiple social issues 

and no payer source


Continue current management discharge when medically stable





; patient's LFTs are trending down, continue current management


Wound VAC care, wound VAC change per instructions, wound care as indicated


Discharge planning per case management





; patient is medically stable for discharge


Awaiting discharge planning coordinating with wound VAC and wound care


Post discharge follow-up visit with vascular Dr. Vikas Laboy on 2020 at 10 AM





; vascular /IR Dr. Cantrell evaluation and recommendations noted and appreciated


Patient's wound had some peculiar infection smell, started on doxycycline


And recommended aggressive wound care.





; iron  talk to Dr. Cantrell regarding the treatment and discharge 

planning


Dr. Cantrell informed that wound care should follow regularly and aggressively 

manage the wounds


And once the wounds show significant healing wound VAC can be placed and patient

may be discharged home with home health.  Outpatient wound clinic 3 times a week

and follow-up with vascular IR per schedule





: Continue dressing change per wound care.  Patient initiated on 

doxycycline, surgical culture is growing bacillus.  Discharge planning per 

vascular recommendation once wound VAC is placed.  Continue supportive care. 





: Continue regular wound care, continue empiric antibiotic, wait on culture 

sensitivity.  Discharge planning per vascular surgery. 





: continue empiric antibiotic, wait on culture sensitivity.  Discharge 

planning per vascular surgery. Continue regular wound care,





: Continue empiric antibiotics, wait for better wound healing to place wound

VAC, plan to DC patient once wound VAC will be placed. consult surgery for 

debridement.





: Discussed with Dr. Sanchez yesterday, if patient need any debridement 

possibly will be done on next week Tuesday.  Continue wound care And antibiotics





: Noted General surgery recommendation, patient wound is too large and 

deeply exposed to place on wound VAC.  General surgery recommending regular 

wound care without wound VAC.  Will await for vascular recommendation for 

discharge planning.





Assessment and plan;


--Compartment syndrome to LLE


-Vascular surgery consulted, appreciate recommendations


-s/p self admin of IM abx to midthigh


-c/o pain, decreased sensation, pulses were dopplarable, paralysis, mottled skin


-s/p 4 compartment tracheotomy on 5/3 with vascular surgery


-Bilateral lower extremity Doppler ultrasound showed no DVT


-Left lower extremity CT with contrast showed appearance of posterior soft 

tissue complex 


cellulitis with small subcutaneous lymph nodes, no drainable fluid collections 

present, contusion 


could have similar appearance


-Bilateral lower extremity arterial duplex showed no significant lower extremity

peripheral artery disease


-Normal ABIs





Patient received wound VAC, with aggressive management and frequent changing.  

And with supportive care


Patient was being prepared to discharge with home health and outpatient wound 

care 3 times a week.





However patient's wound got significantly infected, wound VAC was removed, wound

care personal consulted and they are aggressively treating the wounds.


Once the wound infection improves, patient may receive wound VAC and then 

patient may be discharged home with follow-up visit with Dr. Vikas Laboy


Outpatient wound care 3 times a week





--Peripheral neuropathy


Continue gabapentin


Neurology evaluated





--HTN


Moderate control, continue current antihypertensives


As needed medications





--Rhabdomyolysis;


Improved from 95,000 CK -1001 week ago


Closely monitor IV hydration





--acute kidney injury with vasomotor nephropathy now resolved


Monitor renal function


Avoid nephrotoxin. gentle hydration as needed








Patient is not ready for discharge at this point.


Plan of care reviewed in detail with the patient and her nurse





Disposition; continue aggressive wound care, discharge pending on GS and 

vascular clearance














Subjective


Date of service: 22


Principal diagnosis: abnormal liver enzymes


Interval history: 





Patient seen and examined.  Medical records and medication list reviewed.  


No acute event overnight noted by the RN.  


Patient denies any chest pain or difficulty breathing.  Patient is tolerating 

diet.  


Discussed plan of care at bedside with patient.








Objective





- Exam


Narrative Exam: 





General appearance: Present: mild distress, well-nourished, obese





- EENT


Eyes: Present: PERRL, EOM intact


ENT: clear oral mucosa





- Neck


Neck: Present: supple, normal ROM





- Respiratory


Respiratory effort: normal


Respiratory: bilateral: diminished, rhonchi, negative: rales, wheezing





- Cardiovascular


Rhythm: regular


Heart Sounds: Present: S1 & S2





- Extremities


Extremities: no ischemia, No edema, abnormal (Left lower extremity, dressing in 

place, elevated)





- Abdominal


General gastrointestinal: soft, non-tender, non-distended, normal bowel sounds





- Integumentary


Integumentary: Present: clear, warm





- Psychiatric


Psychiatric: appropriate mood/affect, cooperative





- Neurologic


Neurologic: CNII-XII intact, moves all extremities





- Constitutional


Vitals: 


                               Vital Signs - 12hr











  22





  06:11 07:59 11:05


 


Temperature 98.9 F  99.0 F


 


Pulse Rate 90  93 H


 


Respiratory 18  16





Rate   


 


Blood Pressure   111/67


 


Blood Pressure 109/54  





[Right]   


 


O2 Sat by Pulse 95 98 94





Oximetry   














- Labs


CBC & Chem 7: 


                                 22 04:25





                                 22 04:25


Labs: 


                              Abnormal lab results











  22 Range/Units





  04:25 04:25 


 


RBC  3.48 L   (3.65-5.03)  M/mm3


 


Hgb  11.4 L   (11.8-15.2)  gm/dl


 


Hct  32.9 L   (35.5-45.6)  %


 


MCV  95 H   (84-94)  fl


 


MCH  33 H   (28-32)  pg


 


MCHC  35 H   (32-34)  %


 


Plt Count  444 H   (140-440)  K/mm3


 


Lymph % (Auto)  37.8 H   (13.4-35.0)  %


 


Mono % (Auto)  13.4 H   (0.0-7.3)  %


 


BUN   8 L  (9-20)  mg/dL


 


Creatinine   0.6 L  (0.8-1.3)  mg/dL


 


Glucose   105 H  ()  mg/dL

## 2022-05-31 RX ADMIN — Medication SCH 1000UNITS: at 10:00

## 2022-05-31 RX ADMIN — HYDROMORPHONE HYDROCHLORIDE PRN MG: 1 INJECTION, SOLUTION INTRAMUSCULAR; INTRAVENOUS; SUBCUTANEOUS at 09:26

## 2022-05-31 RX ADMIN — Medication SCH ML: at 21:46

## 2022-05-31 RX ADMIN — DOCUSATE SODIUM SCH: 100 CAPSULE, LIQUID FILLED ORAL at 21:46

## 2022-05-31 RX ADMIN — Medication SCH ML: at 09:16

## 2022-05-31 RX ADMIN — FAMOTIDINE SCH MG: 20 TABLET ORAL at 09:19

## 2022-05-31 RX ADMIN — HYDROMORPHONE HYDROCHLORIDE PRN MG: 1 INJECTION, SOLUTION INTRAMUSCULAR; INTRAVENOUS; SUBCUTANEOUS at 02:20

## 2022-05-31 RX ADMIN — FAMOTIDINE SCH MG: 20 TABLET ORAL at 21:45

## 2022-05-31 RX ADMIN — VALSARTAN SCH: 160 TABLET, FILM COATED ORAL at 09:18

## 2022-05-31 RX ADMIN — GABAPENTIN SCH MG: 300 CAPSULE ORAL at 21:45

## 2022-05-31 RX ADMIN — GABAPENTIN SCH MG: 300 CAPSULE ORAL at 08:00

## 2022-05-31 RX ADMIN — HEPARIN SODIUM SCH UNIT: 5000 INJECTION, SOLUTION INTRAVENOUS; SUBCUTANEOUS at 21:46

## 2022-05-31 RX ADMIN — HYDROMORPHONE HYDROCHLORIDE PRN MG: 1 INJECTION, SOLUTION INTRAMUSCULAR; INTRAVENOUS; SUBCUTANEOUS at 21:47

## 2022-05-31 RX ADMIN — DOCUSATE SODIUM SCH MG: 100 CAPSULE, LIQUID FILLED ORAL at 09:18

## 2022-05-31 RX ADMIN — GABAPENTIN SCH MG: 300 CAPSULE ORAL at 16:01

## 2022-05-31 RX ADMIN — HEPARIN SODIUM SCH UNIT: 5000 INJECTION, SOLUTION INTRAVENOUS; SUBCUTANEOUS at 09:19

## 2022-05-31 RX ADMIN — HYDROMORPHONE HYDROCHLORIDE PRN MG: 1 INJECTION, SOLUTION INTRAMUSCULAR; INTRAVENOUS; SUBCUTANEOUS at 12:54

## 2022-05-31 NOTE — PROGRESS NOTE
Assessment and Plan


Assessment and plan: 


This is a 36 year old  transgendered female undergoing gender transformation 

with silicone injection to buttocks ([place] ) and breast implants, heavy 

drinker on the weekends (bottle of vodka) who presented to [facility] on 5/3 because 

of adverse drug reaction after reportedly self administration of  

rocephin and PCN for sore throat. Patient reportedly collapsed while checking in

to the emergency department, has complains of severe 10 out of 10 pain in left 

thigh and has noticeable mottling of skin in thigh and left lower abdomen.  

Patient underwent a CT of the left leg which showed possible cellulitis with no 

drainable fluid collection and was started on empiric antibiotics.  Patient 

admitted to the hospital service with diagnosis of acute liver failure, 

rhabdomyolysis, s/p 4 compartment fasciotomy to left lower extremity.  Patient 

received antibiotics, wound care, wound VAC treatment and was planning to be 

discharged


However developed a wound infection, wound VAC was removed, medical personnel 

aggressively treating the patient.  Discharge was held





Hospital course to date:





5/3: Venous ultrasound shows no DVT, Vascular surgery consulted who performed a 

fasciotomy and plan to transfer patient to [facility] post intervention.  Neurology 

consulted





: Transfer to ICU, GI and neurology consulted.





: Patient will be transferred back to the floor from ICU.  Her liver enzymes 

and creatinine are trending down.  Will be started on gabapentin per neurology 

recommendations and antibiotics discontinued.





: Continue supportive care.  CPK slowly improving.  Vascular plans for 

closure of fasciotomy early next week.  PT OT evaluation and treatment closure 

done.  Continue wound management.  No evidence of withdrawal noted at this time.

 Continue to monitor LFTs which are also slightly improving.  Plan discussed in 

detail with the patient who verbalized understanding





: Patient seen and examined clinically stable continues to show some 

improvement.  Closure of fasciotomy planned for tomorrow.  Renal function is 

improving.  CK ordered and pending





: Patient seen and examined, wbc mildly elevated likely secondary to 

steroids, will change to po for two additional days and stop, benefit probably 

already achieved, continue wound dressing, sensory function improved, awaiting 

wound vac and closure. Patient advised on clinically progress. 





: Patient seen and examined awaiting her vascular surgery for closure of 

fasciotomy.  CK is decreasing less than 9000 today.  We will continue aggressive

IV fluids until we have the resolution below 5000.  LFTs on the downward trend 

also.  Leukocytosis appears to have peaked steroids was changed to oral for 2 

additional days today is 1 of 2..  Plan of care discussed in detail with the 

patient who verbalized understanding.





5/10:  Left lower extremity compartment syndrome status post fasciotomies.  Ca

use is still unclear and may represent some allergic reaction.  Motor function 

is unchanged from original exam.  Sensory function has improved with full 

sensation of the foot.vascular surgery to place wound vacs on the leg today.  CK

improved to the 8000 range today





: Patient underwent excisional debridement of muscle and soft tissue from 

left leg wound yesterday.  Patient also had wound VAC placement left leg 

fasciotomy incisions.  The patient had moderate amount of superficial necrotic 

muscle in the anterior lateral compartments with minimal amount of superficial 

necrotic muscle in the posterior compartment.  Follow-up culture results.  CK 

levels have improved to 6900 range.  AST/ALT continue to trend downward.





: I discussed the case with Dr. Mckeon who recommends home wound VAC as 

well as physical therapy.  I also discussed these needs with case management who

was attempting to arrange for discharge planning.  CK continues to improve with 

levels in the 4000 range.





: Still awaiting for home wound VAC arrangements to be made.  Leukocytosis 

is much improved down to 13,900 today.  LFTs continue to trend down to normal 

range.  CK levels still in the 4000 range.  Nephrology has signed off.  

Anticipate discharge later today or in a.m.





:  Still awaiting for home wound VAC arrangements to be made.  Laboratories 

continue to include including LFTs, CK and leukocytosis.





5/15:  Still awaiting for home wound VAC arrangements to be made.





: Patient had an episode of syncope while working with PT today.  Nurse 

reports patient lost consciousness for a few seconds and was helped back to the 

bed.  Patient's blood pressure was noted to be hypotensive/orthostatic.  We will

bolus 500 normal saline IV fluid and monitor orthostatics.  Case management 

reports that wound VAC should be delivered today.  LFTs continue to trend down 

to normal range.  CK levels still in the 2000 range.  Nephrology has signed off.

 Anticipate discharge in a.m. given the syncope and once wound VAC arranged.  

Patient with low-grade temp.  We will monitor.





; patient is anxious to go home, wound VAC is delivered to the bedside, 

continue wound care


Multiple social issues, DC planning per case management, left lower extremity 

Doppler negative for DVT





; discharge planning, multiple social issues





; continue current management, wound VAC in place, DC planning issues





; discharge planning per case management, setting up wound VAC, setting up 

outpatient wound care


Setting up follow-up visits with physicians, patient has multiple social issues 

and no payer source


Continue current management discharge when medically stable





; patient's LFTs are trending down, continue current management


Wound VAC care, wound VAC change per instructions, wound care as indicated


Discharge planning per case management





; patient is medically stable for discharge


Awaiting discharge planning coordinating with wound VAC and wound care


Post discharge follow-up visit with vascular Dr. Vikas Laboy on 2020 at 10 AM





; vascular /IR Dr. Cantrell evaluation and recommendations noted and appreciated


Patient's wound had some peculiar infection smell, started on doxycycline


And recommended aggressive wound care.





; iron  talk to Dr. Cantrell regarding the treatment and discharge 

planning


Dr. Cantrell informed that wound care should follow regularly and aggressively 

manage the wounds


And once the wounds show significant healing wound VAC can be placed and patient

may be discharged home with home health.  Outpatient wound clinic 3 times a week

and follow-up with vascular IR per schedule





: Continue dressing change per wound care.  Patient initiated on doxyc

ycline, surgical culture is growing bacillus.  Discharge planning per vascular 

recommendation once wound VAC is placed.  Continue supportive care. 





: Continue regular wound care, continue empiric antibiotic, wait on culture 

sensitivity.  Discharge planning per vascular surgery. 





: continue empiric antibiotic, wait on culture sensitivity.  Discharge 

planning per vascular surgery. Continue regular wound care,





: Continue empiric antibiotics, wait for better wound healing to place wound

VAC, plan to DC patient once wound VAC will be placed. consult surgery for 

debridement.





: Discussed with Dr. Sanchez yesterday, if patient need any debridement 

possibly will be done on next week Tuesday.  Continue wound care And antibiotics





: Noted General surgery recommendation, patient wound is too large and 

deeply exposed to place on wound VAC.  General surgery recommending regular 

wound care without wound VAC.  Will await for vascular recommendation for 

discharge planning.





; DC planning per case management, per vascular and general surgeon


Multiple social issues





Assessment and plan;


--Compartment syndrome to LLE


-Vascular surgery consulted, appreciate recommendations


-s/p self admin of IM abx to [facility]


-c/o pain, decreased sensation, pulses were dopplarable, paralysis, mottled skin


-s/p 4 compartment tracheotomy on 5/3 with vascular surgery


-Bilateral lower extremity Doppler ultrasound showed no DVT


-Left lower extremity CT with contrast showed appearance of posterior soft 

tissue complex 


cellulitis with small subcutaneous lymph nodes, no drainable fluid collections 

present, contusion 


could have similar appearance


-Bilateral lower extremity arterial duplex showed no significant lower extremity

peripheral artery disease


-Normal ABIs





Patient received wound VAC, with aggressive management and frequent changing.  

And with supportive care


Patient was being prepared to discharge with home health and outpatient wound 

care 3 times a week.





However patient's wound got significantly infected, wound VAC was removed, wound

care personal consulted and they are aggressively treating the wounds.


Once the wound infection improves, patient may receive wound VAC and then 

patient may be discharged home with follow-up visit with Dr. Vikas Laboy


Outpatient wound care 3 times a week





--Peripheral neuropathy


Continue gabapentin


Neurology evaluated





--HTN


Moderate control, continue current antihypertensives


As needed medications





--Rhabdomyolysis;


Improved from 95,000 CK -1001 week ago


Closely monitor IV hydration





--acute kidney injury with vasomotor nephropathy now resolved


Monitor renal function


Avoid nephrotoxin. gentle hydration as needed








Patient is not ready for discharge at this point.


Plan of care reviewed in detail with the patient and her nurse





Disposition; continue aggressive wound care, discharge pending on GS and vas

cular clearance

















History


Interval history: 


I have seen and examined the patient at the bedside


Patient's chart and medications reviewed


Patient feels slightly better


However wants to go home


Complains of some pain in the leg


Vital signs noted








Hospitalist Physical





- Constitutional


Vitals: 


                                        











Temp Pulse Resp BP Pulse Ox


 


 98.9 F   91 H  16   103/62   94 


 


 22 04:46  22 04:46  22 04:46  22 04:46  22 04:46











General appearance: Present: no acute distress, well-nourished, obese





- EENT


Eyes: Present: PERRL, EOM intact





- Neck


Neck: Present: supple, normal ROM





- Respiratory


Respiratory effort: normal


Respiratory: bilateral: diminished, negative: rales, rhonchi, wheezing





- Cardiovascular


Rhythm: regular


Heart Sounds: Present: S1 & S2





- Extremities


Extremities: abnormal (Left lower extremity in dressing and elevated)





- Abdominal


General gastrointestinal: soft, non-tender, non-distended, normal bowel sounds





- Integumentary


Integumentary: Present: clear, warm





- Psychiatric


Psychiatric: appropriate mood/affect, cooperative





- Neurologic


Neurologic: CNII-XII intact, moves all extremities





Results





- Labs


CBC & Chem 7: 


                                 22 04:25





                                 22 04:25


Labs: 


                             Laboratory Last Values











WBC  5.8 K/mm3 (4.5-11.0)   22  04:25    


 


RBC  3.48 M/mm3 (3.65-5.03)  L  22  04:25    


 


Hgb  11.4 gm/dl (11.8-15.2)  L  22  04:25    


 


Hct  32.9 % (35.5-45.6)  L  22  04:25    


 


MCV  95 fl (84-94)  H  22  04:25    


 


MCH  33 pg (28-32)  H  22  04:25    


 


MCHC  35 % (32-34)  H  22  04:25    


 


RDW  13.4 % (13.2-15.2)   22  04:25    


 


Plt Count  444 K/mm3 (140-440)  H  22  04:25    


 


Lymph % (Auto)  37.8 % (13.4-35.0)  H  22  04:25    


 


Mono % (Auto)  13.4 % (0.0-7.3)  H  22  04:25    


 


Eos % (Auto)  1.9 % (0.0-4.3)   22  04:25    


 


Baso % (Auto)  0.3 % (0.0-1.8)   22  04:25    


 


Lymph # (Auto)  2.2 K/mm3 (1.2-5.4)   22  04:25    


 


Mono # (Auto)  0.8 K/mm3 (0.0-0.8)   22  04:25    


 


Eos # (Auto)  0.1 K/mm3 (0.0-0.4)   22  04:25    


 


Baso # (Auto)  0.0 K/mm3 (0.0-0.1)   22  04:25    


 


Add Manual Diff  Complete   22  04:49    


 


Total Counted  100   22  04:49    


 


Seg Neutrophils %  46.6 % (40.0-70.0)   22  04:25    


 


Seg Neuts % (Manual)  83.0 % (40.0-70.0)  H  22  04:49    


 


Band Neutrophils %  2.0 %  22  04:49    


 


Lymphocytes % (Manual)  5.0 % (13.4-35.0)  L  22  04:49    


 


Reactive Lymphs % (Man)  0 %  22  04:49    


 


Monocytes % (Manual)  8.0 % (0.0-7.3)  H  22  04:49    


 


Eosinophils % (Manual)  0 % (0.0-4.3)   22  04:49    


 


Basophils % (Manual)  0 % (0.0-1.8)   22  04:49    


 


Metamyelocytes %  2.0 %  22  04:49    


 


Myelocytes %  0 %  22  04:49    


 


Promyelocytes %  0 %  22  04:49    


 


Blast Cells %  0 %  22  04:49    


 


Nucleated RBC %  Not Reportable   22  04:49    


 


Seg Neutrophils #  2.7 K/mm3 (1.8-7.7)   22  04:25    


 


Seg Neutrophils # Man  17.1 K/mm3 (1.8-7.7)  H  22  04:49    


 


Band Neutrophils #  0.4 K/mm3  22  04:49    


 


Lymphocytes # (Manual)  1.0 K/mm3 (1.2-5.4)  L  22  04:49    


 


Abs React Lymphs (Man)  0.0 K/mm3  22  04:49    


 


Monocytes # (Manual)  1.6 K/mm3 (0.0-0.8)  H  22  04:49    


 


Eosinophils # (Manual)  0.0 K/mm3 (0.0-0.4)   22  04:49    


 


Basophils # (Manual)  0.0 K/mm3 (0.0-0.1)   22  04:49    


 


Metamyelocytes #  0.4 K/mm3  22  04:49    


 


Myelocytes #  0.0 K/mm3  22  04:49    


 


Promyelocytes #  0.0 K/mm3  22  04:49    


 


Blast Cells #  0.0 K/mm3  22  04:49    


 


WBC Morphology  Not Reportable   22  04:49    


 


Hypersegmented Neuts  Not Reportable   22  04:49    


 


Hyposegmented Neuts  Rare   22  04:49    


 


Hypogranular Neuts  Not Reportable   22  04:49    


 


Smudge Cells  Not Reportable   22  04:49    


 


Toxic Granulation  Not Reportable   22  04:49    


 


Toxic Vacuolation  Not Reportable   22  04:49    


 


Dohle Bodies  Not Reportable   22  04:49    


 


Pelger-Huet Anomaly  Not Reportable   22  04:49    


 


Jamie Rods  Not Reportable   22  04:49    


 


Platelet Estimate  Consistent w auto   22  04:49    


 


Clumped Platelets  Not Reportable   22  04:49    


 


Plt Clumps, EDTA  Not Reportable   22  04:49    


 


Large Platelets  Rare   22  04:49    


 


Giant Platelets  Not Reportable   22  04:49    


 


Platelet Satelliting  Not Reportable   22  04:49    


 


Plt Morphology Comment  Not Reportable   22  04:49    


 


RBC Morphology  Not Reportable   22  04:49    


 


Dimorphic RBCs  Not Reportable   22  04:49    


 


Polychromasia  Not Reportable   22  04:49    


 


Hypochromasia  Not Reportable   22  04:49    


 


Poikilocytosis  Not Reportable   22  04:49    


 


Anisocytosis  Rare   22  04:49    


 


Microcytosis  Not Reportable   22  04:49    


 


Macrocytosis  Rare   22  04:49    


 


Spherocytes  Not Reportable   22  04:49    


 


Pappenheimer Bodies  Not Reportable   22  04:49    


 


Sickle Cells  Not Reportable   22  04:49    


 


Target Cells  Not Reportable   22  04:49    


 


Tear Drop Cells  Not Reportable   22  04:49    


 


Ovalocytes  Not Reportable   22  04:49    


 


Helmet Cells  Not Reportable   22  04:49    


 


Mccormick-Blenheim Bodies  Not Reportable   22  04:49    


 


Cabot Rings  Not Reportable   22  04:49    


 


Cornel Cells  Not Reportable   22  04:49    


 


Bite Cells  Not Reportable   22  04:49    


 


Crenated Cell  Not Reportable   22  04:49    


 


Elliptocytes  Not Reportable   22  04:49    


 


Acanthocytes (Spur)  Not Reportable   22  04:49    


 


Rouleaux  Not Reportable   22  04:49    


 


Hemoglobin C Crystals  Not Reportable   22  04:49    


 


Schistocytes  Not Reportable   22  04:49    


 


Malaria parasites  Not Reportable   22  04:49    


 


Miguel Bodies  Not Reportable   22  04:49    


 


Hem Pathologist Commnt  No   22  04:49    


 


PT  13.4 Sec. (12.2-14.9)   22  13:30    


 


INR  0.92  (0.87-1.13)   22  13:30    


 


APTT  25.8 Sec. (24.2-36.6)   22  13:30    


 


Sodium  137 mmol/L (137-145)   22  04:25    


 


Potassium  4.1 mmol/L (3.6-5.0)   22  04:25    


 


Chloride  100.1 mmol/L ()   22  04:25    


 


Carbon Dioxide  25 mmol/L (22-30)   22  04:25    


 


Anion Gap  16 mmol/L  22  04:25    


 


BUN  8 mg/dL (9-20)  L  22  04:25    


 


Creatinine  0.6 mg/dL (0.8-1.3)  L  22  04:25    


 


Estimated GFR  > 60 ml/min  22  04:25    


 


BUN/Creatinine Ratio  13 %  22  04:25    


 


Glucose  105 mg/dL ()  H  22  04:25    


 


Lactic Acid  1.40 mmol/L (0.7-2.0)   22  13:30    


 


Calcium  9.3 mg/dL (8.4-10.2)   22  04:25    


 


Phosphorus  2.80 mg/dL (2.5-4.5)   22  05:17    


 


Magnesium  1.80 mg/dL (1.7-2.3)   22  06:06    


 


Total Bilirubin  0.50 mg/dL (0.1-1.2)   22  06:06    


 


Direct Bilirubin  < 0.2 mg/dL (0-0.2)   22  04:00    


 


Indirect Bilirubin  0.3 mg/dL  22  04:00    


 


AST  29 units/L (5-40)   22  06:06    


 


ALT  55 units/L (7-56)   22  06:06    


 


Alkaline Phosphatase  81 units/L ()   22  06:06    


 


Ammonia  37.0 umol/L (25-60)   22  04:49    


 


Total Creatine Kinase  1001 units/L ()  H  22  08:55    


 


Total Protein  6.4 g/dL (6.3-8.2)   22  06:06    


 


Albumin  2.9 g/dL (3.9-5)  L  22  06:06    


 


Albumin/Globulin Ratio  0.8 %  22  06:06    


 


Amylase  25 units/L ()  L  22  04:49    


 


Lipase  10 units/L (13-60)  L  22  04:49    


 


Coronavirus (PCR)  Negative  (Negative)   22  11:40    


 


Hepatitis A IgM Ab  Nonreactive  (NonReactive)   22  13:30    


 


Hep Bs Antigen  Non-reactive  (Negative)   22  13:30    


 


Hep B Core IgM Ab  Non-reactive  (NonReactive)   22  13:30    


 


Hepatitis C Antibody  Non-reactive  (NonReactive)   22  13:30    


 


HIV 1&2 Antibody Rapid  Non react  (Non React)   22  16:42    


 


HIV P24 Antigen  Non react  (Non React)   22  16:42    











Mcwilliams/IV: 


                                        





Voiding Method                   Urinal











Active Medications





- Current Medications


Current Medications: 














Generic Name Dose Route Start Last Admin





  Trade Name Freq  PRN Reason Stop Dose Admin


 


Acetaminophen  650 mg  22 22:14  22 01:36





  Acetaminophen 325 Mg Tab  PO   650 mg





  Q6H PRN   Administration





  Pain, Mild (1-3)  


 


Albuterol  2.5 mg  22 06:15 





  Albuterol 2.5 Mg/3 Ml Nebu  IH  





  Q3HRT PRN  





  Shortness Of Breath  


 


Diphenhydramine HCl  25 mg  22 06:24  22 18:40





  Diphenhydramine 50 Mg/Ml Vial  IV   25 mg





  Q6H PRN   Administration





  Skin Irritation  


 


Docusate Sodium  100 mg  22 22:00  22 22:09





  Docusate Sodium 100 Mg Cap  PO   100 mg





  BID CHERYL   Administration


 


Famotidine  20 mg  22 10:00  22 22:09





  Famotidine 20 Mg Tab  PO   20 mg





  BID CHERYL   Administration


 


Gabapentin  300 mg  22 08:00  22 22:09





  Gabapentin 300 Mg Cap  PO   300 mg





  TID@0800,1600,2200 CHERYL   Administration


 


Heparin Sodium (Porcine)  5,000 unit  22 10:00  22 22:09





  Heparin 5,000 Unit/1 Ml Vial  SUB-Q   5,000 unit





  Q12HR CHERYL   Administration


 


Hydralazine HCl  10 mg  22 18:31  22 18:50





  Hydralazine 20 Mg/1 Ml Inj  IV   10 mg





  Q3HR PRN   Administration





  Hypertension  


 


Hydromorphone HCl  0.5 mg  22 06:15  22 02:20





  Hydromorphone 1 Mg/1 Ml Inj  IV   0.5 mg





  Q3H PRN   Administration





  Pain , Severe (7-10)  


 


Ondansetron HCl  4 mg  22 06:15  22 22:29





  Ondansetron 4 Mg/2 Ml Inj  IV   4 mg





  Q8H PRN   Administration





  Nausea And Vomiting  


 


Oxycodone/Acetaminophen  1 tab  22 10:57  22 09:05





  Oxycodone /Acetaminophen 5-325mg Tab  PO   1 tab





  Q6H PRN   Administration





  Pain, Moderate (4-6)  


 


Sodium Chloride  10 ml  22 10:00  22 22:10





  Sodium Chloride 0.9% 10 Ml Flush Syringe  IV   10 ml





  BID CHERYL   Administration


 


Sodium Chloride  10 ml  22 06:15  22 01:33





  Sodium Chloride 0.9% 10 Ml Flush Syringe  IV   10 ml





  PRN PRN   Administration





  LINE FLUSH  


 


Sodium Chloride  500 ml  22 19:10  22 19:32





  Sodium Chloride 0.9% Irr 500 Ml Bottle  IR   500 ml





  AS DIRECT PRN   Administration





  Wound Care  


 


Sodium Hypochlorite  1 applic  22 13:00  22 11:16





  Sodium Hypochlorite, Dakin's Full Strength (0.5%) 473 Ml Topical Soln  TP   1 

1000units





  DAILY CHERYL   Administration


 


Valsartan  160 mg  22 22:00  22 11:17





  Valsartan 160mg Tab  PO   160 mg





  DAILY CHERYL   Administration














Nutrition/Malnutrition Assess





- Dietary Evaluation


Nutrition/Malnutrition Findings: 


                                        





Nutrition Notes                                            Start:  05/10/22 

16:45


Freq:                                                      Status: Active       




Protocol:                                                                       




 Document     22 14:21  HATTIE  (Rec: 22 14:46  HATTIE  [id number])


 Nutrition Notes


     Initial or Follow up                        Reassessment


     Current Diagnosis                           Hypertension


     Other Pertinent Diagnosis                   Rabdomyolysis, Liver Failure,


                                                 s/p L-LE 4 Compartment


                                                 Fasciotomy w/Debridem.


     Current Diet                                Regular Diet + Dietary


                                                 Supplements. (since B ).


     Labs/Tests                                  : BUN 5, Crea 0.7.


     Pertinent Medications                       : Nutritionally


                                                 unremarkable.


     Height                                      5 ft 6 in


     Weight                                      86.183 kg


     Ideal Body Weight (kg)                      64.54


     BMI                                         30.7


     Weight change and time frame                No body weight change


                                                 reporterd in 9 days.


     Weight Status                               Obese


     Subjective/Other Information                RD consult for routine F/U on


                                                 dietary advancement.


                                                 Pt's PO intake of meals has


                                                 been Good (100%), according to


                                                 ADL notes.


                                                 Pt is on Room Air, O2


                                                 saturation @ 96%, according to


                                                 Physical Assessment History


                                                 notes.


                                                 Pt presents L-LE Pitting Edema


                                                 2+, according to Physical


                                                 Assessment History notes.


                                                 Pt presents L-LE Vascular


                                                 Wound with WoundVAC since , according to Physical


                                                 Assessment History notes.


                                                 Event on , Pt suffered a


                                                 syncope during PT session,


                                                 according to Progress notes.


                                                 Pt is clearedd for discharge,


                                                 it is pending due to several


                                                 social issues, according to


                                                 Progress notes.


     Percent of energy/protein needs met:        Prescribed Regular Diet


                                                 provides for energy/protein


                                                 needs (2,289 Kcal/89 g) during


                                                 LOS


     Burn                                        Absent


     Trauma                                      Absent


     GI Symptoms                                 None


     Food Allergy                                No


     Skin Integrity/Comment                      L-LE Vascular Wound


     Current % PO                                Good (%)


     Minimum of two criteria                     No


     #1


      Nutrition Diagnosis                        Increased nutrient needs   (


                                                 specify in comment below)


      Comments:                                  Protein to support wound


                                                 healing processes.


      Diagnosis Progress(for reassessment        Continues


       documentation)                            


     Is patient on ventilator?                   No


     Is Patient Ambulatory and/or Out of Bed     No


     REE-([place] [facility]-confined to bed)      2083.920


     Kcal/Kg value to use for calculation        19


     Approximate Energy Requirements Using       1637


      kcal/Kg                                    


     Calculation Used for Recommendations        Kcal/kg


     Additional Notes                            Protein: 1.25-1.5 g/Kg AdjBW;


                                                  g/day.


                                                 Fluids: 1 ml/Kcal, or as per


                                                 MD.


 Nutrition Intervention


     Change Diet Order:                          Continue Regular Diet.


     Add Supplement/Snack (indicate name/kcal    Continue 28.8 g pkt Aidan; BID


      /protein )                                 .


     Provides kCal:                              190


     Provides Protein (gm)                       5


     Goal #1                                     Support, through dietary


                                                 supplementation, wound healing


                                                 processes during LOS.


     Goal #2                                     Adjust the dietary


                                                 intervention to better serve


                                                 Pt's needs and clinical


                                                 conditions during LOS.


     Goal #3                                     Maintain body weight within +/


                                                 -3% of admission body weight


                                                 during LOS.


     Revisit per MD consult or patient           Sign Off


      request:                                   


     Additional Comments                         Continue monitoring food


                                                 tolerance, %PO intake of meals


                                                 , and BM.

## 2022-06-01 RX ADMIN — GABAPENTIN SCH MG: 300 CAPSULE ORAL at 08:48

## 2022-06-01 RX ADMIN — HYDROMORPHONE HYDROCHLORIDE PRN MG: 1 INJECTION, SOLUTION INTRAMUSCULAR; INTRAVENOUS; SUBCUTANEOUS at 16:03

## 2022-06-01 RX ADMIN — OXYCODONE AND ACETAMINOPHEN PRN TAB: 5; 325 TABLET ORAL at 01:30

## 2022-06-01 RX ADMIN — Medication SCH ML: at 11:51

## 2022-06-01 RX ADMIN — FAMOTIDINE SCH MG: 20 TABLET ORAL at 21:31

## 2022-06-01 RX ADMIN — Medication SCH ML: at 21:32

## 2022-06-01 RX ADMIN — Medication SCH 1000UNITS: at 16:26

## 2022-06-01 RX ADMIN — HYDROMORPHONE HYDROCHLORIDE PRN MG: 1 INJECTION, SOLUTION INTRAMUSCULAR; INTRAVENOUS; SUBCUTANEOUS at 08:54

## 2022-06-01 RX ADMIN — HEPARIN SODIUM SCH UNIT: 5000 INJECTION, SOLUTION INTRAVENOUS; SUBCUTANEOUS at 21:32

## 2022-06-01 RX ADMIN — DOCUSATE SODIUM SCH MG: 100 CAPSULE, LIQUID FILLED ORAL at 11:49

## 2022-06-01 RX ADMIN — GABAPENTIN SCH MG: 300 CAPSULE ORAL at 16:03

## 2022-06-01 RX ADMIN — HYDROMORPHONE HYDROCHLORIDE PRN MG: 1 INJECTION, SOLUTION INTRAMUSCULAR; INTRAVENOUS; SUBCUTANEOUS at 21:39

## 2022-06-01 RX ADMIN — Medication SCH ML: at 08:57

## 2022-06-01 RX ADMIN — VALSARTAN SCH: 160 TABLET, FILM COATED ORAL at 11:42

## 2022-06-01 RX ADMIN — GABAPENTIN SCH MG: 300 CAPSULE ORAL at 21:31

## 2022-06-01 RX ADMIN — FAMOTIDINE SCH MG: 20 TABLET ORAL at 11:52

## 2022-06-01 RX ADMIN — DOCUSATE SODIUM SCH MG: 100 CAPSULE, LIQUID FILLED ORAL at 21:31

## 2022-06-01 RX ADMIN — HEPARIN SODIUM SCH UNIT: 5000 INJECTION, SOLUTION INTRAVENOUS; SUBCUTANEOUS at 11:49

## 2022-06-01 NOTE — PROGRESS NOTE
Assessment and Plan


Assessment and plan: 


This is a 36 year old  transgendered female undergoing gender transformation 

with silicone injection to buttocks ([place] ) and breast implants, heavy 

drinker on the weekends (bottle of vodka) who presented to [facility] on 5/3 because 

of adverse drug reaction after reportedly self administration of  

rocephin and PCN for sore throat. Patient reportedly collapsed while checking in

to the emergency department, has complains of severe 10 out of 10 pain in left 

thigh and has noticeable mottling of skin in thigh and left lower abdomen.  

Patient underwent a CT of the left leg which showed possible cellulitis with no 

drainable fluid collection and was started on empiric antibiotics.  Patient 

admitted to the hospital service with diagnosis of acute liver failure, 

rhabdomyolysis, s/p 4 compartment fasciotomy to left lower extremity.  Patient 

received antibiotics, wound care, wound VAC treatment and was planning to be 

discharged


However developed a wound infection, wound VAC was removed, medical personnel 

aggressively treating the patient.  Discharge was held





Hospital course to date:





5/3: Venous ultrasound shows no DVT, Vascular surgery consulted who performed a 

fasciotomy and plan to transfer patient to [facility] post intervention.  Neurology 

consulted





: Transfer to ICU, GI and neurology consulted.





: Patient will be transferred back to the floor from ICU.  Her liver enzymes 

and creatinine are trending down.  Will be started on gabapentin per neurology 

recommendations and antibiotics discontinued.





: Continue supportive care.  CPK slowly improving.  Vascular plans for 

closure of fasciotomy early next week.  PT OT evaluation and treatment closure 

done.  Continue wound management.  No evidence of withdrawal noted at this time.

 Continue to monitor LFTs which are also slightly improving.  Plan discussed in 

detail with the patient who verbalized understanding





: Patient seen and examined clinically stable continues to show some 

improvement.  Closure of fasciotomy planned for tomorrow.  Renal function is 

improving.  CK ordered and pending





: Patient seen and examined, wbc mildly elevated likely secondary to 

steroids, will change to po for two additional days and stop, benefit probably 

already achieved, continue wound dressing, sensory function improved, awaiting 

wound vac and closure. Patient advised on clinically progress. 





: Patient seen and examined awaiting her vascular surgery for closure of 

fasciotomy.  CK is decreasing less than 9000 today.  We will continue aggressive

IV fluids until we have the resolution below 5000.  LFTs on the downward trend 

also.  Leukocytosis appears to have peaked steroids was changed to oral for 2 

additional days today is 1 of 2..  Plan of care discussed in detail with the 

patient who verbalized understanding.





5/10:  Left lower extremity compartment syndrome status post fasciotomies.  Ca

use is still unclear and may represent some allergic reaction.  Motor function 

is unchanged from original exam.  Sensory function has improved with full 

sensation of the foot.vascular surgery to place wound vacs on the leg today.  CK

improved to the 8000 range today





: Patient underwent excisional debridement of muscle and soft tissue from 

left leg wound yesterday.  Patient also had wound VAC placement left leg 

fasciotomy incisions.  The patient had moderate amount of superficial necrotic 

muscle in the anterior lateral compartments with minimal amount of superficial 

necrotic muscle in the posterior compartment.  Follow-up culture results.  CK 

levels have improved to 6900 range.  AST/ALT continue to trend downward.





: I discussed the case with Dr. Mckeon who recommends home wound VAC as 

well as physical therapy.  I also discussed these needs with case management who

was attempting to arrange for discharge planning.  CK continues to improve with 

levels in the 4000 range.





: Still awaiting for home wound VAC arrangements to be made.  Leukocytosis 

is much improved down to 13,900 today.  LFTs continue to trend down to normal 

range.  CK levels still in the 4000 range.  Nephrology has signed off.  

Anticipate discharge later today or in a.m.





:  Still awaiting for home wound VAC arrangements to be made.  Laboratories 

continue to include including LFTs, CK and leukocytosis.





5/15:  Still awaiting for home wound VAC arrangements to be made.





: Patient had an episode of syncope while working with PT today.  Nurse 

reports patient lost consciousness for a few seconds and was helped back to the 

bed.  Patient's blood pressure was noted to be hypotensive/orthostatic.  We will

bolus 500 normal saline IV fluid and monitor orthostatics.  Case management 

reports that wound VAC should be delivered today.  LFTs continue to trend down 

to normal range.  CK levels still in the 2000 range.  Nephrology has signed off.

 Anticipate discharge in a.m. given the syncope and once wound VAC arranged.  

Patient with low-grade temp.  We will monitor.





; patient is anxious to go home, wound VAC is delivered to the bedside, 

continue wound care


Multiple social issues, DC planning per case management, left lower extremity 

Doppler negative for DVT





; discharge planning, multiple social issues





; continue current management, wound VAC in place, DC planning issues





; discharge planning per case management, setting up wound VAC, setting up 

outpatient wound care


Setting up follow-up visits with physicians, patient has multiple social issues 

and no payer source


Continue current management discharge when medically stable





; patient's LFTs are trending down, continue current management


Wound VAC care, wound VAC change per instructions, wound care as indicated


Discharge planning per case management





; patient is medically stable for discharge


Awaiting discharge planning coordinating with wound VAC and wound care


Post discharge follow-up visit with vascular Dr. Vikas Laboy on 2020 at 10 AM





; vascular /IR Dr. Cantrell evaluation and recommendations noted and appreciated


Patient's wound had some peculiar infection smell, started on doxycycline


And recommended aggressive wound care.





; iron  talk to Dr. Cantrell regarding the treatment and discharge 

planning


Dr. Cantrell informed that wound care should follow regularly and aggressively 

manage the wounds


And once the wounds show significant healing wound VAC can be placed and patient

may be discharged home with home health.  Outpatient wound clinic 3 times a week

and follow-up with vascular IR per schedule





: Continue dressing change per wound care.  Patient initiated on doxyc

ycline, surgical culture is growing bacillus.  Discharge planning per vascular 

recommendation once wound VAC is placed.  Continue supportive care. 





: Continue regular wound care, continue empiric antibiotic, wait on culture 

sensitivity.  Discharge planning per vascular surgery. 





: continue empiric antibiotic, wait on culture sensitivity.  Discharge 

planning per vascular surgery. Continue regular wound care,





: Continue empiric antibiotics, wait for better wound healing to place wound

VAC, plan to DC patient once wound VAC will be placed. consult surgery for 

debridement.





: Discussed with Dr. Sanchez yesterday, if patient need any debridement 

possibly will be done on next week Tuesday.  Continue wound care And antibiotics





: Noted General surgery recommendation, patient wound is too large and 

deeply exposed to place on wound VAC.  General surgery recommending regular 

wound care without wound VAC.  Will await for vascular recommendation for 

discharge planning.





; DC planning per case management, per vascular and general surgeon


Multiple social issues





; continue current management, DC planning per case management, patient is 

medically stable for discharge


Multiple social issues





Assessment and plan;


--Compartment syndrome to LLE


-Vascular surgery consulted, appreciate recommendations


-s/p self admin of IM abx to [facility]


-c/o pain, decreased sensation, pulses were dopplarable, paralysis, mottled skin


-s/p 4 compartment tracheotomy on 5/3 with vascular surgery


-Bilateral lower extremity Doppler ultrasound showed no DVT


-Left lower extremity CT with contrast showed appearance of posterior soft 

tissue complex 


cellulitis with small subcutaneous lymph nodes, no drainable fluid collections 

present, contusion 


could have similar appearance


-Bilateral lower extremity arterial duplex showed no significant lower extremity

peripheral artery disease


-Normal ABIs





Patient received wound VAC, with aggressive management and frequent changing.  

And with supportive care


Patient was being prepared to discharge with home health and outpatient wound 

care 3 times a week.





However patient's wound got significantly infected, wound VAC was removed, wound

care personal consulted and they are aggressively treating the wounds.


Once the wound infection improves, patient may receive wound VAC and then 

patient may be discharged home with follow-up visit with Dr. Vikas Laboy


Outpatient wound care 3 times a week





--Peripheral neuropathy


Continue gabapentin


Neurology evaluated





--HTN


Moderate control, continue current antihypertensives


As needed medications





--Rhabdomyolysis;


Improved from 95,000 CK -1001 week ago


Closely monitor IV hydration





--acute kidney injury with vasomotor nephropathy now resolved


Monitor renal function


Avoid nephrotoxin. gentle hydration as needed





DC planning per case management


Possible home health, outpatient wound care .


Patient has no resources, case management assisting


Disposition; continue aggressive wound care, discharge pending per CM





Plan of care reviewed with the patient and her nurse

















History


Interval history: 


I have seen and examined the patient at the bedside


Patient looks tired and weak, anxious to go home


Receiving wound care left lower extremity


[facility] assisting with discharge planning


Signs noted








Hospitalist Physical





- Constitutional


Vitals: 


                                        











Temp Pulse Resp BP Pulse Ox


 


 98.9 F   86   16   103/69   95 


 


 22 11:28  22 11:28  22 11:28  22 11:28  22 11:28











General appearance: Present: no acute distress, well-nourished, obese





- EENT


Eyes: Present: PERRL, EOM intact





- Neck


Neck: Present: supple, normal ROM





- Respiratory


Respiratory effort: normal


Respiratory: bilateral: diminished, negative: rales, rhonchi, wheezing





- Cardiovascular


Rhythm: regular


Heart Sounds: Present: S1 & S2





- Extremities


Extremities: no ischemia, No edema, abnormal (Wound dressing in place)





- Abdominal


General gastrointestinal: soft, non-tender, non-distended, normal bowel sounds





- Integumentary


Integumentary: Present: clear, warm





- Psychiatric


Psychiatric: appropriate mood/affect, cooperative





- Neurologic


Neurologic: CNII-XII intact, moves all extremities





Results





- Labs


CBC & Chem 7: 


                                 22 04:25





                                 22 04:25


Labs: 


                             Laboratory Last Values











WBC  5.8 K/mm3 (4.5-11.0)   22  04:25    


 


RBC  3.48 M/mm3 (3.65-5.03)  L  22  04:25    


 


Hgb  11.4 gm/dl (11.8-15.2)  L  22  04:25    


 


Hct  32.9 % (35.5-45.6)  L  22  04:25    


 


MCV  95 fl (84-94)  H  22  04:25    


 


MCH  33 pg (28-32)  H  22  04:25    


 


MCHC  35 % (32-34)  H  22  04:25    


 


RDW  13.4 % (13.2-15.2)   22  04:25    


 


Plt Count  444 K/mm3 (140-440)  H  22  04:25    


 


Lymph % (Auto)  37.8 % (13.4-35.0)  H  22  04:25    


 


Mono % (Auto)  13.4 % (0.0-7.3)  H  22  04:25    


 


Eos % (Auto)  1.9 % (0.0-4.3)   22  04:25    


 


Baso % (Auto)  0.3 % (0.0-1.8)   22  04:25    


 


Lymph # (Auto)  2.2 K/mm3 (1.2-5.4)   22  04:25    


 


Mono # (Auto)  0.8 K/mm3 (0.0-0.8)   22  04:25    


 


Eos # (Auto)  0.1 K/mm3 (0.0-0.4)   22  04:25    


 


Baso # (Auto)  0.0 K/mm3 (0.0-0.1)   22  04:25    


 


Add Manual Diff  Complete   22  04:49    


 


Total Counted  100   22  04:49    


 


Seg Neutrophils %  46.6 % (40.0-70.0)   22  04:25    


 


Seg Neuts % (Manual)  83.0 % (40.0-70.0)  H  22  04:49    


 


Band Neutrophils %  2.0 %  22  04:49    


 


Lymphocytes % (Manual)  5.0 % (13.4-35.0)  L  22  04:49    


 


Reactive Lymphs % (Man)  0 %  22  04:49    


 


Monocytes % (Manual)  8.0 % (0.0-7.3)  H  22  04:49    


 


Eosinophils % (Manual)  0 % (0.0-4.3)   22  04:49    


 


Basophils % (Manual)  0 % (0.0-1.8)   22  04:49    


 


Metamyelocytes %  2.0 %  22  04:49    


 


Myelocytes %  0 %  22  04:49    


 


Promyelocytes %  0 %  22  04:49    


 


Blast Cells %  0 %  22  04:49    


 


Nucleated RBC %  Not Reportable   22  04:49    


 


Seg Neutrophils #  2.7 K/mm3 (1.8-7.7)   22  04:25    


 


Seg Neutrophils # Man  17.1 K/mm3 (1.8-7.7)  H  22  04:49    


 


Band Neutrophils #  0.4 K/mm3  22  04:49    


 


Lymphocytes # (Manual)  1.0 K/mm3 (1.2-5.4)  L  22  04:49    


 


Abs React Lymphs (Man)  0.0 K/mm3  22  04:49    


 


Monocytes # (Manual)  1.6 K/mm3 (0.0-0.8)  H  22  04:49    


 


Eosinophils # (Manual)  0.0 K/mm3 (0.0-0.4)   22  04:49    


 


Basophils # (Manual)  0.0 K/mm3 (0.0-0.1)   22  04:49    


 


Metamyelocytes #  0.4 K/mm3  22  04:49    


 


Myelocytes #  0.0 K/mm3  22  04:49    


 


Promyelocytes #  0.0 K/mm3  22  04:49    


 


Blast Cells #  0.0 K/mm3  22  04:49    


 


WBC Morphology  Not Reportable   22  04:49    


 


Hypersegmented Neuts  Not Reportable   22  04:49    


 


Hyposegmented Neuts  Rare   22  04:49    


 


Hypogranular Neuts  Not Reportable   22  04:49    


 


Smudge Cells  Not Reportable   22  04:49    


 


Toxic Granulation  Not Reportable   22  04:49    


 


Toxic Vacuolation  Not Reportable   22  04:49    


 


Dohle Bodies  Not Reportable   22  04:49    


 


Pelger-Huet Anomaly  Not Reportable   22  04:49    


 


Jamie Rods  Not Reportable   22  04:49    


 


Platelet Estimate  Consistent w auto   22  04:49    


 


Clumped Platelets  Not Reportable   22  04:49    


 


Plt Clumps, EDTA  Not Reportable   22  04:49    


 


Large Platelets  Rare   22  04:49    


 


Giant Platelets  Not Reportable   22  04:49    


 


Platelet Satelliting  Not Reportable   22  04:49    


 


Plt Morphology Comment  Not Reportable   22  04:49    


 


RBC Morphology  Not Reportable   22  04:49    


 


Dimorphic RBCs  Not Reportable   22  04:49    


 


Polychromasia  Not Reportable   22  04:49    


 


Hypochromasia  Not Reportable   22  04:49    


 


Poikilocytosis  Not Reportable   22  04:49    


 


Anisocytosis  Rare   22  04:49    


 


Microcytosis  Not Reportable   22  04:49    


 


Macrocytosis  Rare   22  04:49    


 


Spherocytes  Not Reportable   22  04:49    


 


Pappenheimer Bodies  Not Reportable   22  04:49    


 


Sickle Cells  Not Reportable   22  04:49    


 


Target Cells  Not Reportable   22  04:49    


 


Tear Drop Cells  Not Reportable   22  04:49    


 


Ovalocytes  Not Reportable   22  04:49    


 


Helmet Cells  Not Reportable   22  04:49    


 


[place] Bodies  Not Reportable   22  04:49    


 


Cabot Rings  Not Reportable   22  04:49    


 


Collyer Cells  Not Reportable   22  04:49    


 


Bite Cells  Not Reportable   22  04:49    


 


Crenated Cell  Not Reportable   22  04:49    


 


Elliptocytes  Not Reportable   22  04:49    


 


Acanthocytes (Spur)  Not Reportable   22  04:49    


 


Rouleaux  Not Reportable   22  04:49    


 


Hemoglobin C Crystals  Not Reportable   22  04:49    


 


Schistocytes  Not Reportable   22  04:49    


 


Malaria parasites  Not Reportable   22  04:49    


 


Miguel Bodies  Not Reportable   22  04:49    


 


Hem Pathologist Commnt  No   22  04:49    


 


PT  13.4 Sec. (12.2-14.9)   22  13:30    


 


INR  0.92  (0.87-1.13)   22  13:30    


 


APTT  25.8 Sec. (24.2-36.6)   22  13:30    


 


Sodium  137 mmol/L (137-145)   22  04:25    


 


Potassium  4.1 mmol/L (3.6-5.0)   22  04:25    


 


Chloride  100.1 mmol/L ()   22  04:25    


 


Carbon Dioxide  25 mmol/L (22-30)   22  04:25    


 


Anion Gap  16 mmol/L  22  04:25    


 


BUN  8 mg/dL (9-20)  L  22  04:25    


 


Creatinine  0.6 mg/dL (0.8-1.3)  L  22  04:25    


 


Estimated GFR  > 60 ml/min  22  04:25    


 


BUN/Creatinine Ratio  13 %  22  04:25    


 


Glucose  105 mg/dL ()  H  22  04:25    


 


POC Glucose  121 mg/dL ()  H  22  11:26    


 


Lactic Acid  1.40 mmol/L (0.7-2.0)   22  13:30    


 


Calcium  9.3 mg/dL (8.4-10.2)   22  04:25    


 


Phosphorus  2.80 mg/dL (2.5-4.5)   22  05:17    


 


Magnesium  1.80 mg/dL (1.7-2.3)   22  06:06    


 


Total Bilirubin  0.50 mg/dL (0.1-1.2)   22  06:06    


 


Direct Bilirubin  < 0.2 mg/dL (0-0.2)   22  04:00    


 


Indirect Bilirubin  0.3 mg/dL  22  04:00    


 


AST  29 units/L (5-40)   22  06:06    


 


ALT  55 units/L (7-56)   22  06:06    


 


Alkaline Phosphatase  81 units/L ()   22  06:06    


 


Ammonia  37.0 umol/L (25-60)   22  04:49    


 


Total Creatine Kinase  1001 units/L ()  H  22  08:55    


 


Total Protein  6.4 g/dL (6.3-8.2)   22  06:06    


 


Albumin  2.9 g/dL (3.9-5)  L  22  06:06    


 


Albumin/Globulin Ratio  0.8 %  22  06:06    


 


Amylase  25 units/L ()  L  22  04:49    


 


Lipase  10 units/L (13-60)  L  22  04:49    


 


Coronavirus (PCR)  Negative  (Negative)   22  11:40    


 


Hepatitis A IgM Ab  Nonreactive  (NonReactive)   22  13:30    


 


Hep Bs Antigen  Non-reactive  (Negative)   22  13:30    


 


Hep B Core IgM Ab  Non-reactive  (NonReactive)   22  13:30    


 


Hepatitis C Antibody  Non-reactive  (NonReactive)   22  13:30    


 


HIV 1&2 Antibody Rapid  Non react  (Non React)   22  16:42    


 


HIV P24 Antigen  Non react  (Non React)   22  16:42    











Mcwilliams/IV: 


                                        





Voiding Method                   Urinal











Active Medications





- Current Medications


Current Medications: 














Generic Name Dose Route Start Last Admin





  Trade Name Freq  PRN Reason Stop Dose Admin


 


Acetaminophen  650 mg  22 22:14  22 01:36





  Acetaminophen 325 Mg Tab  PO   650 mg





  Q6H PRN   Administration





  Pain, Mild (1-3)  


 


Albuterol  2.5 mg  22 06:15 





  Albuterol 2.5 Mg/3 Ml Nebu  IH  





  Q3HRT PRN  





  Shortness Of Breath  


 


Diphenhydramine HCl  25 mg  22 06:24  22 18:40





  Diphenhydramine 50 Mg/Ml Vial  IV   25 mg





  Q6H PRN   Administration





  Skin Irritation  


 


Docusate Sodium  100 mg  22 22:00  22 11:49





  Docusate Sodium 100 Mg Cap  PO   100 mg





  BID CHERYL   Administration


 


Famotidine  20 mg  22 10:00  22 11:52





  Famotidine 20 Mg Tab  PO   20 mg





  BID CHERYL   Administration


 


Gabapentin  300 mg  22 08:00  22 16:03





  Gabapentin 300 Mg Cap  PO   300 mg





  TID@0800,1600,2200 CHERYL   Administration


 


Heparin Sodium (Porcine)  5,000 unit  22 10:00  22 11:49





  Heparin 5,000 Unit/1 Ml Vial  SUB-Q   5,000 unit





  Q12HR CHERYL   Administration


 


Hydralazine HCl  10 mg  22 18:31  22 18:50





  Hydralazine 20 Mg/1 Ml Inj  IV   10 mg





  Q3HR PRN   Administration





  Hypertension  


 


Hydromorphone HCl  0.5 mg  22 06:15  22 16:03





  Hydromorphone 1 Mg/1 Ml Inj  IV   0.5 mg





  Q3H PRN   Administration





  Pain , Severe (7-10)  


 


Ondansetron HCl  4 mg  22 06:15  22 22:29





  Ondansetron 4 Mg/2 Ml Inj  IV   4 mg





  Q8H PRN   Administration





  Nausea And Vomiting  


 


Oxycodone/Acetaminophen  1 tab  22 10:57  22 01:30





  Oxycodone /Acetaminophen 5-325mg Tab  PO   1 tab





  Q6H PRN   Administration





  Pain, Moderate (4-6)  


 


Sodium Chloride  10 ml  22 10:00  22 11:51





  Sodium Chloride 0.9% 10 Ml Flush Syringe  IV   10 ml





  BID CHERYL   Administration


 


Sodium Chloride  10 ml  22 06:15  22 01:33





  Sodium Chloride 0.9% 10 Ml Flush Syringe  IV   10 ml





  PRN PRN   Administration





  LINE FLUSH  


 


Sodium Chloride  500 ml  22 19:10  22 19:32





  Sodium Chloride 0.9% Irr 500 Ml Bottle  IR   500 ml





  AS DIRECT PRN   Administration





  Wound Care  


 


Sodium Hypochlorite  1 applic  22 13:00  22 16:26





  Sodium Hypochlorite, Dakin's Full Strength (0.5%) 473 Ml Topical Soln  TP   1 

1000units





  DAILY CHERYL   Administration


 


Valsartan  160 mg  22 22:00  22 11:42





  Valsartan 160mg Tab  PO   Not Given





  DAILY CHERYL  














Nutrition/Malnutrition Assess





- Dietary Evaluation


Nutrition/Malnutrition Findings: 


                                        





Nutrition Notes                                            Start:  05/10/22 

16:45


Freq:                                                      Status: Active       




Protocol:                                                                       




 Document     22 14:21  HATTIE  (Rec: 22 14:46  HATTIE  [id number])


 Nutrition Notes


     Initial or Follow up                        Reassessment


     Current Diagnosis                           Hypertension


     Other Pertinent Diagnosis                   Rabdomyolysis, Liver Failure,


                                                 s/p L-LE 4 Compartment


                                                 Fasciotomy w/Debridem.


     Current Diet                                Regular Diet + Dietary


                                                 Supplements. (since B ).


     Labs/Tests                                  : BUN 5, Crea 0.7.


     Pertinent Medications                       : Nutritionally


                                                 unremarkable.


     Height                                      5 ft 6 in


     Weight                                      86.183 kg


     Ideal Body Weight (kg)                      64.54


     BMI                                         30.7


     Weight change and time frame                No body weight change


                                                 reporterd in 9 days.


     Weight Status                               Obese


     Subjective/Other Information                RD consult for routine F/U on


                                                 dietary advancement.


                                                 Pt's PO intake of meals has


                                                 been Good (100%), according to


                                                 ADL notes.


                                                 Pt is on Room Air, O2


                                                 saturation @ 96%, according to


                                                 Physical Assessment History


                                                 notes.


                                                 Pt presents L-LE Pitting Edema


                                                 2+, according to Physical


                                                 Assessment History notes.


                                                 Pt presents L-LE Vascular


                                                 Wound with WoundVAC since , according to Physical


                                                 Assessment History notes.


                                                 Event on , Pt suffered a


                                                 syncope during PT session,


                                                 according to Progress notes.


                                                 Pt is clearedd for discharge,


                                                 it is pending due to several


                                                 social issues, according to


                                                 Progress notes.


     Percent of energy/protein needs met:        Prescribed Regular Diet


                                                 provides for energy/protein


                                                 needs (2,289 Kcal/89 g) during


                                                 LOS


     Burn                                        Absent


     Trauma                                      Absent


     GI Symptoms                                 None


     Food Allergy                                No


     Skin Integrity/Comment                      [facility] Wound


     Current % PO                                Good (%)


     Minimum of two criteria                     No


     #1


      Nutrition Diagnosis                        Increased nutrient needs   (


                                                 specify in comment below)


      Comments:                                  Protein to support wound


                                                 healing processes.


      Diagnosis Progress(for reassessment        Continues


       documentation)                            


     Is patient on ventilator?                   No


     Is Patient Ambulatory and/or Out of Bed     No


     REE-([facility]-confined to bed)      2083.920


     Kcal/Kg value to use for calculation        19


     Approximate Energy Requirements Using       1637


      kcal/Kg                                    


     Calculation Used for Recommendations        Kcal/kg


     Additional Notes                            Protein: 1.25-1.5 g/Kg AdjBW;


                                                  g/day.


                                                 Fluids: 1 ml/Kcal, or as per


                                                 MD.


 Nutrition Intervention


     Change Diet Order:                          Continue Regular Diet.


     Add Supplement/Snack (indicate name/kcal    Continue 28.8 g pkt Aidan; BID


      /protein )                                 .


     Provides kCal:                              190


     Provides Protein (gm)                       5


     Goal #1                                     Support, through dietary


                                                 supplementation, wound healing


                                                 processes during LOS.


     Goal #2                                     Adjust the dietary


                                                 intervention to better serve


                                                 Pt's needs and clinical


                                                 conditions during LOS.


     Goal #3                                     Maintain body weight within +/


                                                 -3% of admission body weight


                                                 during LOS.


     Revisit per MD consult or patient           Sign Off


      request:                                   


     Additional Comments                         Continue monitoring food


                                                 tolerance, %PO intake of meals


                                                 , and BM.

## 2022-06-02 LAB
BUN SERPL-MCNC: 11 MG/DL (ref 9–20)
BUN/CREAT SERPL: 16 %
CALCIUM SERPL-MCNC: 9.3 MG/DL (ref 8.4–10.2)
HEMOLYSIS INDEX: 7

## 2022-06-02 RX ADMIN — VALSARTAN SCH MG: 160 TABLET, FILM COATED ORAL at 09:02

## 2022-06-02 RX ADMIN — Medication SCH ML: at 22:18

## 2022-06-02 RX ADMIN — DOCUSATE SODIUM SCH MG: 100 CAPSULE, LIQUID FILLED ORAL at 22:17

## 2022-06-02 RX ADMIN — Medication SCH 1000UNITS: at 09:03

## 2022-06-02 RX ADMIN — HEPARIN SODIUM SCH UNIT: 5000 INJECTION, SOLUTION INTRAVENOUS; SUBCUTANEOUS at 09:00

## 2022-06-02 RX ADMIN — GABAPENTIN SCH MG: 300 CAPSULE ORAL at 16:01

## 2022-06-02 RX ADMIN — GABAPENTIN SCH MG: 300 CAPSULE ORAL at 22:17

## 2022-06-02 RX ADMIN — FAMOTIDINE SCH MG: 20 TABLET ORAL at 22:16

## 2022-06-02 RX ADMIN — Medication SCH ML: at 09:03

## 2022-06-02 RX ADMIN — HEPARIN SODIUM SCH UNIT: 5000 INJECTION, SOLUTION INTRAVENOUS; SUBCUTANEOUS at 22:17

## 2022-06-02 RX ADMIN — HYDROMORPHONE HYDROCHLORIDE PRN MG: 1 INJECTION, SOLUTION INTRAMUSCULAR; INTRAVENOUS; SUBCUTANEOUS at 09:01

## 2022-06-02 RX ADMIN — GABAPENTIN SCH MG: 300 CAPSULE ORAL at 07:56

## 2022-06-02 RX ADMIN — FAMOTIDINE SCH MG: 20 TABLET ORAL at 09:02

## 2022-06-02 RX ADMIN — DOCUSATE SODIUM SCH MG: 100 CAPSULE, LIQUID FILLED ORAL at 09:02

## 2022-06-02 RX ADMIN — HYDROMORPHONE HYDROCHLORIDE PRN MG: 1 INJECTION, SOLUTION INTRAMUSCULAR; INTRAVENOUS; SUBCUTANEOUS at 19:37

## 2022-06-02 NOTE — PROGRESS NOTE
Assessment and Plan


Assessment and plan: 


This is a 36 year old  transgendered female undergoing gender transformation 

with silicone injection to buttocks ([place] ) and breast implants, heavy 

drinker on the weekends (bottle of vodka) who presented to [facility] on 5/3 because 

of adverse drug reaction after reportedly self administration of  

rocephin and PCN for sore throat. Patient reportedly collapsed while checking in

to the emergency department, had complains of severe 10 out of 10 pain in the 

left thigh and had noticeable mottling of skin on the thigh and left lower 

abdomen.  Patient underwent a CT of the left leg which showed possible 

cellulitis with no drainable fluid collection and was started on empiric 

antibiotics.  Patient admitted to the hospital service with diagnosis of acute 

liver failure, rhabdomyolysis, and compartment syndrome, evaluated by 

vascular/IR, s/p 4 compartment fasciotomy to left lower extremity.  Patient 

received antibiotics, wound care, wound VAC treatment and was planning to be 

discharged


However developed a wound infection, wound VAC was removed, 


medical personnel aggressively treating the patient.  Discharge was held.  

Outpatient wound care was scheduled, wound care personal have evaluated on 

2022, request wound care personal to educate the patient and the family in 

detail with the instructions[preferably written instructions] prior to discharge

if possible.





Assessment and plan;


--Compartment syndrome to LLE


-Vascular surgery consulted, appreciate recommendations


-s/p self admin of IM abx to midthigh


-c/o pain, decreased sensation, pulses were dopplarable, paralysis, mottled skin


-s/p 4 compartment tracheotomy on 5/3 with vascular surgery


-Bilateral lower extremity Doppler ultrasound showed no DVT


-Left lower extremity CT with contrast showed appearance of posterior soft 

tissue complex 


cellulitis with small subcutaneous lymph nodes, no drainable fluid collections 

present, contusion 


could have similar appearance


-Bilateral lower extremity arterial duplex showed no significant lower extremity

peripheral artery disease


-Normal ABIs





Patient received wound VAC, with aggressive management and frequent changing.  

And with supportive care


Patient was being prepared to discharge with home health and outpatient wound 

care 3 times a week.





However patient's wound got significantly infected, wound VAC was removed, wound

care personal consulted and they are aggressively treating the wounds.


Once the wound infection improves, patient may receive wound VAC and then 

patient may be discharged home with follow-up visit with Dr. Vikas Laboy


Outpatient wound care 3 times a week





--Peripheral neuropathy


Continue gabapentin


Neurology evaluated





--HTN


Moderate control, continue current antihypertensives


As needed medications





--Rhabdomyolysis;


Improved from 95,000 CK -1001 week ago


Closely monitor IV hydration





--acute kidney injury with vasomotor nephropathy now resolved


Monitor renal function


Avoid nephrotoxin. gentle hydration as needed





Discharge planning per case management


Detailed wound care instructions to the patient/family at the time of discharge.


Possible discharge in 1 to 2 days after patient/family are educated by the wound

care personal


Case management is assisting











Brief note and daily Hospital course;





Hospital course to date:





5/3: Venous ultrasound shows no DVT, Vascular surgery consulted who performed a 

fasciotomy and plan to transfer patient to IM post intervention.  Neurology 

consulted





: Transfer to ICU, GI and neurology consulted.





: Patient will be transferred back to the floor from ICU.  Her liver enzymes 

and creatinine are trending down.  Will be started on gabapentin per neurology 

recommendations and antibiotics discontinued.





: Continue supportive care.  CPK slowly improving.  Vascular plans for 

closure of fasciotomy early next week.  PT OT evaluation and treatment closure 

done.  Continue wound management.  No evidence of withdrawal noted at this time.

 Continue to monitor LFTs which are also slightly improving.  Plan discussed in 

detail with the patient who verbalized understanding





: Patient seen and examined clinically stable continues to show some 

improvement.  Closure of fasciotomy planned for tomorrow.  Renal function is 

improving.  CK ordered and pending





: Patient seen and examined, wbc mildly elevated likely secondary to 

steroids, will change to po for two additional days and stop, benefit probably 

already achieved, continue wound dressing, sensory function improved, awaiting 

wound vac and closure. Patient advised on clinically progress. 





: Patient seen and examined awaiting her vascular surgery for closure of 

fasciotomy.  CK is decreasing less than 9000 today.  We will continue aggressive

IV fluids until we have the resolution below 5000.  LFTs on the downward trend 

also.  Leukocytosis appears to have peaked steroids was changed to oral for 2 

additional days today is 1 of 2..  Plan of care discussed in detail with the p

atient who verbalized understanding.





5/10:  Left lower extremity compartment syndrome status post fasciotomies.  

Cause is still unclear and may represent some allergic reaction.  Motor function

is unchanged from original exam.  Sensory function has improved with full 

sensation of the foot.vascular surgery to place wound vacs on the leg today.  CK

improved to the 8000 range today





: Patient underwent excisional debridement of muscle and soft tissue from 

left leg wound yesterday.  Patient also had wound VAC placement left leg 

fasciotomy incisions.  The patient had moderate amount of superficial necrotic 

muscle in the anterior lateral compartments with minimal amount of superficial 

necrotic muscle in the posterior compartment.  Follow-up culture results.  CK 

levels have improved to 6900 range.  AST/ALT continue to trend downward.





: I discussed the case with Dr. Mckeon who recommends home wound VAC as 

well as physical therapy.  I also discussed these needs with case management who

was attempting to arrange for discharge planning.  CK continues to improve with 

levels in the 4000 range.





: Still awaiting for home wound VAC arrangements to be made.  Leukocytosis 

is much improved down to 13,900 today.  LFTs continue to trend down to normal 

range.  CK levels still in the 4000 range.  Nephrology has signed off.  

Anticipate discharge later today or in a.m.





:  Still awaiting for home wound VAC arrangements to be made.  Laboratories 

continue to include including LFTs, CK and leukocytosis.





5/15:  Still awaiting for home wound VAC arrangements to be made.





: Patient had an episode of syncope while working with PT today.  Nurse 

reports patient lost consciousness for a few seconds and was helped back to the 

bed.  Patient's blood pressure was noted to be hypotensive/orthostatic.  We will

bolus 500 normal saline IV fluid and monitor orthostatics.  Case management 

reports that wound VAC should be delivered today.  LFTs continue to trend down 

to normal range.  CK levels still in the 2000 range.  Nephrology has signed off.

 Anticipate discharge in a.m. given the syncope and once wound VAC arranged.  

Patient with low-grade temp.  We will monitor.





; patient is anxious to go home, wound VAC is delivered to the bedside, 

continue wound care


Multiple social issues, DC planning per case management, left lower extremity 

Doppler negative for DVT





; discharge planning, multiple social issues





; continue current management, wound VAC in place, DC planning issues





; discharge planning per case management, setting up wound VAC, setting up 

outpatient wound care


Setting up follow-up visits with physicians, patient has multiple social issues 

and no payer source


Continue current management discharge when medically stable





; patient's LFTs are trending down, continue current management


Wound VAC care, wound VAC change per instructions, wound care as indicated


Discharge planning per case management





; patient is medically stable for discharge


Awaiting discharge planning coordinating with wound VAC and wound care


Post discharge follow-up visit with vascular Dr. Vikas Laboy on 2020 at 10 AM





; vascular /IR Dr. Cantrell evaluation and recommendations noted and appreciated


Patient's wound had some peculiar infection smell, started on doxycycline


And recommended aggressive wound care.





; iron  talk to Dr. Cantrell regarding the treatment and discharge 

planning


Dr. Cantrell informed that wound care should follow regularly and aggressively 

manage the wounds


And once the wounds show significant healing wound VAC can be placed and patient

may be discharged home with home health.  Outpatient wound clinic 3 times a week

and follow-up with vascular IR per schedule





: Continue dressing change per wound care.  Patient initiated on 

doxycycline, surgical culture is growing bacillus.  Discharge planning per 

vascular recommendation once wound VAC is placed.  Continue supportive care. 





: Continue regular wound care, continue empiric antibiotic, wait on culture 

sensitivity.  Discharge planning per vascular surgery. 





: continue empiric antibiotic, wait on culture sensitivity.  Discharge 

planning per vascular surgery. Continue regular wound care,





: Continue empiric antibiotics, wait for better wound healing to place wound

VAC, plan to DC patient once wound VAC will be placed. consult surgery for 

debridement.





: Discussed with Dr. Sanchez yesterday, if patient need any debridement 

possibly will be done on next week Tuesday.  Continue wound care And antibiotics





: Noted General surgery recommendation, patient wound is too large and 

deeply exposed to place on wound VAC.  General surgery recommending regular 

wound care without wound VAC.  Will await for vascular recommendation for 

discharge planning.





; DC planning per case management, per vascular and general surgeon


Multiple social issues





; continue current management, DC planning per case management, patient is 

medically stable for discharge


Multiple social issues





; surgeon Dr. Sanchez and wound care have detailed instructions of caring for 

the wound of the left lower extremity


As well as outpatient wound care follow-up.  Wound care to explain in detail to 

the patient through the 


And if possible give written instructions to the patient/family at discharge.





Discharge in 1 to 2 days patient is stable








History


Interval history: 


I have seen and examined the patient at the bedside


Patient's chart and medications reviewed


Patient feels slightly better and anxious to go home


Case management assisting with discharge planning














Hospitalist Physical





- Constitutional


Vitals: 


                                        











Temp Pulse Resp BP Pulse Ox


 


 97.9 F   82   20   94/55   98 


 


 22 11:41  22 11:41  22 11:53  22 11:41  22 11:53











General appearance: Present: no acute distress, well-nourished, obese





- EENT


Eyes: Present: PERRL, EOM intact





- Neck


Neck: Present: supple, normal ROM





- Respiratory


Respiratory effort: normal


Respiratory: bilateral: diminished, negative: rales, rhonchi, wheezing





- Cardiovascular


Rhythm: regular


Heart Sounds: Present: S1 & S2





- Extremities


Extremities: no ischemia, No edema, abnormal (Left lower extremity elevated 

dressing in place)





- Abdominal


General gastrointestinal: soft, non-tender, non-distended, normal bowel sounds





- Integumentary


Integumentary: Present: clear, warm





- Psychiatric


Psychiatric: appropriate mood/affect, cooperative





- Neurologic


Neurologic: moves all extremities





Results





- Labs


CBC & Chem 7: 


                                 22 04:25





                                 22 04:41


Labs: 


                             Laboratory Last Values











WBC  5.8 K/mm3 (4.5-11.0)   22  04:25    


 


RBC  3.48 M/mm3 (3.65-5.03)  L  22  04:25    


 


Hgb  11.4 gm/dl (11.8-15.2)  L  22  04:25    


 


Hct  32.9 % (35.5-45.6)  L  22  04:25    


 


MCV  95 fl (84-94)  H  22  04:25    


 


MCH  33 pg (28-32)  H  22  04:25    


 


MCHC  35 % (32-34)  H  22  04:25    


 


RDW  13.4 % (13.2-15.2)   22  04:25    


 


Plt Count  444 K/mm3 (140-440)  H  22  04:25    


 


Lymph % (Auto)  37.8 % (13.4-35.0)  H  22  04:25    


 


Mono % (Auto)  13.4 % (0.0-7.3)  H  22  04:25    


 


Eos % (Auto)  1.9 % (0.0-4.3)   22  04:25    


 


Baso % (Auto)  0.3 % (0.0-1.8)   22  04:25    


 


Lymph # (Auto)  2.2 K/mm3 (1.2-5.4)   22  04:25    


 


Mono # (Auto)  0.8 K/mm3 (0.0-0.8)   22  04:25    


 


Eos # (Auto)  0.1 K/mm3 (0.0-0.4)   22  04:25    


 


Baso # (Auto)  0.0 K/mm3 (0.0-0.1)   22  04:25    


 


Add Manual Diff  Complete   22  04:49    


 


Total Counted  100   22  04:49    


 


Seg Neutrophils %  46.6 % (40.0-70.0)   22  04:25    


 


Seg Neuts % (Manual)  83.0 % (40.0-70.0)  H  22  04:49    


 


Band Neutrophils %  2.0 %  22  04:49    


 


Lymphocytes % (Manual)  5.0 % (13.4-35.0)  L  22  04:49    


 


Reactive Lymphs % (Man)  0 %  22  04:49    


 


Monocytes % (Manual)  8.0 % (0.0-7.3)  H  22  04:49    


 


Eosinophils % (Manual)  0 % (0.0-4.3)   22  04:49    


 


Basophils % (Manual)  0 % (0.0-1.8)   22  04:49    


 


Metamyelocytes %  2.0 %  22  04:49    


 


Myelocytes %  0 %  22  04:49    


 


Promyelocytes %  0 %  22  04:49    


 


Blast Cells %  0 %  22  04:49    


 


Nucleated RBC %  Not Reportable   22  04:49    


 


Seg Neutrophils #  2.7 K/mm3 (1.8-7.7)   22  04:25    


 


Seg Neutrophils # Man  17.1 K/mm3 (1.8-7.7)  H  22  04:49    


 


Band Neutrophils #  0.4 K/mm3  22  04:49    


 


Lymphocytes # (Manual)  1.0 K/mm3 (1.2-5.4)  L  22  04:49    


 


Abs React Lymphs (Man)  0.0 K/mm3  22  04:49    


 


Monocytes # (Manual)  1.6 K/mm3 (0.0-0.8)  H  22  04:49    


 


Eosinophils # (Manual)  0.0 K/mm3 (0.0-0.4)   22  04:49    


 


Basophils # (Manual)  0.0 K/mm3 (0.0-0.1)   22  04:49    


 


Metamyelocytes #  0.4 K/mm3  22  04:49    


 


Myelocytes #  0.0 K/mm3  22  04:49    


 


Promyelocytes #  0.0 K/mm3  22  04:49    


 


Blast Cells #  0.0 K/mm3  22  04:49    


 


WBC Morphology  Not Reportable   22  04:49    


 


Hypersegmented Neuts  Not Reportable   22  04:49    


 


Hyposegmented Neuts  Rare   22  04:49    


 


Hypogranular Neuts  Not Reportable   22  04:49    


 


Smudge Cells  Not Reportable   22  04:49    


 


Toxic Granulation  Not Reportable   22  04:49    


 


Toxic Vacuolation  Not Reportable   22  04:49    


 


Dohle Bodies  Not Reportable   22  04:49    


 


Pelger-Huet Anomaly  Not Reportable   22  04:49    


 


Jamie Rods  Not Reportable   22  04:49    


 


Platelet Estimate  Consistent w auto   22  04:49    


 


Clumped Platelets  Not Reportable   22  04:49    


 


Plt Clumps, EDTA  Not Reportable   22  04:49    


 


Large Platelets  Rare   22  04:49    


 


Giant Platelets  Not Reportable   22  04:49    


 


Platelet Satelliting  Not Reportable   22  04:49    


 


Plt Morphology Comment  Not Reportable   22  04:49    


 


RBC Morphology  Not Reportable   22  04:49    


 


Dimorphic RBCs  Not Reportable   22  04:49    


 


Polychromasia  Not Reportable   22  04:49    


 


Hypochromasia  Not Reportable   22  04:49    


 


Poikilocytosis  Not Reportable   22  04:49    


 


Anisocytosis  Rare   22  04:49    


 


Microcytosis  Not Reportable   22  04:49    


 


Macrocytosis  Rare   22  04:49    


 


Spherocytes  Not Reportable   22  04:49    


 


Pappenheimer Bodies  Not Reportable   22  04:49    


 


Sickle Cells  Not Reportable   22  04:49    


 


Target Cells  Not Reportable   22  04:49    


 


Tear Drop Cells  Not Reportable   22  04:49    


 


Ovalocytes  Not Reportable   22  04:49    


 


Helmet Cells  Not Reportable   22  04:49    


 


[place] Bodies  Not Reportable   22  04:49    


 


Cabot Rings  Not Reportable   22  04:49    


 


[address] Cells  Not Reportable   22  04:49    


 


Bite Cells  Not Reportable   22  04:49    


 


Crenated Cell  Not Reportable   22  04:49    


 


Elliptocytes  Not Reportable   22  04:49    


 


Acanthocytes (Spur)  Not Reportable   22  04:49    


 


Rouleaux  Not Reportable   22  04:49    


 


Hemoglobin C Crystals  Not Reportable   22  04:49    


 


Schistocytes  Not Reportable   22  04:49    


 


Malaria parasites  Not Reportable   22  04:49    


 


Miguel Bodies  Not Reportable   22  04:49    


 


Hem Pathologist Commnt  No   22  04:49    


 


PT  13.4 Sec. (12.2-14.9)   22  13:30    


 


INR  0.92  (0.87-1.13)   22  13:30    


 


APTT  25.8 Sec. (24.2-36.6)   22  13:30    


 


Sodium  135 mmol/L (137-145)  L  22  04:41    


 


Potassium  4.1 mmol/L (3.6-5.0)   22  04:41    


 


Chloride  98.3 mmol/L ()   22  04:41    


 


Carbon Dioxide  28 mmol/L (22-30)   22  04:41    


 


Anion Gap  13 mmol/L  22  04:41    


 


BUN  11 mg/dL (9-20)   22  04:41    


 


Creatinine  0.7 mg/dL (0.8-1.3)  L  22  04:41    


 


Estimated GFR  > 60 ml/min  22  04:41    


 


BUN/Creatinine Ratio  16 %  22  04:41    


 


Glucose  102 mg/dL ()  H  22  04:41    


 


POC Glucose  121 mg/dL ()  H  22  11:26    


 


Lactic Acid  1.40 mmol/L (0.7-2.0)   22  13:30    


 


Calcium  9.3 mg/dL (8.4-10.2)   22  04:41    


 


Phosphorus  2.80 mg/dL (2.5-4.5)   22  05:17    


 


Magnesium  1.90 mg/dL (1.7-2.3)   22  04:41    


 


Total Bilirubin  0.50 mg/dL (0.1-1.2)   22  06:06    


 


Direct Bilirubin  < 0.2 mg/dL (0-0.2)   22  04:00    


 


Indirect Bilirubin  0.3 mg/dL  22  04:00    


 


AST  29 units/L (5-40)   22  06:06    


 


ALT  55 units/L (7-56)   22  06:06    


 


Alkaline Phosphatase  81 units/L ()   22  06:06    


 


Ammonia  37.0 umol/L (25-60)   22  04:49    


 


Total Creatine Kinase  125 units/L ()   22  04:41    


 


Total Protein  6.4 g/dL (6.3-8.2)   22  06:06    


 


Albumin  2.9 g/dL (3.9-5)  L  22  06:06    


 


Albumin/Globulin Ratio  0.8 %  22  06:06    


 


Amylase  25 units/L ()  L  22  04:49    


 


Lipase  10 units/L (13-60)  L  22  04:49    


 


Coronavirus (PCR)  Negative  (Negative)   22  11:40    


 


Hepatitis A IgM Ab  Nonreactive  (NonReactive)   22  13:30    


 


Hep Bs Antigen  Non-reactive  (Negative)   22  13:30    


 


Hep B Core IgM Ab  Non-reactive  (NonReactive)   22  13:30    


 


Hepatitis C Antibody  Non-reactive  (NonReactive)   22  13:30    


 


HIV 1&2 Antibody Rapid  Non react  (Non React)   22  16:42    


 


HIV P24 Antigen  Non react  (Non React)   22  16:42    











Mcwilliams/IV: 


                                        





Voiding Method                   Urinal











Active Medications





- Current Medications


Current Medications: 














Generic Name Dose Route Start Last Admin





  Trade Name Freq  PRN Reason Stop Dose Admin


 


Acetaminophen  650 mg  22 22:14  22 01:36





  Acetaminophen 325 Mg Tab  PO   650 mg





  Q6H PRN   Administration





  Pain, Mild (1-3)  


 


Albuterol  2.5 mg  22 06:15 





  Albuterol 2.5 Mg/3 Ml Nebu  IH  





  Q3HRT PRN  





  Shortness Of Breath  


 


Diphenhydramine HCl  25 mg  22 06:24  22 18:40





  Diphenhydramine 50 Mg/Ml Vial  IV   25 mg





  Q6H PRN   Administration





  Skin Irritation  


 


Docusate Sodium  100 mg  22 22:00  22 09:02





  Docusate Sodium 100 Mg Cap  PO   100 mg





  BID CHERYL   Administration


 


Famotidine  20 mg  22 10:00  22 09:02





  Famotidine 20 Mg Tab  PO   20 mg





  BID CHERYL   Administration


 


Gabapentin  300 mg  22 08:00  22 07:56





  Gabapentin 300 Mg Cap  PO   300 mg





  TID@0800,1600,2200 CHERYL   Administration


 


Heparin Sodium (Porcine)  5,000 unit  22 10:00  22 09:00





  Heparin 5,000 Unit/1 Ml Vial  SUB-Q   5,000 unit





  Q12HR CHERYL   Administration


 


Hydralazine HCl  10 mg  22 18:31  22 18:50





  Hydralazine 20 Mg/1 Ml Inj  IV   10 mg





  Q3HR PRN   Administration





  Hypertension  


 


Hydromorphone HCl  0.5 mg  22 06:15  22 09:01





  Hydromorphone 1 Mg/1 Ml Inj  IV   0.5 mg





  Q3H PRN   Administration





  Pain , Severe (7-10)  


 


Ondansetron HCl  4 mg  22 06:15  22 22:29





  Ondansetron 4 Mg/2 Ml Inj  IV   4 mg





  Q8H PRN   Administration





  Nausea And Vomiting  


 


Oxycodone/Acetaminophen  1 tab  22 10:57  22 01:30





  Oxycodone /Acetaminophen 5-325mg Tab  PO   1 tab





  Q6H PRN   Administration





  Pain, Moderate (4-6)  


 


Sodium Chloride  10 ml  22 10:00  22 09:03





  Sodium Chloride 0.9% 10 Ml Flush Syringe  IV   10 ml





  BID CHERYL   Administration


 


Sodium Chloride  10 ml  22 06:15  22 01:33





  Sodium Chloride 0.9% 10 Ml Flush Syringe  IV   10 ml





  PRN PRN   Administration





  LINE FLUSH  


 


Sodium Chloride  500 ml  22 19:10  22 19:32





  Sodium Chloride 0.9% Irr 500 Ml Bottle  IR   500 ml





  AS DIRECT PRN   Administration





  Wound Care  


 


Sodium Hypochlorite  1 applic  22 13:00  22 09:03





  Sodium Hypochlorite, Dakin's Full Strength (0.5%) 473 Ml Topical Soln  TP   1 

1000units





  DAILY CHERYL   Administration


 


Valsartan  160 mg  22 22:00  22 09:02





  Valsartan 160mg Tab  PO   160 mg





  DAILY CHERYL   Administration














Nutrition/Malnutrition Assess





- Dietary Evaluation


Nutrition/Malnutrition Findings: 


                                        





Nutrition Notes                                            Start:  05/10/22 

16:45


Freq:                                                      Status: Active       




Protocol:                                                                       




 Document     22 14:21  HATTIE  (Rec: 22 14:46  HATTIE  [id number])


 Nutrition Notes


     Initial or Follow up                        Reassessment


     Current Diagnosis                           Hypertension


     Other Pertinent Diagnosis                   Rabdomyolysis, Liver Failure,


                                                 s/p L-LE 4 Compartment


                                                 Fasciotomy w/Debridem.


     Current Diet                                Regular Diet + Dietary


                                                 Supplements. (since B ).


     Labs/Tests                                  : BUN 5, Crea 0.7.


     Pertinent Medications                       : Nutritionally


                                                 unremarkable.


     Height                                      5 ft 6 in


     Weight                                      86.183 kg


     Ideal Body Weight (kg)                      64.54


     BMI                                         30.7


     Weight change and time frame                No body weight change


                                                 reporterd in 9 days.


     Weight Status                               Obese


     Subjective/Other Information                RD consult for routine F/U on


                                                 dietary advancement.


                                                 Pt's PO intake of meals has


                                                 been Good (100%), according to


                                                 ADL notes.


                                                 Pt is on Room Air, O2


                                                 saturation @ 96%, according to


                                                 Physical Assessment History


                                                 notes.


                                                 Pt presents L-LE Pitting Edema


                                                 2+, according to Physical


                                                 Assessment History notes.


                                                 Pt presents L-LE Vascular


                                                 Wound with WoundVAC since , according to Physical


                                                 Assessment History notes.


                                                 Event on , Pt suffered a


                                                 syncope during PT session,


                                                 according to Progress notes.


                                                 Pt is clearedd for discharge,


                                                 it is pending due to several


                                                 social issues, according to


                                                 Progress notes.


     Percent of energy/protein needs met:        Prescribed Regular Diet


                                                 provides for energy/protein


                                                 needs (2,289 Kcal/89 g) during


                                                 LOS


     Burn                                        Absent


     Trauma                                      Absent


     GI Symptoms                                 None


     Food Allergy                                No


     Skin Integrity/Comment                      L-LE Vascular Wound


     Current % PO                                Good (%)


     Minimum of two criteria                     No


     #1


      Nutrition Diagnosis                        Increased nutrient needs   (


                                                 specify in comment below)


      Comments:                                  Protein to support wound


                                                 healing processes.


      Diagnosis Progress(for reassessment        Continues


       documentation)                            


     Is patient on ventilator?                   No


     Is Patient Ambulatory and/or Out of Bed     No


     REE-([facility]-confined to bed)      2083.920


     Kcal/Kg value to use for calculation        19


     Approximate Energy Requirements Using       1637


      kcal/Kg                                    


     Calculation Used for Recommendations        Kcal/kg


     Additional Notes                            Protein: 1.25-1.5 g/Kg AdjBW;


                                                  g/day.


                                                 Fluids: 1 ml/Kcal, or as per


                                                 MD.


 Nutrition Intervention


     Change Diet Order:                          Continue Regular Diet.


     Add Supplement/Snack (indicate name/kcal    Continue 28.8 g pkt Aidan; BID


      /protein )                                 .


     Provides kCal:                              190


     Provides Protein (gm)                       5


     Goal #1                                     Support, through dietary


                                                 supplementation, wound healing


                                                 processes during LOS.


     Goal #2                                     Adjust the dietary


                                                 intervention to better serve


                                                 Pt's needs and clinical


                                                 conditions during LOS.


     Goal #3                                     Maintain body weight within +/


                                                 -3% of admission body weight


                                                 during LOS.


     Revisit per MD consult or patient           Sign Off


      request:                                   


     Additional Comments                         Continue monitoring food


                                                 tolerance, %PO intake of meals


                                                 , and BM.

## 2022-06-02 NOTE — DISCHARGE SUMMARY
Providers





- Providers


Date of Admission: 


22 06:15





Attending physician: 


AMY J KOCHERLA





                                        





22 06:15


Consult to Physician [CONS] Routine 


   Comment: Dr. Prescott spoke with Dr. Sanchez @ [phone]


   Consulting Provider: DIEGO SANCHEZ


   Physician Instructions: 


   Reason For Exam: Left leg pain cellulitis





22 11:44


Consult to Physician [CONS] Routine 


   Comment: 


   Consulting Provider: DIOGO SANCHEZ


   Physician Instructions: 


   Reason For Exam: LtLE swelling/mottling/unable to move toes





22 12:18


Consult to Physician [CONS] Routine 


   Comment: Pt injected penicillin Lt. thigh/allergic reaction


   Consulting Provider: VERNA CHASE


   Physician Instructions: 


   Reason For Exam: Neuropathy Lt lower extremity/possible foot drop





22 08:31


Consult to Physician [CONS] Routine 


   Comment: 


   Consulting Provider: SOLEDAD DOWD


   Physician Instructions: 


   Reason For Exam: Acute liver failure/compartment syndrome





22 18:28


Consult to Physician [CONS] Routine 


   Comment: 


   Consulting Provider: CARMEN CHARLTON


   Physician Instructions: 


   Reason For Exam: Severe rhabdo dialysis/compartmental syndrome





05/10/22 19:02


Consult to Wound/ET Nurse [CONS] Routine 


   Reason For Exam: wound vac management





22 10:11


Consult to Case Management [CONS] Routine 


   Services Needed at Discharge: Wound Vac


                                   Physical Therapy


   Notified:: ELMIRA





22 13:34


Physical Therapy Evaluation and Treat [CONS] Routine 


   Comment: Patient s/p L leg fasciotomy


   Reason For Exam: Eval and treat





22 13:35


Consult to Orthotist/Prosthetist [CONS] Routine 


   Reason For Exam: AFO Brace





22 14:17


Consult to Wound/ET Nurse [CONS] Stat 


   Reason For Exam: wound vac changes





22 19:39


Consult to Physician [CONS] Routine 


   Comment: 


   Consulting Provider: DIEGO SANCHEZ


   Physician Instructions: 


   Reason For Exam: wound debridement











Primary care physician: 


PRIMARY CARE MD








Hospitalization


Reason for admission: Cellulitis leg/compartment syndrome left lower extremity


Condition: Stable


Hospital course: 


This is a 36 year old  transgendered female undergoing gender transformation 

with silicone injection to buttocks ([place] ) and breast implants, heavy 

drinker on the weekends (bottle of vodka) who presented to [facility] on 5/3 because 

of adverse drug reaction after reportedly self administration of  ro

cephin and PCN for sore throat. Patient reportedly collapsed while checking in 

to the emergency department, had complains of severe 10 out of 10 pain in the 

left thigh and had noticeable mottling of skin on the thigh and left lower 

abdomen.  Patient underwent a CT of the left leg which showed possible 

cellulitis with no drainable fluid collection and was started on empiric 

antibiotics.  Patient admitted to the hospital service with diagnosis of acute 

liver failure, rhabdomyolysis, and compartment syndrome, evaluated by 

vascular/IR, s/p 4 compartment fasciotomy to left lower extremity.  Patient 

received antibiotics, wound care, wound VAC treatment and was planning to be 

discharged


However developed a wound infection, wound VAC was removed, 


medical personnel aggressively treating the patient.  Discharge was held.  

Outpatient wound care was scheduled, wound care personal have evaluated on 

2022, request wound care personal to educate the patient and the family in 

detail with the instructions[preferably written instructions] prior to discharge

if possible.





Assessment and plan;


--Compartment syndrome to LLE


-Vascular surgery consulted, appreciate recommendations


-s/p self admin of IM abx to [facility]


-c/o pain, decreased sensation, pulses were dopplarable, paralysis, mottled skin


-s/p 4 compartment fasciotomy on 5/3 with vascular surgery


-Bilateral lower extremity Doppler ultrasound showed no DVT


-Left lower extremity CT with contrast showed appearance of posterior soft 

tissue complex 


cellulitis with small subcutaneous lymph nodes, no drainable fluid collections 

present, contusion 


could have similar appearance


-Bilateral lower extremity arterial duplex showed no significant lower extremity

peripheral artery disease


-Normal ABIs





Patient received wound VAC, with aggressive management and frequent changing.  

And with supportive care


Patient was being prepared to discharge with home health and outpatient wound 

care 3 times a week.





However patient's wound got significantly infected, wound VAC was removed, wound

care personal consulted and they are aggressively treating the wounds.


Once the wound infection improves, patient may receive wound VAC and then 

patient may be discharged home with follow-up visit with Dr. Clarita Prescott


Outpatient wound care 3 times a week





--Peripheral neuropathy


Continue gabapentin


Neurology evaluated





--HTN


Moderate control, continue current antihypertensives


As needed medications





--Rhabdomyolysis;


Improved from 95,000 CK -1001 week ago


Closely monitor IV hydration





--Severe protein calorie malnutrition; 


Nutrition supplements, supportive care





--severe hypoalbuminemia albumin 2.9


Nutrition supplements and supportive care





--Obesity; BMI 30.7


Advised weight reduction when medically stable





--acute kidney injury with vasomotor nephropathy now resolved


Monitor renal function


Avoid nephrotoxin. gentle hydration as needed





Discharge planning per case management


Detailed wound care instructions to the patient/family at the time of discharge.


Possible discharge in 1 to 2 days after patient/family are educated by the wound

care personal


Case management is assisting








Disposition:  HOME HEALTH CARE SERVICE


Final Discharge Diagnosis (Prints w/discharge instructions): Left lower 

extremity compartment syndrome.  4 compartment fasciotomy.  Slowly healing wound

left lower extremity.  Peripheral neuropathy.  Hypertension well controlled.  

Rhabdomyolysis improved.  Acute kidney injury vasomotor nephropathy resolved.  

Obesity BMI 30.  Severe protein calorie malnutrition.  Severe hypoalbuminemia; 

albumin 2.9


Time spent for discharge: 40 min





Core Measure Documentation





- Palliative Care


Palliative Care/ Comfort Measures: Not Applicable





- Core Measures


Any of the following diagnoses?: none





Exam





- Constitutional


Vitals: 


                                        











Temp Pulse Resp BP Pulse Ox


 


 97.9 F   82   20   94/55   98 


 


 22 11:41  22 11:41  22 11:53  22 11:41  22 11:53














Plan


Activity: advance as tolerated, fall precautions


Diet: regular


Wound: per your surgeon's advice, per wound nurse instructions


Additional Instructions: Advised to keep the wound clean and dry.  Follow the 

wound care nurse instructions.  Advised to follow wound care clinic per schedule

after discharge.  Advised to follow vascular surgeon Dr. Clarita Prescott on 2022.

 If you have worsening symptoms contact MD or go to the nearest emergency room 

as needed


Follow up with: 


PRIMARY CARE,MD [Primary Care Provider] - 3-5 Days


DIEGO SANCHEZ MD [Staff Physician] - 10 Days


CLARITA PRESCOTT MD [Staff Physician] - 22 10:00 am


Prescriptions: 


Docusate Sodium [Colace CAP] 100 mg PO BID #30 capsule


Sodium Hypochlorite [Dakin's Full Strength] 1 applic TP DAILY #2 bottle


Valsartan [Diovan] 160 mg PO DAILY #30 tablet


Gabapentin 300 mg PO TID@0800,1600,2200 #90 capsule


Famotidine [Pepcid] 20 mg PO BID #30 tablet


oxyCODONE /ACETAMINOPHEN [Percocet 5/325 mg] 1 tab PO Q6H PRN #30 tablet


 PRN Reason: Pain, Moderate (4-6)

## 2022-06-02 NOTE — EVENT NOTE
Date: 06/02/22


Wound care personnel came and did the wound dressing and educated the patient 

and the family member


Patient will be given wound dressing material by the nurse.  Patient will be 

scheduled to see outpatient wound care.


We will follow with Dr. Vikas Laboy on 6/9/2022


Discussed with case management Ms. Monzon who in turn discussed with Ms banks, 

possible discharge home tomorrow


If everything is set up and if patient is stable.

## 2022-06-02 NOTE — DISCHARGE SUMMARY
Providers





- Providers


Date of Admission: 


05/03/22 06:15





Date of discharge: 06/02/22


Attending physician: 


AMY J KOCHERLA





                                        





05/03/22 06:15


Consult to Physician [CONS] Routine 


   Comment: Dr. Laboy spoke with Dr. Sanchez @ [phone]


   Consulting Provider: DIEGO SANCHEZ


   Physician Instructions: 


   Reason For Exam: Left leg pain cellulitis





05/03/22 11:44


Consult to Physician [CONS] Routine 


   Comment: 


   Consulting Provider: DIOGO SANCHEZ


   Physician Instructions: 


   Reason For Exam: LtLE swelling/mottling/unable to move toes





05/03/22 12:18


Consult to Physician [CONS] Routine 


   Comment: Pt injected penicillin Lt. thigh/allergic reaction


   Consulting Provider: VERNA CHASE


   Physician Instructions: 


   Reason For Exam: Neuropathy Lt lower extremity/possible foot drop





05/04/22 08:31


Consult to Physician [CONS] Routine 


   Comment: 


   Consulting Provider: SOLEDAD DOWD


   Physician Instructions: 


   Reason For Exam: Acute liver failure/compartment syndrome





05/04/22 18:28


Consult to Physician [CONS] Routine 


   Comment: 


   Consulting Provider: CARMEN CHARLTON


   Physician Instructions: 


   Reason For Exam: Severe rhabdo dialysis/compartmental syndrome





05/10/22 19:02


Consult to Wound/ET Nurse [CONS] Routine 


   Reason For Exam: wound vac management





05/12/22 10:11


Consult to Case Management [CONS] Routine 


   Services Needed at Discharge: Wound Vac


                                   Physical Therapy


   Notified:: CM





05/13/22 13:34


Physical Therapy Evaluation and Treat [CONS] Routine 


   Comment: Patient s/p L leg fasciotomy


   Reason For Exam: Eval and treat





05/13/22 13:35


Consult to Orthotist/Prosthetist [CONS] Routine 


   Reason For Exam: AFO Brace





05/16/22 14:17


Consult to Wound/ET Nurse [CONS] Stat 


   Reason For Exam: wound vac changes





05/28/22 19:39


Consult to Physician [CONS] Routine 


   Comment: 


   Consulting Provider: DIEGO SANCHEZ


   Physician Instructions: 


   Reason For Exam: wound debridement











Primary care physician: 


PRIMARY CARE MD








Hospitalization


Condition: Stable


Disposition: 06 HOME HEALTH CARE SERVICE





Core Measure Documentation





- Palliative Care


Palliative Care/ Comfort Measures: Not Applicable





- Core Measures


Any of the following diagnoses?: none





Exam





- Constitutional


Vitals: 


                                        











Temp Pulse Resp BP Pulse Ox


 


 97.9 F   82   20   94/55   98 


 


 06/02/22 11:41  06/02/22 11:41  06/02/22 11:53  06/02/22 11:41  06/02/22 11:53














Plan


Activity: advance as tolerated, fall precautions


Diet: regular


Follow up with: 


PRIMARY CARE,MD [Primary Care Provider] - 3-5 Days

## 2022-06-03 VITALS — DIASTOLIC BLOOD PRESSURE: 70 MMHG | SYSTOLIC BLOOD PRESSURE: 103 MMHG

## 2022-06-03 RX ADMIN — HYDROMORPHONE HYDROCHLORIDE PRN MG: 1 INJECTION, SOLUTION INTRAMUSCULAR; INTRAVENOUS; SUBCUTANEOUS at 12:13

## 2022-06-03 RX ADMIN — GABAPENTIN SCH MG: 300 CAPSULE ORAL at 08:49

## 2022-06-03 RX ADMIN — HYDROMORPHONE HYDROCHLORIDE PRN MG: 1 INJECTION, SOLUTION INTRAMUSCULAR; INTRAVENOUS; SUBCUTANEOUS at 05:20

## 2022-06-03 RX ADMIN — HEPARIN SODIUM SCH UNIT: 5000 INJECTION, SOLUTION INTRAVENOUS; SUBCUTANEOUS at 12:17

## 2022-06-03 RX ADMIN — OXYCODONE AND ACETAMINOPHEN PRN TAB: 5; 325 TABLET ORAL at 15:40

## 2022-06-03 RX ADMIN — VALSARTAN SCH MG: 160 TABLET, FILM COATED ORAL at 12:25

## 2022-06-03 RX ADMIN — FAMOTIDINE SCH MG: 20 TABLET ORAL at 12:49

## 2022-06-03 NOTE — DISCHARGE SUMMARY
Providers





- Providers


Date of Admission: 


22 06:15





Date of discharge: 22


Attending physician: 


MICHAEL KNOTT MD





                                        





22 06:15


Consult to Physician [CONS] Routine 


   Comment: Dr. Prescott spoke with Dr. Sanchez @ [phone]


   Consulting Provider: DIEGO SANCHEZ


   Physician Instructions: 


   Reason For Exam: Left leg pain cellulitis





22 11:44


Consult to Physician [CONS] Routine 


   Comment: 


   Consulting Provider: DIOGO SANCHEZ


   Physician Instructions: 


   Reason For Exam: LtLE swelling/mottling/unable to move toes





22 12:18


Consult to Physician [CONS] Routine 


   Comment: Pt injected penicillin Lt. thigh/allergic reaction


   Consulting Provider: VERNA CHASE


   Physician Instructions: 


   Reason For Exam: Neuropathy Lt lower extremity/possible foot drop





22 08:31


Consult to Physician [CONS] Routine 


   Comment: 


   Consulting Provider: SOLEDAD DOWD


   Physician Instructions: 


   Reason For Exam: Acute liver failure/compartment syndrome





22 18:28


Consult to Physician [CONS] Routine 


   Comment: 


   Consulting Provider: CARMEN CHARLTON


   Physician Instructions: 


   Reason For Exam: Severe rhabdo dialysis/compartmental syndrome





05/10/22 19:02


Consult to Wound/ET Nurse [CONS] Routine 


   Reason For Exam: wound vac management





22 10:11


Consult to Case Management [CONS] Routine 


   Services Needed at Discharge: Wound Vac


                                   Physical Therapy


   Notified:: ELMIRA





22 13:34


Physical Therapy Evaluation and Treat [CONS] Routine 


   Comment: Patient s/p L leg fasciotomy


   Reason For Exam: Eval and treat





22 13:35


Consult to Orthotist/Prosthetist [CONS] Routine 


   Reason For Exam: AFO Brace





22 14:17


Consult to Wound/ET Nurse [CONS] Stat 


   Reason For Exam: wound vac changes





22 19:39


Consult to Physician [CONS] Routine 


   Comment: 


   Consulting Provider: DIEGO SANCHEZ


   Physician Instructions: 


   Reason For Exam: wound debridement











Primary care physician: 


PRIMARY CARE MD








Hospitalization


Reason for admission: acute encephalopathy


Condition: Stable


Hospital course: 





This is a 36 year old  transgendered female undergoing gender transformation 

with silicone injection to buttocks ([place] ) and breast implants, heavy 

drinker on the weekends (bottle of vodka) who presented to [facility] on 5/3 because 

of adverse drug reaction after reportedly self administration of  

rocephin and PCN for sore throat. Patient reportedly collapsed while checking in

 to the emergency department, had complains of severe 10 out of 10 pain in the 

left thigh and had noticeable mottling of skin on the thigh and left lower 

abdomen.  Patient underwent a CT of the left leg which showed possible 

cellulitis with no drainable fluid collection and was started on empiric 

antibiotics.  Patient admitted to the hospital service with diagnosis of acute 

liver failure, rhabdomyolysis, and compartment syndrome, evaluated by 

vascular/IR, s/p 4 compartment fasciotomy to left lower extremity.  Patient 

received antibiotics, wound care, wound VAC treatment and was planning to be 

discharged


However developed a wound infection, wound VAC was removed, medical personnel ag

gressively treating the patient.  Discharge was held.  Outpatient wound care was

 scheduled, wound care personal have evaluated on 2022. Once wound care 

provided instructions to the patient and the family in detail, patient was 

discharged home in care of family. 


Disposition:  HOME HEALTH CARE SERVICE


Final Discharge Diagnosis (Prints w/discharge instructions): Compartment 

syndrome to the left lower extremity.  Peripheral nephropathy.  Hypertension.  

Rhabdomyolysis.  Acute kidney injury secondary to vasomotor nephropathy


Time spent for discharge: 35 minutes





Core Measure Documentation





- Palliative Care


Palliative Care/ Comfort Measures: Not Applicable





- Core Measures


Any of the following diagnoses?: none





Exam





- Physical Exam


Narrative exam: 





GENERAL: Well-developed well-nourished. In no acute distress.


HEENT: Normocephalic. Atraumatic. 


NECK: Supple.  


CHEST/LUNGS: CTAB on room air


HEART/CARDIOVASCULAR: RRR. No murmur, rubs or gallops appreciated.


ABDOMEN: +BS. NT/ND.


NEURO:  No focal motor deficit.  Follows all commands.


MUSCULOSKELETAL: No joint effusion 


EXTREMITIES: LLE ace bandage intact. No cyanosis, clubbing or edema.


PSYCH: Cooperative.





- Constitutional


Vitals: 


                                        











Temp Pulse Resp BP Pulse Ox


 


 97.7 F   83   19   103/70   98 


 


 22 05:28  22 05:28  22 05:28  22 05:28  22 05:28














Plan


Care Plan Goals: 


Please make sure to follow-up in the wound care clinic as instructed. Your first

 appointment in 2022 @10:15am. 


Please follow the detailed instructions for wound care.


You have an appointment with the vascular surgeon  at 10 AM.  Please try 

your best to make this appointment.


Also, establish care with a primary care provider for management of your 

medications.


Follow up with: 


DIEGO SANCHEZ MD [Staff Physician] - 10 Days


PRIMARY CARE,MD [Primary Care Provider] - 3-5 Days


CLARITA PRESCOTT MD [Staff Physician] - 22 10:00 am


Prescriptions: 


Docusate Sodium [Colace CAP] 100 mg PO BID #30 capsule


Sodium Hypochlorite [Dakin's Full Strength] 1 applic TP DAILY #2 bottle


Valsartan [Diovan] 160 mg PO DAILY #30 tablet


Gabapentin 300 mg PO TID@0800,1600,2200 #90 capsule


Famotidine [Pepcid] 20 mg PO BID #30 tablet


oxyCODONE /ACETAMINOPHEN [Percocet 5/325 mg] 1 tab PO Q6H PRN #30 tablet


 PRN Reason: Pain, Moderate (4-6)

## 2023-01-01 NOTE — EVENT NOTE
Date: 05/03/22


Vascular and interventional radiologists Dr. Cantrell/Dr. Vikas Laboy evaluated the 

patient 


And patient underwent emergency left leg 4 compartment fasciotomies.


Patient will be transferred to [facility] for postop care and close observation and 

management.


Patient is critically ill with guarded prognosis


Patient's family member who is at the bedside is aware of patient's condition, 

and prognosis


We will closely monitor the patient and adjust the management as needed.


We will sign off to the overnight covering hospitalist


Total critical care time 60 minutes








The high probability of a clinically significant, sudden or life threatening 

deterioration of the [multi] system(s) required my full and direct attention, 

intervention and personal management. The aggregate critical care time was [60] 

minutes. This time is in addition to time spent performing reported procedures 

but includes the following: 


[x] Data Review and interpretation 


[x] Patient assessment and monitoring of vital signs 


[x] Documentation 


[x] Medication orders and management N/A

## 2023-12-29 NOTE — PROGRESS NOTE
Assessment and Plan


Assessment and plan: 


This is a 36 year old  transgendered female undergoing gender transformation 

with silicone injection to buttocks ([place] ) and breast implants, heavy 

drinker on the weekends (bottle of vodka) who presented to [facility] on 5/3 because 

of adverse drug reaction after reportedly self administration of  

rocephin and PCN for sore throat. Patient reportedly collapsed while checking in

to the emergency department, has complains of severe 10 out of 10 pain in left 

thigh and has noticeable mottling of skin in thigh and left lower abdomen.  

Patient underwent a CT of the left leg which showed possible cellulitis with no 

drainable fluid collection and was started on empiric antibiotics.  Patient 

admitted to the hospital service with diagnosis of acute liver failure, 

rhabdomyolysis, s/p 4 compartment fasciotomy to left lower extremity





Acute liver failure 


-GI consulted, appreciate recommendations


-Per GI: Suspect due to rhabdomyolysis given elevated creatinine kinase levels


   -If ALT continues to rise recommend MRCP


-Abdominal ultrasound showed mild diffuse biliary dilation, obstruction to 

lesion of the distal common bile duct cannot be excluded consider MRCP, no 

evidence of gallstones within the gallbladder


-PPI


-Regular diet


-BR: colace


-Trend LFTs





Compartment syndrome to LLE


-Vascular surgery consulted, appreciate recommendations


-s/p self admin of IM abx to midthigh


-c/o pain, decreased sensation, pulses were dopplarable, paralysis, mottled skin


-s/p 4 compartment tracheotomy on 5/3 with vascular surgery


-Bilateral lower extremity Doppler ultrasound showed no DVT


-Left lower extremity CT with contrast showed appearance of posterior soft 

tissue complex cellulitis with small subcutaneous lymph nodes, no drainable 

fluid collections present, contusion could have similar appearance


-Bilateral lower extremity arterial duplex showed no significant lower extremity

peripheral artery disease


-Normal ABIs





Peripheral neuropathy


-Avoid delirium


-Reorientation as needed


-Maintain sleep-wake cycle


-As needed analgesia


-Neurology consulted, appreciate recommendations


-Gabapentin 





HTN


-Blood pressure monitoring per protocol


-Valsartan


-PRN hydralazine





Rhabdomyolysis





acute kidney injury with vasomotor nephropathy now resolved


-Nephrology consulted, appreciate recommendations


-Strict intake and output


-Renally dose medications


-Avoid nephrotoxic medications


-MIVF


-Sodium bicarb


-Trend BMP, CK








Hospital course to date:





5/3: Venous ultrasound shows no DVT, Vascular surgery consulted who performed a 

fasciotomy and plan to transfer patient to [facility] post intervention.  Neurology 

consulted





: Transfer to ICU, GI and neurology consulted.





: Patient will be transferred back to the floor from ICU.  Her liver enzymes 

and creatinine are trending down.  Will be started on gabapentin per neurology 

recommendations and antibiotics discontinued.





: Continue supportive care.  CPK slowly improving.  Vascular plans for 

closure of fasciotomy early next week.  PT OT evaluation and treatment closure 

done.  Continue wound management.  No evidence of withdrawal noted at this time.

 Continue to monitor LFTs which are also slightly improving.  Plan discussed in 

detail with the patient who verbalized understanding





: Patient seen and examined clinically stable continues to show some 

improvement.  Closure of fasciotomy planned for tomorrow.  Renal function is 

improving.  CK ordered and pending





: Patient seen and examined, wbc mildly elevated likely secondary to 

steroids, will change to po for two additional days and stop, benefit probably 

already achieved, continue wound dressing, sensory function improved, awaiting 

wound vac and closure. Patient advised on clinically progress. 





: Patient seen and examined awaiting her vascular surgery for closure of 

fasciotomy.  CK is decreasing less than 9000 today.  We will continue aggressive

IV fluids until we have the resolution below 5000.  LFTs on the downward trend 

also.  Leukocytosis appears to have peaked steroids was changed to oral for 2 

additional days today is 1 of 2..  Plan of care discussed in detail with the 

patient who verbalized understanding.





5/10:  Left lower extremity compartment syndrome status post fasciotomies.  Cau

se is still unclear and may represent some allergic reaction.  Motor function is

unchanged from original exam.  Sensory function has improved with full sensation

of the foot.vascular surgery to place wound vacs on the leg today.  CK improved 

to the 8000 range today





: Patient underwent excisional debridement of muscle and soft tissue from 

left leg wound yesterday.  Patient also had wound VAC placement left leg 

fasciotomy incisions.  The patient had moderate amount of superficial necrotic 

muscle in the anterior lateral compartments with minimal amount of superficial 

necrotic muscle in the posterior compartment.  Follow-up culture results.  CK 

levels have improved to 6900 range.  AST/ALT continue to trend downward.





: I discussed the case with Dr. Mckeon who recommends home wound VAC as 

well as physical therapy.  I also discussed these needs with case management who

was attempting to arrange for discharge planning.  CK continues to improve with 

levels in the 4000 range.





: Still awaiting for home wound VAC arrangements to be made.  Leukocytosis 

is much improved down to 13,900 today.  LFTs continue to trend down to normal 

range.  CK levels still in the 4000 range.  Nephrology has signed off.  

Anticipate discharge later today or in a.m.





:  Still awaiting for home wound VAC arrangements to be made.  Laboratories 

continue to include including LFTs, CK and leukocytosis.





5/15:  Still awaiting for home wound VAC arrangements to be made.





: Patient had an episode of syncope while working with PT today.  Nurse 

reports patient lost consciousness for a few seconds and was helped back to the 

bed.  Patient's blood pressure was noted to be hypotensive/orthostatic.  We will

bolus 500 normal saline IV fluid and monitor orthostatics.  Case management 

reports that wound VAC should be delivered today.  LFTs continue to trend down 

to normal range.  CK levels still in the 2000 range.  Nephrology has signed off.

 Anticipate discharge in a.m. given the syncope and once wound VAC arranged.  

Patient with low-grade temp.  We will monitor.





; patient is anxious to go home, wound VAC is delivered to the bedside, 

continue wound care


Multiple social issues, DC planning per case management, left lower extremity 

Doppler negative for DVT





; discharge planning, multiple social issues





; continue current management, wound VAC in place, DC planning issues





History


Interval history: 


I have seen and examined the patient at the bedside


Patient's chart and medications reviewed


Patient states he feels better


No new complaints


Wound VAC functioning








Hospitalist Physical





- Constitutional


Vitals: 


                                        











Temp Pulse Resp BP Pulse Ox


 


 99.2 F   101 H  16   114/70   98 


 


 22 18:07  22 18:07  22 18:07  22 18:07  22 21:30











General appearance: Present: no acute distress, well-nourished, obese





- EENT


Eyes: Present: PERRL, EOM intact





- Neck


Neck: Present: supple, normal ROM





- Respiratory


Respiratory effort: normal


Respiratory: bilateral: diminished, negative: rales, rhonchi, wheezing





- Cardiovascular


Rhythm: regular


Heart Sounds: Present: S1 & S2





- Extremities


Extremities: abnormal (Left lower extremity wound VAC in place mild swelling)


Extremity abnormal: edema





- Abdominal


General gastrointestinal: soft, non-tender, non-distended, normal bowel sounds





- Integumentary


Integumentary: Present: clear, warm





- Psychiatric


Psychiatric: appropriate mood/affect, cooperative





- Neurologic


Neurologic: moves all extremities





Results





- Labs


CBC & Chem 7: 


                                 22 05:01





                                 22 08:55


Labs: 


                             Laboratory Last Values











WBC  10.6 K/mm3 (4.5-11.0)   22  05:01    


 


RBC  3.21 M/mm3 (3.65-5.03)  L  22  05:01    


 


Hgb  10.7 gm/dl (11.8-15.2)  L  22  05:01    


 


Hct  32.0 % (35.5-45.6)  L  22  05:01    


 


MCV  99 fl (84-94)  H  22  05:01    


 


MCH  33 pg (28-32)  H  22  05:01    


 


MCHC  33 % (32-34)   22  05:01    


 


RDW  13.3 % (13.2-15.2)   22  05:01    


 


Plt Count  427 K/mm3 (140-440)   22  05:01    


 


Lymph % (Auto)  12.9 % (13.4-35.0)  L  22  05:01    


 


Mono % (Auto)  6.9 % (0.0-7.3)   22  05:01    


 


Eos % (Auto)  0.4 % (0.0-4.3)   22  05:01    


 


Baso % (Auto)  0.3 % (0.0-1.8)   22  05:01    


 


Lymph # (Auto)  1.4 K/mm3 (1.2-5.4)   22  05:01    


 


Mono # (Auto)  0.7 K/mm3 (0.0-0.8)   22  05:01    


 


Eos # (Auto)  0.0 K/mm3 (0.0-0.4)   22  05:01    


 


Baso # (Auto)  0.0 K/mm3 (0.0-0.1)   22  05:01    


 


Add Manual Diff  Complete   22  04:49    


 


Total Counted  100   22  04:49    


 


Seg Neutrophils %  79.5 % (40.0-70.0)  H  22  05:01    


 


Seg Neuts % (Manual)  83.0 % (40.0-70.0)  H  22  04:49    


 


Band Neutrophils %  2.0 %  22  04:49    


 


Lymphocytes % (Manual)  5.0 % (13.4-35.0)  L  22  04:49    


 


Reactive Lymphs % (Man)  0 %  22  04:49    


 


Monocytes % (Manual)  8.0 % (0.0-7.3)  H  22  04:49    


 


Eosinophils % (Manual)  0 % (0.0-4.3)   22  04:49    


 


Basophils % (Manual)  0 % (0.0-1.8)   22  04:49    


 


Metamyelocytes %  2.0 %  22  04:49    


 


Myelocytes %  0 %  22  04:49    


 


Promyelocytes %  0 %  22  04:49    


 


Blast Cells %  0 %  22  04:49    


 


Nucleated RBC %  Not Reportable   22  04:49    


 


Seg Neutrophils #  8.4 K/mm3 (1.8-7.7)  H  22  05:01    


 


Seg Neutrophils # Man  17.1 K/mm3 (1.8-7.7)  H  22  04:49    


 


Band Neutrophils #  0.4 K/mm3  22  04:49    


 


Lymphocytes # (Manual)  1.0 K/mm3 (1.2-5.4)  L  22  04:49    


 


Abs React Lymphs (Man)  0.0 K/mm3  22  04:49    


 


Monocytes # (Manual)  1.6 K/mm3 (0.0-0.8)  H  22  04:49    


 


Eosinophils # (Manual)  0.0 K/mm3 (0.0-0.4)   22  04:49    


 


Basophils # (Manual)  0.0 K/mm3 (0.0-0.1)   22  04:49    


 


Metamyelocytes #  0.4 K/mm3  22  04:49    


 


Myelocytes #  0.0 K/mm3  22  04:49    


 


Promyelocytes #  0.0 K/mm3  22  04:49    


 


Blast Cells #  0.0 K/mm3  22  04:49    


 


WBC Morphology  Not Reportable   22  04:49    


 


Hypersegmented Neuts  Not Reportable   22  04:49    


 


Hyposegmented Neuts  Rare   22  04:49    


 


Hypogranular Neuts  Not Reportable   22  04:49    


 


Smudge Cells  Not Reportable   22  04:49    


 


Toxic Granulation  Not Reportable   22  04:49    


 


Toxic Vacuolation  Not Reportable   22  04:49    


 


Dohle Bodies  Not Reportable   22  04:49    


 


Pelger-Huet Anomaly  Not Reportable   22  04:49    


 


Jamie Rods  Not Reportable   22  04:49    


 


Platelet Estimate  Consistent w auto   22  04:49    


 


Clumped Platelets  Not Reportable   22  04:49    


 


Plt Clumps, EDTA  Not Reportable   22  04:49    


 


Large Platelets  Rare   22  04:49    


 


Giant Platelets  Not Reportable   22  04:49    


 


Platelet Satelliting  Not Reportable   22  04:49    


 


Plt Morphology Comment  Not Reportable   22  04:49    


 


RBC Morphology  Not Reportable   22  04:49    


 


Dimorphic RBCs  Not Reportable   22  04:49    


 


Polychromasia  Not Reportable   22  04:49    


 


Hypochromasia  Not Reportable   22  04:49    


 


Poikilocytosis  Not Reportable   22  04:49    


 


Anisocytosis  Rare   22  04:49    


 


Microcytosis  Not Reportable   22  04:49    


 


Macrocytosis  Rare   22  04:49    


 


Spherocytes  Not Reportable   22  04:49    


 


Pappenheimer Bodies  Not Reportable   22  04:49    


 


Sickle Cells  Not Reportable   22  04:49    


 


Target Cells  Not Reportable   22  04:49    


 


Tear Drop Cells  Not Reportable   22  04:49    


 


Ovalocytes  Not Reportable   22  04:49    


 


Helmet Cells  Not Reportable   22  04:49    


 


Mccormick-Kingstowne Bodies  Not Reportable   22  04:49    


 


Cabot Rings  Not Reportable   22  04:49    


 


[place] Cells  Not Reportable   22  04:49    


 


Bite Cells  Not Reportable   22  04:49    


 


Crenated Cell  Not Reportable   22  04:49    


 


Elliptocytes  Not Reportable   22  04:49    


 


Acanthocytes (Spur)  Not Reportable   22  04:49    


 


Rouleaux  Not Reportable   22  04:49    


 


Hemoglobin C Crystals  Not Reportable   22  04:49    


 


Schistocytes  Not Reportable   22  04:49    


 


Malaria parasites  Not Reportable   22  04:49    


 


Miguel Bodies  Not Reportable   22  04:49    


 


Hem Pathologist Commnt  No   22  04:49    


 


PT  13.4 Sec. (12.2-14.9)   22  13:30    


 


INR  0.92  (0.87-1.13)   22  13:30    


 


APTT  25.8 Sec. (24.2-36.6)   22  13:30    


 


Sodium  138 mmol/L (137-145)   22  05:01    


 


Potassium  3.8 mmol/L (3.6-5.0)   22  05:01    


 


Chloride  102.8 mmol/L ()   22  05:01    


 


Carbon Dioxide  27 mmol/L (22-30)   22  05:01    


 


Anion Gap  12 mmol/L  22  05:01    


 


BUN  5 mg/dL (9-20)  L  22  05:01    


 


Creatinine  0.6 mg/dL (0.8-1.3)  L  22  05:01    


 


Estimated GFR  > 60 ml/min  22  05:01    


 


BUN/Creatinine Ratio  8 %  22  05:01    


 


Glucose  105 mg/dL ()  H  22  05:01    


 


Lactic Acid  1.40 mmol/L (0.7-2.0)   22  13:30    


 


Calcium  8.4 mg/dL (8.4-10.2)   22  05:01    


 


Phosphorus  2.80 mg/dL (2.5-4.5)   22  05:17    


 


Magnesium  2.20 mg/dL (1.7-2.3)   22  05:17    


 


Total Bilirubin  0.50 mg/dL (0.1-1.2)   22  04:00    


 


Direct Bilirubin  < 0.2 mg/dL (0-0.2)   22  04:00    


 


Indirect Bilirubin  0.3 mg/dL  22  04:00    


 


AST  93 units/L (5-40)  H  22  04:00    


 


ALT  187 units/L (7-56)  H  22  04:00    


 


Alkaline Phosphatase  85 units/L ()   22  04:00    


 


Ammonia  37.0 umol/L (25-60)   22  04:49    


 


Total Creatine Kinase  2431 units/L ()  H  05/15/22  05:07    


 


Total Protein  5.3 g/dL (6.3-8.2)  L  22  04:00    


 


Albumin  2.8 g/dL (3.9-5)  L  22  04:00    


 


Albumin/Globulin Ratio  1.1 %  22  04:00    


 


Amylase  25 units/L ()  L  22  04:49    


 


Lipase  10 units/L (13-60)  L  22  04:49    


 


Coronavirus (PCR)  Negative  (Negative)   22  11:40    


 


Hepatitis A IgM Ab  Nonreactive  (NonReactive)   22  13:30    


 


Hep Bs Antigen  Non-reactive  (Negative)   22  13:30    


 


Hep B Core IgM Ab  Non-reactive  (NonReactive)   22  13:30    


 


Hepatitis C Antibody  Non-reactive  (NonReactive)   22  13:30    


 


HIV 1&2 Antibody Rapid  Non react  (Non React)   22  16:42    


 


HIV P24 Antigen  Non react  (Non React)   22  16:42    











Mcwilliams/IV: 


                                        





Voiding Method                   Urinal











Active Medications





- Current Medications


Current Medications: 














Generic Name Dose Route Start Last Admin





  Trade Name Freq  PRN Reason Stop Dose Admin


 


Acetaminophen  650 mg  22 22:14  22 00:08





  Acetaminophen 325 Mg Tab  PO   650 mg





  Q6H PRN   Administration





  Pain, Mild (1-3)  


 


Albuterol  2.5 mg  22 06:15 





  Albuterol 2.5 Mg/3 Ml Nebu  IH  





  Q3HRT PRN  





  Shortness Of Breath  


 


Albuterol/Ipratropium  1 ampul  22 08:00  22 19:42





  Ipratropium/Albuterol Sulfate 3 Ml Ampul.Neb  IH   Not Given





  BIDRT CHERYL  


 


Diphenhydramine HCl  25 mg  22 06:24  22 17:12





  Diphenhydramine 50 Mg/Ml Vial  IV   25 mg





  Q6H PRN   Administration





  Skin Irritation  


 


Docusate Sodium  100 mg  22 22:00  22 21:27





  Docusate Sodium 100 Mg Cap  PO   100 mg





  BID CHERYL   Administration


 


Famotidine  20 mg  22 10:00  22 21:27





  Famotidine 20 Mg Tab  PO   20 mg





  BID CHERYL   Administration


 


Gabapentin  300 mg  22 08:00  22 21:27





  Gabapentin 300 Mg Cap  PO   300 mg





  TID@0800,1600,2200 CHERYL   Administration


 


Heparin Sodium (Porcine)  5,000 unit  22 10:00  22 21:27





  Heparin 5,000 Unit/1 Ml Vial  SUB-Q   5,000 unit





  Q12HR CHERYL   Administration


 


Hydralazine HCl  10 mg  22 18:31  22 18:50





  Hydralazine 20 Mg/1 Ml Inj  IV   10 mg





  Q3HR PRN   Administration





  Hypertension  


 


Hydromorphone HCl  0.5 mg  22 06:15  22 14:05





  Hydromorphone 1 Mg/1 Ml Inj  IV   0.5 mg





  Q3H PRN   Administration





  Pain , Severe (7-10)  


 


Sodium Chloride  1,000 mls @ 75 mls/hr  22 01:00  22 21:27





  Nacl 0.9% 1000 Ml  IV   75 mls/hr





  AS DIRECT CHERYL   Administration


 


Morphine Sulfate  2 mg  22 06:15  22 17:50





  Morphine 2 Mg/1 Ml Inj  IV   2 mg





  Q4H PRN   Administration





  Pain, Moderate (4-6)  


 


Ondansetron HCl  4 mg  22 06:15  22 10:38





  Ondansetron 4 Mg/2 Ml Inj  IV   4 mg





  Q8H PRN   Administration





  Nausea And Vomiting  


 


Sodium Chloride  10 ml  22 10:00  22 21:59





  Sodium Chloride 0.9% 10 Ml Flush Syringe  IV   10 ml





  BID CHERYL   Administration


 


Sodium Chloride  10 ml  22 06:15  22 01:09





  Sodium Chloride 0.9% 10 Ml Flush Syringe  IV   10 ml





  PRN PRN   Administration





  LINE FLUSH  


 


Valsartan  160 mg  22 22:00  22 09:09





  Valsartan 160mg Tab  PO   160 mg





  DAILY CHERYL   Administration














Nutrition/Malnutrition Assess





- Dietary Evaluation


Nutrition/Malnutrition Findings: 


                                        





Nutrition Notes                                            Start:  05/10/22 

16:45


Freq:                                                      Status: Active       




Protocol:                                                                       




 Document     22 12:23  HATTIE  (Rec: 22 12:43  HATTIE  [id number])


 Nutrition Notes


     Initial or Follow up                        Brief Note


     Current Diagnosis                           Hypertension


     Other Pertinent Diagnosis                   Rabdomyolysis, Liver Failure,


                                                 s/p L-LE 4 Compartment


                                                 Fasciotomy w/Debridem.


     Current Diet                                Regular Diet + Dietary


                                                 Supplements. (since B ).


     Height                                      5 ft 6 in


     Weight                                      86.183 kg


     Ideal Body Weight (kg)                      64.54


     BMI                                         30.7


     Weight change and time frame                No bnody weight change


                                                 reporterd in 2 days.


     Weight Status                               Obese


     Subjective/Other Information                RD consult for routine F/U on


                                                 dietary advancement.


                                                 Pt is now on PO diet, but no


                                                 reports available on Pt's PO


                                                 intake of meals at the time,


                                                 will assess at F/U.


                                                 Pt is on Room Air, O2


                                                 saturation @ 97%, according to


                                                 Physical Assessment History


                                                 notes.


                                                 Procedure on 05/10: Excisinal


                                                 debridement of muscle and soft


                                                 tissue from L-LE wound, and


                                                 woundVAC placement; Leg


                                                 fasciotomy lateral and medial


                                                 incisions, Well tolerated,


                                                 according to Operative Report.


     Percent of energy/protein needs met:        Prescribed Regular Diet


                                                 provides for energy/protein


                                                 needs (2,289 Kcal/89 g) during


                                                 LOS; additionally, Dietary


                                                 Supplements will support wound


                                                 healing processes with 190


                                                 Kcal and 5 g of protein.


     #1


      Nutrition Diagnosis                        Increased nutrient needs   (


                                                 specify in comment below)


      Comments:                                  Protein to support wound


                                                 healing processes.


      Diagnosis Progress(for reassessment        Continues


       documentation)                            


     Is patient on ventilator?                   No


     Is Patient Ambulatory and/or Out of Bed     No


     REE-([facility]-confined to bed)      [phone]


     Kcal/Kg value to use for calculation        19


     Approximate Energy Requirements Using       1637


      kcal/Kg                                    


     Calculation Used for Recommendations        Kcal/kg


     Additional Notes                            Protein: 1.25-1.5 g/Kg AdjBW;


                                                  g/day.


                                                 Fluids: 1 ml/Kcal, or as per


                                                 MD.


 Nutrition Intervention


     Change Diet Order:                          Continue Regular Diet.


     Add Supplement/Snack (indicate name/kcal    Continue 28.8 g pkt Aidan; BID


      /protein )                                 .


     Provides kCal:                              190


     Provides Protein (gm)                       5


     Goal #1                                     Support, through dietary


                                                 supplementation, wound healing


                                                 processes during LOS.


     Goal #2                                     Adjust the dietary


                                                 intervention to better serve


                                                 Pt's needs and clinical


                                                 conditions during LOS.


     Goal #3                                     Maintain body weight within +/


                                                 -3% of admission body weight


                                                 during LOS.


     Follow-Up By:                               22


     Additional Comments                         Continue monitoring food


                                                 tolerance, %PO intake of meals


                                                 , and BM. Labs Drawn